# Patient Record
Sex: MALE | Race: WHITE | Employment: OTHER | URBAN - METROPOLITAN AREA
[De-identification: names, ages, dates, MRNs, and addresses within clinical notes are randomized per-mention and may not be internally consistent; named-entity substitution may affect disease eponyms.]

---

## 2017-10-05 ENCOUNTER — HOSPITAL ENCOUNTER (OUTPATIENT)
Dept: GENERAL RADIOLOGY | Age: 72
Discharge: HOME | End: 2017-10-05
Attending: OTOLARYNGOLOGY
Payer: MEDICARE

## 2017-10-05 DIAGNOSIS — R49.0 HOARSENESS: ICD-10-CM

## 2017-10-05 DIAGNOSIS — R13.10 DYSPHAGIA: ICD-10-CM

## 2017-10-05 DIAGNOSIS — R13.14 PHARYNGOESOPHAGEAL DYSPHAGIA: ICD-10-CM

## 2017-10-05 DIAGNOSIS — J37.0 LARYNGITIS, CHRONIC: ICD-10-CM

## 2017-10-05 PROCEDURE — 74230 X-RAY XM SWLNG FUNCJ C+: CPT

## 2017-10-05 PROCEDURE — G8997 SWALLOW GOAL STATUS: HCPCS

## 2017-10-05 PROCEDURE — G8996 SWALLOW CURRENT STATUS: HCPCS

## 2017-10-05 PROCEDURE — G8998 SWALLOW D/C STATUS: HCPCS

## 2017-10-05 PROCEDURE — 92611 MOTION FLUOROSCOPY/SWALLOW: CPT

## 2018-07-31 ENCOUNTER — HOSPITAL ENCOUNTER (OUTPATIENT)
Dept: PREADMISSION TESTING | Age: 73
Discharge: HOME | End: 2018-07-31
Payer: MEDICARE

## 2018-07-31 VITALS
WEIGHT: 274 LBS | SYSTOLIC BLOOD PRESSURE: 117 MMHG | BODY MASS INDEX: 35.16 KG/M2 | HEART RATE: 67 BPM | HEIGHT: 74 IN | DIASTOLIC BLOOD PRESSURE: 77 MMHG | TEMPERATURE: 97.7 F

## 2018-07-31 LAB
ABO + RH BLD: NORMAL
ANION GAP SERPL CALC-SCNC: 8 MMOL/L (ref 5–15)
APPEARANCE UR: ABNORMAL
BACTERIA URNS QL MICRO: NEGATIVE /HPF
BILIRUB UR QL: NEGATIVE
BLOOD GROUP ANTIBODIES SERPL: NORMAL
BUN SERPL-MCNC: 19 MG/DL (ref 6–20)
BUN/CREAT SERPL: 20 (ref 12–20)
CALCIUM SERPL-MCNC: 8.7 MG/DL (ref 8.5–10.1)
CHLORIDE SERPL-SCNC: 109 MMOL/L (ref 97–108)
CO2 SERPL-SCNC: 23 MMOL/L (ref 21–32)
COLOR UR: ABNORMAL
CREAT SERPL-MCNC: 0.93 MG/DL (ref 0.7–1.3)
EPITH CASTS URNS QL MICRO: ABNORMAL /LPF
ERYTHROCYTE [DISTWIDTH] IN BLOOD BY AUTOMATED COUNT: 13.5 % (ref 11.5–14.5)
EST. AVERAGE GLUCOSE BLD GHB EST-MCNC: 117 MG/DL
GLUCOSE SERPL-MCNC: 108 MG/DL (ref 65–100)
GLUCOSE UR STRIP.AUTO-MCNC: NEGATIVE MG/DL
HBA1C MFR BLD: 5.7 % (ref 4.2–6.3)
HCT VFR BLD AUTO: 47.1 % (ref 36.6–50.3)
HGB BLD-MCNC: 15.2 G/DL (ref 12.1–17)
HGB UR QL STRIP: NEGATIVE
HYALINE CASTS URNS QL MICRO: ABNORMAL /LPF (ref 0–5)
INR PPP: 1 (ref 0.9–1.1)
KETONES UR QL STRIP.AUTO: NEGATIVE MG/DL
LEUKOCYTE ESTERASE UR QL STRIP.AUTO: NEGATIVE
MCH RBC QN AUTO: 29.7 PG (ref 26–34)
MCHC RBC AUTO-ENTMCNC: 32.3 G/DL (ref 30–36.5)
MCV RBC AUTO: 92.2 FL (ref 80–99)
NITRITE UR QL STRIP.AUTO: NEGATIVE
NRBC # BLD: 0 K/UL (ref 0–0.01)
NRBC BLD-RTO: 0 PER 100 WBC
PH UR STRIP: 5.5 [PH] (ref 5–8)
PLATELET # BLD AUTO: 156 K/UL (ref 150–400)
PMV BLD AUTO: 12.2 FL (ref 8.9–12.9)
POTASSIUM SERPL-SCNC: 4.3 MMOL/L (ref 3.5–5.1)
PROT UR STRIP-MCNC: NEGATIVE MG/DL
PROTHROMBIN TIME: 10.4 SEC (ref 9–11.1)
RBC # BLD AUTO: 5.11 M/UL (ref 4.1–5.7)
RBC #/AREA URNS HPF: ABNORMAL /HPF (ref 0–5)
SODIUM SERPL-SCNC: 140 MMOL/L (ref 136–145)
SP GR UR REFRACTOMETRY: 1.02 (ref 1–1.03)
SPECIMEN EXP DATE BLD: NORMAL
UA: UC IF INDICATED,UAUC: ABNORMAL
UROBILINOGEN UR QL STRIP.AUTO: 1 EU/DL (ref 0.2–1)
WBC # BLD AUTO: 7.7 K/UL (ref 4.1–11.1)
WBC URNS QL MICRO: ABNORMAL /HPF (ref 0–4)

## 2018-07-31 PROCEDURE — 36415 COLL VENOUS BLD VENIPUNCTURE: CPT | Performed by: ORTHOPAEDIC SURGERY

## 2018-07-31 PROCEDURE — 81001 URINALYSIS AUTO W/SCOPE: CPT | Performed by: ORTHOPAEDIC SURGERY

## 2018-07-31 PROCEDURE — 83036 HEMOGLOBIN GLYCOSYLATED A1C: CPT | Performed by: ORTHOPAEDIC SURGERY

## 2018-07-31 PROCEDURE — 85027 COMPLETE CBC AUTOMATED: CPT | Performed by: ORTHOPAEDIC SURGERY

## 2018-07-31 PROCEDURE — 85610 PROTHROMBIN TIME: CPT | Performed by: ORTHOPAEDIC SURGERY

## 2018-07-31 PROCEDURE — 80048 BASIC METABOLIC PNL TOTAL CA: CPT | Performed by: ORTHOPAEDIC SURGERY

## 2018-07-31 PROCEDURE — 93005 ELECTROCARDIOGRAM TRACING: CPT

## 2018-07-31 PROCEDURE — 86900 BLOOD TYPING SEROLOGIC ABO: CPT | Performed by: ORTHOPAEDIC SURGERY

## 2018-07-31 RX ORDER — METOPROLOL SUCCINATE 25 MG/1
25 TABLET, EXTENDED RELEASE ORAL DAILY
COMMUNITY
End: 2021-09-17

## 2018-07-31 RX ORDER — LANOLIN ALCOHOL/MO/W.PET/CERES
400 CREAM (GRAM) TOPICAL DAILY
COMMUNITY
End: 2021-05-14

## 2018-07-31 RX ORDER — DUTASTERIDE 0.5 MG/1
0.5 CAPSULE, LIQUID FILLED ORAL DAILY
COMMUNITY

## 2018-07-31 RX ORDER — LEVOCETIRIZINE DIHYDROCHLORIDE 5 MG/1
5 TABLET, FILM COATED ORAL
COMMUNITY

## 2018-07-31 RX ORDER — ACETAMINOPHEN 160 MG/5ML
200 SUSPENSION, ORAL (FINAL DOSE FORM) ORAL DAILY
COMMUNITY
End: 2022-04-22

## 2018-07-31 RX ORDER — MONTELUKAST SODIUM 10 MG/1
10 TABLET ORAL
COMMUNITY
End: 2021-08-22

## 2018-07-31 RX ORDER — PHENOL/SODIUM PHENOLATE
20 AEROSOL, SPRAY (ML) MUCOUS MEMBRANE 2 TIMES DAILY
COMMUNITY
End: 2021-12-13 | Stop reason: ALTCHOICE

## 2018-07-31 RX ORDER — NITROFURANTOIN (MACROCRYSTALS) 100 MG/1
CAPSULE ORAL DAILY
COMMUNITY
End: 2018-08-16

## 2018-07-31 RX ORDER — IPRATROPIUM BROMIDE AND ALBUTEROL SULFATE 2.5; .5 MG/3ML; MG/3ML
3 SOLUTION RESPIRATORY (INHALATION)
COMMUNITY

## 2018-07-31 RX ORDER — GUAIFENESIN 100 MG/5ML
81 LIQUID (ML) ORAL DAILY
COMMUNITY
End: 2018-08-11

## 2018-07-31 RX ORDER — LANOLIN ALCOHOL/MO/W.PET/CERES
1000 CREAM (GRAM) TOPICAL
COMMUNITY
End: 2021-05-14 | Stop reason: ALTCHOICE

## 2018-08-01 LAB
ATRIAL RATE: 53 BPM
BACTERIA SPEC CULT: NORMAL
BACTERIA SPEC CULT: NORMAL
CALCULATED P AXIS, ECG09: 28 DEGREES
CALCULATED R AXIS, ECG10: -7 DEGREES
CALCULATED T AXIS, ECG11: 20 DEGREES
DIAGNOSIS, 93000: NORMAL
P-R INTERVAL, ECG05: 174 MS
Q-T INTERVAL, ECG07: 426 MS
QRS DURATION, ECG06: 102 MS
QTC CALCULATION (BEZET), ECG08: 399 MS
SERVICE CMNT-IMP: NORMAL
VENTRICULAR RATE, ECG03: 53 BPM

## 2018-08-03 PROBLEM — M17.11 PRIMARY OSTEOARTHRITIS OF RIGHT KNEE: Status: ACTIVE | Noted: 2018-08-03

## 2018-08-03 NOTE — H&P
Chief Complaint: Follow-up of the Right Knee 
  
Concepcion Carrion comes in the the clinic today for evaluation of his right knee. Patient complains of bilateral knee pain but he is mainly concerned with the right knee. Patient feels that he failed conservative treatment including steroid injections. He is unable to walk short distances without pain. He is unable to do his daily activities now due to the severity of his symptoms. No recent injuries.  
  
Review of Systems 7/16/2018 
  
Constitutional: Unexplained: Negative Genitourinary: Frequent Urination: Positive HEENT: Vision Loss: Negative Neurological: Memory Loss: Negative Integumentary: Rash: Negative Immunological: Seasonal Allergies: Positive Musculoskeletal: Joint Pain: Positive 
  
Medical History Past Medical History:  
Diagnosis Date  Asthma    
 Dementia    
 Sleep apnea    
  
  
  
Surgical History Past Surgical History:  
Procedure Laterality Date  ANGIOPLASTY      
 NO RELEVANT ORTHOPAEDIC SURGERIES      
  
  
  
Objective:  
  
   
Vitals:  
  07/16/18 1057 BP: 120/80  
  
  
Constitutional:  No acute distress. Well nourished. Well developed. Eyes:  Sclera are nonicteric. Respiratory:  No labored breathing. Cardiovascular:  No marked edema. Skin:  No marked skin ulcers. Neurological:  No marked sensory loss noted. Psychiatric: Alert and oriented x3. Musculoskeletal  
  
On exam of the right knee reveals a lack of 8 degrees to full extension with flexion to 111. Diffuse tenderness and crepitus to ROM. Synovial hypertrophy. No knee effusion. Slight varus deformity. The cruciate and collateral ligaments are stable. No sign of infection. No ecchymosis or erythema. No cellulitis or rash. No calf pain, no evidence of DVT. I detect no obvious motor or sensory deficits in the lower extremities. The extensor mechanism is intact. The neurovascular status is intact. 
  
Imaging/Studies: No imaging obtained   
I reviewed and interpreted previous X-rays of the right knee taken on 04/08/18.  
  
They revealed marked degenerative arthritis with complete loss of joint space medially. Significant lateral and patellofemoral changes.  
  
Assessment:  
There is no problem list on file for this patient. 
  
  
1. Primary osteoarthritis of right knee   
  
  
  
Plan: I reviewed my findings with the patient and explained he has advanced degenerative arthritis in the right knee. We discussed treatment options. The  Patient failed conservative treatment and cortisone injection. I explained that the only treatment modality left that would provide him significant relief of his symptoms was right total knee replacement. The patient was in agreement with the proposed plan.  
  
I explained the hospitalization, post-op and rehabilitation for the procedure. We discussed all complications associated with the surgery, including the chance of infection, DVT, and pulmonary embolus. I explained the use of antibiotics and Xarelto following surgery to prevent infection and DVT and we discussed the course of post-op physical therapy for rehabilitation. PROCEDURE: Right total knee replacement. Date of Surgery Update: Alayna Sanchez was seen and examined. Past Medical History:  
Diagnosis Date  Arthritis  Asthma   
 R/T ENVIRONMENTAL ALLERGIES; INHALER USE DAILY  CAD (coronary artery disease) 2007 MI  
 Edema extremities LEGS; WEARS COMPRESSION STOCKINGS  
 GERD (gastroesophageal reflux disease)  History of BPH   
 History of kidney stones  Hx: UTI (urinary tract infection)  Hypertension  Sleep apnea CPAP Prior to Admission Medications Prescriptions Last Dose Informant Patient Reported? Taking? Omeprazole delayed release (PRILOSEC D/R) 20 mg tablet 8/8/2018 at Unknown time  Yes Yes Sig: Take 20 mg by mouth two (2) times a day.   
albuterol-ipratropium (DUO-NEB) 2.5 mg-0.5 mg/3 ml nebu Not Taking at Unknown time  Yes No  
Sig: 3 mL by Nebulization route every six (6) hours as needed. aspirin 81 mg chewable tablet 8/7/2018 at Unknown time  Yes Yes Sig: Take 81 mg by mouth daily. calcium-cholecalciferol, d3, (CALCIUM 600 + D) 600-125 mg-unit tab 8/7/2018 at Unknown time  Yes Yes Sig: Take  by mouth daily. coenzyme Q-10 (CO Q-10) 200 mg capsule 8/1/2018 at Unknown time  Yes Yes Sig: Take  by mouth daily. cranberry fruit extract (CRANBERRY CONCENTRATE PO) 8/1/2018 at Unknown time  Yes Yes Sig: Take 360 mg by mouth nightly. cyanocobalamin (VITAMIN B-12) 1,000 mcg tablet 8/1/2018 at Unknown time  Yes Yes Sig: Take 1,000 mcg by mouth every seven (7) days. dutasteride (AVODART) 0.5 mg capsule 8/7/2018 at Unknown time  Yes Yes Sig: Take 0.5 mg by mouth daily. levocetirizine (XYZAL) 5 mg tablet 8/7/2018 at Unknown time  Yes Yes Sig: Take  by mouth nightly.  
magnesium oxide (MAG-OX) 400 mg tablet 8/7/2018 at Unknown time  Yes Yes Sig: Take 400 mg by mouth daily. mecobal/levomefolat Ca/B6 phos (FOLTANX PO) 8/1/2018 at Unknown time  Yes Yes Sig: Take  by mouth daily. metoprolol succinate (TOPROL-XL) 25 mg XL tablet 8/8/2018 at 0600  Yes Yes Sig: Take  by mouth daily. mometasone-formoterol (DULERA) 100-5 mcg/actuation HFA inhaler 8/8/2018 at Unknown time  Yes Yes Sig: Take 2 Puffs by inhalation two (2) times a day. montelukast (SINGULAIR) 10 mg tablet 8/7/2018 at Unknown time  Yes Yes Sig: Take 10 mg by mouth nightly. nitrofurantoin (MACRODANTIN) 100 mg capsule 8/7/2018 at Unknown time  Yes Yes Sig: Take  by mouth daily. Facility-Administered Medications: None Allergy to:Sulfa (sulfonamide antibiotics) Physical Examination: General appearance - alert, well appearing, and in no distress History and physical has been reviewed. The patient has been examined.  There have been no significant clinical changes since the completion of the originally dated History and Physical. 
 
Signed By: Audrey Robles PA-C  August 8, 2018 10:14 AM

## 2018-08-08 ENCOUNTER — HOSPITAL ENCOUNTER (INPATIENT)
Age: 73
LOS: 3 days | Discharge: HOME HEALTH | DRG: 470 | End: 2018-08-11
Attending: ORTHOPAEDIC SURGERY | Admitting: ORTHOPAEDIC SURGERY
Payer: MEDICARE

## 2018-08-08 ENCOUNTER — ANESTHESIA (OUTPATIENT)
Dept: SURGERY | Age: 73
DRG: 470 | End: 2018-08-08
Payer: MEDICARE

## 2018-08-08 ENCOUNTER — ANESTHESIA EVENT (OUTPATIENT)
Dept: SURGERY | Age: 73
DRG: 470 | End: 2018-08-08
Payer: MEDICARE

## 2018-08-08 DIAGNOSIS — M17.11 PRIMARY OSTEOARTHRITIS OF RIGHT KNEE: Primary | ICD-10-CM

## 2018-08-08 LAB
GLUCOSE BLD STRIP.AUTO-MCNC: 95 MG/DL (ref 65–100)
SERVICE CMNT-IMP: NORMAL

## 2018-08-08 PROCEDURE — 77030007866 HC KT SPN ANES BBMI -B: Performed by: NURSE ANESTHETIST, CERTIFIED REGISTERED

## 2018-08-08 PROCEDURE — 74011250636 HC RX REV CODE- 250/636: Performed by: PHYSICIAN ASSISTANT

## 2018-08-08 PROCEDURE — 74011250636 HC RX REV CODE- 250/636

## 2018-08-08 PROCEDURE — 0SRC0J9 REPLACEMENT OF RIGHT KNEE JOINT WITH SYNTHETIC SUBSTITUTE, CEMENTED, OPEN APPROACH: ICD-10-PCS | Performed by: ORTHOPAEDIC SURGERY

## 2018-08-08 PROCEDURE — 77030003601 HC NDL NRV BLK BBMI -A

## 2018-08-08 PROCEDURE — 77030016547 HC BLD SAW SAG1 STRY -B: Performed by: ORTHOPAEDIC SURGERY

## 2018-08-08 PROCEDURE — 74011250636 HC RX REV CODE- 250/636: Performed by: ANESTHESIOLOGY

## 2018-08-08 PROCEDURE — 77030014077 HC TOWER MX CEM J&J -C: Performed by: ORTHOPAEDIC SURGERY

## 2018-08-08 PROCEDURE — 76060000036 HC ANESTHESIA 2.5 TO 3 HR: Performed by: ORTHOPAEDIC SURGERY

## 2018-08-08 PROCEDURE — 94640 AIRWAY INHALATION TREATMENT: CPT

## 2018-08-08 PROCEDURE — 77030013079 HC BLNKT BAIR HGGR 3M -A: Performed by: NURSE ANESTHETIST, CERTIFIED REGISTERED

## 2018-08-08 PROCEDURE — 64450 NJX AA&/STRD OTHER PN/BRANCH: CPT

## 2018-08-08 PROCEDURE — C1776 JOINT DEVICE (IMPLANTABLE): HCPCS | Performed by: ORTHOPAEDIC SURGERY

## 2018-08-08 PROCEDURE — 77030029684 HC NEB SM VOL KT MONA -A

## 2018-08-08 PROCEDURE — 77030012935 HC DRSG AQUACEL BMS -B: Performed by: ORTHOPAEDIC SURGERY

## 2018-08-08 PROCEDURE — 74011000250 HC RX REV CODE- 250: Performed by: PHYSICIAN ASSISTANT

## 2018-08-08 PROCEDURE — 74011000258 HC RX REV CODE- 258

## 2018-08-08 PROCEDURE — 77030018846 HC SOL IRR STRL H20 ICUM -A: Performed by: ORTHOPAEDIC SURGERY

## 2018-08-08 PROCEDURE — 77030008467 HC STPLR SKN COVD -B: Performed by: ORTHOPAEDIC SURGERY

## 2018-08-08 PROCEDURE — 74011000250 HC RX REV CODE- 250

## 2018-08-08 PROCEDURE — 77030031139 HC SUT VCRL2 J&J -A: Performed by: ORTHOPAEDIC SURGERY

## 2018-08-08 PROCEDURE — 77030012894

## 2018-08-08 PROCEDURE — 74011250637 HC RX REV CODE- 250/637: Performed by: PHYSICIAN ASSISTANT

## 2018-08-08 PROCEDURE — 77030039497 HC CST PAD STERILE CHCS -A: Performed by: ORTHOPAEDIC SURGERY

## 2018-08-08 PROCEDURE — 76210000006 HC OR PH I REC 0.5 TO 1 HR: Performed by: ORTHOPAEDIC SURGERY

## 2018-08-08 PROCEDURE — 94664 DEMO&/EVAL PT USE INHALER: CPT

## 2018-08-08 PROCEDURE — C1713 ANCHOR/SCREW BN/BN,TIS/BN: HCPCS | Performed by: ORTHOPAEDIC SURGERY

## 2018-08-08 PROCEDURE — C9290 INJ, BUPIVACAINE LIPOSOME: HCPCS | Performed by: PHYSICIAN ASSISTANT

## 2018-08-08 PROCEDURE — 74011000258 HC RX REV CODE- 258: Performed by: PHYSICIAN ASSISTANT

## 2018-08-08 PROCEDURE — 77030020788: Performed by: ORTHOPAEDIC SURGERY

## 2018-08-08 PROCEDURE — 77030035236 HC SUT PDS STRATFX BARB J&J -B: Performed by: ORTHOPAEDIC SURGERY

## 2018-08-08 PROCEDURE — 77030032490 HC SLV COMPR SCD KNE COVD -B: Performed by: ORTHOPAEDIC SURGERY

## 2018-08-08 PROCEDURE — 3E0T3BZ INTRODUCTION OF ANESTHETIC AGENT INTO PERIPHERAL NERVES AND PLEXI, PERCUTANEOUS APPROACH: ICD-10-PCS | Performed by: ANESTHESIOLOGY

## 2018-08-08 PROCEDURE — 77030038020 HC MANFLD NEPTUNE STRY -B: Performed by: ORTHOPAEDIC SURGERY

## 2018-08-08 PROCEDURE — 76010000171 HC OR TIME 2 TO 2.5 HR INTENSV-TIER 1: Performed by: ORTHOPAEDIC SURGERY

## 2018-08-08 PROCEDURE — 77030011640 HC PAD GRND REM COVD -A: Performed by: ORTHOPAEDIC SURGERY

## 2018-08-08 PROCEDURE — 65270000029 HC RM PRIVATE

## 2018-08-08 PROCEDURE — 77030034850: Performed by: ORTHOPAEDIC SURGERY

## 2018-08-08 PROCEDURE — 82962 GLUCOSE BLOOD TEST: CPT

## 2018-08-08 PROCEDURE — 77030018836 HC SOL IRR NACL ICUM -A: Performed by: ORTHOPAEDIC SURGERY

## 2018-08-08 PROCEDURE — 77030020782 HC GWN BAIR PAWS FLX 3M -B

## 2018-08-08 PROCEDURE — 77010033678 HC OXYGEN DAILY

## 2018-08-08 DEVICE — INSERT TIB RP FEM KNEE CEM: Type: IMPLANTABLE DEVICE | Status: FUNCTIONAL

## 2018-08-08 DEVICE — ATTUNE KNEE SYSTEM REVISION FIXED BEARING TIBIAL BASE CEMENTED SIZE 8
Type: IMPLANTABLE DEVICE | Site: KNEE | Status: FUNCTIONAL
Brand: ATTUNE

## 2018-08-08 DEVICE — ATTUNE KNEE SYSTEM TIBIAL INSERT FIXED BEARING POSTERIOR STABILIZED 8 8MM AOX
Type: IMPLANTABLE DEVICE | Site: KNEE | Status: FUNCTIONAL
Brand: ATTUNE

## 2018-08-08 DEVICE — ATTUNE PATELLA MEDIALIZED DOME 41MM CEMENTED AOX
Type: IMPLANTABLE DEVICE | Site: KNEE | Status: FUNCTIONAL
Brand: ATTUNE

## 2018-08-08 DEVICE — SMARTSET HV HIGH VISCOSITY BONE CEMENT 40G
Type: IMPLANTABLE DEVICE | Site: KNEE | Status: FUNCTIONAL
Brand: SMARTSET

## 2018-08-08 DEVICE — ATTUNE KNEE SYSTEM FEMORAL POSTERIOR STABILIZED SIZE 8 RIGHT CEMENTED
Type: IMPLANTABLE DEVICE | Site: KNEE | Status: FUNCTIONAL
Brand: ATTUNE

## 2018-08-08 RX ORDER — MIDAZOLAM HYDROCHLORIDE 1 MG/ML
1 INJECTION, SOLUTION INTRAMUSCULAR; INTRAVENOUS AS NEEDED
Status: DISCONTINUED | OUTPATIENT
Start: 2018-08-08 | End: 2018-08-08 | Stop reason: HOSPADM

## 2018-08-08 RX ORDER — FENTANYL CITRATE 50 UG/ML
25 INJECTION, SOLUTION INTRAMUSCULAR; INTRAVENOUS
Status: DISCONTINUED | OUTPATIENT
Start: 2018-08-08 | End: 2018-08-11 | Stop reason: HOSPADM

## 2018-08-08 RX ORDER — FENTANYL CITRATE 50 UG/ML
INJECTION, SOLUTION INTRAMUSCULAR; INTRAVENOUS AS NEEDED
Status: DISCONTINUED | OUTPATIENT
Start: 2018-08-08 | End: 2018-08-08 | Stop reason: HOSPADM

## 2018-08-08 RX ORDER — SODIUM CHLORIDE 0.9 % (FLUSH) 0.9 %
5-10 SYRINGE (ML) INJECTION EVERY 8 HOURS
Status: DISCONTINUED | OUTPATIENT
Start: 2018-08-08 | End: 2018-08-08 | Stop reason: HOSPADM

## 2018-08-08 RX ORDER — SODIUM CHLORIDE 0.9 % (FLUSH) 0.9 %
5-10 SYRINGE (ML) INJECTION AS NEEDED
Status: DISCONTINUED | OUTPATIENT
Start: 2018-08-08 | End: 2018-08-08 | Stop reason: HOSPADM

## 2018-08-08 RX ORDER — CETIRIZINE HCL 10 MG
5 TABLET ORAL EVERY EVENING
Status: DISCONTINUED | OUTPATIENT
Start: 2018-08-08 | End: 2018-08-11 | Stop reason: HOSPADM

## 2018-08-08 RX ORDER — DEXAMETHASONE SODIUM PHOSPHATE 4 MG/ML
INJECTION, SOLUTION INTRA-ARTICULAR; INTRALESIONAL; INTRAMUSCULAR; INTRAVENOUS; SOFT TISSUE AS NEEDED
Status: DISCONTINUED | OUTPATIENT
Start: 2018-08-08 | End: 2018-08-08 | Stop reason: HOSPADM

## 2018-08-08 RX ORDER — METOPROLOL SUCCINATE 25 MG/1
25 TABLET, EXTENDED RELEASE ORAL DAILY
Status: DISCONTINUED | OUTPATIENT
Start: 2018-08-09 | End: 2018-08-11 | Stop reason: HOSPADM

## 2018-08-08 RX ORDER — SODIUM CHLORIDE 9 MG/ML
125 INJECTION, SOLUTION INTRAVENOUS CONTINUOUS
Status: DISPENSED | OUTPATIENT
Start: 2018-08-08 | End: 2018-08-09

## 2018-08-08 RX ORDER — NALOXONE HYDROCHLORIDE 0.4 MG/ML
0.4 INJECTION, SOLUTION INTRAMUSCULAR; INTRAVENOUS; SUBCUTANEOUS AS NEEDED
Status: DISCONTINUED | OUTPATIENT
Start: 2018-08-08 | End: 2018-08-11 | Stop reason: HOSPADM

## 2018-08-08 RX ORDER — FENTANYL CITRATE 50 UG/ML
25 INJECTION, SOLUTION INTRAMUSCULAR; INTRAVENOUS
Status: DISCONTINUED | OUTPATIENT
Start: 2018-08-08 | End: 2018-08-08 | Stop reason: HOSPADM

## 2018-08-08 RX ORDER — HYDROXYZINE HYDROCHLORIDE 10 MG/1
10 TABLET, FILM COATED ORAL
Status: DISCONTINUED | OUTPATIENT
Start: 2018-08-08 | End: 2018-08-11 | Stop reason: HOSPADM

## 2018-08-08 RX ORDER — BUDESONIDE 0.5 MG/2ML
500 INHALANT ORAL
Status: DISCONTINUED | OUTPATIENT
Start: 2018-08-08 | End: 2018-08-11 | Stop reason: HOSPADM

## 2018-08-08 RX ORDER — SODIUM CHLORIDE 9 MG/ML
1000 INJECTION, SOLUTION INTRAVENOUS CONTINUOUS
Status: DISCONTINUED | OUTPATIENT
Start: 2018-08-08 | End: 2018-08-08 | Stop reason: HOSPADM

## 2018-08-08 RX ORDER — OXYCODONE AND ACETAMINOPHEN 5; 325 MG/1; MG/1
1 TABLET ORAL AS NEEDED
Status: DISCONTINUED | OUTPATIENT
Start: 2018-08-08 | End: 2018-08-08 | Stop reason: HOSPADM

## 2018-08-08 RX ORDER — FACIAL-BODY WIPES
10 EACH TOPICAL DAILY PRN
Status: DISCONTINUED | OUTPATIENT
Start: 2018-08-10 | End: 2018-08-11 | Stop reason: HOSPADM

## 2018-08-08 RX ORDER — OXYCODONE HYDROCHLORIDE 5 MG/1
10 TABLET ORAL
Status: DISCONTINUED | OUTPATIENT
Start: 2018-08-08 | End: 2018-08-11 | Stop reason: HOSPADM

## 2018-08-08 RX ORDER — SODIUM CHLORIDE 0.9 % (FLUSH) 0.9 %
5-10 SYRINGE (ML) INJECTION EVERY 8 HOURS
Status: DISCONTINUED | OUTPATIENT
Start: 2018-08-09 | End: 2018-08-11 | Stop reason: HOSPADM

## 2018-08-08 RX ORDER — FAMOTIDINE 20 MG/1
20 TABLET, FILM COATED ORAL
Status: DISCONTINUED | OUTPATIENT
Start: 2018-08-08 | End: 2018-08-11 | Stop reason: HOSPADM

## 2018-08-08 RX ORDER — BUPIVACAINE HYDROCHLORIDE 5 MG/ML
INJECTION, SOLUTION EPIDURAL; INTRACAUDAL AS NEEDED
Status: DISCONTINUED | OUTPATIENT
Start: 2018-08-08 | End: 2018-08-08 | Stop reason: HOSPADM

## 2018-08-08 RX ORDER — LIDOCAINE HYDROCHLORIDE 10 MG/ML
0.1 INJECTION, SOLUTION EPIDURAL; INFILTRATION; INTRACAUDAL; PERINEURAL AS NEEDED
Status: DISCONTINUED | OUTPATIENT
Start: 2018-08-08 | End: 2018-08-08 | Stop reason: HOSPADM

## 2018-08-08 RX ORDER — MIDAZOLAM HYDROCHLORIDE 1 MG/ML
0.5 INJECTION, SOLUTION INTRAMUSCULAR; INTRAVENOUS
Status: DISCONTINUED | OUTPATIENT
Start: 2018-08-08 | End: 2018-08-08 | Stop reason: HOSPADM

## 2018-08-08 RX ORDER — OXYCODONE HYDROCHLORIDE 5 MG/1
5 TABLET ORAL
Status: DISCONTINUED | OUTPATIENT
Start: 2018-08-08 | End: 2018-08-11 | Stop reason: HOSPADM

## 2018-08-08 RX ORDER — POLYETHYLENE GLYCOL 3350 17 G/17G
17 POWDER, FOR SOLUTION ORAL DAILY
Status: DISCONTINUED | OUTPATIENT
Start: 2018-08-09 | End: 2018-08-11 | Stop reason: HOSPADM

## 2018-08-08 RX ORDER — MIDAZOLAM HYDROCHLORIDE 1 MG/ML
INJECTION, SOLUTION INTRAMUSCULAR; INTRAVENOUS AS NEEDED
Status: DISCONTINUED | OUTPATIENT
Start: 2018-08-08 | End: 2018-08-08 | Stop reason: HOSPADM

## 2018-08-08 RX ORDER — ARFORMOTEROL TARTRATE 15 UG/2ML
15 SOLUTION RESPIRATORY (INHALATION)
Status: DISCONTINUED | OUTPATIENT
Start: 2018-08-08 | End: 2018-08-11 | Stop reason: HOSPADM

## 2018-08-08 RX ORDER — AMOXICILLIN 250 MG
1 CAPSULE ORAL 2 TIMES DAILY
Status: DISCONTINUED | OUTPATIENT
Start: 2018-08-08 | End: 2018-08-11 | Stop reason: HOSPADM

## 2018-08-08 RX ORDER — MORPHINE SULFATE 1 MG/ML
2 INJECTION, SOLUTION EPIDURAL; INTRATHECAL; INTRAVENOUS
Status: DISCONTINUED | OUTPATIENT
Start: 2018-08-08 | End: 2018-08-08 | Stop reason: HOSPADM

## 2018-08-08 RX ORDER — MONTELUKAST SODIUM 10 MG/1
10 TABLET ORAL
Status: DISCONTINUED | OUTPATIENT
Start: 2018-08-08 | End: 2018-08-11 | Stop reason: HOSPADM

## 2018-08-08 RX ORDER — PANTOPRAZOLE SODIUM 40 MG/1
40 TABLET, DELAYED RELEASE ORAL 2 TIMES DAILY
Status: DISCONTINUED | OUTPATIENT
Start: 2018-08-09 | End: 2018-08-11 | Stop reason: HOSPADM

## 2018-08-08 RX ORDER — PROPOFOL 10 MG/ML
INJECTION, EMULSION INTRAVENOUS AS NEEDED
Status: DISCONTINUED | OUTPATIENT
Start: 2018-08-08 | End: 2018-08-08 | Stop reason: HOSPADM

## 2018-08-08 RX ORDER — IPRATROPIUM BROMIDE AND ALBUTEROL SULFATE 2.5; .5 MG/3ML; MG/3ML
3 SOLUTION RESPIRATORY (INHALATION)
Status: DISCONTINUED | OUTPATIENT
Start: 2018-08-08 | End: 2018-08-11 | Stop reason: HOSPADM

## 2018-08-08 RX ORDER — SODIUM CHLORIDE, SODIUM LACTATE, POTASSIUM CHLORIDE, CALCIUM CHLORIDE 600; 310; 30; 20 MG/100ML; MG/100ML; MG/100ML; MG/100ML
125 INJECTION, SOLUTION INTRAVENOUS CONTINUOUS
Status: DISCONTINUED | OUTPATIENT
Start: 2018-08-08 | End: 2018-08-08 | Stop reason: HOSPADM

## 2018-08-08 RX ORDER — DIPHENHYDRAMINE HYDROCHLORIDE 50 MG/ML
12.5 INJECTION, SOLUTION INTRAMUSCULAR; INTRAVENOUS AS NEEDED
Status: DISCONTINUED | OUTPATIENT
Start: 2018-08-08 | End: 2018-08-08 | Stop reason: HOSPADM

## 2018-08-08 RX ORDER — ONDANSETRON 2 MG/ML
4 INJECTION INTRAMUSCULAR; INTRAVENOUS AS NEEDED
Status: DISCONTINUED | OUTPATIENT
Start: 2018-08-08 | End: 2018-08-08 | Stop reason: HOSPADM

## 2018-08-08 RX ORDER — SODIUM CHLORIDE 0.9 % (FLUSH) 0.9 %
5-10 SYRINGE (ML) INJECTION AS NEEDED
Status: DISCONTINUED | OUTPATIENT
Start: 2018-08-08 | End: 2018-08-11 | Stop reason: HOSPADM

## 2018-08-08 RX ORDER — SODIUM CHLORIDE 9 MG/ML
50 INJECTION, SOLUTION INTRAVENOUS CONTINUOUS
Status: DISCONTINUED | OUTPATIENT
Start: 2018-08-08 | End: 2018-08-08 | Stop reason: HOSPADM

## 2018-08-08 RX ORDER — PROPOFOL 10 MG/ML
INJECTION, EMULSION INTRAVENOUS
Status: DISCONTINUED | OUTPATIENT
Start: 2018-08-08 | End: 2018-08-08 | Stop reason: HOSPADM

## 2018-08-08 RX ORDER — DUTASTERIDE 0.5 MG/1
0.5 CAPSULE, LIQUID FILLED ORAL DAILY
Status: DISCONTINUED | OUTPATIENT
Start: 2018-08-09 | End: 2018-08-11 | Stop reason: HOSPADM

## 2018-08-08 RX ORDER — ACETAMINOPHEN 325 MG/1
650 TABLET ORAL EVERY 6 HOURS
Status: DISCONTINUED | OUTPATIENT
Start: 2018-08-08 | End: 2018-08-11 | Stop reason: HOSPADM

## 2018-08-08 RX ORDER — ONDANSETRON 2 MG/ML
INJECTION INTRAMUSCULAR; INTRAVENOUS AS NEEDED
Status: DISCONTINUED | OUTPATIENT
Start: 2018-08-08 | End: 2018-08-08 | Stop reason: HOSPADM

## 2018-08-08 RX ORDER — FENTANYL CITRATE 50 UG/ML
50 INJECTION, SOLUTION INTRAMUSCULAR; INTRAVENOUS AS NEEDED
Status: DISCONTINUED | OUTPATIENT
Start: 2018-08-08 | End: 2018-08-08 | Stop reason: HOSPADM

## 2018-08-08 RX ORDER — ONDANSETRON 2 MG/ML
4 INJECTION INTRAMUSCULAR; INTRAVENOUS
Status: ACTIVE | OUTPATIENT
Start: 2018-08-08 | End: 2018-08-09

## 2018-08-08 RX ADMIN — CEFAZOLIN 3 G: 1 INJECTION, POWDER, FOR SOLUTION INTRAMUSCULAR; INTRAVENOUS; PARENTERAL at 19:34

## 2018-08-08 RX ADMIN — ONDANSETRON 4 MG: 2 INJECTION INTRAMUSCULAR; INTRAVENOUS at 13:35

## 2018-08-08 RX ADMIN — CETIRIZINE HYDROCHLORIDE 5 MG: 10 TABLET, FILM COATED ORAL at 17:53

## 2018-08-08 RX ADMIN — RIVAROXABAN 10 MG: 10 TABLET, FILM COATED ORAL at 21:45

## 2018-08-08 RX ADMIN — FENTANYL CITRATE 50 MCG: 50 INJECTION, SOLUTION INTRAMUSCULAR; INTRAVENOUS at 13:24

## 2018-08-08 RX ADMIN — DOCUSATE SODIUM AND SENNOSIDES 1 TABLET: 8.6; 5 TABLET, FILM COATED ORAL at 17:53

## 2018-08-08 RX ADMIN — MONTELUKAST SODIUM 10 MG: 10 TABLET, FILM COATED ORAL at 21:45

## 2018-08-08 RX ADMIN — SODIUM CHLORIDE, SODIUM LACTATE, POTASSIUM CHLORIDE, AND CALCIUM CHLORIDE 125 ML/HR: 600; 310; 30; 20 INJECTION, SOLUTION INTRAVENOUS at 10:30

## 2018-08-08 RX ADMIN — FENTANYL CITRATE 50 MCG: 50 INJECTION, SOLUTION INTRAMUSCULAR; INTRAVENOUS at 10:50

## 2018-08-08 RX ADMIN — BUDESONIDE 500 MCG: 0.5 INHALANT RESPIRATORY (INHALATION) at 22:18

## 2018-08-08 RX ADMIN — PROPOFOL 30 MG: 10 INJECTION, EMULSION INTRAVENOUS at 11:54

## 2018-08-08 RX ADMIN — FENTANYL CITRATE 50 MCG: 50 INJECTION, SOLUTION INTRAMUSCULAR; INTRAVENOUS at 13:32

## 2018-08-08 RX ADMIN — ACETAMINOPHEN 650 MG: 325 TABLET, FILM COATED ORAL at 22:52

## 2018-08-08 RX ADMIN — ACETAMINOPHEN 650 MG: 325 TABLET, FILM COATED ORAL at 17:53

## 2018-08-08 RX ADMIN — SODIUM CHLORIDE, SODIUM LACTATE, POTASSIUM CHLORIDE, AND CALCIUM CHLORIDE: 600; 310; 30; 20 INJECTION, SOLUTION INTRAVENOUS at 12:27

## 2018-08-08 RX ADMIN — ARFORMOTEROL TARTRATE 15 MCG: 15 SOLUTION RESPIRATORY (INHALATION) at 22:18

## 2018-08-08 RX ADMIN — SODIUM CHLORIDE 125 ML/HR: 900 INJECTION, SOLUTION INTRAVENOUS at 22:50

## 2018-08-08 RX ADMIN — MIDAZOLAM HYDROCHLORIDE 2 MG: 1 INJECTION, SOLUTION INTRAMUSCULAR; INTRAVENOUS at 11:46

## 2018-08-08 RX ADMIN — BUPIVACAINE HYDROCHLORIDE 12 MG: 5 INJECTION, SOLUTION EPIDURAL; INTRACAUDAL at 11:57

## 2018-08-08 RX ADMIN — SODIUM CHLORIDE 125 ML/HR: 900 INJECTION, SOLUTION INTRAVENOUS at 15:38

## 2018-08-08 RX ADMIN — MIDAZOLAM HYDROCHLORIDE 2 MG: 1 INJECTION, SOLUTION INTRAMUSCULAR; INTRAVENOUS at 10:50

## 2018-08-08 RX ADMIN — CEFAZOLIN 3 G: 1 INJECTION, POWDER, FOR SOLUTION INTRAMUSCULAR; INTRAVENOUS; PARENTERAL at 12:10

## 2018-08-08 RX ADMIN — DEXAMETHASONE SODIUM PHOSPHATE 8 MG: 4 INJECTION, SOLUTION INTRA-ARTICULAR; INTRALESIONAL; INTRAMUSCULAR; INTRAVENOUS; SOFT TISSUE at 12:05

## 2018-08-08 RX ADMIN — PROPOFOL 75 MCG/KG/MIN: 10 INJECTION, EMULSION INTRAVENOUS at 12:02

## 2018-08-08 NOTE — BRIEF OP NOTE
BRIEF OPERATIVE NOTE    Date of Procedure: 8/8/2018   Preoperative Diagnosis: DEGENERATIVE JOINT DISEASE RIGHT KNEE  Postoperative Diagnosis: DEGENERATIVE JOINT DISEASE RIGHT KNEE    Procedure(s):  RIGHT TOTAL KNEE REPLACEMENT  Surgeon(s) and Role:     * Rex Cr MD - Primary         Surgical Assistant: Noman Samano PA-C    Surgical Staff:  Circ-1: Dung Hooker  Circ-Relief: Liliam Olea RN  Physician Assistant: Nguyen Luna PA-C  Scrub Tech-1: Nerissa Mayo  Scrub RN-1: Radha Forrest RN  Surg Asst-1: Emily Walsh  Surg Asst-2: Schaller [place]  Event Time In   Incision Start 1230   Incision Close 1409     Anesthesia: Spinal   Estimated Blood Loss: 200 mL  Specimens: * No specimens in log *   Findings: DJD Right knee   Complications: None. Implants:   Implant Name Type Inv.  Item Serial No.  Lot No. LRB No. Used Action   CEMENT BNE SMARTSET HV 40GM -- ORDER IN SETS OF 20 - SNA  CEMENT BNE SMARTSET HV 40GM -- ORDER IN SETS OF 20 [facility] [id number] Right 2 Implanted   PAT VANIA DOME MEDIAL 41MM -- ATTUNE - SNA  PAT VANIA DOME MEDIAL 41MM -- ATTUNE NA [facility] [id number] Right 1 Implanted   REVISION CEMENTED STEM 14 MM X 50 MM   1512- [employer] [id number] Right 1 Implanted   REVISION TIBIAL BASE SIZE 8    NA [employer] [id number] Right 1 Implanted   FEM PS SZ 8 RT VANIA -- ATTUNE - SNA  FEM PS SZ 8 RT VANIA -- ATTUNE NA [facility] [id number] Right 1 Implanted   INSERT TIB FB PS SZ 8 8MM -- ATTUNE - SNA   INSERT TIB FB PS SZ 8 8MM -- ATTUNE NA [facility] [id number] Right 1 Implanted

## 2018-08-08 NOTE — PERIOP NOTES
Right adductor canal block performed by Dr. Noa Obrien. Oxygen and monitors in place per protocol. Patient tolerated procedure well.

## 2018-08-08 NOTE — ANESTHESIA PROCEDURE NOTES
Peripheral Block    End time: 8/8/2018 10:50 AM  Performed by: ELVIS Meng  Authorized by: ELVIS Meng       Pre-procedure: Indications: at surgeon's request and post-op pain management    Preanesthetic Checklist: patient identified, risks and benefits discussed, site marked, timeout performed, anesthesia consent given and patient being monitored      Block Type:   Block Type:   Adductor canal  Laterality:  Right  Monitoring:  Standard ASA monitoring, continuous pulse ox, frequent vital sign checks, heart rate, responsive to questions and oxygen  Injection Technique:  Single shot  Procedures: ultrasound guided    Patient Position: supine  Prep: chlorhexidine    Location:  Mid thigh  Needle Type:  Stimuplex  Needle Gauge:  22 G  Needle Localization:  Ultrasound guidance  Medication Injected:  0.5%  ropivacaine  Volume (mL):  25    Assessment:  Number of attempts:  1  Injection Assessment:  Incremental injection every 5 mL, local visualized surrounding nerve on ultrasound, negative aspiration for blood, no paresthesia and no intravascular symptoms  Patient tolerance:  Patient tolerated the procedure well with no immediate complications

## 2018-08-08 NOTE — IP AVS SNAPSHOT
[address] [facility] [address] [place] [address] 
[phone] Patient: Rae Montes De Oca MRN: [id number] [id number] A check dimple indicates which time of day the medication should be taken. My Medications START taking these medications Instructions Each Dose to Equal  
 Morning Noon Evening Bedtime  
 oxyCODONE IR 5 mg immediate release tablet Commonly known as:  Claire Dean Your last dose was: Your next dose is: Take 1 Tab by mouth every four (4) hours as needed for Pain. Max Daily Amount: 30 mg.  
 5 mg  
    
   
   
   
  
 rivaroxaban 10 mg tablet Commonly known as:  Humza Gavin Your last dose was: Your next dose is: Take 1 Tab by mouth daily (with lunch) for 12 days. Indications: KNEE REPLACEMENT DEEP VEIN THROMBOSIS PREVENTION  
 10 mg CONTINUE taking these medications Instructions Each Dose to Equal  
 Morning Noon Evening Bedtime  
 albuterol-ipratropium 2.5 mg-0.5 mg/3 ml Nebu Commonly known as:  Georgette Medicus Your last dose was: Your next dose is:    
   
   
 3 mL by Nebulization route every six (6) hours as needed. 3 mL CALCIUM 600 + D 600-125 mg-unit Tab Generic drug:  calcium-cholecalciferol (d3) Your last dose was: Your next dose is: Take  by mouth daily. CO Q-10 200 mg capsule Generic drug:  coenzyme Q-10 Your last dose was: Your next dose is: Take  by mouth daily. CRANBERRY CONCENTRATE PO Your last dose was: Your next dose is: Take 360 mg by mouth nightly. 360 mg  
    
   
   
   
  
 DULERA 100-5 mcg/actuation HFA inhaler Generic drug:  mometasone-formoterol Your last dose was: Your next dose is: Take 2 Puffs by inhalation two (2) times a day. 2 Puff dutasteride 0.5 mg capsule Commonly known as:  AVODART Your last dose was: Your next dose is: Take 0.5 mg by mouth daily. 0.5 mg  
    
   
   
   
  
 FOLTANX PO Your last dose was: Your next dose is: Take  by mouth daily. levocetirizine 5 mg tablet Commonly known as:  Lupe Brady Your last dose was: Your next dose is: Take  by mouth nightly.  
     
   
   
   
  
 magnesium oxide 400 mg tablet Commonly known as:  MAG-OX Your last dose was: Your next dose is: Take 400 mg by mouth daily. 400 mg  
    
   
   
   
  
 metoprolol succinate 25 mg XL tablet Commonly known as:  TOPROL-XL Your last dose was: Your next dose is: Take  by mouth daily. montelukast 10 mg tablet Commonly known as:  SINGULAIR Your last dose was: Your next dose is: Take 10 mg by mouth nightly. 10 mg  
    
   
   
   
  
 nitrofurantoin 100 mg capsule Commonly known as:  MACRODANTIN Your last dose was: Your next dose is: Take  by mouth daily. Omeprazole delayed release 20 mg tablet Commonly known as:  PRILOSEC D/R Your last dose was: Your next dose is: Take 20 mg by mouth two (2) times a day. 20 mg  
    
   
   
   
  
 VITAMIN B-12 1,000 mcg tablet Generic drug:  cyanocobalamin Your last dose was: Your next dose is: Take 1,000 mcg by mouth every seven (7) days. 1000 mcg STOP taking these medications   
 aspirin 81 mg chewable tablet Where to Get Your Medications Information on where to get these meds will be given to you by the nurse or doctor. ! Ask your nurse or doctor about these medications oxyCODONE IR 5 mg immediate release tablet  
 rivaroxaban 10 mg tablet

## 2018-08-08 NOTE — ANESTHESIA POSTPROCEDURE EVALUATION
Post-Anesthesia Evaluation and Assessment    Patient: Andry Travis MRN: [id number]  SSN: [id number]    YOB: 1945  Age: 68 y.o. Sex: male       Cardiovascular Function/Vital Signs  Visit Vitals    /81    Pulse 74    Temp 36.4 °C (97.5 °F)    Resp 25    Ht 6' 1.5\" (1.867 m)    Wt 124.3 kg (274 lb)    SpO2 97%    BMI 35.66 kg/m2       Patient is status post spinal anesthesia for Procedure(s):  RIGHT TOTAL KNEE REPLACEMENT. Nausea/Vomiting: None    Postoperative hydration reviewed and adequate. Pain:  Pain Scale 1: Numeric (0 - 10) (08/08/18 1424)  Pain Intensity 1: 0 (08/08/18 1424)   Managed    Neurological Status:   Neuro (WDL): Exceptions to WDL (08/08/18 1424)  Neuro  Neurologic State: Alert;Drowsy (08/08/18 1424)  Orientation Level: Oriented X4 (08/08/18 1424)  Cognition: Follows commands (08/08/18 1424)  Speech: Clear (08/08/18 1424)  LUE Motor Response: Purposeful (08/08/18 1424)  LLE Motor Response: Purposeful;Weak;Numbness (08/08/18 1424)  RUE Motor Response: Purposeful (08/08/18 1424)  RLE Motor Response: Purposeful;Weak;Numbness (08/08/18 1424)   At baseline    Mental Status and Level of Consciousness: Arousable    Pulmonary Status:   O2 Device: Nasal cannula (08/08/18 1424)   Adequate oxygenation and airway patent    Complications related to anesthesia: None    Post-anesthesia assessment completed.  No concerns    Signed By: Win Tomlinson MD     August 8, 2018

## 2018-08-08 NOTE — PROGRESS NOTES
Physical Therapy  8/8/2018     1601:  Patient arrived to floor, Room 571. Assisted RN and transport with transfer from stretcher to bed. Patient only able to extend great toe, bilaterally. Patient unable to discriminate light touch or deep pressure sensations in bilateral LEs at this time. Not safe to attempt PT evaluation at this time. Will continue to follow. 1630:   Patient able to DF/PF 1/3 of full range, bilaterally, only. Patient still not appropriate for PT evaluation at this time. Will continue to follow. 1703:   Patient able to DF/PF 2/3 of full range bilaterally. Patient with continued impaired sensation in surgical LE - minimal quad activation. Note only trace gluteal activation during attempted gluteal set. Will follow up tomorrow. Nursing to mobilize this evening as appropriate (Thank you for your assistance!)    Thank you.   Celestino Sloan, PT, DPT

## 2018-08-08 NOTE — IP AVS SNAPSHOT
110 [facility] [place] [address] [place] [address] 
[phone] Patient: Lanre Martinez MRN: [id number] [id number] About your hospitalization You were admitted on:  August 8, 2018 You last received care in the:  [address] [place] [address] You were discharged on:  August 11, 2018 Why you were hospitalized Your primary diagnosis was:  Primary Osteoarthritis Of Right Knee Follow-up Information Follow up With Details Comments Contact Info Geo Townsend 6885   [phone] Marixa Vasquez MD   [address] [address] 
[phone] Discharge Orders None A check dimple indicates which time of day the medication should be taken. My Medications START taking these medications Instructions Each Dose to Equal  
 Morning Noon Evening Bedtime  
 oxyCODONE IR 5 mg immediate release tablet Commonly known as:  Charley Mccormick Your last dose was: Your next dose is: Take 1 Tab by mouth every four (4) hours as needed for Pain. Max Daily Amount: 30 mg.  
 5 mg  
    
   
   
   
  
 rivaroxaban 10 mg tablet Commonly known as:  Quoc Esteves Your last dose was: Your next dose is: Take 1 Tab by mouth daily (with lunch) for 12 days. Indications: KNEE REPLACEMENT DEEP VEIN THROMBOSIS PREVENTION  
 10 mg CONTINUE taking these medications Instructions Each Dose to Equal  
 Morning Noon Evening Bedtime  
 albuterol-ipratropium 2.5 mg-0.5 mg/3 ml Nebu Commonly known as:  Lianna Villalobos Your last dose was: Your next dose is:    
   
   
 3 mL by Nebulization route every six (6) hours as needed. 3 mL CALCIUM 600 + D 600-125 mg-unit Tab Generic drug:  calcium-cholecalciferol (d3) Your last dose was: Your next dose is: Take  by mouth daily. CO Q-10 200 mg capsule Generic drug:  coenzyme Q-10 Your last dose was: Your next dose is: Take  by mouth daily. CRANBERRY CONCENTRATE PO Your last dose was: Your next dose is: Take 360 mg by mouth nightly. 360 mg  
    
   
   
   
  
 DULERA 100-5 mcg/actuation HFA inhaler Generic drug:  mometasone-formoterol Your last dose was: Your next dose is: Take 2 Puffs by inhalation two (2) times a day. 2 Puff  
    
   
   
   
  
 dutasteride 0.5 mg capsule Commonly known as:  AVODART Your last dose was: Your next dose is: Take 0.5 mg by mouth daily. 0.5 mg  
    
   
   
   
  
 FOLTANX PO Your last dose was: Your next dose is: Take  by mouth daily. levocetirizine 5 mg tablet Commonly known as:  Ping Solian Your last dose was: Your next dose is: Take  by mouth nightly.  
     
   
   
   
  
 magnesium oxide 400 mg tablet Commonly known as:  MAG-OX Your last dose was: Your next dose is: Take 400 mg by mouth daily. 400 mg  
    
   
   
   
  
 metoprolol succinate 25 mg XL tablet Commonly known as:  TOPROL-XL Your last dose was: Your next dose is: Take  by mouth daily. montelukast 10 mg tablet Commonly known as:  SINGULAIR Your last dose was: Your next dose is: Take 10 mg by mouth nightly. 10 mg  
    
   
   
   
  
 nitrofurantoin 100 mg capsule Commonly known as:  MACRODANTIN Your last dose was: Your next dose is: Take  by mouth daily. Omeprazole delayed release 20 mg tablet Commonly known as:  PRILOSEC D/R Your last dose was: Your next dose is: Take 20 mg by mouth two (2) times a day.   
 20 mg  
    
 VITAMIN B-12 1,000 mcg tablet Generic drug:  cyanocobalamin Your last dose was: Your next dose is: Take 1,000 mcg by mouth every seven (7) days. 1000 mcg STOP taking these medications   
 aspirin 81 mg chewable tablet Where to Get Your Medications Information on where to get these meds will be given to you by the nurse or doctor. ! Ask your nurse or doctor about these medications  
  oxyCODONE IR 5 mg immediate release tablet  
 rivaroxaban 10 mg tablet Opioid Education Prescription Opioids: What You Need to Know: 
 
Prescription opioids can be used to help relieve moderate-to-severe pain and are often prescribed following a surgery or injury, or for certain health conditions. These medications can be an important part of treatment but also come with serious risks. Opioids are strong pain medicines. Examples include hydrocodone, oxycodone, fentanyl, and morphine. Heroin is an example of an illegal opioid. It is important to work with your health care provider to make sure you are getting the safest, most effective care. WHAT ARE THE RISKS AND SIDE EFFECTS OF OPIOID USE? Prescription opioids carry serious risks of addiction and overdose, especially with prolonged use. An opioid overdose, often marked by slow breathing, can cause sudden death. The use of prescription opioids can have a number of side effects as well, even when taken as directed. · Tolerance-meaning you might need to take more of a medication for the same pain relief · Physical dependence-meaning you have symptoms of withdrawal when the medication is stopped. Withdrawal symptoms can include nausea, sweating, chills, diarrhea, stomach cramps, and muscle aches. Withdrawal can last up to several weeks, depending on which drug you took and how long you took it. · Increased sensitivity to pain · Constipation · Nausea, vomiting, and dry mouth · Sleepiness and dizziness · Confusion · Depression · Low levels of testosterone that can result in lower sex drive, energy, and strength · Itching and sweating RISKS ARE GREATER WITH:      
· History of drug misuse, substance use disorder, or overdose · Mental health conditions (such as depression or anxiety) · Sleep apnea · Older age (72 years or older) · Pregnancy Avoid alcohol while taking prescription opioids. Also, unless specifically advised by your health care provider, medications to avoid include: · Benzodiazepines (such as Xanax or Valium) · Muscle relaxants (such as Soma or Flexeril) · Hypnotics (such as Ambien or Lunesta) · Other prescription opioids KNOW YOUR OPTIONS Talk to your health care provider about ways to manage your pain that don't involve prescription opioids. Some of these options may actually work better and have fewer risks and side effects. Options may include: 
· Pain relievers such as acetaminophen, ibuprofen, and naproxen · Some medications that are also used for depression or seizures · Physical therapy and exercise · Counseling to help patients learn how to cope better with triggers of pain and stress. · Application of heat or cold compress · Massage therapy · Relaxation techniques Be Informed Make sure you know the name of your medication, how much and how often to take it, and its potential risks & side effects. IF YOU ARE PRESCRIBED OPIOIDS FOR PAIN: 
· Never take opioids in greater amounts or more often than prescribed. Remember the goal is not to be pain-free but to manage your pain at a tolerable level. · Follow up with your primary care provider to: · Work together to create a plan on how to manage your pain. · Talk about ways to help manage your pain that don't involve prescription opioids. · Talk about any and all concerns and side effects. · Help prevent misuse and abuse. · Never sell or share prescription opioids · Help prevent misuse and abuse. · Store prescription opioids in a secure place and out of reach of others (this may include visitors, children, friends, and family). · Safely dispose of unused/unwanted prescription opioids: Find your community drug take-back program or your pharmacy mail-back program, or flush them down the toilet, following guidance from the Food and Drug Administration (www.fda.gov/Drugs/ResourcesForYou). · Visit www.cdc.gov/drugoverdose to learn about the risks of opioid abuse and overdose. · If you believe you may be struggling with addiction, tell your health care provider and ask for guidance or call [employer] at 3-312-800-QHNT. Discharge Instructions Patient meets criteria for BUNDLED PAYMENT  
for Care Improvement Initiative Criteria Contact Information for Orthopedic Nurse Navigator:     
MADELEINE Singletary, RN-BC 
Z:[phone] D:[phone] Q:[phone] DC Orders All Total Knees Case Management for DC planning to Home HC . - PT 3 times a week for 2 weeks; WBAT. - Xarelto therapy once daily for 12 days post-operatively. - Remove staples at 2 weeks post-op; 8/22/18 . - Follow up in Office on Monday [facility] location). After Hospital Care Plan:  Discharge Instructions Knee Replacement-Dr. Lien Quevedo Patient Name: Yesi Kruse Date of procedure: 8/8/2018 Procedure: Procedure(s): RIGHT TOTAL KNEE REPLACEMENT Surgeon: Chalino Ayon) and Role: 
   * Jon Becker MD - Primary PCP: Ryder Disla MD 
Date of discharge: No discharge date for patient encounter. Follow up appointments ? [facility] Call [phone] to make an appointment.  
 
? If home health has been arranged for you the agency will contact you to arrange dates/times for visits. Please call them if you do not hear from them within 24 hours after you are discharged When to call your Orthopaedic Surgeon: Call [phone]. If you call after 5pm or on a weekend, the on call physician will be contacted ? Unrelieved pain ? Signs of infection-if your incision is red; continues to have drainage; drainage has a foul odor or if you have a persistent fever over 101 degrees ? Signs of a blood clot in your leg-calf pain, tenderness, redness, swelling of lower leg When to call your Primary Care Physician: 
? Concerns about medical conditions such as diabetes, high blood pressure, asthma, congestive heart failure ? Call if blood sugars are elevated, persistent headache or dizziness, coughing or congestion, constipation or diarrhea, burning with urination, abnormal heart rate When to call 286wnd go to the nearest emergency room ? Acute onset of chest pain, shortness of breath, difficulty breathing Activity ? Weight bearing as tolerated with walker or crutches. Refer to pages 23-31 of your handbook for instructions and pictures ? Complete your Home Exercise Program daily as instructed by your therapist.  Refer to pages 33-41 of your handbook for instructions and pictures ? Get up every one hour and walk (except at night when sleeping) ? Do not drive or operate heavy machinery Incision Care ? The Aquacel (brown, waterproof) surgical dressing is to remain on your knee for 7 days. On the 7th day have someone gently peel the dressing off by carefully lifting the edge and stretching it slightly to break the adhesive seal 
? You will have staples in your knee incision. They will be removed by the home health agency staff ? If your Aquacel dressing comes loose/off before the 7th day, you may replace it with a dry sterile gauze dressing; change it daily. Once your incision is not draining, you may leave it open to air ? You may take a shower with the Aquacel dressing in place. Once the Aquacel is removed, you may shower and get your incision wet but do not submerge your incision under water in a bath tub, hot tub or swimming pool for 6 weeks after surgery. Preventing blood clots ? Take Xarelto once daily as prescribed by Dr. Lien Quevedo for 12 days following surgery ? Wear elastic stockings (TEDS) for 4 weeks. You should remove them for approximately 1 hour daily for showering/sponge bathing Pain management ? Take pain medication as prescribed; decrease the amount you use as your pain lessens ? Avoid alcoholic beverages while taking pain medication ? Please be aware that many medications contain Tylenol. We do not want you to over medicate so please read the information below as a guide. Do not take more than 4 Grams of Tylenol in a 24 hour period. (There are 1000 milligrams in one Gram) 
o 325 mg of Tylenol per tablet (do not take more than 9 tablets in 24 hours) 
o 500 mg of Tylenol per tablet (do not take more than 8 tablets in 24 hours) ? Elevate your leg (do not bend/flex knee) and place ice bags on your knee for 15-20 minutes after exercising Diet ? Resume usual diet; drink plenty of fluids; eat foods high in fiber ? You may want to take a stool softener (such as Senokot-S or Colace) to prevent constipation while you are taking pain medication. If constipation occurs, take a laxative (such as Dulcolax tablets, Milk of Magnesia, or a suppository) Home Health Care Protocol (to be followed by [facility] Philippe Dean carolynn) Nursing ? Remove staples per physicians order ? Complete head to toe assessment, vital signs ? Medication reconciliation ? Review pain management ? Manage chronic medical conditions Physical Therapy-per physicians orders Weight bearing status: 
  
  
  
 
Mobility Status: 
  
  
  
  
 
Gait: 
  
  
  
  
 
ADL status overall composite: 
  
  
  
  
  
 
Physical Therapy ? Assessment and evaluation-bed mobility; functional transfers (bed, chair, bathroom, stairs); ambulation with equipment, car transfers, shower transfers, safety and ability to get out of house in the event of an emergency ? Review weight bearing as tolerated, wean from walker or crutches as tolerated ? Discuss pain management ? Review how to do ADLs. Refer to page 42 of patient handbook Home Exercise Program-refer to pages 33-41 of patient handbook for exercises Introducing [facility]! [employer] introduces [employer] patient portal. Now you can access parts of your medical record, email your doctor's office, and request medication refills online. 1. In your internet browser, go to [email or web address] 2. Click on the First Time User? Click Here link in the Sign In box. You will see the New Member Sign Up page. 3. Enter your [employer] Access Code exactly as it appears below. You will not need to use this code after youve completed the sign-up process. If you do not sign up before the expiration date, you must request a new code. · [employer] Access Code: [email or web address] Expires: 10/29/2018 11:42 AM 
 
4. Enter the last four digits of your Social Security Number (xxxx) and Date of Birth (mm/dd/yyyy) as indicated and click Submit. You will be taken to the next sign-up page. 5. Create a [employer] ID. This will be your [employer] login ID and cannot be changed, so think of one that is secure and easy to remember. 6. Create a [employer] password. You can change your password at any time. 7. Enter your Password Reset Question and Answer. This can be used at a later time if you forget your password. 8. Enter your e-mail address. You will receive e-mail notification when new information is available in [address] 9. Click Sign Up. You can now view and download portions of your medical record. 10. Click the Download Summary menu link to download a portable copy of your medical information. If you have questions, please visit the Frequently Asked Questions section of the [employer] website. Remember, AVOB is NOT to be used for urgent needs. For medical emergencies, dial 911. Now available from your iPhone and Android! Introducing Juan M Nash As a [facility] patient, I wanted to make you aware of our electronic visit tool called Juan M Nash. [employer] 24/7 allows you to connect within minutes with a medical provider 24 hours a day, seven days a week via a mobile device or tablet or logging into a secure website from your computer. You can access Juan M Nash from anywhere in the [place]. A virtual visit might be right for you when you have a simple condition and feel like you just dont want to get out of bed, or cant get away from work for an appointment, when your regular [facility] provider is not available (evenings, weekends or holidays), or when youre out of town and need minor care. Electronic visits cost only $49 and if the [facility] 24/7 provider determines a prescription is needed to treat your condition, one can be electronically transmitted to a nearby pharmacy*. Please take a moment to enroll today if you have not already done so. The enrollment process is free and takes just a few minutes. To enroll, please download the [employer] 24/7 carmen to your tablet or phone, or visit [email or web address] to enroll on your computer. And, as an [address] [place] [address] patient with a [employer] account, the results of your visits will be scanned into your electronic medical record and your primary care provider will be able to view the scanned results. We urge you to continue to see your regular [facility] provider for your ongoing medical care.   And while your primary care provider may not be the one available when you seek a Juan M Nash virtual visit, the peace of mind you get from getting a real diagnosis real time can be priceless. For more information on Juan M Web[employer], view our Frequently Asked Questions (FAQs) at [email or web address]. Sincerely, 
 
Obi Smith MD 
Chief Medical Officer Isaiah Calderon *:  certain medications cannot be prescribed via Juan M Nash Providers Seen During Your Hospitalization Provider Specialty Primary office phone Emmanuel Will MD Orthopedic Surgery [phone] Your Primary Care Physician (PCP) Primary Care Physician Office Phone Office Fax Chey Loaiza [phone] You are allergic to the following Allergen Reactions Sulfa (Sulfonamide Antibiotics) Rash Recent Documentation Height Weight BMI Smoking Status 1.867 m 124 kg 35.58 kg/m2 Former Smoker Emergency Contacts Name Discharge Info Relation Home Work Mobile Bess Murray DISCHARGE CAREGIVER [3] Spouse [3] [phone] Patient Belongings The following personal items are in your possession at time of discharge: 
     Visual Aid: None   Hearing Aids/Status: Does not own         Clothing: Other (comment) (clothing bag to pacu)    Other Valuables: Eyeglasses (glasses to pacu) Please provide this summary of care documentation to your next provider. Signatures-by signing, you are acknowledging that this After Visit Summary has been reviewed with you and you have received a copy. Patient Signature:  ____________________________________________________________ Date:  ____________________________________________________________  
  
Eder Andrew Provider Signature:  ____________________________________________________________ Date:  ____________________________________________________________

## 2018-08-08 NOTE — ANESTHESIA PREPROCEDURE EVALUATION
Anesthetic History   No history of anesthetic complications            Review of Systems / Medical History  Patient summary reviewed, nursing notes reviewed and pertinent labs reviewed    Pulmonary  Within defined limits      Sleep apnea    Asthma        Neuro/Psych   Within defined limits           Cardiovascular  Within defined limits  Hypertension          CAD         GI/Hepatic/Renal  Within defined limits   GERD           Endo/Other  Within defined limits      Arthritis     Other Findings              Physical Exam    Airway  Mallampati: II  TM Distance: > 6 cm  Neck ROM: normal range of motion   Mouth opening: Normal     Cardiovascular  Regular rate and rhythm,  S1 and S2 normal,  no murmur, click, rub, or gallop             Dental  No notable dental hx       Pulmonary  Breath sounds clear to auscultation               Abdominal  GI exam deferred       Other Findings            Anesthetic Plan    ASA: 3  Anesthesia type: spinal      Post-op pain plan if not by surgeon: peripheral nerve block single    Induction: Intravenous  Anesthetic plan and risks discussed with: Patient

## 2018-08-08 NOTE — ANESTHESIA PROCEDURE NOTES
Spinal Block    Start time: 8/8/2018 11:51 AM  End time: 8/8/2018 12:00 PM  Performed by: Walter Fuentes  Authorized by: Walter Fuentes     Pre-procedure:   Indications: primary anesthetic  Preanesthetic Checklist: patient identified, risks and benefits discussed, anesthesia consent, site marked, patient being monitored and timeout performed    Timeout Time: 11:50          Spinal Block:   Patient Position:  Seated  Prep Region:  Lumbar  Prep: Betadine      Location:  L3-4  Technique:  Single shot  Local:  Lidocaine 1%  Local Dose (mL):  2    Needle:   Needle Type:  Pencan  Needle Gauge:  25 G        Events: CSF confirmed, no blood with aspiration and no paresthesia        Assessment:  Insertion:  Uncomplicated  Patient tolerance:  Patient tolerated the procedure well with no immediate complications

## 2018-08-08 NOTE — ROUTINE PROCESS
Patient: Marcelle Nieto MRN: [id number]  SSN: [id number]   YOB: 1945  Age: 68 y.o. Sex: male     Patient is status post Procedure(s):  RIGHT TOTAL KNEE REPLACEMENT. Surgeon(s) and Role:     * Germain Hector MD - Primary    Local/Dose/Irrigation: Right knee injection: 0.5% Marcaine 30 ml, Exparel 266 mg in 20 ml injectable saline                 Peripheral IV 08/08/18 Left Arm (Active)                           Dressing/Packing:  Wound Knee Right-DRESSING TYPE: Aquacel; Cast padding;Elastic bandage;Staples (08/08/18 1100)  Splint/Cast:  ]    Other:

## 2018-08-08 NOTE — PERIOP NOTES
TRANSFER - OUT REPORT:    Verbal report given to [facility]) on Rae Montes De Oca  being transferred to 57(unit) for routine post - op       Report consisted of patients Situation, Background, Assessment and   Recommendations(SBAR). Time Pre op antibiotic given:1210  Anesthesia Stop time: 5631   Argueta Present on Transfer to floor:yes  Order for Argueta on Chart:yes  Discharge Prescriptions with Chart:no    Information from the following report(s) SBAR, OR Summary, Procedure Summary, Intake/Output, MAR, Recent Results, Med Rec Status and Cardiac Rhythm NSR was reviewed with the receiving nurse. Opportunity for questions and clarification was provided. Is the patient on 02? NO       L/Min ra       Other     Is the patient on a monitor? NO    Is the nurse transporting with the patient? NO    Surgical Waiting Area notified of patient's transfer from PACU? YES      The following personal items collected during your admission accompanied patient upon transfer:   Dental Appliance:    Vision:    Hearing Aid:    Jewelry:    Clothing: Clothing: Other (comment) (clothing bag to pacu)  Other Valuables:  Other Valuables: Eyeglasses (glasses to pacu)  Valuables sent to safe:

## 2018-08-09 LAB
ANION GAP SERPL CALC-SCNC: 10 MMOL/L (ref 5–15)
BUN SERPL-MCNC: 12 MG/DL (ref 6–20)
BUN/CREAT SERPL: 15 (ref 12–20)
CALCIUM SERPL-MCNC: 8.1 MG/DL (ref 8.5–10.1)
CHLORIDE SERPL-SCNC: 110 MMOL/L (ref 97–108)
CO2 SERPL-SCNC: 19 MMOL/L (ref 21–32)
CREAT SERPL-MCNC: 0.8 MG/DL (ref 0.7–1.3)
GLUCOSE SERPL-MCNC: 131 MG/DL (ref 65–100)
HGB BLD-MCNC: 13.5 G/DL (ref 12.1–17)
INR PPP: 1.2 (ref 0.9–1.1)
POTASSIUM SERPL-SCNC: 4.1 MMOL/L (ref 3.5–5.1)
PROTHROMBIN TIME: 11.9 SEC (ref 9–11.1)
SODIUM SERPL-SCNC: 139 MMOL/L (ref 136–145)

## 2018-08-09 PROCEDURE — 97530 THERAPEUTIC ACTIVITIES: CPT

## 2018-08-09 PROCEDURE — 85018 HEMOGLOBIN: CPT | Performed by: PHYSICIAN ASSISTANT

## 2018-08-09 PROCEDURE — 74011000250 HC RX REV CODE- 250: Performed by: PHYSICIAN ASSISTANT

## 2018-08-09 PROCEDURE — 97161 PT EVAL LOW COMPLEX 20 MIN: CPT

## 2018-08-09 PROCEDURE — 94760 N-INVAS EAR/PLS OXIMETRY 1: CPT

## 2018-08-09 PROCEDURE — 80048 BASIC METABOLIC PNL TOTAL CA: CPT | Performed by: PHYSICIAN ASSISTANT

## 2018-08-09 PROCEDURE — 74011250637 HC RX REV CODE- 250/637: Performed by: PHYSICIAN ASSISTANT

## 2018-08-09 PROCEDURE — 85610 PROTHROMBIN TIME: CPT | Performed by: PHYSICIAN ASSISTANT

## 2018-08-09 PROCEDURE — 65270000029 HC RM PRIVATE

## 2018-08-09 PROCEDURE — 94762 N-INVAS EAR/PLS OXIMTRY CONT: CPT

## 2018-08-09 PROCEDURE — 74011250636 HC RX REV CODE- 250/636: Performed by: PHYSICIAN ASSISTANT

## 2018-08-09 PROCEDURE — 94640 AIRWAY INHALATION TREATMENT: CPT

## 2018-08-09 PROCEDURE — 36415 COLL VENOUS BLD VENIPUNCTURE: CPT | Performed by: PHYSICIAN ASSISTANT

## 2018-08-09 PROCEDURE — 77010033678 HC OXYGEN DAILY

## 2018-08-09 PROCEDURE — 97116 GAIT TRAINING THERAPY: CPT

## 2018-08-09 RX ORDER — ONDANSETRON 4 MG/1
4 TABLET, ORALLY DISINTEGRATING ORAL
Status: DISCONTINUED | OUTPATIENT
Start: 2018-08-09 | End: 2018-08-11 | Stop reason: HOSPADM

## 2018-08-09 RX ORDER — ZOLPIDEM TARTRATE 5 MG/1
5 TABLET ORAL
Status: DISCONTINUED | OUTPATIENT
Start: 2018-08-09 | End: 2018-08-11 | Stop reason: HOSPADM

## 2018-08-09 RX ORDER — OXYCODONE HYDROCHLORIDE 5 MG/1
5 TABLET ORAL
Qty: 60 TAB | Refills: 0 | Status: SHIPPED | OUTPATIENT
Start: 2018-08-09 | End: 2018-08-16

## 2018-08-09 RX ADMIN — ACETAMINOPHEN 650 MG: 325 TABLET, FILM COATED ORAL at 02:00

## 2018-08-09 RX ADMIN — OXYCODONE HYDROCHLORIDE 5 MG: 5 TABLET ORAL at 04:52

## 2018-08-09 RX ADMIN — MONTELUKAST SODIUM 10 MG: 10 TABLET, FILM COATED ORAL at 20:40

## 2018-08-09 RX ADMIN — OXYCODONE HYDROCHLORIDE 5 MG: 5 TABLET ORAL at 08:05

## 2018-08-09 RX ADMIN — SODIUM CHLORIDE 125 ML/HR: 900 INJECTION, SOLUTION INTRAVENOUS at 08:07

## 2018-08-09 RX ADMIN — DUTASTERIDE 0.5 MG: 0.5 CAPSULE, LIQUID FILLED ORAL at 09:00

## 2018-08-09 RX ADMIN — METOPROLOL SUCCINATE 25 MG: 25 TABLET, EXTENDED RELEASE ORAL at 10:06

## 2018-08-09 RX ADMIN — Medication 10 ML: at 22:39

## 2018-08-09 RX ADMIN — ACETAMINOPHEN 650 MG: 325 TABLET, FILM COATED ORAL at 17:55

## 2018-08-09 RX ADMIN — CETIRIZINE HYDROCHLORIDE 5 MG: 10 TABLET, FILM COATED ORAL at 17:56

## 2018-08-09 RX ADMIN — SODIUM CHLORIDE 125 ML/HR: 900 INJECTION, SOLUTION INTRAVENOUS at 01:26

## 2018-08-09 RX ADMIN — BUDESONIDE 500 MCG: 0.5 INHALANT RESPIRATORY (INHALATION) at 07:43

## 2018-08-09 RX ADMIN — DOCUSATE SODIUM AND SENNOSIDES 1 TABLET: 8.6; 5 TABLET, FILM COATED ORAL at 17:56

## 2018-08-09 RX ADMIN — PANTOPRAZOLE SODIUM 40 MG: 40 TABLET, DELAYED RELEASE ORAL at 17:56

## 2018-08-09 RX ADMIN — DOCUSATE SODIUM AND SENNOSIDES 1 TABLET: 8.6; 5 TABLET, FILM COATED ORAL at 09:00

## 2018-08-09 RX ADMIN — OXYCODONE HYDROCHLORIDE 5 MG: 5 TABLET ORAL at 12:13

## 2018-08-09 RX ADMIN — PANTOPRAZOLE SODIUM 40 MG: 40 TABLET, DELAYED RELEASE ORAL at 10:05

## 2018-08-09 RX ADMIN — ACETAMINOPHEN 650 MG: 325 TABLET, FILM COATED ORAL at 04:52

## 2018-08-09 RX ADMIN — FENTANYL CITRATE 25 MCG: 50 INJECTION, SOLUTION INTRAMUSCULAR; INTRAVENOUS at 01:39

## 2018-08-09 RX ADMIN — ARFORMOTEROL TARTRATE 15 MCG: 15 SOLUTION RESPIRATORY (INHALATION) at 07:43

## 2018-08-09 RX ADMIN — POLYETHYLENE GLYCOL 3350 17 G: 17 POWDER, FOR SOLUTION ORAL at 10:06

## 2018-08-09 RX ADMIN — Medication 5 ML: at 06:00

## 2018-08-09 RX ADMIN — OXYCODONE HYDROCHLORIDE 5 MG: 5 TABLET ORAL at 20:40

## 2018-08-09 RX ADMIN — CEFAZOLIN 3 G: 1 INJECTION, POWDER, FOR SOLUTION INTRAMUSCULAR; INTRAVENOUS; PARENTERAL at 04:46

## 2018-08-09 RX ADMIN — ZOLPIDEM TARTRATE 5 MG: 5 TABLET ORAL at 22:39

## 2018-08-09 RX ADMIN — RIVAROXABAN 10 MG: 10 TABLET, FILM COATED ORAL at 17:56

## 2018-08-09 RX ADMIN — ACETAMINOPHEN 650 MG: 325 TABLET, FILM COATED ORAL at 11:29

## 2018-08-09 NOTE — PROGRESS NOTES
Problem: Mobility Impaired (Adult and Pediatric)  Goal: *Acute Goals and Plan of Care (Insert Text)  Physical Therapy Goals  Initiated 8/9/2018    1. Patient will move from supine to sit and sit to supine , scoot up and down and roll side to side in bed with modified independence within 4 days. 2. Patient will perform sit to stand with supervision/set-up within 4 days. 3. Patient will ambulate with supervision/set-up for 150 feet with the least restrictive device within 4 days. 4. Patient will perform home exercise program per protocol with independence within 4 days. 5. Patient will demonstrate AROM 0-90 degrees in operative joint within 4 days. physical Therapy knee EVALUATION  Patient: Simran Tapia (68 y.o. male)  Date: 8/9/2018  Primary Diagnosis: DEGENERATIVE JOINT DISEASE RIGHT KNEE  Primary osteoarthritis of right knee  Procedure(s) (LRB):  RIGHT TOTAL KNEE REPLACEMENT (Right) 1 Day Post-Op   Precautions:   WBAT    ASSESSMENT :    Based on the objective data described below, the patient presents with functional mobility deficits relative to baseline due to R knee pain, ROM restriction, weakness, gait and balance dysfunction with low activity tolerance s/p admission for R TKA, POD #1. Patient cleared for OOB activity by RN. Reviewed and performed bed therex in supine with patient able to perform SLR, quad sets, hip abd/add, and heel slides;  poor activation of quads. Overall patient moving well, only required CGA for bed mobility to EOB, MIN A for transfer to standing and CGA with gait x 80' with RW. Good weight acceptance on RLE with minimal antalgic limp. Needed cues initially for sequencing and not advancing RW too far anteriorly but otherwise demos good safety with device. Returned to bedside chair where encouraged to sit up for 30 min with instructions on how to periodically stretch knee to change positions.   Patient to get back to bed with RN assist after this due to pre-existing lymphedema and need to elevate LEs. Patient lives with significant other in a trailer at ground level with ramp to enter, was very active and indep with full-time job PTA. Anticipate patient will progress quickly with continued acute therapy and be appropriate for d/c home with assistance and benefit from HHPT to further progress mobility goals. Patient will benefit from skilled intervention to address the above impairments. Patients rehabilitation potential is considered to be Excellent  Factors which may influence rehabilitation potential include:   []         None noted  []         Mental ability/status  []         Medical condition  []         Home/family situation and support systems  []         Safety awareness  []         Pain tolerance/management  [x]         Other: lymphedema     PLAN :  Recommendations and Planned Interventions:  [x]           Bed Mobility Training             [x]    Neuromuscular Re-Education  [x]           Transfer Training                   []    Orthotic/Prosthetic Training  [x]           Gait Training                         []    Modalities  [x]           Therapeutic Exercises           []    Edema Management/Control  [x]           Therapeutic Activities            [x]    Patient and Family Training/Education  []           Other (comment):    Frequency/Duration: Patient will be followed by physical therapy twice daily to address goals. Discharge Recommendations: Home Health  Further Equipment Recommendations for Discharge: rolling walker (ordered)     SUBJECTIVE:   Patient stated I've been hurting for years, I can handle this okay.     OBJECTIVE DATA SUMMARY:   HISTORY:    Past Medical History:   Diagnosis Date    Arthritis     Asthma     R/T ENVIRONMENTAL ALLERGIES; INHALER USE DAILY    CAD (coronary artery disease) 2007    MI    Edema extremities     LEGS; WEARS COMPRESSION STOCKINGS    GERD (gastroesophageal reflux disease)     History of BPH     History of kidney stones  Hx: UTI (urinary tract infection)     Hypertension     Sleep apnea     CPAP     Past Surgical History:   Procedure Laterality Date    BREAST SURGERY PROCEDURE UNLISTED Right     EXCISION OF BREAST LUMP (BENIGN)    HX HEART CATHETERIZATION  2007, 2016    STENTS X3  (INGRID--DR NUNEZ)    HX LITHOTRIPSY      2-3X    HX ORTHOPAEDIC Right 1964    FEMUR ORIF  (DR PRESCOTT)    HX TONSILLECTOMY      HX UROLOGICAL      CYSTO    VASCULAR SURGERY PROCEDURE UNLIST Right     LEG VEIN BYPASS (INGRID)     Prior Level of Function/Home Situation: indep PTA, no AD, 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  Personal factors and/or comorbidities impacting plan of care:     Home Situation  Home Environment: Trailer/mobile home  # Steps to Enter: 0  One/Two Story Residence: One story  Living Alone: No  Support Systems: Spouse/Significant Other/Partner  Patient Expects to be Discharged to[de-identified] Trailer/mobile home  Current DME Used/Available at Home: CPAP, Nebulizer    EXAMINATION/PRESENTATION/DECISION MAKING:   Critical Behavior:  Neurologic State: Alert  Orientation Level: Oriented X4  Cognition: Appropriate decision making     Hearing: Auditory  Auditory Impairment: None  Hearing Aids/Status: Does not own  Skin:  See nursing notes  Range Of Motion:  AROM: Within functional limits (Except R knee -10 to 70 deg)                       Strength:    Strength: Within functional limits (Weak quads, very weak SLR)                    Tone & Sensation:   Tone: Normal              Sensation: Intact               Coordination:  Coordination: Within functional limits  Vision:      Functional Mobility:  Bed Mobility:     Supine to Sit: Contact guard assistance     Scooting: Stand-by assistance  Transfers:  Sit to Stand: Minimum assistance;Assist x1  Stand to Sit: Contact guard assistance;Assist x1                       Balance:   Sitting: Intact; Without support  Standing: Intact; With support  Ambulation/Gait Training:  Distance (ft): 80 Feet (ft)  Assistive Device: Gait belt;Walker, rolling  Ambulation - Level of Assistance: Contact guard assistance        Gait Abnormalities: Antalgic;Decreased step clearance; Step to gait  Right Side Weight Bearing: As tolerated  Left Side Weight Bearing: Full  Base of Support: Shift to left  Stance: Right decreased  Speed/Sandra: Pace decreased (<100 feet/min)  Step Length: Right lengthened;Left shortened                     Stairs: Therapeutic Exercises:   Instructed in QS, heel slides, hip abd/add, SLR    Functional Measure:  Tinetti test:    Sitting Balance: 1  Arises: 0  Attempts to Rise: 2  Immediate Standing Balance: 1  Standing Balance: 1  Nudged: 0  Eyes Closed: 0  Turn 360 Degrees - Continuous/Discontinuous: 0  Turn 360 Degrees - Steady/Unsteady: 0  Sitting Down: 1  Balance Score: 6  Indication of Gait: 0  R Step Length/Height: 0  L Step Length/Height: 0  R Foot Clearance: 1  L Foot Clearance: 1  Step Symmetry: 0  Step Continuity: 0  Path: 1  Trunk: 1  Walking Time: 0  Gait Score: 4  Total Score: 10       Tinetti Test and G-code impairment scale:  Percentage of Impairment CH    0%   CI    1-19% CJ    20-39% CK    40-59% CL    60-79% CM    80-99% CN     100%   Tinetti  Score 0-28 28 23-27 17-22 12-16 6-11 1-5 0       Tinetti Tool Score Risk of Falls  <19 = High Fall Risk  19-24 = Moderate Fall Risk  25-28 = Low Fall Risk  Tinetti ME. Performance-Oriented Assessment of Mobility Problems in Elderly Patients. Gonzalez 66; [id number]. (Scoring Description: PT Bulletin Feb. 10, 1993)    Older adults: Ana Collins et al, 2009; n = 1000 [facility] elderly evaluated with ABC, MARTIN, ADL, and IADL)  · Mean MARTIN score for males aged 69-68 years = 26.21(3.40)  · Mean MARTIN score for females age 69-68 years = 25.16(4.30)  · Mean MARTIN score for males over 80 years = 23.29(6.02)  · Mean MARTIN score for females over 80 years = 17.20(8.32)         G codes:   In compliance with CMSs Claims Based Outcome Reporting, the following G-code set was chosen for this patient based on their primary functional limitation being treated: The outcome measure chosen to determine the severity of the functional limitation was the Tinetti with a score of 10/28 which was correlated with the impairment scale. ? Mobility - Walking and Moving Around:     - CURRENT STATUS: CL - 60%-79% impaired, limited or restricted    - GOAL STATUS: CJ - 20%-39% impaired, limited or restricted    - D/C STATUS:  ---------------To be determined---------------        Physical Therapy Evaluation Charge Determination   History Examination Presentation Decision-Making   HIGH Complexity :3+ comorbidities / personal factors will impact the outcome/ POC  LOW Complexity : 1-2 Standardized tests and measures addressing body structure, function, activity limitation and / or participation in recreation  MEDIUM Complexity : Evolving with changing characteristics  Other outcome measures Tinetti 10/28  LOW       Based on the above components, the patient evaluation is determined to be of the following complexity level: LOW     Pain:  Pain Scale 1: Numeric (0 - 10)  Pain Intensity 1: 6  Pain Location 1: Knee  Pain Orientation 1: Right  Pain Description 1: Aching  Pain Intervention(s) 1: Medication (see MAR); Cold pack  Activity Tolerance:   Fair - [de-identified]' with RW with CGA    Please refer to the flowsheet for vital signs taken during this treatment. After treatment:   [x]         Patient left in no apparent distress sitting up in chair  []         Patient left in no apparent distress in bed  [x]         Call bell left within reach  [x]         Nursing notified  []         Caregiver present  []         Bed alarm activated    COMMUNICATION/EDUCATION:   The patients plan of care was discussed with: Registered Nurse. [x]         Fall prevention education was provided and the patient/caregiver indicated understanding.   []         Patient/family have participated as able in goal setting and plan of care. [x]         Patient/family agree to work toward stated goals and plan of care. []         Patient understands intent and goals of therapy, but is neutral about his/her participation. []         Patient is unable to participate in goal setting and plan of care.     Thank you for this referral.  Marciano Fox, PT   Time Calculation: 28 mins

## 2018-08-09 NOTE — PROGRESS NOTES
TOTAL KNEE PROGRESS NOTE      Subjective:     Post-Operative Day: 1 Status Post right Total Knee Arthroplasty  Systemic or Specific Complaints:No Complaints    Objective:     Patient Vitals for the past 24 hrs:   BP Temp Pulse Resp SpO2   08/09/18 0846 113/77 97.5 °F (36.4 °C) 95 16 96 %   08/09/18 0747 - - - - 100 %   08/09/18 0444 154/77 97.8 °F (36.6 °C) 84 16 94 %   08/08/18 2242 154/88 97.6 °F (36.4 °C) 88 18 96 %   08/08/18 2233 144/83 - - - -   08/08/18 2219 - - - - 95 %   08/08/18 2216 136/79 - 73 - -   08/08/18 1933 136/84 97.6 °F (36.4 °C) 69 16 95 %   08/08/18 1802 145/84 97.4 °F (36.3 °C) 76 18 96 %   08/08/18 1707 124/79 97.4 °F (36.3 °C) 81 18 96 %   08/08/18 1602 136/82 96.5 °F (35.8 °C) 60 18 97 %   08/08/18 1531 - - (!) 59 19 96 %   08/08/18 1530 (!) 145/97 - (!) 52 19 96 %   08/08/18 1515 (!) 137/95 - (!) 55 22 97 %   08/08/18 1510 - 97.5 °F (36.4 °C) - - -   08/08/18 1500 129/75 - 61 19 95 %   08/08/18 1445 134/81 - 74 25 97 %   08/08/18 1430 103/80 - 77 15 93 %   08/08/18 1425 (!) 116/94 - 80 22 94 %   08/08/18 1424 - 97.5 °F (36.4 °C) - - -   08/08/18 1420 126/73 - 69 17 96 %   08/08/18 1418 - - 76 21 92 %   08/08/18 1417 129/82 97.5 °F (36.4 °C) 76 21 93 %       General: alert, cooperative, no distress, appears stated age   Wound: Wound clean and dry no evidence of infection. , No Erythema, No Edema, No Drainage and Wound Intact   Motion: Extension: Full Extension   DVT Exam: No evidence of DVT seen on physical exam.  Negative Kathi's sign. No cords or calf tenderness. No significant calf/ankle edema.      Data Review:    Recent Results (from the past 24 hour(s))   METABOLIC PANEL, BASIC    Collection Time: 08/09/18  4:54 AM   Result Value Ref Range    Sodium 139 136 - 145 mmol/L    Potassium 4.1 3.5 - 5.1 mmol/L    Chloride 110 (H) 97 - 108 mmol/L    CO2 19 (L) 21 - 32 mmol/L    Anion gap 10 5 - 15 mmol/L    Glucose 131 (H) 65 - 100 mg/dL    BUN 12 6 - 20 MG/DL    Creatinine 0.80 0.70 - 1.30 MG/DL    BUN/Creatinine ratio 15 12 - 20      GFR est AA >60 >60 ml/min/1.73m2    GFR est non-AA >60 >60 ml/min/1.73m2    Calcium 8.1 (L) 8.5 - 10.1 MG/DL   HEMOGLOBIN    Collection Time: 08/09/18  4:54 AM   Result Value Ref Range    HGB 13.5 12.1 - 17.0 g/dL   PROTHROMBIN TIME + INR    Collection Time: 08/09/18  4:54 AM   Result Value Ref Range    INR 1.2 (H) 0.9 - 1.1      Prothrombin time 11.9 (H) 9.0 - 11.1 sec         Assessment:     Status Post right Total Knee Arthroplasty. Doing well postoperatively. . Bandage aquacell, covered by clothes. Labs stable. PT progressing well. Pain control WNL. Plan:     Continues current post-op course  Continue PT per protocol  Plan to discharge to [facility]. tomorrow.

## 2018-08-09 NOTE — PROGRESS NOTES
Bedside shift change report given to [address] (oncoming nurse) by Romero Dudley (offgoing nurse). Report included the following information SBAR.

## 2018-08-09 NOTE — PROGRESS NOTES
Problem: Discharge Planning  Goal: *Discharge to safe environment  Outcome: 65 [facility]

## 2018-08-09 NOTE — PROGRESS NOTES
Problem: Mobility Impaired (Adult and Pediatric)  Goal: *Acute Goals and Plan of Care (Insert Text)  Physical Therapy Goals  Initiated 8/9/2018    1. Patient will move from supine to sit and sit to supine , scoot up and down and roll side to side in bed with modified independence within 4 days. 2. Patient will perform sit to stand with supervision/set-up within 4 days. 3. Patient will ambulate with supervision/set-up for 150 feet with the least restrictive device within 4 days. 4. Patient will perform home exercise program per protocol with independence within 4 days. 5. Patient will demonstrate AROM 0-90 degrees in operative joint within 4 days. physical Therapy TREATMENT  Patient: Misty Elias (68 y.o. male)  Date: 8/9/2018  Diagnosis: DEGENERATIVE JOINT DISEASE RIGHT KNEE  Primary osteoarthritis of right knee Primary osteoarthritis of right knee  Procedure(s) (LRB):  RIGHT TOTAL KNEE REPLACEMENT (Right) 1 Day Post-Op  Precautions: WBAT  Chart, physical therapy assessment, plan of care and goals were reviewed. ASSESSMENT:  Patient progressing steadily with therapy goals, more sore this afternoon but eager to work with therapy and push ambulation tolerance. MIN A still to come to standing, increased swelling/lymphedema noted in BLEs. Gait training x 100' total distance with one standing rest break x 30 sec 2/2 fatigue, VSS. Occ cues needed to advance RW less as tended to increased distance as time elapsed and with distarctions. Otherwise good safety awareness exhibited and good command following. Instructed in use of LLE to assist RLE back to bed but still required CGA to MIN A for this transfer, likely 2/2 weight of LEs as well. Patient may be appropriate for d/c tomorrow if medically ready pending continued improvement in ambulation tolerance and improved transfer ability.   Patient will have assistance at home but not round the clock so will need to be able to get up more easily and without assistance safely prior to d/c.  Very motivated to work with therapy and improve, complete therex. HHPT. Progression toward goals:  [x]      Improving appropriately and progressing toward goals  []      Improving slowly and progressing toward goals  []      Not making progress toward goals and plan of care will be adjusted     PLAN:  Patient continues to benefit from skilled intervention to address the above impairments. Continue treatment per established plan of care. Discharge Recommendations:  Home Health  Further Equipment Recommendations for Discharge:  rolling walker (ordered)     SUBJECTIVE:   I kind of feel like I may have a chill/fever    OBJECTIVE DATA SUMMARY:   Range of Motion:  AROM: Within functional limits (Except R knee -10 to 70 deg)                       Functional Mobility Training:  Bed Mobility:     Supine to Sit: Contact guard assistance     Scooting: Stand-by assistance        Transfers:  Sit to Stand: Minimum assistance;Assist x1  Stand to Sit: Contact guard assistance;Assist x1                             Ambulation/Gait Training:  Distance (ft): 100 Feet (ft)  Assistive Device: Gait belt;Walker, rolling  Ambulation - Level of Assistance: Contact guard assistance        Gait Abnormalities: Antalgic;Decreased step clearance; Step to gait  Right Side Weight Bearing: As tolerated  Left Side Weight Bearing: Full  Base of Support: Shift to left  Stance: Right decreased  Speed/Sandra: Pace decreased (<100 feet/min)  Step Length: Right lengthened;Left shortened                    Stairs: Therapeutic Exercises:   Exercises performed per protocol. See morning treatment note for description. Pain:  Pain Scale 1: Numeric (0 - 10)  Pain Intensity 1: 6  Pain Location 1: Knee  Pain Orientation 1: Right  Pain Description 1: Aching  Pain Intervention(s) 1: Medication (see MAR); Cold pack  Activity Tolerance:   Improved - fair- 100' with RW and CGA    Please refer to the flowsheet for vital signs taken during this treatment.   After treatment:   [] Patient left in no apparent distress sitting up in chair  [x] Patient left in no apparent distress in bed  [x] Call bell left within reach  [x] Nursing notified  [] Caregiver present  [] Bed alarm activated    COMMUNICATION/COLLABORATION:   The patients plan of care was discussed with: Registered Nurse    Marciano Fox, PT   Time Calculation: 35 mins

## 2018-08-09 NOTE — PROGRESS NOTES
Care Management Interventions  PCP Verified by CM: Yes (Dr. Tom Rivas)  Palliative Care Criteria Met (RRAT>21 & CHF Dx)?: No  Mode of Transport at Discharge: Self  Transition of Care Consult (CM Consult): [address]: No  Reason Outside [place]: Out of service area (Referral to Home Recovery)  [address]: No  Discharge Durable Medical Equipment: No  Health Maintenance Reviewed: Yes  Physical Therapy Consult: Yes  Occupational Therapy Consult: Yes  Speech Therapy Consult: No  Current Support Network: Lives with Spouse  Confirm Follow Up Transport: Family  Plan discussed with Pt/Family/Caregiver: Yes  Freedom of Choice Offered: Yes   Resource Information Provided?: No  Discharge Location  Discharge Placement: Home with home health    Reason for Admission:   Right Total Knee Replacement/Medicare Bundle                   RRAT Score:    10                 Plan for utilizing home health:      Yes, referral sent to Home [facility]                    Likelihood of Readmission:  low                         Transition of Care Plan:       [address] [facility] accepted the case. [email or web address]    Home care will not start for this patient until Monday August 13th.  PENNYT

## 2018-08-09 NOTE — PROGRESS NOTES
08/08/18 4785   Activity and Safety   Activity Level Up with Assistance     Ambulated to the hallway and back without distress

## 2018-08-10 LAB — HGB BLD-MCNC: 12.9 G/DL (ref 12.1–17)

## 2018-08-10 PROCEDURE — 65270000029 HC RM PRIVATE

## 2018-08-10 PROCEDURE — 97110 THERAPEUTIC EXERCISES: CPT

## 2018-08-10 PROCEDURE — 74011000250 HC RX REV CODE- 250: Performed by: PHYSICIAN ASSISTANT

## 2018-08-10 PROCEDURE — 85018 HEMOGLOBIN: CPT | Performed by: PHYSICIAN ASSISTANT

## 2018-08-10 PROCEDURE — 97116 GAIT TRAINING THERAPY: CPT

## 2018-08-10 PROCEDURE — 97530 THERAPEUTIC ACTIVITIES: CPT

## 2018-08-10 PROCEDURE — 74011250637 HC RX REV CODE- 250/637: Performed by: PHYSICIAN ASSISTANT

## 2018-08-10 PROCEDURE — 94640 AIRWAY INHALATION TREATMENT: CPT

## 2018-08-10 PROCEDURE — 36415 COLL VENOUS BLD VENIPUNCTURE: CPT | Performed by: PHYSICIAN ASSISTANT

## 2018-08-10 PROCEDURE — 94664 DEMO&/EVAL PT USE INHALER: CPT

## 2018-08-10 RX ADMIN — OXYCODONE HYDROCHLORIDE 5 MG: 5 TABLET ORAL at 11:25

## 2018-08-10 RX ADMIN — PANTOPRAZOLE SODIUM 40 MG: 40 TABLET, DELAYED RELEASE ORAL at 08:42

## 2018-08-10 RX ADMIN — RIVAROXABAN 10 MG: 10 TABLET, FILM COATED ORAL at 17:14

## 2018-08-10 RX ADMIN — POLYETHYLENE GLYCOL 3350 17 G: 17 POWDER, FOR SOLUTION ORAL at 08:44

## 2018-08-10 RX ADMIN — BUDESONIDE 500 MCG: 0.5 INHALANT RESPIRATORY (INHALATION) at 09:00

## 2018-08-10 RX ADMIN — OXYCODONE HYDROCHLORIDE 5 MG: 5 TABLET ORAL at 14:36

## 2018-08-10 RX ADMIN — DOCUSATE SODIUM AND SENNOSIDES 1 TABLET: 8.6; 5 TABLET, FILM COATED ORAL at 08:42

## 2018-08-10 RX ADMIN — ACETAMINOPHEN 650 MG: 325 TABLET, FILM COATED ORAL at 17:14

## 2018-08-10 RX ADMIN — BUDESONIDE 500 MCG: 0.5 INHALANT RESPIRATORY (INHALATION) at 22:24

## 2018-08-10 RX ADMIN — DOCUSATE SODIUM AND SENNOSIDES 1 TABLET: 8.6; 5 TABLET, FILM COATED ORAL at 17:14

## 2018-08-10 RX ADMIN — ACETAMINOPHEN 650 MG: 325 TABLET, FILM COATED ORAL at 05:12

## 2018-08-10 RX ADMIN — Medication 10 ML: at 14:37

## 2018-08-10 RX ADMIN — CETIRIZINE HYDROCHLORIDE 5 MG: 10 TABLET, FILM COATED ORAL at 17:14

## 2018-08-10 RX ADMIN — Medication 10 ML: at 05:13

## 2018-08-10 RX ADMIN — PANTOPRAZOLE SODIUM 40 MG: 40 TABLET, DELAYED RELEASE ORAL at 17:13

## 2018-08-10 RX ADMIN — ARFORMOTEROL TARTRATE 15 MCG: 15 SOLUTION RESPIRATORY (INHALATION) at 22:24

## 2018-08-10 RX ADMIN — MONTELUKAST SODIUM 10 MG: 10 TABLET, FILM COATED ORAL at 21:22

## 2018-08-10 RX ADMIN — METOPROLOL SUCCINATE 25 MG: 25 TABLET, EXTENDED RELEASE ORAL at 08:42

## 2018-08-10 RX ADMIN — ARFORMOTEROL TARTRATE 15 MCG: 15 SOLUTION RESPIRATORY (INHALATION) at 09:00

## 2018-08-10 RX ADMIN — OXYCODONE HYDROCHLORIDE 5 MG: 5 TABLET ORAL at 05:12

## 2018-08-10 RX ADMIN — Medication 10 ML: at 21:22

## 2018-08-10 RX ADMIN — ACETAMINOPHEN 650 MG: 325 TABLET, FILM COATED ORAL at 11:25

## 2018-08-10 RX ADMIN — DUTASTERIDE 0.5 MG: 0.5 CAPSULE, LIQUID FILLED ORAL at 08:42

## 2018-08-10 NOTE — PROGRESS NOTES
Problem: Mobility Impaired (Adult and Pediatric)  Goal: *Acute Goals and Plan of Care (Insert Text)  Physical Therapy Goals  Initiated 8/9/2018    1. Patient will move from supine to sit and sit to supine , scoot up and down and roll side to side in bed with modified independence within 4 days. 2. Patient will perform sit to stand with supervision/set-up within 4 days. 3. Patient will ambulate with supervision/set-up for 150 feet with the least restrictive device within 4 days. 4. Patient will perform home exercise program per protocol with independence within 4 days. 5. Patient will demonstrate AROM 0-90 degrees in operative joint within 4 days. physical Therapy TREATMENT  Patient: Camila Brady (68 y.o. male)  Date: 8/10/2018  Diagnosis: DEGENERATIVE JOINT DISEASE RIGHT KNEE  Primary osteoarthritis of right knee Primary osteoarthritis of right knee  Procedure(s) (LRB):  RIGHT TOTAL KNEE REPLACEMENT (Right) 2 Days Post-Op  Precautions: WBAT  Chart, physical therapy assessment, plan of care and goals were reviewed. ASSESSMENT:  Pt received fowlers in bed and agreeable to therapy. Pt performed supine therex, as per protocol, demonstrating limited flexion/extension ROM and requiring min-mod A for assisted heelslides. Performed bed mobility with min-mod A, requiring therapist assist for RLE and at trunk. Pt performed sit to stand, with RW and min A, VC for hand placement to ensure safety. Pt requested assistance into bathroom and ambulated approximately 120', with RW and CGA, requiring occasional standing rest breaks due to SOB, VC for LE sequencing with good carryover. Upon return to room, pt required assistance for self care and doffing/donning of briefs. Pt returned to seated in chair, and positioned to comfort, all needs within reach. Pt may benefit from afternoon session to review bed mobility strategies in preparation for discharge to home with HHPT services.  In addition to home health, pt advises that he does have a caretaker to provide assistance with ADLs during the day. Progression toward goals:  []      Improving appropriately and progressing toward goals  [x]      Improving slowly and progressing toward goals  []      Not making progress toward goals and plan of care will be adjusted     PLAN:  Patient continues to benefit from skilled intervention to address the above impairments. Continue treatment per established plan of care. Discharge Recommendations:  Home Health  Further Equipment Recommendations for Discharge:  Pt owns RW     SUBJECTIVE:   Patient stated Everybody is telling me something different. Nobody is on the same page. But I'll do whatever you need me to do.     OBJECTIVE DATA SUMMARY:   Critical Behavior:  Neurologic State: Alert  Orientation Level: Oriented X4  Cognition: Appropriate decision making     Range of Motion:              RLE PROM  R Knee Flexion: 40  R Knee Extension: -15           Functional Mobility Training:  Bed Mobility:     Supine to Sit: min-mod A for RLE and trunk  Sit to Supine:          Transfers:  Sit to Stand: Min A  Stand to Sit: Contact guard assistance                             Balance:  Sitting: Intact  Standing: Impaired  Standing - Static: Good  Standing - Dynamic : Good  Ambulation/Gait Training:  Distance (ft): 120 Feet (ft)  Assistive Device: Gait belt;Walker, rolling  Ambulation - Level of Assistance: Contact guard assistance        Gait Abnormalities: Antalgic;Decreased step clearance  Right Side Weight Bearing: As tolerated     Base of Support: Widened  Stance: Right decreased  Speed/Sandra: Pace decreased (<100 feet/min)  Step Length: Left shortened                          Therapeutic Exercises:     EXERCISE   Sets   Reps   Active Active Assist   Passive Self ROM   Comments   Ankle Pumps  10 [x]                                        []                                        []                                        [] Quad Sets  10 [x]                                        []                                        []                                        []                                           Hamstring Sets   []                                        []                                        []                                        []                                           Short Arc Quads  10 []                                        [x]                                        []                                        []                                           Knee Extension Stretch  1 min   []                                          [x]                                          []                                          []                                           Heel Slides   []                                        [x]                                        []                                        []                                           Long Arc Quads   []                                        []                                        []                                        []                                           Knee Flexion Stretch   []                                        []                                        [x]                                        []                                           Straight Leg Raises   []                                        []                                        []                                        []                                             Pain:                    Activity Tolerance:   Limited by pain and activity endurance  Please refer to the flowsheet for vital signs taken during this treatment.   After treatment:   [x] Patient left in no apparent distress sitting up in chair  [] Patient left in no apparent distress in bed  [x] Call bell left within reach  [x] Nursing notified  [] Caregiver present  [] Bed alarm activated    COMMUNICATION/COLLABORATION:   The patients plan of care was discussed with: Registered Nurse    MIKE Kaye  Start: 09:09  End: 10:00    Regarding student involvement in patient care:  A student participated in this treatment session. Per CMS Medicare statements and APTA guidelines I certify that the following was true:  1. I was present and directly observed the entire session. 2. I made all skilled judgments and clinical decisions regarding care. 3. I am the practitioner responsible for assessment, treatment, and documentation.

## 2018-08-10 NOTE — OP NOTES
[facility] REPORT    Comfort Tam  MR#: [id number]  : 1945  ACCOUNT #: [de-identified]   DATE OF SERVICE: 2018    PREOPERATIVE DIAGNOSIS:  Advanced degenerative arthritis, right knee. POSTOPERATIVE DIAGNOSIS:  Advanced degenerative arthritis, right knee. PROCEDURE PERFORMED:  Right total knee replacement. ANESTHESIA:  Spinal.    ESTIMATED BLOOD LOSS:  200 mL. DRAIN:  None. COMPLICATIONS:  None. SPECIMENS REMOVED:  Tibial and femoral bone fragments. ASSISTANT:  Arnol Reilly PA-C    SURGEON:  Brayan Salcido MD    IMPLANTS:  Right total knee components as listed in the operative report. INDICATIONS:  The patient has advanced degenerative arthritis of his right knee. He has failed conservative treatment. He now presents for the above procedure. PROCEDURE:  On the day of operation, the patient was taken to the operating room, spinal anesthesia administered. He was placed in supine position. Right lower extremity was prepped and draped in the usual fashion. After exsanguination with an Esmarch, tourniquet inflated to 312 mmHg. A midline longitudinal incision was made over the knee. It was carried through subcutaneous tissue. A medial parapatellar capsular incision made. Advanced degenerative changes were noted throughout the knee. The knee was debrided of osteophytes and soft tissue. A drill was used to gain access to the femoral canal.  Distal femoral cutting guide assembled with a 5 degree valgus cut. The distal femoral cut made with an oscillating saw. The femur was sized and felt to be #8 in the Attune system. Anterior and posterior cuts were made, chamfer cuts were made. Box cut for the posterior stabilized prosthesis made. The external tibial alignment guide assembled, tibial plateau cut was made with an oscillating saw.   The tibial bone was felt to be osteoporotic and it was felt that a supplemental stem would be appropriate. Flexion and extension gaps were evaluated. The tibia was then prepared with a #8 to utilize an extended stem as well. Oscillating saw was used to prepare the patella and sized for 41 mm. Trial components were put into place, 8 mm trial insert provided the best fit, range of motion, with proper alignment and proper tracking of the patella. The trial components were then removed. The knee was thoroughly irrigated with pulse irrigation as well as antibiotic solution. The components were then cemented into place including the tibial stem. The 8 mm trial insert put into place. Soft tissues were infiltrated with Exparel local anesthetic. After these cement matured, the 8 mm insert was put into place. Tourniquet released, coagulation achieved with electrocautery. The capsule repaired with #1 Vicryl supplemented with #2 PDS, 2-0 Vicryl was used to close subcutaneous tissue and staples used to close the skin. Sterile dressings were applied and the patient was taken to recovery room in satisfactory condition. The assistant, Jimena Acosta PA-C, assisted me with positioning, retraction, implant installation and incision closure. MD DEBORAH Lyoola / BETTIE  D: 08/09/2018 12:44     T: 08/09/2018 20:05  JOB #: [id number]  CC: Lobito Ching MD  CC: Eli Marion MD

## 2018-08-10 NOTE — PROGRESS NOTES
Occupational Therapy Note:     Attempted to see pt for evaluation and training. Pt received supine in bed and noted with numerous demands. Pt assisted to meet all demands as therapist entered (call bell out of reach, phone out of reach, call bell not working, charging cell phone, fixing pillows, and repositioning). Pt educated for the role of OT and provided training for progress OOB to complete dressing and bathing activities. Pt reporting a rough night last night due to difficulty accessing to use of urinal.  Pt soiled bed and linens numerous times. Encouraged pt to sit EOB for toileting and use of urinal to see if this will help soiling himself. Pt asking not to dress today and to try to do this tomorrow. Will place patient on weekend list to follow up tomorrow to progress ADL education and training.       Jacquelyn Middleton, OT

## 2018-08-10 NOTE — PROGRESS NOTES
TOTAL KNEE PROGRESS NOTE      Subjective:     Post-Operative Day: 2 Status Post right Total Knee Arthroplasty  Systemic or Specific Complaints:No Complaints    Objective:     Patient Vitals for the past 24 hrs:   BP Temp Pulse Resp SpO2 Weight   08/10/18 0503 135/89 98.1 °F (36.7 °C) 76 16 96 % -   08/09/18 2308 - - - - - 124 kg (273 lb 5.9 oz)   08/09/18 2100 - - 92 16 96 % -   08/09/18 2038 149/89 98.2 °F (36.8 °C) 88 16 96 % -   08/09/18 1538 134/78 97.7 °F (36.5 °C) 97 16 97 % -   08/09/18 0846 113/77 97.5 °F (36.4 °C) 95 16 96 % -       General: alert, cooperative, no distress, appears stated age   Wound: Wound clean and dry no evidence of infection. , No Erythema, No Edema, No Drainage and Wound Intact   Motion: Extension: Full Extension   DVT Exam: No evidence of DVT seen on physical exam.  Negative Kathi's sign. No cords or calf tenderness. No significant calf/ankle edema. Data Review:    Recent Results (from the past 24 hour(s))   HEMOGLOBIN    Collection Time: 08/10/18  5:09 AM   Result Value Ref Range    HGB 12.9 12.1 - 17.0 g/dL         Assessment:     Status Post right Total Knee Arthroplasty. Doing well postoperatively. . Bandage aquacell, clean/dry. Labs stable. PT progressing well. Pain control WNL. Lower extremity edema, no change. Plan:     Continues current post-op course  Continue PT per protocol  Plan to discharge to Home [facility]. today, followup with Dr. Noris Spicer in [place] on Monday.

## 2018-08-10 NOTE — DISCHARGE INSTRUCTIONS
Patient meets criteria for   BUNDLED PAYMENT   for Care Improvement Initiative Criteria    Contact Information for Orthopedic Nurse Navigator:      MADELEINE Lai, RN-BC  V:[phone]  V:[phone]  H:[phone]    DC Orders All Total Knees    Case Management for DC planning to Home HC .   - PT 3 times a week for 2 weeks; WBAT. - Xarelto therapy once daily for 12 days post-operatively. - Remove staples at 2 weeks post-op; 8/22/18 .   - Follow up in Office on Monday [facility] location). After Hospital Care Plan:  Discharge Instructions Knee Replacement-Dr. Carolina Cote    Patient Name: Johanne Kline  Date of procedure: 8/8/2018   Procedure: Procedure(s):  RIGHT TOTAL KNEE REPLACEMENT  Surgeon: Kellie Chaudhary) and Role:     * Dilma Bernard MD - Primary    PCP: Kirsten Pozo MD  Date of discharge: No discharge date for patient encounter. Follow up appointments   Follow up with Dr. Carolina Cote on Monday [facility] location). Call [phone] to make an appointment.  If home health has been arranged for you the agency will contact you to arrange dates/times for visits. Please call them if you do not hear from them within 24 hours after you are discharged    When to call your Orthopaedic Surgeon: Call [phone].  If you call after 5pm or on a weekend, the on call physician will be contacted   Unrelieved pain   Signs of infection-if your incision is red; continues to have drainage; drainage has a foul odor or if you have a persistent fever over 101 degrees   Signs of a blood clot in your leg-calf pain, tenderness, redness, swelling of lower leg    When to call your Primary Care Physician:   Concerns about medical conditions such as diabetes, high blood pressure, asthma, congestive heart failure   Call if blood sugars are elevated, persistent headache or dizziness, coughing or congestion, constipation or diarrhea, burning with urination, abnormal heart rate    When to call [id number] go to the nearest emergency room   Acute onset of chest pain, shortness of breath, difficulty breathing      Activity   Weight bearing as tolerated with walker or crutches. Refer to pages 23-31 of your handbook for instructions and pictures   Complete your Home Exercise Program daily as instructed by your therapist.  Refer to pages 33-41 of your handbook for instructions and pictures   Get up every one hour and walk (except at night when sleeping)   Do not drive or operate heavy machinery    Incision Care   The Aquacel (brown, waterproof) surgical dressing is to remain on your knee for 7 days. On the 7th day have someone gently peel the dressing off by carefully lifting the edge and stretching it slightly to break the adhesive seal   You will have staples in your knee incision. They will be removed by the home health agency staff   If your Aquacel dressing comes loose/off before the 7th day, you may replace it with a dry sterile gauze dressing; change it daily. Once your incision is not draining, you may leave it open to air   You may take a shower with the Aquacel dressing in place. Once the Aquacel is removed, you may shower and get your incision wet but do not submerge your incision under water in a bath tub, hot tub or swimming pool for 6 weeks after surgery. Preventing blood clots    Take Xarelto once daily as prescribed by Dr. Portillo Sensor for 12 days following surgery   Wear elastic stockings (TEDS) for 4 weeks. You should remove them for approximately 1 hour daily for showering/sponge bathing    Pain management   Take pain medication as prescribed; decrease the amount you use as your pain lessens   Avoid alcoholic beverages while taking pain medication   Please be aware that many medications contain Tylenol. We do not want you to over medicate so please read the information below as a guide. Do not take more than 4 Grams of Tylenol in a 24 hour period.   (There are 1000 milligrams in one Gram)  o 325 mg of Tylenol per tablet (do not take more than 9 tablets in 24 hours)  o 500 mg of Tylenol per tablet (do not take more than 8 tablets in 24 hours)     Elevate your leg (do not bend/flex knee) and place ice bags on your knee for 15-20 minutes after exercising    Diet   Resume usual diet; drink plenty of fluids; eat foods high in fiber   You may want to take a stool softener (such as Senokot-S or Colace) to prevent constipation while you are taking pain medication. If constipation occurs, take a laxative (such as Dulcolax tablets, Milk of Magnesia, or a suppository)    [facility] (to be followed by [address] [place] [address])  Nursing   Remove staples per physicians order   Complete head to toe assessment, vital signs   Medication reconciliation   Review pain management   Manage chronic medical conditions    Physical Therapy-per physicians orders     Weight bearing status:             Mobility Status:                Gait:                ADL status overall composite:                   Physical Therapy   Assessment and evaluation-bed mobility; functional transfers (bed, chair, bathroom, stairs); ambulation with equipment, car transfers, shower transfers, safety and ability to get out of house in the event of an emergency   Review weight bearing as tolerated, wean from walker or crutches as tolerated   Discuss pain management   Review how to do ADLs.  Refer to page 42 of patient handbook    Home Exercise Program-refer to pages 33-41 of patient handbook for exercises

## 2018-08-10 NOTE — PROGRESS NOTES
Home care will start on Tuesday August 14th. The patient is scheduled to see the doctor on Monday in [place].  MAYITO

## 2018-08-10 NOTE — PROGRESS NOTES
Bedside shift change report given to Jian Greenfield (oncoming nurse) by Jannet Gates (offgoing nurse). Report included the following information SBAR.

## 2018-08-10 NOTE — PROGRESS NOTES
Problem: Mobility Impaired (Adult and Pediatric)  Goal: *Acute Goals and Plan of Care (Insert Text)  Physical Therapy Goals  Initiated 8/9/2018    1. Patient will move from supine to sit and sit to supine , scoot up and down and roll side to side in bed with modified independence within 4 days. 2. Patient will perform sit to stand with supervision/set-up within 4 days. 3. Patient will ambulate with supervision/set-up for 150 feet with the least restrictive device within 4 days. 4. Patient will perform home exercise program per protocol with independence within 4 days. 5. Patient will demonstrate AROM 0-90 degrees in operative joint within 4 days. physical Therapy TREATMENT  Patient: Srinath Lutz (68 y.o. male)  Date: 8/10/2018  Diagnosis: DEGENERATIVE JOINT DISEASE RIGHT KNEE  Primary osteoarthritis of right knee Primary osteoarthritis of right knee  Procedure(s) (LRB):  RIGHT TOTAL KNEE REPLACEMENT (Right) 2 Days Post-Op  Precautions: WBAT  Chart, physical therapy assessment, plan of care and goals were reviewed. ASSESSMENT:  Per discussion with Dr Crystal Napier and patient, pt will stay the night and have another PT session in the AM. Pt did perform bed mobility easier with the use of the leg  but required increase time for task. Pt also required several attempts to complete stand. Pt was able to maintain gait distance with rolling walker and progressed to SBA. Pt could benefit from bed mobility review. Pt has a ramp to enter home. Pt will have a friend to assist at discharge for AM. Pt is working on getting more assistance at discharge from daughter. Pt will have HHPT  Progression toward goals:  [x]      Improving appropriately and progressing toward goals  []      Improving slowly and progressing toward goals  []      Not making progress toward goals and plan of care will be adjusted     PLAN:  Patient continues to benefit from skilled intervention to address the above impairments.   Continue treatment per established plan of care. Discharge Recommendations:  Home Health  Further Equipment Recommendations for Discharge:  Received rolling walker     SUBJECTIVE:   Patient stated That was better.     OBJECTIVE DATA SUMMARY:   Critical Behavior:  Neurologic State: Alert  Orientation Level: Oriented X4  Cognition: Appropriate decision making     Range of Motion:         measured in AM     RLE PROM  R Knee Flexion: 40  R Knee Extension: -15           Functional Mobility Training:  Bed Mobility:     Supine to Sit: Stand-by assistance;Setup (leg )  Sit to Supine: Stand-by assistance with leg      Transfers:  Sit to Stand: Stand-by assistance; Additional time  Stand to Sit: Contact guard assistance                             Balance:  Sitting: Intact  Standing: Impaired  Standing - Static: Good  Standing - Dynamic : Good  Ambulation/Gait Training:  Distance (ft): 120 Feet (ft)  Assistive Device: Gait belt;Walker, rolling  Ambulation - Level of Assistance: Contact guard assistance        Gait Abnormalities: Antalgic;Decreased step clearance  Right Side Weight Bearing: As tolerated     Base of Support: Widened  Stance: Right decreased  Speed/Sandra: Pace decreased (<100 feet/min)  Step Length: Left shortened                   Stairs:            Therapeutic Exercises:     EXERCISE   Sets   Reps   Active Active Assist   Passive Self ROM   Comments   Ankle Pumps  10 [x]                                        []                                        []                                        []                                           Quad Sets   []                                        []                                        []                                        []                                           Hamstring Sets   []                                        []                                        []                                        []                                           Short Arc Quads 10 [x]                                        []                                        []                                        []                                           Knee Extension Stretch     []                                          []                                          []                                          []                                           Heel Slides  10 []                                        [x]                                        []                                        []                                           Long Arc Quads   []                                        []                                        []                                        []                                           Knee Flexion Stretch   []                                        []                                        []                                        []                                           Straight Leg Raises   []                                        []                                        []                                        []                                             Pain:         pt had no c/o knee pain during to session            Activity Tolerance:   Limited   Please refer to the flowsheet for vital signs taken during this treatment.   After treatment:   [] Patient left in no apparent distress sitting up in chair  [x] Patient left in no apparent distress in bed  [x] Call bell left within reach  [x] Nursing notified  [] Caregiver present  [] Bed alarm activated    COMMUNICATION/COLLABORATION:   The patients plan of care was discussed with: Registered Nurse and Physician    Yuli Gore PTA   Time Calculation: 33 mins

## 2018-08-10 NOTE — PROGRESS NOTES
Bedside and Verbal shift change report given to Jake Olivares (oncoming nurse) by Hemalatha Vieira (offgoing nurse). Report included the following information SBAR.

## 2018-08-10 NOTE — PROGRESS NOTES
Bedside and Verbal shift change report given to Jaiden Hamm (oncoming nurse) by Hal Joyce RN (offgoing nurse). Report given with Moe REYNOSO and MAR.

## 2018-08-11 VITALS
RESPIRATION RATE: 17 BRPM | HEART RATE: 86 BPM | WEIGHT: 273.37 LBS | DIASTOLIC BLOOD PRESSURE: 77 MMHG | HEIGHT: 74 IN | OXYGEN SATURATION: 94 % | BODY MASS INDEX: 35.08 KG/M2 | SYSTOLIC BLOOD PRESSURE: 125 MMHG | TEMPERATURE: 98 F

## 2018-08-11 LAB — HGB BLD-MCNC: 12 G/DL (ref 12.1–17)

## 2018-08-11 PROCEDURE — 36415 COLL VENOUS BLD VENIPUNCTURE: CPT | Performed by: PHYSICIAN ASSISTANT

## 2018-08-11 PROCEDURE — 94760 N-INVAS EAR/PLS OXIMETRY 1: CPT

## 2018-08-11 PROCEDURE — G8988 SELF CARE GOAL STATUS: HCPCS

## 2018-08-11 PROCEDURE — 74011000250 HC RX REV CODE- 250: Performed by: PHYSICIAN ASSISTANT

## 2018-08-11 PROCEDURE — G8987 SELF CARE CURRENT STATUS: HCPCS

## 2018-08-11 PROCEDURE — 85018 HEMOGLOBIN: CPT | Performed by: PHYSICIAN ASSISTANT

## 2018-08-11 PROCEDURE — 94640 AIRWAY INHALATION TREATMENT: CPT

## 2018-08-11 PROCEDURE — 74011250637 HC RX REV CODE- 250/637: Performed by: PHYSICIAN ASSISTANT

## 2018-08-11 PROCEDURE — 97165 OT EVAL LOW COMPLEX 30 MIN: CPT

## 2018-08-11 PROCEDURE — 97116 GAIT TRAINING THERAPY: CPT

## 2018-08-11 PROCEDURE — 97535 SELF CARE MNGMENT TRAINING: CPT

## 2018-08-11 RX ADMIN — ACETAMINOPHEN 650 MG: 325 TABLET, FILM COATED ORAL at 03:34

## 2018-08-11 RX ADMIN — OXYCODONE HYDROCHLORIDE 5 MG: 5 TABLET ORAL at 03:34

## 2018-08-11 RX ADMIN — DUTASTERIDE 0.5 MG: 0.5 CAPSULE, LIQUID FILLED ORAL at 09:14

## 2018-08-11 RX ADMIN — ARFORMOTEROL TARTRATE 15 MCG: 15 SOLUTION RESPIRATORY (INHALATION) at 08:25

## 2018-08-11 RX ADMIN — POLYETHYLENE GLYCOL 3350 17 G: 17 POWDER, FOR SOLUTION ORAL at 09:14

## 2018-08-11 RX ADMIN — ACETAMINOPHEN 650 MG: 325 TABLET, FILM COATED ORAL at 09:14

## 2018-08-11 RX ADMIN — BUDESONIDE 500 MCG: 0.5 INHALANT RESPIRATORY (INHALATION) at 08:25

## 2018-08-11 RX ADMIN — METOPROLOL SUCCINATE 25 MG: 25 TABLET, EXTENDED RELEASE ORAL at 09:14

## 2018-08-11 RX ADMIN — OXYCODONE HYDROCHLORIDE 5 MG: 5 TABLET ORAL at 10:34

## 2018-08-11 RX ADMIN — DOCUSATE SODIUM AND SENNOSIDES 1 TABLET: 8.6; 5 TABLET, FILM COATED ORAL at 09:14

## 2018-08-11 RX ADMIN — PANTOPRAZOLE SODIUM 40 MG: 40 TABLET, DELAYED RELEASE ORAL at 09:14

## 2018-08-11 NOTE — PROGRESS NOTES
Problem: Self Care Deficits Care Plan (Adult)  Goal: *Acute Goals and Plan of Care (Insert Text)  Occupational Therapy Goals  Initiated 8/11/2018  1. Patient will perform lower body dressing with modified independence within 7 day(s). 2.  Patient will perform bathing with supervision/set-up within 7 day(s). 3.  Patient will perform toilet transfers with modified independence within 7 day(s). 4.  Patient will utilize energy conservation techniques during functional activities with verbal cues within 7 day(s). Occupational Therapy EVALUATION  Patient: Misty Elias (68 y.o. male)  Date: 8/11/2018  Primary Diagnosis: DEGENERATIVE JOINT DISEASE RIGHT KNEE  Primary osteoarthritis of right knee  Procedure(s) (LRB):  RIGHT TOTAL KNEE REPLACEMENT (Right) 3 Days Post-Op   Precautions:   WBAT    ASSESSMENT :  Based on the objective data described below, the patient presents with overall decreased strength, endurance, and ROM compared to baseline following R TRK. Patient received supine in bed agreeable to therapy today, required mod A with leg assist and reliance on bed rails for bed mobilitiy to sit EOB. Once EOB, required min A with RW for support for sit to stand transfer for functional walk to bathroom. Required min A for toilet transfer (elevated commode and grab bars for support), completed toileting with supervision and lower body dressing to don underpants with min A to for functional reach and increased time. Patient completed grooming at sink with RW with mod I. Patient returned to chair with supervision and RW. At this time, patient well below functional baseline and requiring assist for most ADL tasks. He states his daughter will be nearby if he needs something and that he will have caregivers coming over for assist. Patient will benefit from [facility] for continued work toward independence with ADLs.  Patient educated on where to purchase [facility] [employer] and possibly use of reacher/dressing stick and sock aide pending progression. Recommend with nursing patient to complete as able in order to maintain strength, endurance and independence: ADLs with supervision/setup, OOB to chair 3x/day and mobilizing to the bathroom for toileting with 1 assist. Thank you for your assistance. Patient will benefit from skilled intervention to address the above impairments. Patients rehabilitation potential is considered to be Good  Factors which may influence rehabilitation potential include:   [x]             None noted  []             Mental ability/status  []             Medical condition  []             Home/family situation and support systems  []             Safety awareness  []             Pain tolerance/management  []             Other:      PLAN :  Recommendations and Planned Interventions:  [x]               Self Care Training                  [x]        Therapeutic Activities  []               Functional Mobility Training    []        Cognitive Retraining  []               Therapeutic Exercises           [x]        Endurance Activities  []               Balance Training                   []        Neuromuscular Re-Education  []               Visual/Perceptual Training     [x]   Home Safety Training  [x]               Patient Education                 []        Family Training/Education  []               Other (comment):    Frequency/Duration: Patient will be followed by occupational therapy 5 times a week to address goals. Discharge Recommendations: Home Health  Further Equipment Recommendations for Discharge: hip kit, BSC (instructed on where to buy)     SUBJECTIVE:   Patient stated I didn't think about these things, I was focused on the surgery.     OBJECTIVE DATA SUMMARY:   HISTORY:   Past Medical History:   Diagnosis Date    Arthritis     Asthma     R/T ENVIRONMENTAL ALLERGIES; INHALER USE DAILY    CAD (coronary artery disease) 2007    MI    Edema extremities     LEGS; WEARS COMPRESSION STOCKINGS    GERD (gastroesophageal reflux disease)     History of BPH     History of kidney stones     Hx: UTI (urinary tract infection)     Hypertension     Sleep apnea     CPAP     Past Surgical History:   Procedure Laterality Date    BREAST SURGERY PROCEDURE UNLISTED Right     EXCISION OF BREAST LUMP (BENIGN)    HX HEART CATHETERIZATION  2007, 2016    STENTS X3  (INGRID--DR NUNEZ)    HX LITHOTRIPSY      2-3X    HX ORTHOPAEDIC Right 1964    FEMUR ORIF  (DR PRESCOTT)    HX TONSILLECTOMY      HX UROLOGICAL      CYSTO    VASCULAR SURGERY PROCEDURE UNLIST Right     LEG VEIN BYPASS (INGRID)       Prior Level of Function/Environment/Context: Patient lives alone in trailer, no steps, tub/shower combo and was independent with ADLs PTA, daughter lives nearby and patient states that there will be caregivers coming in to assist him   Occupations in which the patient is/was successful, what are the barriers preventing that success:   Performance Patterns (routines, roles, habits, and rituals):   Personal Interests and/or values:   Expanded or extensive additional review of patient history:     Home Situation  Home Environment: Trailer/mobile home  # Steps to Enter: 0  Wheelchair Ramp: Yes  One/Two Story Residence: One story  Living Alone: Yes  Support Systems: Family member(s)  Patient Expects to be Discharged to[de-identified] Trailer/mobile home  Current DME Used/Available at Home:  (none)  Tub or Shower Type: Tub/Shower combination        EXAMINATION OF PERFORMANCE DEFICITS:  Cognitive/Behavioral Status:  Neurologic State: Alert  Orientation Level: Oriented X4  Cognition: Appropriate decision making             Skin: intact    Edema: none noted     Hearing:   Auditory  Auditory Impairment: None  Hearing Aids/Status: Does not own    Vision/Perceptual:                                     Range of Motion:    AROM: Generally decreased, functional                         Strength:    Strength: Generally decreased, functional Coordination:  Coordination: Within functional limits  Fine Motor Skills-Upper: Left Intact; Right Intact    Gross Motor Skills-Upper: Left Intact; Right Intact    Tone & Sensation:    Tone: Normal                         Balance:  Sitting: Intact; Without support  Standing: Impaired  Standing - Static: Good  Standing - Dynamic : Fair    Functional Mobility and Transfers for ADLs:  Bed Mobility:  Supine to Sit: Moderate assistance (use of gait belt for assist with LE, bed rail)    Transfers:  Sit to Stand: Minimum assistance (RW)  Stand to Sit: Supervision  Toilet Transfer : Minimum assistance (grab bars, RW)  Tub Transfer: Maximum assistance (infer)    ADL Assessment:       Oral Facial Hygiene/Grooming: Modified Independent (standing at sink)              Lower Body Dressing: Minimum assistance (don underpants )    Toileting: Supervision                ADL Intervention and task modifications:       Grooming  Grooming Assistance: Modified independent (at sink, RW)                   Lower Body Dressing Assistance  Dressing Assistance: Minimum assistance    Toileting  Toileting Assistance: Supervision/set up         Therapeutic Exercise:     Functional Measure:  Barthel Index:    Bathin  Bladder: 10  Bowels: 10  Groomin  Dressin  Feeding: 10  Mobility: 0  Stairs: 0  Toilet Use: 10  Transfer (Bed to Chair and Back): 10  Total: 60       Barthel and G-code impairment scale:  Percentage of impairment CH  0% CI  1-19% CJ  20-39% CK  40-59% CL  60-79% CM  80-99% CN  100%   Barthel Score 0-100 100 99-80 79-60 59-40 20-39 1-19   0   Barthel Score 0-20 20 17-19 13-16 9-12 5-8 1-4 0      The Barthel ADL Index: Guidelines  1. The index should be used as a record of what a patient does, not as a record of what a patient could do. 2. The main aim is to establish degree of independence from any help, physical or verbal, however minor and for whatever reason.   3. The need for supervision renders the patient not independent. 4. A patient's performance should be established using the best available evidence. Asking the patient, friends/relatives and nurses are the usual sources, but direct observation and common sense are also important. However direct testing is not needed. 5. Usually the patient's performance over the preceding 24-48 hours is important, but occasionally longer periods will be relevant. 6. Middle categories imply that the patient supplies over 50 per cent of the effort. 7. Use of aids to be independent is allowed. Johan Jason., Barthel, D.W. (8628). Functional evaluation: the Barthel Index. [facility] (14)2. ALISON Grimaldo, Cari Fitch., Suraj Callaway., Franco, [address] [place] [address] Ave (1999). Measuring the change indisability after inpatient rehabilitation; comparison of the responsiveness of the Barthel Index and Functional [place] Measure. Journal of Neurology, Neurosurgery, and Psychiatry, 66(4), 519-394. Star Zuniga, N.J.A, KAREEM Kelly, & Chandler Carrillo MDannaA. (2004.) Assessment of post-stroke quality of life in cost-effectiveness studies: The usefulness of the Barthel Index and the EuroQoL-5D. Quality of Life Research, 13, 097-00         G codes: In compliance with CMSs Claims Based Outcome Reporting, the following G-code set was chosen for this patient based on their primary functional limitation being treated: The outcome measure chosen to determine the severity of the functional limitation was the Barthel Index with a score of 60/100 which was correlated with the impairment scale. ?  Self Care:     - CURRENT STATUS: CK - 40%-59% impaired, limited or restricted    - GOAL STATUS: CJ - 20%-39% impaired, limited or restricted    - D/C STATUS:  ---------------To be determined---------------     Occupational Therapy Evaluation Charge Determination   History Examination Decision-Making   LOW Complexity : Brief history review  LOW Complexity : 1-3 performance deficits relating to physical, cognitive , or psychosocial skils that result in activity limitations and / or participation restrictions  LOW Complexity : No comorbidities that affect functional and no verbal or physical assistance needed to complete eval tasks       Based on the above components, the patient evaluation is determined to be of the following complexity level: LOW   Pain:  Pain Scale 1: Numeric (0 - 10)  Pain Intensity 1: 3  Pain Location 1: Knee  Pain Orientation 1: Anterior  Pain Description 1: Aching  Pain Intervention(s) 1: Medication (see MAR)  Activity Tolerance:   VSS  Please refer to the flowsheet for vital signs taken during this treatment. After treatment:   [x] Patient left in no apparent distress sitting up in chair  [] Patient left in no apparent distress in bed  [x] Call bell left within reach  [] Nursing notified  [] Caregiver present  [] Bed alarm activated    COMMUNICATION/EDUCATION:   The patients plan of care was discussed with: Physical Therapist.  [x] Home safety education was provided and the patient/caregiver indicated understanding. [] Patient/family have participated as able in goal setting and plan of care. [] Patient/family agree to work toward stated goals and plan of care. [] Patient understands intent and goals of therapy, but is neutral about his/her participation. [] Patient is unable to participate in goal setting and plan of care. This patients plan of care is appropriate for delegation to [facility].     Thank you for this referral.  Edouard Goodson  Time Calculation: 30 mins

## 2018-08-11 NOTE — PROGRESS NOTES
Problem: Mobility Impaired (Adult and Pediatric)  Goal: *Acute Goals and Plan of Care (Insert Text)  Physical Therapy Goals  Initiated 8/9/2018    1. Patient will move from supine to sit and sit to supine , scoot up and down and roll side to side in bed with modified independence within 4 days. 2. Patient will perform sit to stand with supervision/set-up within 4 days. 3. Patient will ambulate with supervision/set-up for 150 feet with the least restrictive device within 4 days. 4. Patient will perform home exercise program per protocol with independence within 4 days. 5. Patient will demonstrate AROM 0-90 degrees in operative joint within 4 days. physical Therapy TREATMENT  Patient: Kiley Amezquita (68 y.o. male)  Date: 8/11/2018  Diagnosis: DEGENERATIVE JOINT DISEASE RIGHT KNEE  Primary osteoarthritis of right knee Primary osteoarthritis of right knee  Procedure(s) (LRB):  RIGHT TOTAL KNEE REPLACEMENT (Right) 3 Days Post-Op  Precautions: WBAT  Chart, physical therapy assessment, plan of care and goals were reviewed. ASSESSMENT:  Patient continues to be limited by pain. Received up in chair. Overall CGA/min A for sit to stand, cues for scooting hips forward for ease of transfer. Patient able to ambulate 120 feet with RW and supervision. Ambulates with step to pattern but able to increase step length on L with cues. Patient with heavy reliance on RW for support during gait. Patient's HR 120bpm and O2 sats 96% on RA with activity, RN aware. Patient returned to bed at end of session. Using gait belt as leg , able to return to supine with min A. Patient plans to have caregivers in the home to assist with mobility. He is cleared for discharge from PT standpoint, RN aware. Recommending HHPT.   Progression toward goals:  [x]      Improving appropriately and progressing toward goals  []      Improving slowly and progressing toward goals  []      Not making progress toward goals and plan of care will be adjusted     PLAN:  Patient continues to benefit from skilled intervention to address the above impairments. Continue treatment per established plan of care. Discharge Recommendations:  Home Health  Further Equipment Recommendations for Discharge: Owns RW     SUBJECTIVE:   Patient stated I don't think I have any more questions for you.     OBJECTIVE DATA SUMMARY:   Critical Behavior:  Neurologic State: Alert  Orientation Level: Oriented X4  Cognition: Appropriate decision making     Range of Motion:  AROM: Generally decreased, functional                       Functional Mobility Training:  Bed Mobility:     Supine to Sit: Moderate assistance (use of gait belt for assist with LE, bed rail)  Sit to Supine: Minimum assistance (with leg )           Transfers:  Sit to Stand: Minimum assistance;Contact guard assistance  Stand to Sit: Supervision  Stand Pivot Transfers: Supervision                          Balance:  Sitting: Intact; Without support  Standing: Intact; With support  Standing - Static: Good  Standing - Dynamic : Fair  Ambulation/Gait Training:  Distance (ft): 120 Feet (ft)  Assistive Device: Gait belt;Walker, rolling  Ambulation - Level of Assistance: Supervision        Gait Abnormalities: Antalgic;Decreased step clearance; Step to gait  Right Side Weight Bearing: As tolerated     Base of Support: Widened;Shift to left  Stance: Right decreased  Speed/Sandra: Pace decreased (<100 feet/min)  Step Length: Right shortened;Left shortened                    Stairs:               Pain:  Pain Scale 1: Numeric (0 - 10)  Pain Intensity 1: 3  Pain Location 1: Knee  Pain Orientation 1: Anterior  Pain Description 1: Aching  Pain Intervention(s) 1: Medication (see MAR)  Activity Tolerance:   Hr 120bpm with activity  Please refer to the flowsheet for vital signs taken during this treatment.   After treatment:   [] Patient left in no apparent distress sitting up in chair  [x] Patient left in no apparent distress in bed  [x] Call bell left within reach  [x] Nursing notified  [] Caregiver present  [] Bed alarm activated    COMMUNICATION/COLLABORATION:   The patients plan of care was discussed with: Registered Nurse    Nick Amaya PT   Time Calculation: 20 mins

## 2018-08-11 NOTE — PROGRESS NOTES
Bedside shift change report given to [facility] Zain Prado and Kal Sanchez RN (oncoming nurse) by Abiola Alvarado (offgoing nurse). Report included the following information SBAR, Kardex, Intake/Output, MAR and Recent Results.

## 2018-08-11 NOTE — PROGRESS NOTES
I have reviewed discharge instructions with the patient. The patient verbalized understanding. Patient was discharged with all of his belongings and taken to patient discharge with a volunteer.

## 2018-08-13 ENCOUNTER — PATIENT OUTREACH (OUTPATIENT)
Dept: OTHER | Age: 73
End: 2018-08-13

## 2018-08-13 NOTE — NURSE NAVIGATOR
[facility]  SBAR Bundled Payment Handoff     FROM:                                TO: Home Recovery                                                      ([facility] or Facility name)  [address]  [address] [id number]  Dept: [address]: [phone]                                      Room#:  [phone]                                                       Nurse Navigator:  Diana Laureano RN           SITUATION      ASAScore: ASA 3 - Patient with moderate systemic disease with functional limitations    Admitted:  8/8/2018  Hospital Day: 4      Attending Provider:  No att. providers found     Consultations:  None    PCP:  Bobby Wakefield MD   [phone]     Admitting Dx:  DEGENERATIVE JOINT DISEASE RIGHT KNEE  Primary osteoarthritis of right knee       Principal Problem:    Primary osteoarthritis of right knee (8/3/2018)      5 Days Post-Op of   Procedure(s):  RIGHT TOTAL KNEE REPLACEMENT   BY: Sury Mosley MD             ON: 8/8/2018                  Code Status: Prior             Advance Directive? Yes Not W Pt (Send w/patient)     Isolation:  There are currently no Active Isolations       MDRO: No current active infections    BACKGROUND     Allergies:   Allergies   Allergen Reactions    Sulfa (Sulfonamide Antibiotics) Rash       Past Medical History:   Diagnosis Date    Arthritis     Asthma     R/T ENVIRONMENTAL ALLERGIES; INHALER USE DAILY    CAD (coronary artery disease) 2007    MI    Edema extremities     LEGS; WEARS COMPRESSION STOCKINGS    GERD (gastroesophageal reflux disease)     History of BPH     History of kidney stones     Hx: UTI (urinary tract infection)     Hypertension     Sleep apnea     CPAP       Past Surgical History:   Procedure Laterality Date    BREAST SURGERY PROCEDURE UNLISTED Right     EXCISION OF BREAST LUMP (BENIGN)    HX HEART CATHETERIZATION  2007, 2016    STENTS X3  (JEFF- MAYRA)    HX LITHOTRIPSY      2-3X    HX ORTHOPAEDIC Right 1964    FEMUR ORIF  (DR PRESCOTT)    HX TONSILLECTOMY      HX UROLOGICAL      CYSTO    VASCULAR SURGERY PROCEDURE UNLIST Right     LEG VEIN BYPASS (INGRID)       Prior to Admission Medications   Prescriptions Last Dose Informant Patient Reported? Taking? Omeprazole delayed release (PRILOSEC D/R) 20 mg tablet 8/8/2018 at Unknown time  Yes Yes   Sig: Take 20 mg by mouth two (2) times a day. albuterol-ipratropium (DUO-NEB) 2.5 mg-0.5 mg/3 ml nebu Not Taking at Unknown time  Yes No   Sig: 3 mL by Nebulization route every six (6) hours as needed. calcium-cholecalciferol, d3, (CALCIUM 600 + D) 600-125 mg-unit tab 8/7/2018 at Unknown time  Yes Yes   Sig: Take  by mouth daily. coenzyme Q-10 (CO Q-10) 200 mg capsule 8/1/2018 at Unknown time  Yes Yes   Sig: Take  by mouth daily. cranberry fruit extract (CRANBERRY CONCENTRATE PO) 8/1/2018 at Unknown time  Yes Yes   Sig: Take 360 mg by mouth nightly. cyanocobalamin (VITAMIN B-12) 1,000 mcg tablet 8/1/2018 at Unknown time  Yes Yes   Sig: Take 1,000 mcg by mouth every seven (7) days. dutasteride (AVODART) 0.5 mg capsule 8/7/2018 at Unknown time  Yes Yes   Sig: Take 0.5 mg by mouth daily. levocetirizine (XYZAL) 5 mg tablet 8/7/2018 at Unknown time  Yes Yes   Sig: Take  by mouth nightly.   magnesium oxide (MAG-OX) 400 mg tablet 8/7/2018 at Unknown time  Yes Yes   Sig: Take 400 mg by mouth daily. mecobal/levomefolat Ca/B6 phos (FOLTANX PO) 8/1/2018 at Unknown time  Yes Yes   Sig: Take  by mouth daily. metoprolol succinate (TOPROL-XL) 25 mg XL tablet 8/8/2018 at 0600  Yes Yes   Sig: Take  by mouth daily. mometasone-formoterol (DULERA) 100-5 mcg/actuation HFA inhaler 8/8/2018 at Unknown time  Yes Yes   Sig: Take 2 Puffs by inhalation two (2) times a day. montelukast (SINGULAIR) 10 mg tablet 8/7/2018 at Unknown time  Yes Yes   Sig: Take 10 mg by mouth nightly.    nitrofurantoin (MACRODANTIN) 100 mg capsule 8/7/2018 at Unknown time  Yes Yes   Sig: Take  by mouth daily. Facility-Administered Medications: None       Vaccinations: There is no immunization history on file for this patient. ASSESSMENT   Age: 68 y.o. Gender: male        Height: Height: 6' 1.5\" (186.7 cm)                    Weight:Weight: 124 kg (273 lb 5.9 oz)     No data found. Active Orders   There are no active orders of the following type(s): Diet. Orientation: Orientation Level: Oriented X4    Active Lines/Drains:  (Peg Tube / Argueta / CL or S/L?):no    Urinary Status: Voiding      Last BM: Last Bowel Movement Date: 08/07/18     Skin Integrity: Incision (comment) (surgery to RLE)   Wound Knee Right-DRESSING STATUS: Old drainage, Dry, Intact    Wound Knee Right-DRESSING TYPE: Aquacel    Mobility: Slightly limited   Weight Bearing Status: WBAT (Weight Bearing as Tolerated)      Gait Training  Assistive Device: Gait belt, Walker, rolling  Ambulation - Level of Assistance: Supervision  Distance (ft): 120 Feet (ft)     On Anticoagulation? YES  Xarelto        Pain Medications given:  Oxycodone 5 mg         Lab Results   Component Value Date/Time    Glucose 131 (H) 08/09/2018 04:54 AM    Hemoglobin A1c 5.7 07/31/2018 12:34 PM    INR 1.2 (H) 08/09/2018 04:54 AM    INR 1.0 07/31/2018 12:34 PM    HGB 12.0 (L) 08/11/2018 03:42 AM    HGB 12.9 08/10/2018 05:09 AM    HGB 13.5 08/09/2018 04:54 AM    HGB 15.2 07/31/2018 12:34 PM       Readmission Risks:  Score:         RECOMMENDATION     See After Visit Summary (AVS) for:  · Discharge instructions  · After [address]   · Medication Reconciliation          [facility] Orthopaedic Nurse Navigator  MADELEINE Joe, RN-BC       Office  [phone]  Cell      [phone]  Fax      [phone]  [email or web address]             . [place]

## 2018-08-13 NOTE — DISCHARGE SUMMARY
Discharge Summary    Physician: Leeanne Luo MD    Physician Assistant: Rafael Zhou PA-C    Admit Diagnosis:  DEGENERATIVE JOINT DISEASE RIGHT KNEE  Primary osteoarthritis of right knee    Final Diagnosis:   1. Advanced degenerative arthritis of right knee . Procedure: Right total knee replacement    Complications: None. History of Present Illness: The patient has a long-standing history of pain within the right knee . The patient has severe degenerative arthritis of the right knee and has exhausted conservative therapy at this time including prior intra-articular injections, activity modifications, OTC NSAIDS. The patient has been limited in their ability to perform ADLs, walk short distances or climb steps appropriately. The patient presents for the above-mentioned procedure. The patient has been cleared from a medical and cardiac standpoint. Past Medical History:  Recorded in the chart. Physical Examination: Also recorded in the chart. The patient is noted for ambulating with an antalgic gait favoring the right side. Examination of the right knee reveals a lack of 8 degrees to full extension with flexion to 111. Diffuse tenderness and crepitus to ROM. Synovial hypertrophy. No knee effusion. Slight varus deformity. The cruciate and collateral ligaments are stable. No sign of infection. No ecchymosis or erythema. No cellulitis or rash. No calf pain, no evidence of DVT. I detect no obvious motor or sensory deficits in the lower extremities. The extensor mechanism is intact. The neurovascular status is intact . X-rays reveal marked degenerative arthritis with complete loss of joint space medially. Significant lateral and patellofemoral changes. Course in the Hospital:  The patient was admitted to the hospital.  The Pt. Underwent a right total knee replacement . Postoperatively, the pt. did well. The pt. Remained stable from a medical standpoint.   The patient ambulated, WBAT weightbearing within the hospital with Physical Therapy. The patient continued with this therapy at [address] [place] [address] with PT. The patient began taking Xarelto post-operatively for DVT prophylaxis and will continue on this for 12 days. At the time of discharge, there was no evidence of any DVT noted. The patient had negative Homans signs bilateral lower extremities. At the time of discharge, the patient's right knee incision appeared with staples intact. No signs of infection or inflammation noted. The patient will follow up in our office in 2-1/2 weeks. DC med list:  Discharge Medication List as of 8/11/2018 10:41 AM      START taking these medications    Details   rivaroxaban (XARELTO) 10 mg tablet Take 1 Tab by mouth daily (with lunch) for 12 days. Indications: KNEE REPLACEMENT DEEP VEIN THROMBOSIS PREVENTION, Print, Disp-12 Tab, R-0      oxyCODONE IR (ROXICODONE) 5 mg immediate release tablet Take 1 Tab by mouth every four (4) hours as needed for Pain. Max Daily Amount: 30 mg., Print, Disp-60 Tab, R-0         CONTINUE these medications which have NOT CHANGED    Details   nitrofurantoin (MACRODANTIN) 100 mg capsule Take  by mouth daily. , Historical Med      mometasone-formoterol (DULERA) 100-5 mcg/actuation HFA inhaler Take 2 Puffs by inhalation two (2) times a day., Historical Med      dutasteride (AVODART) 0.5 mg capsule Take 0.5 mg by mouth daily. , Historical Med      mecobal/levomefolat Ca/B6 phos (FOLTANX PO) Take  by mouth daily. , Historical Med      albuterol-ipratropium (DUO-NEB) 2.5 mg-0.5 mg/3 ml nebu 3 mL by Nebulization route every six (6) hours as needed., Historical Med      levocetirizine (XYZAL) 5 mg tablet Take  by mouth nightly., Historical Med      metoprolol succinate (TOPROL-XL) 25 mg XL tablet Take  by mouth daily. , Historical Med      montelukast (SINGULAIR) 10 mg tablet Take 10 mg by mouth nightly., Historical Med      cyanocobalamin (VITAMIN B-12) 1,000 mcg tablet Take 1,000 mcg by mouth every seven (7) days. , Historical Med      calcium-cholecalciferol, d3, (CALCIUM 600 + D) 600-125 mg-unit tab Take  by mouth daily. , Historical Med      coenzyme Q-10 (CO Q-10) 200 mg capsule Take  by mouth daily. , Historical Med      Omeprazole delayed release (PRILOSEC D/R) 20 mg tablet Take 20 mg by mouth two (2) times a day., Historical Med      cranberry fruit extract (CRANBERRY CONCENTRATE PO) Take 360 mg by mouth nightly., Historical Med      magnesium oxide (MAG-OX) 400 mg tablet Take 400 mg by mouth daily. , Historical Med         STOP taking these medications       aspirin 81 mg chewable tablet Comments:   Reason for Stopping:               Patient Disposition: Home  Followup Care:  Discharge Condition: good  PT/OT per Home Health  Regular Diet  As directed    Follow-up Information     Follow up With Details Comments [facility]   [address] [address], [address] [place]                    Tashi Hess PA-C

## 2018-08-13 NOTE — PROGRESS NOTES
This note will not be viewable in [address] Post Discharge Follow-up contact after Joint Replacement    Patient discharged on 8/11/18  By  Esther Moreno   following  right knee Arthroplasty. Spoke with patient today, who reports they are \" doing okay, my daughter is coming over and the man that helps me has come by. I went to see Dr Reinaldo Moreno this morning and he said that everything looked okay. \"  Denies Fever, Shortness of Breath or Chest Pain. Home Health has not visited; but first visit is scheduled for 9 am tomorrow. Patient also reports:. Incision  clean, dry, intact  Calf is non-tender,   operative extremity has moderate swelling. Pain is well managed. Discussed use of ice & elevation. He states that he is using his walker and  is exercising independently. Taking Xarelto for anticoagulation, oxycodone for pain; he has only taken 2 pills since discharge. Patient   is not experiencing symptoms of constipation & urinating without difficulty. Discussed side effects of anticoagulants & pain medications (bleeding/bruising, constipation, lightheaded/dizziness)  First follow up appointment was completed this morning and next follow up  is scheduled. Discussed calling surgeon Dr Reinaldo Moreno  for drainage, bleeding, swelling in operative extremity, fever or pain. Discussed calling PCP Dr Jackson Howell with other medical issues.

## 2018-08-16 ENCOUNTER — HOSPITAL ENCOUNTER (INPATIENT)
Age: 73
LOS: 3 days | Discharge: HOME HEALTH | DRG: 698 | End: 2018-08-19
Attending: EMERGENCY MEDICINE | Admitting: INTERNAL MEDICINE
Payer: MEDICARE

## 2018-08-16 DIAGNOSIS — A41.9 SEPSIS SECONDARY TO UTI (HCC): Primary | ICD-10-CM

## 2018-08-16 DIAGNOSIS — N39.0 SEPSIS SECONDARY TO UTI (HCC): Primary | ICD-10-CM

## 2018-08-16 PROBLEM — D72.829 LEUKOCYTOSIS: Status: ACTIVE | Noted: 2018-08-16

## 2018-08-16 PROBLEM — R50.9 FEVER: Status: ACTIVE | Noted: 2018-08-16

## 2018-08-16 LAB
ALBUMIN SERPL-MCNC: 2.9 G/DL (ref 3.5–5)
ALBUMIN/GLOB SERPL: 0.7 {RATIO} (ref 1.1–2.2)
ALP SERPL-CCNC: 104 U/L (ref 45–117)
ALT SERPL-CCNC: 57 U/L (ref 12–78)
ANION GAP SERPL CALC-SCNC: 9 MMOL/L (ref 5–15)
APPEARANCE UR: ABNORMAL
AST SERPL-CCNC: 29 U/L (ref 15–37)
BACTERIA URNS QL MICRO: ABNORMAL /HPF
BASOPHILS # BLD: 0.1 K/UL (ref 0–0.1)
BASOPHILS NFR BLD: 1 % (ref 0–1)
BILIRUB SERPL-MCNC: 1.2 MG/DL (ref 0.2–1)
BILIRUB UR QL: NEGATIVE
BUN SERPL-MCNC: 14 MG/DL (ref 6–20)
BUN/CREAT SERPL: 13 (ref 12–20)
CALCIUM SERPL-MCNC: 8.7 MG/DL (ref 8.5–10.1)
CHLORIDE SERPL-SCNC: 105 MMOL/L (ref 97–108)
CO2 SERPL-SCNC: 22 MMOL/L (ref 21–32)
COLOR UR: ABNORMAL
CREAT SERPL-MCNC: 1.1 MG/DL (ref 0.7–1.3)
DIFFERENTIAL METHOD BLD: ABNORMAL
EOSINOPHIL # BLD: 0.1 K/UL (ref 0–0.4)
EOSINOPHIL NFR BLD: 1 % (ref 0–7)
EPITH CASTS URNS QL MICRO: ABNORMAL /LPF
ERYTHROCYTE [DISTWIDTH] IN BLOOD BY AUTOMATED COUNT: 14.2 % (ref 11.5–14.5)
GLOBULIN SER CALC-MCNC: 4 G/DL (ref 2–4)
GLUCOSE SERPL-MCNC: 125 MG/DL (ref 65–100)
GLUCOSE UR STRIP.AUTO-MCNC: NEGATIVE MG/DL
HCT VFR BLD AUTO: 39 % (ref 36.6–50.3)
HGB BLD-MCNC: 12.6 G/DL (ref 12.1–17)
HGB UR QL STRIP: ABNORMAL
IMM GRANULOCYTES # BLD: 0.1 K/UL (ref 0–0.04)
IMM GRANULOCYTES NFR BLD AUTO: 1 % (ref 0–0.5)
KETONES UR QL STRIP.AUTO: ABNORMAL MG/DL
LACTATE SERPL-SCNC: 1.5 MMOL/L (ref 0.4–2)
LEUKOCYTE ESTERASE UR QL STRIP.AUTO: ABNORMAL
LYMPHOCYTES # BLD: 1 K/UL (ref 0.8–3.5)
LYMPHOCYTES NFR BLD: 7 % (ref 12–49)
MCH RBC QN AUTO: 29 PG (ref 26–34)
MCHC RBC AUTO-ENTMCNC: 32.3 G/DL (ref 30–36.5)
MCV RBC AUTO: 89.9 FL (ref 80–99)
MONOCYTES # BLD: 0.9 K/UL (ref 0–1)
MONOCYTES NFR BLD: 6 % (ref 5–13)
NEUTS SEG # BLD: 12.2 K/UL (ref 1.8–8)
NEUTS SEG NFR BLD: 85 % (ref 32–75)
NITRITE UR QL STRIP.AUTO: NEGATIVE
NRBC # BLD: 0 K/UL (ref 0–0.01)
NRBC BLD-RTO: 0 PER 100 WBC
PH UR STRIP: 7 [PH] (ref 5–8)
PLATELET # BLD AUTO: 318 K/UL (ref 150–400)
PMV BLD AUTO: 9.6 FL (ref 8.9–12.9)
POTASSIUM SERPL-SCNC: 4.1 MMOL/L (ref 3.5–5.1)
PROT SERPL-MCNC: 6.9 G/DL (ref 6.4–8.2)
PROT UR STRIP-MCNC: 30 MG/DL
RBC # BLD AUTO: 4.34 M/UL (ref 4.1–5.7)
RBC #/AREA URNS HPF: ABNORMAL /HPF (ref 0–5)
SODIUM SERPL-SCNC: 136 MMOL/L (ref 136–145)
SP GR UR REFRACTOMETRY: 1.02 (ref 1–1.03)
UR CULT HOLD, URHOLD: NORMAL
UROBILINOGEN UR QL STRIP.AUTO: 1 EU/DL (ref 0.2–1)
WBC # BLD AUTO: 14.4 K/UL (ref 4.1–11.1)
WBC URNS QL MICRO: >100 /HPF (ref 0–4)

## 2018-08-16 PROCEDURE — 74011250636 HC RX REV CODE- 250/636: Performed by: PHYSICIAN ASSISTANT

## 2018-08-16 PROCEDURE — 85025 COMPLETE CBC W/AUTO DIFF WBC: CPT | Performed by: EMERGENCY MEDICINE

## 2018-08-16 PROCEDURE — 87040 BLOOD CULTURE FOR BACTERIA: CPT | Performed by: PHYSICIAN ASSISTANT

## 2018-08-16 PROCEDURE — 65660000000 HC RM CCU STEPDOWN

## 2018-08-16 PROCEDURE — A4216 STERILE WATER/SALINE, 10 ML: HCPCS | Performed by: PHYSICIAN ASSISTANT

## 2018-08-16 PROCEDURE — 74011250637 HC RX REV CODE- 250/637: Performed by: NURSE PRACTITIONER

## 2018-08-16 PROCEDURE — 65270000029 HC RM PRIVATE

## 2018-08-16 PROCEDURE — 93005 ELECTROCARDIOGRAM TRACING: CPT

## 2018-08-16 PROCEDURE — 81001 URINALYSIS AUTO W/SCOPE: CPT | Performed by: EMERGENCY MEDICINE

## 2018-08-16 PROCEDURE — 99285 EMERGENCY DEPT VISIT HI MDM: CPT

## 2018-08-16 PROCEDURE — 96360 HYDRATION IV INFUSION INIT: CPT

## 2018-08-16 PROCEDURE — 87186 SC STD MICRODIL/AGAR DIL: CPT | Performed by: PHYSICIAN ASSISTANT

## 2018-08-16 PROCEDURE — 74011250637 HC RX REV CODE- 250/637: Performed by: INTERNAL MEDICINE

## 2018-08-16 PROCEDURE — 87077 CULTURE AEROBIC IDENTIFY: CPT | Performed by: PHYSICIAN ASSISTANT

## 2018-08-16 PROCEDURE — 83605 ASSAY OF LACTIC ACID: CPT | Performed by: EMERGENCY MEDICINE

## 2018-08-16 PROCEDURE — 74011250636 HC RX REV CODE- 250/636: Performed by: EMERGENCY MEDICINE

## 2018-08-16 PROCEDURE — 87086 URINE CULTURE/COLONY COUNT: CPT | Performed by: PHYSICIAN ASSISTANT

## 2018-08-16 PROCEDURE — 80053 COMPREHEN METABOLIC PANEL: CPT | Performed by: EMERGENCY MEDICINE

## 2018-08-16 PROCEDURE — 36415 COLL VENOUS BLD VENIPUNCTURE: CPT | Performed by: PHYSICIAN ASSISTANT

## 2018-08-16 RX ORDER — ACETAMINOPHEN 500 MG
1000 TABLET ORAL
COMMUNITY

## 2018-08-16 RX ORDER — ACETAMINOPHEN 325 MG/1
650 TABLET ORAL
Status: COMPLETED | OUTPATIENT
Start: 2018-08-16 | End: 2018-08-16

## 2018-08-16 RX ORDER — IPRATROPIUM BROMIDE AND ALBUTEROL SULFATE 2.5; .5 MG/3ML; MG/3ML
3 SOLUTION RESPIRATORY (INHALATION)
Status: DISCONTINUED | OUTPATIENT
Start: 2018-08-16 | End: 2018-08-19 | Stop reason: HOSPADM

## 2018-08-16 RX ORDER — HYDROCODONE BITARTRATE AND ACETAMINOPHEN 5; 325 MG/1; MG/1
1 TABLET ORAL
Status: DISCONTINUED | OUTPATIENT
Start: 2018-08-16 | End: 2018-08-19 | Stop reason: HOSPADM

## 2018-08-16 RX ORDER — SODIUM CHLORIDE 0.9 % (FLUSH) 0.9 %
5-10 SYRINGE (ML) INJECTION AS NEEDED
Status: DISCONTINUED | OUTPATIENT
Start: 2018-08-16 | End: 2018-08-19 | Stop reason: HOSPADM

## 2018-08-16 RX ORDER — METOPROLOL SUCCINATE 25 MG/1
25 TABLET, EXTENDED RELEASE ORAL DAILY
Status: DISCONTINUED | OUTPATIENT
Start: 2018-08-17 | End: 2018-08-19 | Stop reason: HOSPADM

## 2018-08-16 RX ORDER — ONDANSETRON 2 MG/ML
4 INJECTION INTRAMUSCULAR; INTRAVENOUS
Status: DISCONTINUED | OUTPATIENT
Start: 2018-08-16 | End: 2018-08-19 | Stop reason: HOSPADM

## 2018-08-16 RX ORDER — BUDESONIDE 0.5 MG/2ML
500 INHALANT ORAL
Status: DISCONTINUED | OUTPATIENT
Start: 2018-08-16 | End: 2018-08-19 | Stop reason: HOSPADM

## 2018-08-16 RX ORDER — TAMSULOSIN HYDROCHLORIDE 0.4 MG/1
0.4 CAPSULE ORAL DAILY
COMMUNITY
End: 2021-08-09 | Stop reason: SDUPTHER

## 2018-08-16 RX ORDER — DUTASTERIDE 0.5 MG/1
0.5 CAPSULE, LIQUID FILLED ORAL DAILY
Status: DISCONTINUED | OUTPATIENT
Start: 2018-08-17 | End: 2018-08-19 | Stop reason: HOSPADM

## 2018-08-16 RX ORDER — DIPHENHYDRAMINE HCL 25 MG
25 CAPSULE ORAL
Status: DISCONTINUED | OUTPATIENT
Start: 2018-08-16 | End: 2018-08-19 | Stop reason: HOSPADM

## 2018-08-16 RX ORDER — FERROUS SULFATE, DRIED 160(50) MG
1 TABLET, EXTENDED RELEASE ORAL DAILY
COMMUNITY

## 2018-08-16 RX ORDER — ERGOCALCIFEROL 1.25 MG/1
50000 CAPSULE ORAL
COMMUNITY
End: 2021-08-04

## 2018-08-16 RX ORDER — PANTOPRAZOLE SODIUM 40 MG/1
40 TABLET, DELAYED RELEASE ORAL
Status: DISCONTINUED | OUTPATIENT
Start: 2018-08-16 | End: 2018-08-19 | Stop reason: HOSPADM

## 2018-08-16 RX ORDER — MONTELUKAST SODIUM 10 MG/1
10 TABLET ORAL
Status: DISCONTINUED | OUTPATIENT
Start: 2018-08-16 | End: 2018-08-19 | Stop reason: HOSPADM

## 2018-08-16 RX ORDER — ARFORMOTEROL TARTRATE 15 UG/2ML
15 SOLUTION RESPIRATORY (INHALATION)
Status: DISCONTINUED | OUTPATIENT
Start: 2018-08-16 | End: 2018-08-19 | Stop reason: HOSPADM

## 2018-08-16 RX ORDER — NALOXONE HYDROCHLORIDE 0.4 MG/ML
0.4 INJECTION, SOLUTION INTRAMUSCULAR; INTRAVENOUS; SUBCUTANEOUS AS NEEDED
Status: DISCONTINUED | OUTPATIENT
Start: 2018-08-16 | End: 2018-08-19 | Stop reason: HOSPADM

## 2018-08-16 RX ORDER — ACETAMINOPHEN 325 MG/1
650 TABLET ORAL
Status: DISCONTINUED | OUTPATIENT
Start: 2018-08-16 | End: 2018-08-19 | Stop reason: HOSPADM

## 2018-08-16 RX ADMIN — MONTELUKAST SODIUM 10 MG: 10 TABLET, COATED ORAL at 21:57

## 2018-08-16 RX ADMIN — SODIUM CHLORIDE 1000 ML: 900 INJECTION, SOLUTION INTRAVENOUS at 15:34

## 2018-08-16 RX ADMIN — PANTOPRAZOLE SODIUM 40 MG: 40 TABLET, DELAYED RELEASE ORAL at 21:57

## 2018-08-16 RX ADMIN — Medication 10 ML: at 21:57

## 2018-08-16 RX ADMIN — WATER 1 G: 1 INJECTION INTRAMUSCULAR; INTRAVENOUS; SUBCUTANEOUS at 18:21

## 2018-08-16 RX ADMIN — RIVAROXABAN 10 MG: 10 TABLET, FILM COATED ORAL at 21:57

## 2018-08-16 RX ADMIN — ACETAMINOPHEN 650 MG: 325 TABLET ORAL at 16:26

## 2018-08-16 RX ADMIN — ACETAMINOPHEN 650 MG: 325 TABLET ORAL at 21:57

## 2018-08-16 NOTE — Clinical Note
Status[de-identified] Inpatient [101] Type of Bed: Remote Telemetry [29] Inpatient Hospitalization Certified Necessary for the Following Reasons: 3. Patient receiving treatment that can only be provided in an inpatient setting (further clarification in H&P documentation) Admitting Diagnosis: UTI (urinary tract infection) [321002] Admitting Physician: [address] [place], [place] [address]Dayday Attending Physician: [address] [place] [address] Danna Boudreaux Estimated Length of Stay: 2 Midnights Discharge Plan[de-identified] Home with Office Follow-up

## 2018-08-16 NOTE — IP AVS SNAPSHOT
Emerita Almeida 
 
 
 [address] [place] [address] 
[phone] Patient: Meme Hernández MRN: [id number] [facility]:[birth year] About your hospitalization You were admitted on:  August 16, 2018 You last received care in the:  [facility] 4M POST SURG ORT 2 You were discharged on:  August 19, 2018 Why you were hospitalized Your primary diagnosis was:  Uti (Urinary Tract Infection) Your diagnoses also included:  Leukocytosis, Fever, Sepsis (Hcc), Primary Osteoarthritis Of Right Knee, Lymphedema, Hypertension, Hx: Uti (Urinary Tract Infection), History Of Kidney Stones, History Of Bph, Gerd (Gastroesophageal Reflux Disease), Cad (Coronary Artery Disease), Asthma, Arthritis, Ruy On Cpap Follow-up Information Follow up With Details Comments Contact Info David Carmona [id number] [phone] Bobby Wakefield MD Schedule an appointment as soon as possible for a visit for a regular follow up and review [address] [address] 
[phone] Discharge Orders None A check dimple indicates which time of day the medication should be taken. My Medications START taking these medications Instructions Each Dose to Equal  
 Morning Noon Evening Bedtime  
 cephALEXin 500 mg capsule Commonly known as:  Cait Other Your last dose was: Your next dose is: Take 1 Cap by mouth two (2) times a day for 7 days. 500 mg CONTINUE taking these medications Instructions Each Dose to Equal  
 Morning Noon Evening Bedtime  
 albuterol-ipratropium 2.5 mg-0.5 mg/3 ml Nebu Commonly known as:  Darlene Enter Your last dose was: Your next dose is:    
   
   
 3 mL by Nebulization route every six (6) hours as needed. 3 mL  
    
   
   
   
  
 calcium-vitamin D 500 mg(1,250mg) -200 unit per tablet Commonly known as:  OYSTER SHELL Your last dose was: Your next dose is: Take 1 Tab by mouth daily. 1 Tab  
    
   
   
   
  
 CO Q-10 200 mg capsule Generic drug:  coenzyme Q-10 Your last dose was: Your next dose is: Take 200 mg by mouth daily. 200 mg CRANBERRY CONCENTRATE PO Your last dose was: Your next dose is: Take 360 mg by mouth nightly. 360 mg  
    
   
   
   
  
 DULERA 100-5 mcg/actuation HFA inhaler Generic drug:  mometasone-formoterol Your last dose was: Your next dose is: Take 1 Puff by inhalation two (2) times a day. 1 Puff  
    
   
   
   
  
 dutasteride 0.5 mg capsule Commonly known as:  AVODART Your last dose was: Your next dose is: Take 0.5 mg by mouth daily. 0.5 mg  
    
   
   
   
  
 FLOMAX 0.4 mg capsule Generic drug:  tamsulosin Your last dose was: Your next dose is: Take 0.4 mg by mouth daily. 0.4 mg  
    
   
   
   
  
 FOLTANX PO Your last dose was: Your next dose is: Take  by mouth daily. levocetirizine 5 mg tablet Commonly known as:  Graylon Jeremy Your last dose was: Your next dose is: Take 5 mg by mouth nightly. 5 mg  
    
   
   
   
  
 magnesium oxide 400 mg tablet Commonly known as:  MAG-OX Your last dose was: Your next dose is: Take 400 mg by mouth daily. 400 mg  
    
   
   
   
  
 metoprolol succinate 25 mg XL tablet Commonly known as:  TOPROL-XL Your last dose was: Your next dose is: Take 25 mg by mouth daily. 25 mg  
    
   
   
   
  
 montelukast 10 mg tablet Commonly known as:  SINGULAIR Your last dose was: Your next dose is: Take 10 mg by mouth nightly.   
 10 mg  
    
   
   
   
  
 Omeprazole delayed release 20 mg tablet Commonly known as:  PRILOSEC D/R Your last dose was: Your next dose is: Take 20 mg by mouth two (2) times a day. 20 mg  
    
   
   
   
  
 rivaroxaban 10 mg tablet Commonly known as:  Sue Smaller Your last dose was: Your next dose is: Take 1 Tab by mouth daily (with lunch) for 12 days. Indications: KNEE REPLACEMENT DEEP VEIN THROMBOSIS PREVENTION  
 10 mg  
    
   
   
   
  
 TYLENOL EXTRA STRENGTH 500 mg tablet Generic drug:  acetaminophen Your last dose was: Your next dose is: Take 1,000 mg by mouth every six (6) hours as needed for Pain. 1000 mg  
    
   
   
   
  
 VITAMIN B-12 1,000 mcg tablet Generic drug:  cyanocobalamin Your last dose was: Your next dose is: Take 1,000 mcg by mouth every seven (7) days. 1000 mcg VITAMIN D2 50,000 unit capsule Generic drug:  ergocalciferol Your last dose was: Your next dose is: Take 50,000 Units by mouth Every Thursday. [address] Units Where to Get Your Medications Information on where to get these meds will be given to you by the nurse or doctor. ! Ask your nurse or doctor about these medications  
  cephALEXin 500 mg capsule Discharge Instructions HOSPITALIST DISCHARGE INSTRUCTIONS 
 
NAME:             Andry Travis :  1945 MRN:  [id number] Date:     2018 ADMIT DATE: 2018 DISCHARGE DATE: 2018 PRINCIPAL ADMITTING DIAGNOSIS: 
UTI (urinary tract infection) DISCHARGE DIAGNOSES: 
Principal Problem: 
  UTI (urinary tract infection) (2018) Primary osteoarthritis of right knee (8/3/2018) Asthma: Overview: R/T ENVIRONMENTAL ALLERGIES; INHALER USE DAILY 
  CAD (coronary artery disease) (2007): Overview: MI 
  GERD (gastroesophageal reflux disease) History of BPH History of kidney stones Hx: UTI (urinary tract infection) Hypertension Lymphedema SANTA on CPAP MEDICATIONS: 
 
· It is important that medications are taken exactly as they are prescribed on the discharge medication instructions and keep them your  in the bottles provided by the pharmacist.  
· Keep a list of the medication names, dosages, and times to be taken at all times. · Do not take other medications without consulting your doctor. Recommended diet:  Cardiac Diet Recommended activity: Activity as tolerated Post discharge care: 
 
Notify follow up health care provider or return to the emergency department if you cannot get hold of your doctor if you feel worse or experience symptoms similar to those that brought you to hospital 
 
Follow-up Information Follow up With Details Comments Contact Info Kassandra [facility] [address] [phone] Information obtained by : 
I understand that if any problems occur once I am at home I am to contact my physician and I understand and acknowledge receipt of the instructions indicated above. Physician's or R.N.'s Signature                                                                  Date/Time Patient or Representative Signature                                                          Date/Time [employer] Announcement We are excited to announce that we are making your provider's discharge notes available to you in [employer].   You will see these notes when they are completed and signed by the physician that discharged you from your recent hospital stay. If you have any questions or concerns about any information you see in [facility], please call the Health Information Department where you were seen or reach out to your Primary Care Provider for more information about your plan of care. Introducing [facility]! [employer] introduces [facility] patient portal. Now you can access parts of your medical record, email your doctor's office, and request medication refills online. 1. In your internet browser, go to [email or web address] 2. Click on the First Time User? Click Here link in the Sign In box. You will see the New Member Sign Up page. 3. Enter your [facility] Access Code exactly as it appears below. You will not need to use this code after youve completed the sign-up process. If you do not sign up before the expiration date, you must request a new code. · [facility] Access Code: [email or web address] Expires: 10/29/2018 11:42 AM 
 
4. Enter the last four digits of your Social Security Number (xxxx) and Date of Birth (mm/dd/yyyy) as indicated and click Submit. You will be taken to the next sign-up page. 5. Create a [facility] ID. This will be your [facility] login ID and cannot be changed, so think of one that is secure and easy to remember. 6. Create a [facility] password. You can change your password at any time. 7. Enter your Password Reset Question and Answer. This can be used at a later time if you forget your password. 8. Enter your e-mail address. You will receive e-mail notification when new information is available in [address] 9. Click Sign Up. You can now view and download portions of your medical record. 10. Click the Download Summary menu link to download a portable copy of your medical information. If you have questions, please visit the Frequently Asked Questions section of the [facility] website. Remember, [facility] is NOT to be used for urgent needs. For medical emergencies, dial 911. Now available from your iPhone and Android! Introducing [facility] As a [employer] patient, I wanted to make you aware of our electronic visit tool called [facility]. [employer] 24/7 allows you to connect within minutes with a medical provider 24 hours a day, seven days a week via a mobile device or tablet or logging into a secure website from your computer. You can access [facility] from anywhere in the [place]. A virtual visit might be right for you when you have a simple condition and feel like you just dont want to get out of bed, or cant get away from work for an appointment, when your regular [employer] provider is not available (evenings, weekends or holidays), or when youre out of town and need minor care. Electronic visits cost only $49 and if the [employer] 24/[employer] provider determines a prescription is needed to treat your condition, one can be electronically transmitted to a nearby pharmacy*. Please take a moment to enroll today if you have not already done so. The enrollment process is free and takes just a few minutes. To enroll, please download the [employer] 24/7 carmen to your tablet or phone, or visit [email or web address] to enroll on your computer. And, as an [address] [place] [address] patient with a [employer] account, the results of your visits will be scanned into your electronic medical record and your primary care provider will be able to view the scanned results. We urge you to continue to see your regular [employer] provider for your ongoing medical care. And while your primary care provider may not be the one available when you seek a [facility] virtual visit, the peace of mind you get from getting a real diagnosis real time can be priceless. For more information on [facility], view our Frequently Asked Questions (FAQs) at [email or web address]. Sincerely, 
 
Thomas Chen MD 
Chief Medical Officer [employer] *:  certain medications cannot be prescribed via [employer] Unresulted Labs-Please follow up with your PCP about these lab tests Order Current Status CULTURE, BLOOD Preliminary result CULTURE, BLOOD Preliminary result Providers Seen During Your Hospitalization Provider Specialty Primary office phone Julia Esqueda, [address] [facility] Emergency Medicine [phone] eWn Cuevas MD Internal Medicine [phone] Osbaldo Mccarty MD Internal Medicine [phone] Your Primary Care Physician (PCP) Primary Care Physician Office Phone Office Fax Mac Bronson [phone] You are allergic to the following Allergen Reactions Sulfa (Sulfonamide Antibiotics) Rash Recent Documentation Height Weight BMI Smoking Status 1.854 m 118.8 kg 34.57 kg/m2 Former Smoker Emergency Contacts Name Discharge Info Relation Home Work Mobile Bess Murray DISCHARGE CAREGIVER [3] Spouse [3] [phone] Patient Belongings The following personal items are in your possession at time of discharge: 
     Visual Aid: None Please provide this summary of care documentation to your next provider. Signatures-by signing, you are acknowledging that this After Visit Summary has been reviewed with you and you have received a copy. Patient Signature:  ____________________________________________________________ Date:  ____________________________________________________________  
  
[facility] Provider Signature:  ____________________________________________________________ Date:  ____________________________________________________________

## 2018-08-16 NOTE — H&P
[facility]    Hospitalist Admission Note      NAME:  Panfilo Hernández   :   1945   MRN:  [id number]     PCP:  Brent Coreas MD     Date/Time:  2018 6:52 PM          Subjective:     CHIEF COMPLAINT: fever     HISTORY OF PRESENT ILLNESS:     Mr. Bernice Porter is a 68 y.o.  male who presented to the Emergency Department complaining of fever. It began today. With chills. He had a TKA last week. During that he has a goetz cath. After goetz removed 5 days ago, some penile bleeding. Otherwise was recovering from surgery okay, but today fever. ER workup finds UTI with sepsis criteria. We will admit him for rmanagent.     Past Medical History:   Diagnosis Date    Arthritis     Asthma     R/T ENVIRONMENTAL ALLERGIES; INHALER USE DAILY    CAD (coronary artery disease)     MI    GERD (gastroesophageal reflux disease)     History of BPH     History of kidney stones     Hx: UTI (urinary tract infection)     Hypertension     Lymphedema     SANTA on CPAP         Past Surgical History:   Procedure Laterality Date    BREAST SURGERY PROCEDURE UNLISTED Right     EXCISION OF BREAST LUMP (BENIGN)    HX HEART CATHETERIZATION  ,     STENTS X3  (INGRID--DR NUNEZ)    HX LITHOTRIPSY      2-3X    HX ORTHOPAEDIC Right 1964    FEMUR ORIF  (DR PRESCOTT)    HX TONSILLECTOMY      HX UROLOGICAL      CYSTO    VASCULAR SURGERY PROCEDURE UNLIST Right     LEG VEIN BYPASS (INGRID)       Social History   Substance Use Topics    Smoking status: Former Smoker     Packs/day: 2.00     Years: 20.00     Quit date:     Smokeless tobacco: Never Used    Alcohol use No        Family History   Problem Relation Age of Onset    Lung Disease Mother     Asthma Mother     Cancer Father      LUNG    Cancer Brother      BRAIN    Heart Disease Brother     Heart Disease Brother     Heart Disease Brother     Anesth Problems Neg Hx      Allergies   Allergen Reactions    Sulfa (Sulfonamide Antibiotics) Rash        Prior to Admission medications    Medication Sig Start Date End Date Taking? Authorizing Provider   rivaroxaban (XARELTO) 10 mg tablet Take 1 Tab by mouth daily (with lunch) for 12 days. Indications: KNEE REPLACEMENT DEEP VEIN THROMBOSIS PREVENTION 8/9/18 8/21/18  Cassia Spear PA-C   oxyCODONE IR (ROXICODONE) 5 mg immediate release tablet Take 1 Tab by mouth every four (4) hours as needed for Pain. Max Daily Amount: 30 mg. 8/9/18   Cassia Spear PA-C   mometasone-formoterol [facility]) 100-5 mcg/actuation HFA inhaler Take 2 Puffs by inhalation two (2) times a day. Historical Provider   dutasteride (AVODART) 0.5 mg capsule Take 0.5 mg by mouth daily. Historical Provider   mecobal/levomefolat Ca/B6 phos (FOLTANX PO) Take  by mouth daily. Historical Provider   albuterol-ipratropium (DUO-NEB) 2.5 mg-0.5 mg/3 ml nebu 3 mL by Nebulization route every six (6) hours as needed. Historical Provider   levocetirizine (XYZAL) 5 mg tablet Take  by mouth nightly. Historical Provider   metoprolol succinate (TOPROL-XL) 25 mg XL tablet Take  by mouth daily. Historical Provider   montelukast (SINGULAIR) 10 mg tablet Take 10 mg by mouth nightly. Historical Provider   cyanocobalamin (VITAMIN B-12) 1,000 mcg tablet Take 1,000 mcg by mouth every seven (7) days. Historical Provider   calcium-cholecalciferol, d3, (CALCIUM 600 + D) 600-125 mg-unit tab Take  by mouth daily. Historical Provider   coenzyme Q-10 (CO Q-10) 200 mg capsule Take  by mouth daily. Historical Provider   Omeprazole delayed release (PRILOSEC D/R) 20 mg tablet Take 20 mg by mouth two (2) times a day. Historical Provider   cranberry fruit extract (CRANBERRY CONCENTRATE PO) Take 360 mg by mouth nightly. Historical Provider   magnesium oxide (MAG-OX) 400 mg tablet Take 400 mg by mouth daily.     Historical Provider       Review of Systems:  (bold if positive, if negative)    Gen:   fever, chills, fatigueEyes:  ENT:  CVS:  Pulm:  GI:    : Hematuria  MS:  Pain, weakness, swelling, arthritisSkin:   erythemawound,Psych:  Endo:    Hem:  Renal:    Neuro:  weakness      Objective:      VITALS:    Vital signs reviewed; most recent are:    Visit Vitals    /77 (BP 1 Location: Right arm, BP Patient Position: At rest)    Pulse 93    Temp 100.1 °F (37.8 °C)    Resp 20    Ht 6' 1\" (1.854 m)    Wt 118.8 kg (262 lb)    SpO2 97%    BMI 34.57 kg/m2     SpO2 Readings from Last 6 Encounters:   08/16/18 97%   08/11/18 94%        No intake or output data in the 24 hours ending 08/16/18 1852     Exam:     Physical Exam:    Gen:  Obese, in no acute distress  HEENT:  Pink conjunctivae, PERRL, hearing intact to voice, moist mucous membranes  Neck:  Supple, without masses, thyroid non-tender  Resp:  No accessory muscle use, distant breath sounds without wheezes rales or rhonchi  Card:  No murmurs, tachycardic S1, S2 without thrills, bruits, 4+ peripheral edema  Abd:  Soft, non-tender, non-distended, normoactive bowel sounds are present, no mass  Lymph:  No cervical or inguinal adenopathy  Musc:  No cyanosis or clubbing  Skin:  No rashes or ulcers, skin turgor is good  Neuro:  Cranial nerves are grossly intact, general and post op motor weakness, follows commands   Psych:  Good insight, oriented to person, place and time, alert     Labs:    Recent Labs      08/16/18   1453   WBC  14.4*   HGB  12.6   HCT  39.0   PLT  318     Recent Labs      08/16/18   1453   NA  136   K  4.1   CL  105   CO2  22   GLU  125*   BUN  14   CREA  1.10   CA  8.7   ALB  2.9*   TBILI  1.2*   SGOT  29   ALT  57     Lab Results   Component Value Date/Time    Glucose (POC) 95 08/08/2018 10:31 AM     No results for input(s): PH, PCO2, PO2, HCO3, FIO2 in the last 72 hours. No results for input(s): INR in the last 72 hours.     No lab exists for component: INREXT  All Micro Results     Procedure Component Value Units Date/Time    CULTURE, URINE [[id number]]     Order Status:  Sent Specimen:  Urine from Clean catch     CULTURE, BLOOD [[id number]]     Order Status:  Sent Specimen:  Blood from Blood     CULTURE, BLOOD [[id number]]     Order Status:  Sent Specimen:  Blood from Blood     URINE CULTURE HOLD SAMPLE [[id number]] Collected:  08/16/18 1631    Order Status:  Completed Specimen:  Urine from Serum Updated:  08/16/18 1643     Urine culture hold         URINE ON HOLD IN MICROBIOLOGY DEPT FOR 3 DAYS. IF UNPRESERVED URINE IS SUBMITTED, IT CANNOT BE USED FOR ADDITIONAL TESTING AFTER 24 HRS, RECOLLECTION WILL BE REQUIRED. I have reviewed previous records       Assessment and Plan:      UTI (urinary tract infection) / History of BPH / History of kidney stones /  Hx UTI (urinary tract infection) - UTI POA, likely CAUTI form recent knee surgery. Empiric ceftriaxone. Awaiting cx. Continue avodart    Sepsis / Leukocytosis / Fever / Sinus tachycardia - POA due to UTI. NOT severe sepsis. No end organ damage. IVF and IV abx as above. Await urine and blood cx. CAD (coronary artery disease) / Hypertension - No symptoms. continue metoprolol. Not on ASA while on xarelto. SANTA on CPAP - May continue home CPAP at night. Asthma - Continue dulera and prn duonebs. No symptoms    Primary osteoarthritis of right knee - courtesy consult to ortho to review progress. Surgery has him on xarelto. GERD (gastroesophageal reflux disease) - no symptoms. conitnue PPI    Lymphedema - Chronic. Followed as outpatient. Limit IVF.          Telemetry reviewed:   normal sinus rhythm    Risk of deterioration: high      Total time spent with patient: [address] discussed with: Patient, Family, Nursing Staff and >50% of time spent in counseling and coordination of care    Discussed:  Care Plan       ___________________________________________________    Attending Physician: Charon Ormond, MD

## 2018-08-16 NOTE — IP AVS SNAPSHOT
[address] 
[phone] Patient: Breanne Lutz MRN: [id number] YTE:[birth year] A check dimple indicates which time of day the medication should be taken. My Medications START taking these medications Instructions Each Dose to Equal  
 Morning Noon Evening Bedtime  
 cephALEXin 500 mg capsule Commonly known as:  Anish Jimenez Your last dose was: Your next dose is: Take 1 Cap by mouth two (2) times a day for 7 days. 500 mg CONTINUE taking these medications Instructions Each Dose to Equal  
 Morning Noon Evening Bedtime  
 albuterol-ipratropium 2.5 mg-0.5 mg/3 ml Nebu Commonly known as:  Smiley Krabbe Your last dose was: Your next dose is:    
   
   
 3 mL by Nebulization route every six (6) hours as needed. 3 mL  
    
   
   
   
  
 calcium-vitamin D 500 mg(1,250mg) -200 unit per tablet Commonly known as:  OYSTER SHELL Your last dose was: Your next dose is: Take 1 Tab by mouth daily. 1 Tab  
    
   
   
   
  
 CO Q-10 200 mg capsule Generic drug:  coenzyme Q-10 Your last dose was: Your next dose is: Take 200 mg by mouth daily. 200 mg CRANBERRY CONCENTRATE PO Your last dose was: Your next dose is: Take 360 mg by mouth nightly. 360 mg  
    
   
   
   
  
 DULERA 100-5 mcg/actuation HFA inhaler Generic drug:  mometasone-formoterol Your last dose was: Your next dose is: Take 1 Puff by inhalation two (2) times a day. 1 Puff  
    
   
   
   
  
 dutasteride 0.5 mg capsule Commonly known as:  AVODART Your last dose was: Your next dose is: Take 0.5 mg by mouth daily. 0.5 mg  
    
   
   
   
  
 FLOMAX 0.4 mg capsule Generic drug:  tamsulosin Your last dose was: Your next dose is: Take 0.4 mg by mouth daily. 0.4 mg  
    
   
   
   
  
 FOLTANX PO Your last dose was: Your next dose is: Take  by mouth daily. levocetirizine 5 mg tablet Commonly known as:  Cali Samano Your last dose was: Your next dose is: Take 5 mg by mouth nightly. 5 mg  
    
   
   
   
  
 magnesium oxide 400 mg tablet Commonly known as:  MAG-OX Your last dose was: Your next dose is: Take 400 mg by mouth daily. 400 mg  
    
   
   
   
  
 metoprolol succinate 25 mg XL tablet Commonly known as:  TOPROL-XL Your last dose was: Your next dose is: Take 25 mg by mouth daily. 25 mg  
    
   
   
   
  
 montelukast 10 mg tablet Commonly known as:  SINGULAIR Your last dose was: Your next dose is: Take 10 mg by mouth nightly. 10 mg Omeprazole delayed release 20 mg tablet Commonly known as:  PRILOSEC D/R Your last dose was: Your next dose is: Take 20 mg by mouth two (2) times a day. 20 mg  
    
   
   
   
  
 rivaroxaban 10 mg tablet Commonly known as:  Reshma Mejia Your last dose was: Your next dose is: Take 1 Tab by mouth daily (with lunch) for 12 days. Indications: KNEE REPLACEMENT DEEP VEIN THROMBOSIS PREVENTION  
 10 mg  
    
   
   
   
  
 TYLENOL EXTRA STRENGTH 500 mg tablet Generic drug:  acetaminophen Your last dose was: Your next dose is: Take 1,000 mg by mouth every six (6) hours as needed for Pain. 1000 mg  
    
   
   
   
  
 VITAMIN B-12 1,000 mcg tablet Generic drug:  cyanocobalamin Your last dose was: Your next dose is: Take 1,000 mcg by mouth every seven (7) days. 1000 mcg VITAMIN D2 50,000 unit capsule Generic drug:  ergocalciferol Your last dose was: Your next dose is: Take 50,000 Units by mouth Every Thursday. 36867 Units Where to Get Your Medications Information on where to get these meds will be given to you by the nurse or doctor. ! Ask your nurse or doctor about these medications  
  cephALEXin 500 mg capsule

## 2018-08-16 NOTE — ED NOTES
Patient incontinent of urine, linen and brief changed, repositioned patient in bed. MD aware of elevated temp.

## 2018-08-16 NOTE — ED NOTES
Patient attempting to provide urine specimen using urinal at this time.  He is in no apparent distress, family member present to assist.

## 2018-08-16 NOTE — PROGRESS NOTES
8/16/2018  3:57 PM  Case Management note    Met with patient and daughter to discuss discharge planning. Confirmed demographics. Patient lives alone in a trailer with a ramp. He and is wife are  and his daughter is at bedside. She is to be the contact [address] [place], [place] [address]way [phone]. Patient had knee surgery earlier in the month. Before that patient drove and performed ADL's independently. He uses [employer] in [facility]. Patient has a walker, could use a BSC has [facility]. Patient follows with Dr. Dilma Mathur. No NN. Reason for Readmission:     Unable to urinate, fever, knee pain         RRAT Score and Risk Level:     8     Level of Readmission:          Care Conference scheduled:   Not scheduled at this time       Resources/supports as identified by patient/family:   Daughter at bedside, wife and patient are seperated       Top Challenges facing patient (as identified by patient/family and CM): Finances/Medication cost?     [facility]  Transportation      Daughter can transport and patient drives when well  Support system or lack thereof? Some family support daughter and her    Living arrangements? Lives alone with ramp  Self-care/ADLs/Cognition? Could do self care before surgery - fair health cognition       Current Advanced Directive/Advance Care Plan:  not        discussed  Plan for utilizing home health:   Open to [facility]             Likelihood of additional readmission:   Low/green but could be higher due lives alone, fair health cognition             Transition of Care Plan:    Based on readmission, the patient's previous Plan of Care   has been evaluated and/or modified.  The current Transition of Care Plan is:       Has home health, unable to determine at this time      Reason for Admission:   Knee pain, fever, unable to urinate                   RRAT Score:          8           Plan for utilizing home health:      Open to [address] Likelihood of Readmission:  Low/green, possibly higher dues to lives alone, fair health cognition                         Transition of Care Plan:       Home with home health services. Care Management Interventions  PCP Verified by CM:  Yes  Mode of Transport at Discharge: Self  Transition of Care Consult (CM Consult): Discharge Planning  Current Support Network: Lives Alone  Confirm Follow Up Transport: Family  Plan discussed with Pt/Family/Caregiver: Yes  Discharge Location  Discharge Placement: Unable to determine at this time  Brie Puente

## 2018-08-16 NOTE — ROUTINE PROCESS
TRANSFER - OUT REPORT:    Verbal report given to [facility] RN(name) on Elizabeth He  being transferred to 4th floor(unit) for routine progression of care       Report consisted of patients Situation, Background, Assessment and   Recommendations(SBAR). Information from the following report(s) ED Summary was reviewed with the receiving nurse. Lines:   Peripheral IV 08/16/18 Left Hand (Active)   Site Assessment Clean, dry, & intact 8/16/2018  4:04 PM   Phlebitis Assessment 0 8/16/2018  4:04 PM   Infiltration Assessment 0 8/16/2018  4:04 PM   Dressing Status Clean, dry, & intact 8/16/2018  4:04 PM   Dressing Type Tape;Transparent 8/16/2018  4:04 PM   Hub Color/Line Status Pink;Flushed;Patent 8/16/2018  4:04 PM   Action Taken Blood drawn 8/16/2018  4:04 PM        Opportunity for questions and clarification was provided.       Patient transported with:   [facility]

## 2018-08-16 NOTE — PROGRESS NOTES
[facility]: MED RECONCILIATION    Information obtained from: Patient     Allergies: Sulfa (sulfonamide antibiotics)    Comment:  Added Tamsulosin to the list. Patient states name sounds familiar. RX Query confirms regular dispensing. Please consider to resume it if clinically appropriate. Prior to Admission Medications:     Medication Documentation Review Audit       Reviewed by Adrien Hernandez PHARMD (Pharmacist) on 08/16/18 at 1933         Medication Sig Documenting Provider Last Dose Status Taking?      acetaminophen (TYLENOL EXTRA STRENGTH) 500 mg tablet Take 1,000 mg by mouth every six (6) hours as needed for Pain. Historical Provider  Active Yes    albuterol-ipratropium (DUO-NEB) 2.5 mg-0.5 mg/3 ml nebu 3 mL by Nebulization route every six (6) hours as needed. Historical Provider  Active Yes             Med Note Florin Aaron   Thu Aug 16, 2018  7:28 PM):        calcium-vitamin D (OYSTER SHELL) 500 mg(1,250mg) -200 unit per tablet Take 1 Tab by mouth daily. Historical Provider  Active Yes    coenzyme Q-10 (CO Q-10) 200 mg capsule Take 200 mg by mouth daily. Historical Provider  Active Yes             Med Note (Florin Aaron   Thu Aug 16, 2018  7:29 PM):        cranberry fruit extract (CRANBERRY CONCENTRATE PO) Take 360 mg by mouth nightly. Historical Provider  Active Yes             Med Note Florin Aaron   Thu Aug 16, 2018  7:29 PM):        cyanocobalamin (VITAMIN B-12) 1,000 mcg tablet Take 1,000 mcg by mouth every seven (7) days. Historical Provider  Active Yes    dutasteride (AVODART) 0.5 mg capsule Take 0.5 mg by mouth daily. Historical Provider  Active Yes    ergocalciferol (VITAMIN D2) 50,000 unit capsule Take 50,000 Units by mouth Every Thursday. Historical Provider  Active Yes    levocetirizine (XYZAL) 5 mg tablet Take 5 mg by mouth nightly. Historical Provider  Active Yes    magnesium oxide (MAG-OX) 400 mg tablet Take 400 mg by mouth daily.  Historical Provider  Active Yes mecobal/levomefolat Ca/B6 phos (FOLTANX PO) Take  by mouth daily. Historical Provider  Active Yes             Med Note Dennise Arnett   Thu Aug 16, 2018  7:29 PM):        metoprolol succinate (TOPROL-XL) 25 mg XL tablet Take 25 mg by mouth daily. Historical Provider  Active Yes             Med Note Dennise Arnett   Thu Aug 16, 2018  7:29 PM):        mometasone-formoterol (DULERA) 100-5 mcg/actuation HFA inhaler Take 1 Puff by inhalation two (2) times a day. Historical Provider  Active Yes             Med Note Dennise Arnett   Thu Aug 16, 2018  7:29 PM):        montelukast (SINGULAIR) 10 mg tablet Take 10 mg by mouth nightly. Historical Provider  Active Yes    Omeprazole delayed release (PRILOSEC D/R) 20 mg tablet Take 20 mg by mouth two (2) times a day. Historical Provider  Active Yes             Med Note Dennise Arnett   Thu Aug 16, 2018  7:29 PM):        rivaroxaban (XARELTO) 10 mg tablet Take 1 Tab by mouth daily (with lunch) for 12 days. Indications: KNEE REPLACEMENT DEEP VEIN THROMBOSIS PREVENTION Meri Pearson PA-C 8/15/2018 1200 Active Yes    tamsulosin (FLOMAX) 0.4 mg capsule Take 0.4 mg by mouth daily.  Historical Provider  Active Yes                  [address], [place]   Contact: [phone]

## 2018-08-16 NOTE — ED TRIAGE NOTES
Pt c/o increased urinary frequency with chills that started this AM. +recent right knee replacement. Afebrile, last Tylenol dose was at 1330.

## 2018-08-17 LAB
ANION GAP SERPL CALC-SCNC: 7 MMOL/L (ref 5–15)
ATRIAL RATE: 92 BPM
BUN SERPL-MCNC: 12 MG/DL (ref 6–20)
BUN/CREAT SERPL: 12 (ref 12–20)
CALCIUM SERPL-MCNC: 8.6 MG/DL (ref 8.5–10.1)
CALCULATED P AXIS, ECG09: 56 DEGREES
CALCULATED R AXIS, ECG10: -11 DEGREES
CALCULATED T AXIS, ECG11: 21 DEGREES
CHLORIDE SERPL-SCNC: 107 MMOL/L (ref 97–108)
CO2 SERPL-SCNC: 24 MMOL/L (ref 21–32)
CREAT SERPL-MCNC: 1 MG/DL (ref 0.7–1.3)
DIAGNOSIS, 93000: NORMAL
ERYTHROCYTE [DISTWIDTH] IN BLOOD BY AUTOMATED COUNT: 14.4 % (ref 11.5–14.5)
GLUCOSE SERPL-MCNC: 117 MG/DL (ref 65–100)
HCT VFR BLD AUTO: 37 % (ref 36.6–50.3)
HGB BLD-MCNC: 11.5 G/DL (ref 12.1–17)
MAGNESIUM SERPL-MCNC: 2.3 MG/DL (ref 1.6–2.4)
MCH RBC QN AUTO: 28.8 PG (ref 26–34)
MCHC RBC AUTO-ENTMCNC: 31.1 G/DL (ref 30–36.5)
MCV RBC AUTO: 92.7 FL (ref 80–99)
NRBC # BLD: 0 K/UL (ref 0–0.01)
NRBC BLD-RTO: 0 PER 100 WBC
P-R INTERVAL, ECG05: 152 MS
PLATELET # BLD AUTO: 268 K/UL (ref 150–400)
PMV BLD AUTO: 9.4 FL (ref 8.9–12.9)
POTASSIUM SERPL-SCNC: 4 MMOL/L (ref 3.5–5.1)
Q-T INTERVAL, ECG07: 354 MS
QRS DURATION, ECG06: 96 MS
QTC CALCULATION (BEZET), ECG08: 437 MS
RBC # BLD AUTO: 3.99 M/UL (ref 4.1–5.7)
SODIUM SERPL-SCNC: 138 MMOL/L (ref 136–145)
VENTRICULAR RATE, ECG03: 92 BPM
WBC # BLD AUTO: 15.3 K/UL (ref 4.1–11.1)

## 2018-08-17 PROCEDURE — 36415 COLL VENOUS BLD VENIPUNCTURE: CPT | Performed by: INTERNAL MEDICINE

## 2018-08-17 PROCEDURE — 74011250636 HC RX REV CODE- 250/636: Performed by: INTERNAL MEDICINE

## 2018-08-17 PROCEDURE — 94640 AIRWAY INHALATION TREATMENT: CPT

## 2018-08-17 PROCEDURE — 97161 PT EVAL LOW COMPLEX 20 MIN: CPT

## 2018-08-17 PROCEDURE — 97165 OT EVAL LOW COMPLEX 30 MIN: CPT

## 2018-08-17 PROCEDURE — 77030018846 HC SOL IRR STRL H20 ICUM -A

## 2018-08-17 PROCEDURE — 65660000000 HC RM CCU STEPDOWN

## 2018-08-17 PROCEDURE — 77030013140 HC MSK NEB VYRM -A

## 2018-08-17 PROCEDURE — 94664 DEMO&/EVAL PT USE INHALER: CPT

## 2018-08-17 PROCEDURE — 74011250637 HC RX REV CODE- 250/637: Performed by: INTERNAL MEDICINE

## 2018-08-17 PROCEDURE — 85027 COMPLETE CBC AUTOMATED: CPT | Performed by: INTERNAL MEDICINE

## 2018-08-17 PROCEDURE — 77030029684 HC NEB SM VOL KT MONA -A

## 2018-08-17 PROCEDURE — 97535 SELF CARE MNGMENT TRAINING: CPT

## 2018-08-17 PROCEDURE — 74011000258 HC RX REV CODE- 258: Performed by: INTERNAL MEDICINE

## 2018-08-17 PROCEDURE — 74011000250 HC RX REV CODE- 250: Performed by: INTERNAL MEDICINE

## 2018-08-17 PROCEDURE — 80048 BASIC METABOLIC PNL TOTAL CA: CPT | Performed by: INTERNAL MEDICINE

## 2018-08-17 PROCEDURE — 97116 GAIT TRAINING THERAPY: CPT

## 2018-08-17 PROCEDURE — 65270000029 HC RM PRIVATE

## 2018-08-17 PROCEDURE — 83735 ASSAY OF MAGNESIUM: CPT | Performed by: INTERNAL MEDICINE

## 2018-08-17 RX ORDER — TAMSULOSIN HYDROCHLORIDE 0.4 MG/1
0.4 CAPSULE ORAL DAILY
Status: DISCONTINUED | OUTPATIENT
Start: 2018-08-17 | End: 2018-08-19 | Stop reason: HOSPADM

## 2018-08-17 RX ADMIN — ARFORMOTEROL TARTRATE 15 MCG: 15 SOLUTION RESPIRATORY (INHALATION) at 19:54

## 2018-08-17 RX ADMIN — DUTASTERIDE 0.5 MG: 0.5 CAPSULE, LIQUID FILLED ORAL at 09:30

## 2018-08-17 RX ADMIN — METOPROLOL SUCCINATE 25 MG: 25 TABLET, EXTENDED RELEASE ORAL at 09:30

## 2018-08-17 RX ADMIN — TAMSULOSIN HYDROCHLORIDE 0.4 MG: 0.4 CAPSULE ORAL at 09:30

## 2018-08-17 RX ADMIN — Medication 10 ML: at 21:36

## 2018-08-17 RX ADMIN — PANTOPRAZOLE SODIUM 40 MG: 40 TABLET, DELAYED RELEASE ORAL at 15:51

## 2018-08-17 RX ADMIN — BUDESONIDE 500 MCG: 0.5 INHALANT RESPIRATORY (INHALATION) at 19:54

## 2018-08-17 RX ADMIN — ACETAMINOPHEN 650 MG: 325 TABLET ORAL at 15:51

## 2018-08-17 RX ADMIN — RIVAROXABAN 10 MG: 10 TABLET, FILM COATED ORAL at 11:20

## 2018-08-17 RX ADMIN — CEFTRIAXONE SODIUM 1 G: 1 INJECTION, POWDER, FOR SOLUTION INTRAMUSCULAR; INTRAVENOUS at 17:59

## 2018-08-17 RX ADMIN — Medication 10 ML: at 03:41

## 2018-08-17 RX ADMIN — BUDESONIDE 500 MCG: 0.5 INHALANT RESPIRATORY (INHALATION) at 07:44

## 2018-08-17 RX ADMIN — ARFORMOTEROL TARTRATE 15 MCG: 15 SOLUTION RESPIRATORY (INHALATION) at 07:48

## 2018-08-17 RX ADMIN — MONTELUKAST SODIUM 10 MG: 10 TABLET, COATED ORAL at 21:36

## 2018-08-17 RX ADMIN — ACETAMINOPHEN 650 MG: 325 TABLET ORAL at 21:36

## 2018-08-17 RX ADMIN — PANTOPRAZOLE SODIUM 40 MG: 40 TABLET, DELAYED RELEASE ORAL at 06:40

## 2018-08-17 RX ADMIN — ACETAMINOPHEN 650 MG: 325 TABLET ORAL at 09:39

## 2018-08-17 NOTE — PROGRESS NOTES
Bedside and Verbal shift change report given to Mali Barrera RN (oncoming nurse) by Shaniqua Alvarado RN (offgoing nurse). Report included the following information SBAR and Kardex.

## 2018-08-17 NOTE — PROGRESS NOTES
Delon More cecilia Paia [address]  [address] [facility], [address] [place] [address]  [phone]      Medical Progress Note      NAME:         Summer Richmond   :        1945  MRM:        [id number]    Date:          2018      Subjective: Patient has been seen and examined as a follow up for a UTI. Chart, labs, diagnostics reviewed. He feels generally weak. Has dysuria. No fever or chills. He has no abdominal discomfort. Objective:    Vital Signs:    Visit Vitals    /70 (BP 1 Location: Left arm, BP Patient Position: At rest)    Pulse 76    Temp 98 °F (36.7 °C)    Resp 18    Ht 6' 1\" (1.854 m)    Wt 118.8 kg (262 lb)    SpO2 97%    BMI 34.57 kg/m2        Intake/Output Summary (Last 24 hours) at 18 1007  Last data filed at 18 0942   Gross per 24 hour   Intake              240 ml   Output              950 ml   Net             -710 ml        Physical Examination:    General:   Weak looking but not in any distress   Eyes:   pink conjunctivae, PERRLA with no discharge. ENT:   no ottorrhea or rhinorrhea with moist mucous membranes  Neck: no masses, thyroid non-tender and trachea central.  Pulm:  no accessory muscle use, clear breath sounds without crackles or wheezes  Card:  no JVD or murmurs, has regular and normal S1, S2 without thrills, bruits. + peripheral edema  Abd:  Soft, non-tender, non-distended, normoactive bowel sounds with no palpable organomegaly  Musc:  No cyanosis, clubbing, atrophy or deformities. Skin:  No rashes, bruising or ulcers. Neuro: Awake and alert.  Generally a non focal exam. Follows commands appropriately  Psych:  Has a good insight and is oriented x 3    Current Facility-Administered Medications   Medication Dose Route Frequency    tamsulosin (FLOMAX) capsule 0.4 mg  0.4 mg Oral DAILY    sodium chloride (NS) flush 5-10 mL  5-10 mL IntraVENous PRN    cefTRIAXone (ROCEPHIN) 1 g in 0.9% sodium chloride (MBP/ADV) 50 mL  1 g IntraVENous Q24H    albuterol-ipratropium (DUO-NEB) 2.5 MG-0.5 MG/3 ML  3 mL Nebulization Q6H PRN    dutasteride (AVODART) capsule 0.5 mg  0.5 mg Oral DAILY    metoprolol succinate (TOPROL-XL) XL tablet 25 mg  25 mg Oral DAILY    montelukast (SINGULAIR) tablet 10 mg  10 mg Oral QHS    pantoprazole (PROTONIX) tablet 40 mg  40 mg Oral ACB&D    rivaroxaban (XARELTO) tablet 10 mg  10 mg Oral DAILY WITH LUNCH    naloxone (NARCAN) injection 0.4 mg  0.4 mg IntraVENous PRN    acetaminophen (TYLENOL) tablet 650 mg  650 mg Oral Q4H PRN    HYDROcodone-acetaminophen (NORCO) 5-325 mg per tablet 1 Tab  1 Tab Oral Q4H PRN    diphenhydrAMINE (BENADRYL) capsule 25 mg  25 mg Oral Q4H PRN    ondansetron (ZOFRAN) injection 4 mg  4 mg IntraVENous Q4H PRN    arformoterol (BROVANA) neb solution 15 mcg  15 mcg Nebulization BID RT    budesonide (PULMICORT) 500 mcg/2 ml nebulizer suspension  500 mcg Nebulization BID RT        Laboratory data and review:    Recent Labs      08/17/18   0341  08/16/18   1453   WBC  15.3*  14.4*   HGB  11.5*  12.6   HCT  37.0  39.0   PLT  268  318     Recent Labs      08/17/18   0341  08/16/18   1453   NA  138  136   K  4.0  4.1   CL  107  105   CO2  24  22   GLU  117*  125*   BUN  12  14   CREA  1.00  1.10   CA  8.6  8.7   MG  2.3   --    ALB   --   2.9*   SGOT   --   29   ALT   --   57     No components found for: GLPOC    Assessment and Plan:    UTI (urinary tract infection) / History of BPH / History of kidney stones /  Hx UTI (urinary tract infection) - UTI POA, likely CAUTI form recent knee surgery. Cultures neg thus far. Continue empiric IV Ceftriaxone and Avodart. Follow cultures     Sepsis / Leukocytosis / Fever POA: due to UTI. NOT severe sepsis. No end organ damage. Continue IV abx as above and follow clinically.      CAD (coronary artery disease) / Hypertension: stable. Asymptomatic. Continue Metoprolol. Not on ASA while on xarelto.     SANTA on CPAP : Continue CPAP at night.     Asthma: No wheezing. Continue dulera and prn duonebs. No symptoms     Primary osteoarthritis of right knee: courtesy consult to ortho to review progress. Surgery has him on xarelto.     GERD (gastroesophageal reflux disease): stable. Continue PPI     Lymphedema - Chronic. Followed as outpatient.     Total time spent for the patient's care: [address] [facility] discussed with: Patient, Care Manager and Nursing Staff    Discussed:  Care Plan and D/C Planning    Prophylaxis:  Lovenox    Anticipated Disposition:  Home w/Family           ___________________________________________________    Attending Physician:   Amberly Dubois MD

## 2018-08-17 NOTE — PROGRESS NOTES
8/17/2018 11:16 AM Resumption of care for home health order requested and received. Spoke with pt's attending, planning for discharge on 8/19. Nursing on day of discharge please call [facility] at [phone] to notify. Please fax pt's avs to [facility] at [phone]. Thank you.   Kameron Oliver BSW

## 2018-08-17 NOTE — PROGRESS NOTES
Problem: Falls - Risk of  Goal: *Absence of Falls  Document Buzz Fall Risk and appropriate interventions in the flowsheet.   Outcome: Progressing Towards Goal  Fall Risk Interventions:  Mobility Interventions: Communicate number of staff needed for ambulation/transfer, Bed/chair exit alarm, Patient to call before getting OOB, PT Consult for mobility concerns, PT Consult for assist device competence, OT consult for ADLs         Medication Interventions: Bed/chair exit alarm, Evaluate medications/consider consulting pharmacy, Patient to call before getting OOB, Teach patient to arise slowly, Utilize gait belt for transfers/ambulation    Elimination Interventions: Bed/chair exit alarm, Call light in reach, Elevated toilet seat, Patient to call for help with toileting needs, Toilet paper/wipes in reach, Toileting schedule/hourly rounds, Urinal in reach

## 2018-08-17 NOTE — PROGRESS NOTES
TRANSFER - IN REPORT:    Verbal report received from Skye Wallace RN(name) on Adán Galeana  being received from ED(unit) for routine progression of care      Report consisted of patients Situation, Background, Assessment and   Recommendations(SBAR). Information from the following report(s) SBAR, Kardex, Intake/Output, MAR and Recent Results was reviewed with the receiving nurse. Opportunity for questions and clarification was provided. Assessment completed upon patients arrival to unit and care assumed.

## 2018-08-17 NOTE — PROGRESS NOTES
Problem: Self Care Deficits Care Plan (Adult)  Goal: *Acute Goals and Plan of Care (Insert Text)  Occupational Therapy Goals  Initiated 8/17/2018  1. Patient will perform lower body ADLs with modified independence within 7 day(s). 2.  Patient will perform upper body ADLs standing 5 mins without fatigue or LOB with modified independence within 7 day(s). 3.  Patient will perform full body bathing with modified independence within 7 days. 4.  Patient will participate in upper extremity therapeutic exercise/activities with independence for 10 minutes within 7 day(s). 5.  Patient will utilize energy conservation techniques during functional activities without cues within 7 day(s). Occupational Therapy EVALUATION  Patient: Marialuisa Moreira (68 y.o. male)  Date: 8/17/2018  Primary Diagnosis: UTI (urinary tract infection)  UTI (urinary tract infection)        Precautions:   WBAT, Fall    ASSESSMENT :  Based on the objective data described below, the patient presents with overall CGA x1 with RW for functional mobility, up to [address] for lower body ADLs, and independent for upper body ADLs s/p fever and incontinence, diagnosis of UTI. Patient had R TKR on 8/8/2018 at [facility], has been living home alone with family support since then. Patient requiring few cues for RW safety as well as PLB techniques, O2 sats WNL, >98% on RA. Patient tolerating standing at sink for >3 minutes for simple grooming with RW and ambulating 120' without rest break. Patient progressing well and reporting he feels comfortable with plan to return home with family assist when medically stable. Will continue to follow to maximize comfort, safety, and independence with all ADLs during acute admission. Patient will benefit from skilled intervention to address the above impairments.   Patients rehabilitation potential is considered to be Good  Factors which may influence rehabilitation potential include:   []             None noted  []             Mental ability/status  []             Medical condition  []             Home/family situation and support systems  []             Safety awareness  []             Pain tolerance/management  []             Other:      PLAN :  Recommendations and Planned Interventions:  [x]               Self Care Training                  [x]        Therapeutic Activities  [x]               Functional Mobility Training    []        Cognitive Retraining  [x]               Therapeutic Exercises           [x]        Endurance Activities  [x]               Balance Training                   []        Neuromuscular Re-Education  []               Visual/Perceptual Training     [x]   Home Safety Training  [x]               Patient Education                 [x]        Family Training/Education  []               Other (comment):    Frequency/Duration: Patient will be followed by occupational therapy 3 times a week to address goals. Discharge Recommendations: None  Further Equipment Recommendations for Discharge: None noted     SUBJECTIVE:   Patient stated I have 3-4 people who really help me out around the house when I need it.     OBJECTIVE DATA SUMMARY:   HISTORY:   Past Medical History:   Diagnosis Date    Arthritis     Asthma     R/T ENVIRONMENTAL ALLERGIES; INHALER USE DAILY    CAD (coronary artery disease) 2007    MI    GERD (gastroesophageal reflux disease)     History of BPH     History of kidney stones     Hx: UTI (urinary tract infection)     Hypertension     Lymphedema     SANTA on CPAP      Past Surgical History:   Procedure Laterality Date    BREAST SURGERY PROCEDURE UNLISTED Right     EXCISION OF BREAST LUMP (BENIGN)    HX HEART CATHETERIZATION  2007, 2016    STENTS X3  (INGRID--DR NUNEZ)    HX LITHOTRIPSY      2-3X    HX ORTHOPAEDIC Right 1964    FEMUR ORIF  (DR PRESCOTT)    HX TONSILLECTOMY      HX UROLOGICAL      CYSTO    VASCULAR SURGERY PROCEDURE UNLIST Right     LEG VEIN BYPASS (INGRID)       Prior Level of Function/Environment/Context: per patient report, lives at home alone. Independent. Has supportive children and ex-wife. States he has \"some aide assistance\" occasionally for IADLs. Patient with R TKR on 8/8/2018 at [facility], d/c home after procedure. Home Situation  Home Environment: Private residence  # Steps to Enter: 0  Wheelchair Ramp: Yes  One/Two Story Residence: One story  Living Alone: No  Support Systems: Family member(s), Friends \ neighbors  Patient Expects to be Discharged to[de-identified] Private residence  Current DME Used/Available at Home: Walker, rolling    Hand dominance: Right    EXAMINATION OF PERFORMANCE DEFICITS:  Cognitive/Behavioral Status:  Neurologic State: Alert  Orientation Level: Oriented X4  Cognition: Appropriate decision making; Appropriate for age attention/concentration; Appropriate safety awareness; Follows commands  Perception: Appears intact  Perseveration: No perseveration noted  Safety/Judgement: Awareness of environment    Skin: Appears intact    Edema: None noted in BUEs    Hearing: Auditory  Auditory Impairment: None    Vision/Perceptual:    Tracking: Able to track stimulus in all quadrants w/o difficulty                      Acuity: Within Defined Limits         Range of Motion:  In BUEs  AROM: Within functional limits  PROM: Within functional limits    Strength: In BUEs  Strength: Within functional limits    Coordination:  Coordination: Within functional limits  Fine Motor Skills-Upper: Left Intact; Right Intact    Gross Motor Skills-Upper: Left Intact; Right Intact    Tone & Sensation:  In BUEs  Tone: Normal  Sensation: Intact    Balance:  Sitting: Without support  Sitting - Static: Good (unsupported)  Sitting - Dynamic: Good (unsupported)  Standing: With support  Standing - Static: Good  Standing - Dynamic : Fair    Functional Mobility and Transfers for ADLs:  Bed Mobility:  Supine to Sit: Additional time;Contact guard assistance  Scooting:  Additional time;Supervision    Transfers:  Sit to Stand: Contact guard assistance; Additional time  Stand to Sit: Contact guard assistance; Additional time  Toilet Transfer : Contact guard assistance; Additional time (Inferred per obs of functional mobilty)    ADL Assessment:  Feeding: Independent (Inferred per obs of BUE ROM)    Oral Facial Hygiene/Grooming: Supervision (Standing at sink)    Bathing: Minimum assistance (For distal BLEs d/t painful RLE)    Upper Body Dressing: Independent (Inferred per obs of BUE ROM)    Lower Body Dressing: Minimum assistance (For distal BLEs d/t painful RLE)    Toileting: Independent (Inferred per obs of BUE ROM)    ADL Intervention and task modifications:  Grooming  Brushing Teeth: Supervision/set-up    Cognitive Retraining  Safety/Judgement: Awareness of environment    Therapeutic Exercise:  - Patient able to tolerate simple grooming standing at sink as well as 120' ambulation with RW     Functional Measure:  Barthel Index:    Bathin  Bladder: 10  Bowels: 10  Groomin  Dressin  Feeding: 10  Mobility: 0  Stairs: 0  Toilet Use: 5  Transfer (Bed to Chair and Back): 10  Total: 55       Barthel and G-code impairment scale:  Percentage of impairment CH  0% CI  1-19% CJ  20-39% CK  40-59% CL  60-79% CM  80-99% CN  100%   Barthel Score 0-100 100 99-80 79-60 59-40 20-39 1-19   0   Barthel Score 0-20 20 17-19 13-16 9-12 5-8 1-4 0      The Barthel ADL Index: Guidelines  1. The index should be used as a record of what a patient does, not as a record of what a patient could do. 2. The main aim is to establish degree of independence from any help, physical or verbal, however minor and for whatever reason. 3. The need for supervision renders the patient not independent. 4. A patient's performance should be established using the best available evidence. Asking the patient, friends/relatives and nurses are the usual sources, but direct observation and common sense are also important.  However direct testing is not needed. 5. Usually the patient's performance over the preceding 24-48 hours is important, but occasionally longer periods will be relevant. 6. Middle categories imply that the patient supplies over 50 per cent of the effort. 7. Use of aids to be independent is allowed. Citlali Diaz., Barthel, D.W. ([id number]). Functional evaluation: the Barthel Index. [facility] (14)2. ALISON Ramos, Rachel Lagunas., Sweetie Flowres, Franco, [facility] (1999). Measuring the change indisability after inpatient rehabilitation; comparison of the responsiveness of the Barthel Index and Functional [place] Measure. Journal of Neurology, Neurosurgery, and Psychiatry, 66(4), 640-178. Goyo Joseph, N.J.SKIP, KAREEM Kelly, & Juliana Perez M.A. (2004.) Assessment of post-stroke quality of life in cost-effectiveness studies: The usefulness of the Barthel Index and the EuroQoL-5D. Quality of Life Research, 13, 383-03         G codes: In compliance with CMSs Claims Based Outcome Reporting, the following G-code set was chosen for this patient based on their primary functional limitation being treated: The outcome measure chosen to determine the severity of the functional limitation was the Barthel Index with a score of 55/100 which was correlated with the impairment scale. ? Self Care:     - CURRENT STATUS: CK - 40%-59% impaired, limited or restricted    - GOAL STATUS: CJ - 20%-39% impaired, limited or restricted    - D/C STATUS:  ---------------To be determined---------------     Occupational Therapy Evaluation Charge Determination   History Examination Decision-Making   LOW Complexity : Brief history review  LOW Complexity : 1-3 performance deficits relating to physical, cognitive , or psychosocial skils that result in activity limitations and / or participation restrictions  MEDIUM Complexity : Patient may present with comorbidities that affect occupational performnce.  Miniml to moderate modification of tasks or assistance (eg, physical or verbal ) with assesment(s) is necessary to enable patient to complete evaluation       Based on the above components, the patient evaluation is determined to be of the following complexity level: LOW   Pain:  Pain Scale 1: Numeric (0 - 10)  Pain Intensity 1: 0              Activity Tolerance:   Good. Please refer to the flowsheet for vital signs taken during this treatment. After treatment:   [] Patient left in no apparent distress sitting up in chair  [x] Patient left in no apparent distress in bed  [x] Call bell left within reach  [x] Nursing notified  [x] Caregiver present  [] Bed alarm activated    COMMUNICATION/EDUCATION:   The patients plan of care was discussed with: Physical Therapist and Registered Nurse. [x] Home safety education was provided and the patient/caregiver indicated understanding. [x] Patient/family have participated as able in goal setting and plan of care. [] Patient/family agree to work toward stated goals and plan of care. [] Patient understands intent and goals of therapy, but is neutral about his/her participation. [] Patient is unable to participate in goal setting and plan of care. This patients plan of care is appropriate for delegation to LAVONNE.     Thank you for this referral.  Srinath Canada, OT  Time Calculation: 21 mins

## 2018-08-17 NOTE — PROGRESS NOTES
Problem: Mobility Impaired (Adult and Pediatric)  Goal: *Acute Goals and Plan of Care (Insert Text)  Physical Therapy Goals  Initiated 8/17/2018  1. Patient will move from supine to sit and sit to supine  in bed with modified independence within 7 day(s). 2.  Patient will transfer from bed to chair and chair to bed with supervision using the least restrictive device within 7 day(s). 3.  Patient will perform sit to stand with supervision/set-up within 7 day(s). 4.  Patient will ambulate with supervision/set-up for 150 feet with the least restrictive device within 7 day(s). 5.  Patient will demonstrate AROM at R knee 0-90 degrees within 7 days. physical Therapy EVALUATION  Patient: Alayna Sanchez (68 y.o. male)  Date: 8/17/2018  Primary Diagnosis: UTI (urinary tract infection)  UTI (urinary tract infection)        Precautions:   WBAT, Fall    ASSESSMENT :  Based on the objective data described below, the patient presents with decreased strength, AROM, and endurance; impaired balance; and limited functional mobility on day 1 with UTI sepsis. Pt received R TKA 8/8/18 at [facility] and was receiving HHPT prior to current admission. He reports ambulating using RW prior to admission and denies fall history. On PT evaluation pt offers excellent efforts  and tolerates treatment without complaints. He requires minimal assistance for transfers and brief flat surface mobility. HR as high as 108 with activity and decreases with seated rest.    Patient will benefit from skilled intervention to address the above impairments.   Patients rehabilitation potential is considered to be good  Factors which may influence rehabilitation potential include:   [x]         None noted  []         Mental ability/status  []         Medical condition  []         Home/family situation and support systems  []         Safety awareness  []         Pain tolerance/management  []         Other:      PLAN :  Recommendations and Planned Interventions:  [x]           Bed Mobility Training             []    Neuromuscular Re-Education  [x]           Transfer Training                   []    Orthotic/Prosthetic Training  [x]           Gait Training                         []    Modalities  [x]           Therapeutic Exercises           []    Edema Management/Control  [x]           Therapeutic Activities            [x]    Patient and Family Training/Education  []           Other (comment):    Frequency/Duration: Patient will be followed by physical therapy  5 times a week to address goals. Discharge Recommendations: Home Health  Further Equipment Recommendations for Discharge: none     SUBJECTIVE:   Patient stated I haven't walked much in a couple days. \"    Pt received supine, agreeable to PT and cleared by RN. BLE compression stockings donned but rolled to ankles; adjusted to proper fit by PT and pt educated accordingly. OBJECTIVE DATA SUMMARY:   HISTORY:    Past Medical History:   Diagnosis Date    Arthritis     Asthma     R/T ENVIRONMENTAL ALLERGIES; INHALER USE DAILY    CAD (coronary artery disease) 2007    MI    GERD (gastroesophageal reflux disease)     History of BPH     History of kidney stones     Hx: UTI (urinary tract infection)     Hypertension     Lymphedema     SANTA on CPAP      Past Surgical History:   Procedure Laterality Date    BREAST SURGERY PROCEDURE UNLISTED Right     EXCISION OF BREAST LUMP (BENIGN)    HX HEART CATHETERIZATION  2007, 2016    STENTS X3  (INGRID--DR NUNEZ)    HX LITHOTRIPSY      2-3X    HX ORTHOPAEDIC Right 1964    FEMUR ORIF  (DR PRESCOTT)    HX TONSILLECTOMY      HX UROLOGICAL      CYSTO    VASCULAR SURGERY PROCEDURE UNLIST Right     LEG VEIN BYPASS (INGRID)     Prior Level of Function/Home Situation: states mod I ambulation using RW immediately prior to admission. States he is receiving care from family and multiple friends.   Personal factors and/or comorbidities impacting plan of care: as above    Home Situation  Home Environment: Private residence  # Steps to Enter: 0  Wheelchair Ramp: Yes  One/Two Story Residence: One story  Living Alone: No  Support Systems: Family member(s), Friends \ neighbors  Patient Expects to be Discharged to[de-identified] Private residence  Current DME Used/Available at Home: Walker, rolling    EXAMINATION/PRESENTATION/DECISION MAKING:   Critical Behavior:  Neurologic State: Alert  Orientation Level: Oriented X4        Hearing: Auditory  Auditory Impairment: None  Skin:  R knee incision approximated, open to air with staples intact, no drainage noted. Wound assessed by wound care nurse during PT encouhnter. Edema: + BLE edema; pt states consistent with baseline lymphedema  Range Of Motion:         generally decreased throughout LEs. Grossly decreased R knee. Strength:           generally decreased throughout. R knee 3/5 extension              Tone & Sensation:             normal and intact                     Coordination:   WFL  Functional Mobility:  Bed Mobility:     Supine to Sit: Additional time;Contact guard assistance     Scooting: Additional time;Supervision  Transfers:  Sit to Stand: Additional time;Minimum assistance  Stand to Sit: Additional time;Minimum assistance                       Balance:   Sitting: Without support  Sitting - Static: Good (unsupported)  Sitting - Dynamic: Good (unsupported)  Standing: With support  Standing - Static: Good  Standing - Dynamic : Fair  Ambulation/Gait Training:  Distance (ft): 25 Feet (ft)  Assistive Device: Walker, rolling;Gait belt  Ambulation - Level of Assistance: Minimal assistance        Gait Abnormalities: Antalgic;Decreased step clearance; Step to gait        Base of Support: Widened  Stance: Right decreased  Speed/Sandra: Pace decreased (<100 feet/min)                     Very limited RLE weight bearing, occasional weight bearing to forefoot only. Therapeutic Exercises:    Ankle pumps; discussed importance of LE elevation. Functional Measure:  Barthel Index:    Bathin  Bladder: 10  Bowels: 10  Groomin  Dressin  Feeding: 10  Mobility: 0  Stairs: 0  Toilet Use: 5  Transfer (Bed to Chair and Back): 10  Total: 55       Barthel and G-code impairment scale:  Percentage of impairment CH  0% CI  1-19% CJ  20-39% CK  40-59% CL  60-79% CM  80-99% CN  100%   Barthel Score 0-100 100 99-80 79-60 59-40 20-39 1-19   0   Barthel Score 0-20 20 17-19 13-16 9-12 5-8 1-4 0      The Barthel ADL Index: Guidelines  1. The index should be used as a record of what a patient does, not as a record of what a patient could do. 2. The main aim is to establish degree of independence from any help, physical or verbal, however minor and for whatever reason. 3. The need for supervision renders the patient not independent. 4. A patient's performance should be established using the best available evidence. Asking the patient, friends/relatives and nurses are the usual sources, but direct observation and common sense are also important. However direct testing is not needed. 5. Usually the patient's performance over the preceding 24-48 hours is important, but occasionally longer periods will be relevant. 6. Middle categories imply that the patient supplies over 50 per cent of the effort. 7. Use of aids to be independent is allowed. Link ., Barthel, D.W. ([id number]). Functional evaluation: the Barthel Index. [facility] (14)2. Sue Sanders, JDannaJDannaMNAOMI, Gustabo Dowell., Dru Adams., Franco, [address] [place] [address] (). Measuring the change indisability after inpatient rehabilitation; comparison of the responsiveness of the Barthel Index and Functional [place] Measure. Journal of Neurology, Neurosurgery, and Psychiatry, 66(4), 131-090. Inga Johnston, N.J.A, KAREEM Kelly, & Farhana Mclaughlin MDannaA. (2004.) Assessment of post-stroke quality of life in cost-effectiveness studies:  The usefulness of the Barthel Index and the EuroQoL-5D. Quality of Life Research, 13, 621-79         G codes: In compliance with CMSs Claims Based Outcome Reporting, the following G-code set was chosen for this patient based on their primary functional limitation being treated: The outcome measure chosen to determine the severity of the functional limitation was the barthel with a score of 55/100 which was correlated with the impairment scale. ? Mobility - Walking and Moving Around:     - CURRENT STATUS: CK - 40%-59% impaired, limited or restricted    - GOAL STATUS: CJ - 20%-39% impaired, limited or restricted    - D/C STATUS:  ---------------To be determined---------------      Physical Therapy Evaluation Charge Determination   History Examination Presentation Decision-Making   HIGH Complexity :3+ comorbidities / personal factors will impact the outcome/ POC  HIGH Complexity : 4+ Standardized tests and measures addressing body structure, function, activity limitation and / or participation in recreation  MEDIUM Complexity : Evolving with changing characteristics  Other outcome measures barthel  MEDIUM      Based on the above components, the patient evaluation is determined to be of the following complexity level: MEDIUM    Pain:  Pain Scale 1: Numeric (0 - 10)  Pain Intensity 1: 0              Activity Tolerance:   Limited by fatigue. Please refer to the flowsheet for vital signs taken during this treatment. After treatment:   [x]         Patient left in no apparent distress sitting up in chair, LEs elevated  []         Patient left in no apparent distress in bed  [x]         Call bell left within reach  [x]         Nursing notified  []         Caregiver present  [x]         Chair alarm activated    Pt educated regarding safety precautions including proper footwear and need to contact staff for assistance with all mobility. Pt verbalizes understanding.       COMMUNICATION/EDUCATION:   The patients plan of care was discussed with: Registered Nurse and Rehabilitation Attendant. [x]         Fall prevention education was provided and the patient/caregiver indicated understanding. [x]         Patient/family have participated as able in goal setting and plan of care. [x]         Patient/family agree to work toward stated goals and plan of care. []         Patient understands intent and goals of therapy, but is neutral about his/her participation. []         Patient is unable to participate in goal setting and plan of care.     Thank you for this referral.  Dev Sanders, PT, DPT   Time Calculation: 30 mins

## 2018-08-17 NOTE — WOUND CARE
Wound care  Nurse consult to assess the right anterior knee incision, S/P TKR last week per ortho. Up in the chair, no distress- working with PT. Assessment  Bilateral lower legs edematous history of lymphedema - compression hose in place. Right anterior knee incision approximated with staples intact, no drainage noted. Open to air. No wound care needs at this time. Reconsult if needed.      Yissel Kovacs

## 2018-08-17 NOTE — PROGRESS NOTES
Primary Nurse Leilani Beard RN and Teresa Le RN performed a dual skin assessment on this patient Impairment noted- see wound doc flow sheet  Skip score is 18

## 2018-08-18 LAB
BACTERIA SPEC CULT: ABNORMAL
BACTERIA SPEC CULT: ABNORMAL
CC UR VC: ABNORMAL
SERVICE CMNT-IMP: ABNORMAL

## 2018-08-18 PROCEDURE — 94660 CPAP INITIATION&MGMT: CPT

## 2018-08-18 PROCEDURE — 74011250637 HC RX REV CODE- 250/637: Performed by: INTERNAL MEDICINE

## 2018-08-18 PROCEDURE — 94640 AIRWAY INHALATION TREATMENT: CPT

## 2018-08-18 PROCEDURE — 74011000258 HC RX REV CODE- 258: Performed by: INTERNAL MEDICINE

## 2018-08-18 PROCEDURE — 74011250636 HC RX REV CODE- 250/636: Performed by: INTERNAL MEDICINE

## 2018-08-18 PROCEDURE — 65660000000 HC RM CCU STEPDOWN

## 2018-08-18 PROCEDURE — 74011000250 HC RX REV CODE- 250: Performed by: INTERNAL MEDICINE

## 2018-08-18 RX ADMIN — ACETAMINOPHEN 650 MG: 325 TABLET ORAL at 16:36

## 2018-08-18 RX ADMIN — Medication 10 ML: at 06:50

## 2018-08-18 RX ADMIN — TAMSULOSIN HYDROCHLORIDE 0.4 MG: 0.4 CAPSULE ORAL at 09:29

## 2018-08-18 RX ADMIN — ACETAMINOPHEN 650 MG: 325 TABLET ORAL at 02:11

## 2018-08-18 RX ADMIN — ARFORMOTEROL TARTRATE 15 MCG: 15 SOLUTION RESPIRATORY (INHALATION) at 07:53

## 2018-08-18 RX ADMIN — ARFORMOTEROL TARTRATE 15 MCG: 15 SOLUTION RESPIRATORY (INHALATION) at 20:13

## 2018-08-18 RX ADMIN — CEFTRIAXONE SODIUM 1 G: 1 INJECTION, POWDER, FOR SOLUTION INTRAMUSCULAR; INTRAVENOUS at 16:31

## 2018-08-18 RX ADMIN — PANTOPRAZOLE SODIUM 40 MG: 40 TABLET, DELAYED RELEASE ORAL at 06:49

## 2018-08-18 RX ADMIN — BUDESONIDE 500 MCG: 0.5 INHALANT RESPIRATORY (INHALATION) at 20:13

## 2018-08-18 RX ADMIN — PANTOPRAZOLE SODIUM 40 MG: 40 TABLET, DELAYED RELEASE ORAL at 16:30

## 2018-08-18 RX ADMIN — RIVAROXABAN 10 MG: 10 TABLET, FILM COATED ORAL at 13:05

## 2018-08-18 RX ADMIN — ACETAMINOPHEN 650 MG: 325 TABLET ORAL at 06:49

## 2018-08-18 RX ADMIN — BUDESONIDE 500 MCG: 0.5 INHALANT RESPIRATORY (INHALATION) at 07:53

## 2018-08-18 RX ADMIN — DUTASTERIDE 0.5 MG: 0.5 CAPSULE, LIQUID FILLED ORAL at 09:29

## 2018-08-18 RX ADMIN — METOPROLOL SUCCINATE 25 MG: 25 TABLET, EXTENDED RELEASE ORAL at 09:29

## 2018-08-18 RX ADMIN — MONTELUKAST SODIUM 10 MG: 10 TABLET, COATED ORAL at 21:53

## 2018-08-18 NOTE — DISCHARGE INSTRUCTIONS
HOSPITALIST DISCHARGE INSTRUCTIONS    NAME:             Bradly Segura   :  1945   MRN:  [id number]     Date:     2018    ADMIT DATE: 2018     DISCHARGE DATE: 2018     PRINCIPAL ADMITTING DIAGNOSIS:  UTI (urinary tract infection)    DISCHARGE DIAGNOSES:  Principal Problem:    UTI (urinary tract infection) (2018)    Primary osteoarthritis of right knee (8/3/2018)    Asthma: Overview: R/T ENVIRONMENTAL ALLERGIES; INHALER USE DAILY    CAD (coronary artery disease) (2007): Overview: MI    GERD (gastroesophageal reflux disease)     History of BPH     History of kidney stones     Hx: UTI (urinary tract infection)     Hypertension     Lymphedema     SANTA on CPAP     MEDICATIONS:    · It is important that medications are taken exactly as they are prescribed on the discharge medication instructions and keep them your  in the bottles provided by the pharmacist.   · Keep a list of the medication names, dosages, and times to be taken at all times. · Do not take other medications without consulting your doctor. Recommended diet:  Cardiac Diet    Recommended activity: Activity as tolerated    Post discharge care:    Notify follow up health care provider or return to the emergency department if you cannot get hold of your doctor if you feel worse or experience symptoms similar to those that brought you to hospital    Follow-up Information     Follow up With Details Comments Contact Marlene Cox [phone]          Information obtained by :  I understand that if any problems occur once I am at home I am to contact my physician and I understand and acknowledge receipt of the instructions indicated above.                                                                                                                                            Physician's or R.N.'s Signature Date/Time                                                                                                                                              Patient or Representative Signature                                                          Date/Time

## 2018-08-18 NOTE — PROGRESS NOTES
Delon More cecilia Solomons 79  [address] [facility], [address] [place] [address]  [phone]      Medical Progress Note      NAME:         Perry Knapp   :        1945  MRM:        [id number]    Date:          2018      Subjective: Patient has been seen and examined as a follow up for a UTI. Chart, labs, diagnostics reviewed. He remains wean but not in any much distress. Afebrile. No nausea or vomiting. Objective:    Vital Signs:    Visit Vitals    /90 (BP 1 Location: Right arm, BP Patient Position: At rest)    Pulse 76    Temp 97.9 °F (36.6 °C)    Resp 15    Ht 6' 1\" (1.854 m)    Wt 118.8 kg (262 lb)    SpO2 99%    BMI 34.57 kg/m2          Intake/Output Summary (Last 24 hours) at 18 1022  Last data filed at 18 0933   Gross per 24 hour   Intake              255 ml   Output             1150 ml   Net             -895 ml        Physical Examination:    General:   Weak looking but not in any distress   Eyes:   pink conjunctivae, PERRLA with no discharge. ENT:   no ottorrhea or rhinorrhea with moist mucous membranes  Pulm: clear breath sounds without crackles or wheezes  Card:  no JVD or murmurs, has regular and normal S1, S2 without thrills, bruits. + peripheral edema  Abd:  Soft, non-tender, non-distended, normoactive bowel sounds   Musc:  No cyanosis, clubbing, atrophy or deformities. Skin:  No rashes, bruising or ulcers. Neuro: Awake and alert.  Generally a non focal exam. Follows commands appropriately  Psych:  Has a good insight and is oriented x 3    Current Facility-Administered Medications   Medication Dose Route Frequency    tamsulosin (FLOMAX) capsule 0.4 mg  0.4 mg Oral DAILY    sodium chloride (NS) flush 5-10 mL  5-10 mL IntraVENous PRN    cefTRIAXone (ROCEPHIN) 1 g in 0.9% sodium chloride (MBP/ADV) 50 mL  1 g IntraVENous Q24H    albuterol-ipratropium (DUO-NEB) 2.5 MG-0.5 MG/3 ML  3 mL Nebulization Q6H PRN    dutasteride (AVODART) capsule 0.5 mg  0.5 mg Oral DAILY    metoprolol succinate (TOPROL-XL) XL tablet 25 mg  25 mg Oral DAILY    montelukast (SINGULAIR) tablet 10 mg  10 mg Oral QHS    pantoprazole (PROTONIX) tablet 40 mg  40 mg Oral ACB&D    rivaroxaban (XARELTO) tablet 10 mg  10 mg Oral DAILY WITH LUNCH    naloxone (NARCAN) injection 0.4 mg  0.4 mg IntraVENous PRN    acetaminophen (TYLENOL) tablet 650 mg  650 mg Oral Q4H PRN    HYDROcodone-acetaminophen (NORCO) 5-325 mg per tablet 1 Tab  1 Tab Oral Q4H PRN    diphenhydrAMINE (BENADRYL) capsule 25 mg  25 mg Oral Q4H PRN    ondansetron (ZOFRAN) injection 4 mg  4 mg IntraVENous Q4H PRN    arformoterol (BROVANA) neb solution 15 mcg  15 mcg Nebulization BID RT    budesonide (PULMICORT) 500 mcg/2 ml nebulizer suspension  500 mcg Nebulization BID RT        Laboratory data and review:    Recent Labs      08/17/18   0341  08/16/18   1453   WBC  15.3*  14.4*   HGB  11.5*  12.6   HCT  37.0  39.0   PLT  268  318     Recent Labs      08/17/18   0341  08/16/18   1453   NA  138  136   K  4.0  4.1   CL  107  105   CO2  24  22   GLU  117*  125*   BUN  12  14   CREA  1.00  1.10   CA  8.6  8.7   MG  2.3   --    ALB   --   2.9*   SGOT   --   29   ALT   --   57     No components found for: GLPOC    Assessment and Plan:    UTI (urinary tract infection) / History of BPH / History of kidney stones /  Hx UTI (urinary tract infection): UTI POA, likely CAUTI form recent knee surgery. Cultures growing Gram neg rods. Continue empiric IV Ceftriaxone and Avodart. Follow cultures     Sepsis / Leukocytosis / Fever POA: due to UTI. NOT severe sepsis. No end organ damage. Stable. Continue IV abx as above and monitor       CAD (coronary artery disease) / Hypertension: stable. Asymptomatic. Continue Metoprolol. Not on ASA while on xarelto. Asthma: No wheezing. Continue dulera and prn duonebs.   No symptoms     Primary osteoarthritis of right knee: courtesy consult to ortho to review progress. Surgery has him on xarelto.     GERD (gastroesophageal reflux disease): stable. Continue PPI     Lymphedema - Chronic. Followed as outpatient. SANTA on CPAP : Continue CPAP at night.     Total time spent for the patient's care: [address] [facility] discussed with: Patient, Care Manager and Nursing Staff    Discussed:  Care Plan and D/C Planning    Prophylaxis:  Lovenox    Anticipated Disposition:  Home w/Family           ___________________________________________________    Attending Physician:   Nehemiah Ramon MD

## 2018-08-18 NOTE — PROGRESS NOTES
Problem: Falls - Risk of  Goal: *Absence of Falls  Document Buzz Fall Risk and appropriate interventions in the flowsheet.    Outcome: Progressing Towards Goal  Fall Risk Interventions:  Mobility Interventions: Bed/chair exit alarm, Communicate number of staff needed for ambulation/transfer, Patient to call before getting OOB         Medication Interventions: Bed/chair exit alarm, Evaluate medications/consider consulting pharmacy, Patient to call before getting OOB    Elimination Interventions: Bed/chair exit alarm, Call light in reach, Elevated toilet seat, Patient to call for help with toileting needs

## 2018-08-18 NOTE — PROGRESS NOTES
Bedside and Verbal shift change report given to Saravanan Villalobos RN (oncoming nurse) by Domenic Burch RN (offgoing nurse). Report included the following information SBAR and Kardex.

## 2018-08-19 VITALS
TEMPERATURE: 97.7 F | BODY MASS INDEX: 34.72 KG/M2 | DIASTOLIC BLOOD PRESSURE: 84 MMHG | HEIGHT: 73 IN | WEIGHT: 262 LBS | SYSTOLIC BLOOD PRESSURE: 151 MMHG | RESPIRATION RATE: 16 BRPM | HEART RATE: 87 BPM | OXYGEN SATURATION: 95 %

## 2018-08-19 PROCEDURE — 94660 CPAP INITIATION&MGMT: CPT

## 2018-08-19 PROCEDURE — 74011250637 HC RX REV CODE- 250/637: Performed by: INTERNAL MEDICINE

## 2018-08-19 PROCEDURE — 74011000250 HC RX REV CODE- 250: Performed by: INTERNAL MEDICINE

## 2018-08-19 PROCEDURE — 94640 AIRWAY INHALATION TREATMENT: CPT

## 2018-08-19 RX ORDER — CEPHALEXIN 500 MG/1
500 CAPSULE ORAL 2 TIMES DAILY
Qty: 14 CAP | Refills: 0 | Status: SHIPPED | OUTPATIENT
Start: 2018-08-19 | End: 2018-08-26

## 2018-08-19 RX ADMIN — ACETAMINOPHEN 650 MG: 325 TABLET ORAL at 03:29

## 2018-08-19 RX ADMIN — ARFORMOTEROL TARTRATE 15 MCG: 15 SOLUTION RESPIRATORY (INHALATION) at 07:27

## 2018-08-19 RX ADMIN — DUTASTERIDE 0.5 MG: 0.5 CAPSULE, LIQUID FILLED ORAL at 09:36

## 2018-08-19 RX ADMIN — TAMSULOSIN HYDROCHLORIDE 0.4 MG: 0.4 CAPSULE ORAL at 00:00

## 2018-08-19 RX ADMIN — BUDESONIDE 500 MCG: 0.5 INHALANT RESPIRATORY (INHALATION) at 07:27

## 2018-08-19 RX ADMIN — METOPROLOL SUCCINATE 25 MG: 25 TABLET, EXTENDED RELEASE ORAL at 09:37

## 2018-08-19 RX ADMIN — PANTOPRAZOLE SODIUM 40 MG: 40 TABLET, DELAYED RELEASE ORAL at 06:31

## 2018-08-19 NOTE — PROGRESS NOTES
Problem: Falls - Risk of  Goal: *Absence of Falls  Document Buzz Fall Risk and appropriate interventions in the flowsheet.    Outcome: Progressing Towards Goal  Fall Risk Interventions:  Mobility Interventions: Bed/chair exit alarm, Communicate number of staff needed for ambulation/transfer, Patient to call before getting OOB, Utilize walker, cane, or other assistive device, Utilize gait belt for transfers/ambulation         Medication Interventions: Bed/chair exit alarm, Patient to call before getting OOB, Teach patient to arise slowly, Utilize gait belt for transfers/ambulation    Elimination Interventions: Bed/chair exit alarm, Call light in reach, Patient to call for help with toileting needs, Toileting schedule/hourly rounds, Urinal in reach

## 2018-08-19 NOTE — PROGRESS NOTES
Bedside and Verbal shift change report given to Beckie Salas RN (oncoming nurse) by Houston Clark RN (offgoing nurse). Report included the following information SBAR, Kardex, ED Summary, Intake/Output, MAR, Accordion and Recent Results.

## 2018-08-19 NOTE — DISCHARGE SUMMARY
Delon More cecilia Jemison [address]  [address]  Tel: [phone]    Physician Discharge Summary    Patient ID: Vandana Older  Age:              68 y.o.    : 1945  MRN:             [id number]     PCP: Glenn Kwok MD     Admit date: 2018    Discharge date: 2018    Principal admission Diagnosis:   UTI (urinary tract infection)  UTI (urinary tract infection)    Discharge Diagnoses:  Principal Problem:    UTI (urinary tract infection) (2018)    Primary osteoarthritis of right knee (8/3/2018)    Asthma: Overview: R/T ENVIRONMENTAL ALLERGIES; INHALER USE DAILY    CAD (coronary artery disease) (2007): Overview: MI    GERD (gastroesophageal reflux disease)     History of BPH     History of kidney stones     Hx: UTI (urinary tract infection)     Hypertension     Lymphedema     SANTA on CPAP     Consults: None    Hospital Course:     Mr. Wanda Arnett is a 68 y.o. admitted to [facility] and treated for the following:    UTI (urinary tract infection) / History of BPH / History of kidney stones /  Hx UTI (urinary tract infection): UTI POA. Cultures isolated insignificant growth with SERRATIA MARCESCENS. He has remained stable and will be discharged home with Keflex to complete course       Sepsis / Leukocytosis / Fever POA: due to UTI.  NOT severe sepsis.  No end organ damage. Resolved         CAD (coronary artery disease) / Hypertension: stable. Asymptomatic. Continue Metoprolol. Not on ASA while on xarelto. Asthma: No wheezing. Continue home medications as below.       Primary osteoarthritis of right knee: Has remained stable. Out patient follow up as scheduled. Surgery have him on xarelto      GERD (gastroesophageal reflux disease): stable. Continue PPI      Lymphedema: Chronic.  Followed as outpatient.     SANTA on CPAP : Continue CPAP at night.     Discharge Exam:    Visit Vitals    /68 (BP 1 Location: Left arm, BP Patient Position: At rest)    Pulse 86    Temp 98 °F (36.7 °C)    Resp 16    Ht 6' 1\" (1.854 m)    Wt 118.8 kg (262 lb)    SpO2 96%    BMI 34.57 kg/m2      General: well looking and stable patient in no acute distress  Pulm: clear breath sounds without wheezes  Card: no murmurs, normal S1, S2 without thrills, bruits   Abd:    soft, non-tender, normoactive bowel sounds  Skin: no rashes or ulcers, skin turgor is good  Neuro: awake, alert and has a non focal     Activity: Activity as tolerated    Diet: Cardiac Diet    Current Discharge Medication List      START taking these medications    Details   cephALEXin (KEFLEX) 500 mg capsule Take 1 Cap by mouth two (2) times a day for 7 days. Qty: 14 Cap, Refills: 0         CONTINUE these medications which have NOT CHANGED    Details   tamsulosin (FLOMAX) 0.4 mg capsule Take 0.4 mg by mouth daily. ergocalciferol (VITAMIN D2) 50,000 unit capsule Take 50,000 Units by mouth Every Thursday. acetaminophen (TYLENOL EXTRA STRENGTH) 500 mg tablet Take 1,000 mg by mouth every six (6) hours as needed for Pain. calcium-vitamin D (OYSTER SHELL) 500 mg(1,250mg) -200 unit per tablet Take 1 Tab by mouth daily. rivaroxaban (XARELTO) 10 mg tablet Take 1 Tab by mouth daily (with lunch) for 12 days. Indications: KNEE REPLACEMENT DEEP VEIN THROMBOSIS PREVENTION  Qty: 12 Tab, Refills: 0      mometasone-formoterol (DULERA) 100-5 mcg/actuation HFA inhaler Take 1 Puff by inhalation two (2) times a day. dutasteride (AVODART) 0.5 mg capsule Take 0.5 mg by mouth daily. mecobal/levomefolat Ca/B6 phos (FOLTANX PO) Take  by mouth daily. albuterol-ipratropium (DUO-NEB) 2.5 mg-0.5 mg/3 ml nebu 3 mL by Nebulization route every six (6) hours as needed. levocetirizine (XYZAL) 5 mg tablet Take 5 mg by mouth nightly. metoprolol succinate (TOPROL-XL) 25 mg XL tablet Take 25 mg by mouth daily.       montelukast (SINGULAIR) 10 mg tablet Take 10 mg by mouth nightly. cyanocobalamin (VITAMIN B-12) 1,000 mcg tablet Take 1,000 mcg by mouth every seven (7) days. coenzyme Q-10 (CO Q-10) 200 mg capsule Take 200 mg by mouth daily. Omeprazole delayed release (PRILOSEC D/R) 20 mg tablet Take 20 mg by mouth two (2) times a day. cranberry fruit extract (CRANBERRY CONCENTRATE PO) Take 360 mg by mouth nightly.      magnesium oxide (MAG-OX) 400 mg tablet Take 400 mg by mouth daily. Follow-up Information     Follow up With Details Comments Contact Ashley Rondon [address] [place] [address]    Malcolm Lin MD Schedule an appointment as soon as possible for a visit for a regular follow up and review [place] [id number] [phone]            Follow-up tests or labs: None    Discharge Condition: Stable    Disposition: home    Time taken to arrange discharge:  35 minutes.     Signed:  Nicki Tillman MD     [facility]  8/19/2018   7:06 AM

## 2018-08-22 LAB
BACTERIA SPEC CULT: NORMAL
BACTERIA SPEC CULT: NORMAL
SERVICE CMNT-IMP: NORMAL
SERVICE CMNT-IMP: NORMAL

## 2018-09-17 NOTE — PROGRESS NOTES
Care Management    Received call from Luci/Charge RN, calling with concerns about home situation. Daughter called back to hospital with concern that her father was transported from hospital to [facility] and has no assistance. Call made to Mr. Billingsley Has, he was alert and oriented to time and situation. He was clear that he had made a choice to come home. He stated to writer he has food available and he is able to get to bathroom. He added he is watching [employer] game right now and will be alone tonight, but has someone coming tomorrow. CRM verified with [employer] [employer], they are scheduled to come out to home on Tuesday, they will add aide. Noted per AVS patient has follow up appointment  Scheduled for Monday.     Melinda Spear, RN, BSN, [place]  ED Care Management  [phone] Body Location Override (Optional - Billing Will Still Be Based On Selected Body Map Location If Applicable): left inf lat forehead

## 2019-07-29 ENCOUNTER — HOSPITAL ENCOUNTER (INPATIENT)
Age: 74
LOS: 3 days | Discharge: HOME | DRG: 550 | End: 2019-08-02
Attending: FAMILY MEDICINE | Admitting: INTERNAL MEDICINE
Payer: MEDICARE

## 2019-07-29 ENCOUNTER — APPOINTMENT (OUTPATIENT)
Dept: GENERAL RADIOLOGY | Age: 74
DRG: 550 | End: 2019-07-29
Attending: FAMILY MEDICINE
Payer: MEDICARE

## 2019-07-29 ENCOUNTER — APPOINTMENT (OUTPATIENT)
Dept: VASCULAR SURGERY | Age: 74
DRG: 550 | End: 2019-07-29
Attending: FAMILY MEDICINE
Payer: MEDICARE

## 2019-07-29 LAB
ALBUMIN SERPL-MCNC: 3.5 G/DL (ref 3.5–5)
ALBUMIN/GLOB SERPL: 1 {RATIO} (ref 1.1–2.2)
ALP SERPL-CCNC: 117 U/L (ref 45–117)
ALT SERPL-CCNC: 23 U/L (ref 12–78)
ANION GAP SERPL CALC-SCNC: 6 MMOL/L (ref 5–15)
APPEARANCE FLD: ABNORMAL
AST SERPL-CCNC: 10 U/L (ref 15–37)
BILIRUB SERPL-MCNC: 0.7 MG/DL (ref 0.2–1)
BODY FLD TYPE: NORMAL
BUN SERPL-MCNC: 15 MG/DL (ref 6–20)
BUN/CREAT SERPL: 18 (ref 12–20)
CALCIUM SERPL-MCNC: 8.6 MG/DL (ref 8.5–10.1)
CHLORIDE SERPL-SCNC: 106 MMOL/L (ref 97–108)
CO2 SERPL-SCNC: 25 MMOL/L (ref 21–32)
COLOR FLD: ABNORMAL
CREAT SERPL-MCNC: 0.82 MG/DL (ref 0.7–1.3)
CRP SERPL-MCNC: 5.34 MG/DL (ref 0–0.6)
CRYSTALS FLD MICRO: NORMAL
ERYTHROCYTE [DISTWIDTH] IN BLOOD BY AUTOMATED COUNT: 14.9 % (ref 11.5–14.5)
ERYTHROCYTE [SEDIMENTATION RATE] IN BLOOD: 7 MM/HR (ref 0–20)
GLOBULIN SER CALC-MCNC: 3.5 G/DL (ref 2–4)
GLUCOSE SERPL-MCNC: 100 MG/DL (ref 65–100)
HCT VFR BLD AUTO: 45.3 % (ref 36.6–50.3)
HGB BLD-MCNC: 14.2 G/DL (ref 12.1–17)
LACTATE SERPL-SCNC: 1.3 MMOL/L (ref 0.4–2)
LYMPHOCYTES NFR FLD: 11 %
MCH RBC QN AUTO: 27.6 PG (ref 26–34)
MCHC RBC AUTO-ENTMCNC: 31.3 G/DL (ref 30–36.5)
MCV RBC AUTO: 88.1 FL (ref 80–99)
NEUTROPHILS NFR FLD: 89 %
NRBC # BLD: 0 K/UL (ref 0–0.01)
NRBC BLD-RTO: 0 PER 100 WBC
NUC CELL # FLD: ABNORMAL /CU MM
PLATELET # BLD AUTO: 147 K/UL (ref 150–400)
PMV BLD AUTO: 10.4 FL (ref 8.9–12.9)
POTASSIUM SERPL-SCNC: 4 MMOL/L (ref 3.5–5.1)
PROT SERPL-MCNC: 7 G/DL (ref 6.4–8.2)
RBC # BLD AUTO: 5.14 M/UL (ref 4.1–5.7)
RBC # FLD: >100 /CU MM
SODIUM SERPL-SCNC: 137 MMOL/L (ref 136–145)
SPECIMEN SOURCE FLD: ABNORMAL
WBC # BLD AUTO: 11.3 K/UL (ref 4.1–11.1)

## 2019-07-29 PROCEDURE — 87205 SMEAR GRAM STAIN: CPT

## 2019-07-29 PROCEDURE — 36415 COLL VENOUS BLD VENIPUNCTURE: CPT

## 2019-07-29 PROCEDURE — 93970 EXTREMITY STUDY: CPT

## 2019-07-29 PROCEDURE — 20610 DRAIN/INJ JOINT/BURSA W/O US: CPT

## 2019-07-29 PROCEDURE — 85027 COMPLETE CBC AUTOMATED: CPT

## 2019-07-29 PROCEDURE — 93005 ELECTROCARDIOGRAM TRACING: CPT

## 2019-07-29 PROCEDURE — 87040 BLOOD CULTURE FOR BACTERIA: CPT

## 2019-07-29 PROCEDURE — 87186 SC STD MICRODIL/AGAR DIL: CPT

## 2019-07-29 PROCEDURE — 74011250636 HC RX REV CODE- 250/636: Performed by: PHYSICIAN ASSISTANT

## 2019-07-29 PROCEDURE — 87077 CULTURE AEROBIC IDENTIFY: CPT

## 2019-07-29 PROCEDURE — 89050 BODY FLUID CELL COUNT: CPT

## 2019-07-29 PROCEDURE — 85652 RBC SED RATE AUTOMATED: CPT

## 2019-07-29 PROCEDURE — 74011250636 HC RX REV CODE- 250/636: Performed by: FAMILY MEDICINE

## 2019-07-29 PROCEDURE — 83605 ASSAY OF LACTIC ACID: CPT

## 2019-07-29 PROCEDURE — 94640 AIRWAY INHALATION TREATMENT: CPT

## 2019-07-29 PROCEDURE — 65270000029 HC RM PRIVATE

## 2019-07-29 PROCEDURE — 86140 C-REACTIVE PROTEIN: CPT

## 2019-07-29 PROCEDURE — 74011000250 HC RX REV CODE- 250: Performed by: FAMILY MEDICINE

## 2019-07-29 PROCEDURE — 73560 X-RAY EXAM OF KNEE 1 OR 2: CPT

## 2019-07-29 PROCEDURE — 74011000258 HC RX REV CODE- 258: Performed by: FAMILY MEDICINE

## 2019-07-29 PROCEDURE — 74011250637 HC RX REV CODE- 250/637: Performed by: FAMILY MEDICINE

## 2019-07-29 PROCEDURE — 80053 COMPREHEN METABOLIC PANEL: CPT

## 2019-07-29 PROCEDURE — 89060 EXAM SYNOVIAL FLUID CRYSTALS: CPT

## 2019-07-29 RX ORDER — TAMSULOSIN HYDROCHLORIDE 0.4 MG/1
0.4 CAPSULE ORAL DAILY
Status: DISCONTINUED | OUTPATIENT
Start: 2019-07-29 | End: 2019-08-02 | Stop reason: HOSPADM

## 2019-07-29 RX ORDER — GUAIFENESIN 100 MG/5ML
81 LIQUID (ML) ORAL DAILY
COMMUNITY
End: 2022-04-22

## 2019-07-29 RX ORDER — GUAIFENESIN 100 MG/5ML
81 LIQUID (ML) ORAL DAILY
Status: DISCONTINUED | OUTPATIENT
Start: 2019-07-29 | End: 2019-08-02 | Stop reason: HOSPADM

## 2019-07-29 RX ORDER — METOPROLOL SUCCINATE 25 MG/1
25 TABLET, EXTENDED RELEASE ORAL DAILY
Status: DISCONTINUED | OUTPATIENT
Start: 2019-07-29 | End: 2019-08-02 | Stop reason: HOSPADM

## 2019-07-29 RX ORDER — VANCOMYCIN/0.9 % SOD CHLORIDE 1.5G/250ML
1500 PLASTIC BAG, INJECTION (ML) INTRAVENOUS EVERY 12 HOURS
Status: DISCONTINUED | OUTPATIENT
Start: 2019-07-29 | End: 2019-07-31

## 2019-07-29 RX ORDER — LIDOCAINE HYDROCHLORIDE 10 MG/ML
5 INJECTION INFILTRATION; PERINEURAL ONCE
Status: COMPLETED | OUTPATIENT
Start: 2019-07-29 | End: 2019-07-29

## 2019-07-29 RX ORDER — DUTASTERIDE 0.5 MG/1
0.5 CAPSULE, LIQUID FILLED ORAL DAILY
Status: DISCONTINUED | OUTPATIENT
Start: 2019-07-29 | End: 2019-08-02 | Stop reason: HOSPADM

## 2019-07-29 RX ORDER — HEPARIN SODIUM 5000 [USP'U]/ML
5000 INJECTION, SOLUTION INTRAVENOUS; SUBCUTANEOUS 3 TIMES DAILY
Status: DISCONTINUED | OUTPATIENT
Start: 2019-07-29 | End: 2019-08-02 | Stop reason: HOSPADM

## 2019-07-29 RX ORDER — BUDESONIDE 0.5 MG/2ML
500 INHALANT ORAL
Status: DISCONTINUED | OUTPATIENT
Start: 2019-07-29 | End: 2019-08-02 | Stop reason: HOSPADM

## 2019-07-29 RX ORDER — ACETAMINOPHEN 325 MG/1
650 TABLET ORAL
Status: DISCONTINUED | OUTPATIENT
Start: 2019-07-29 | End: 2019-08-02 | Stop reason: HOSPADM

## 2019-07-29 RX ORDER — VANCOMYCIN HYDROCHLORIDE
1250 ONCE
Status: DISCONTINUED | OUTPATIENT
Start: 2019-07-29 | End: 2019-07-29 | Stop reason: DRUGHIGH

## 2019-07-29 RX ORDER — SODIUM CHLORIDE 0.9 % (FLUSH) 0.9 %
5-40 SYRINGE (ML) INJECTION AS NEEDED
Status: DISCONTINUED | OUTPATIENT
Start: 2019-07-29 | End: 2019-08-02 | Stop reason: HOSPADM

## 2019-07-29 RX ORDER — SODIUM CHLORIDE 0.9 % (FLUSH) 0.9 %
5-40 SYRINGE (ML) INJECTION EVERY 8 HOURS
Status: DISCONTINUED | OUTPATIENT
Start: 2019-07-29 | End: 2019-08-02 | Stop reason: HOSPADM

## 2019-07-29 RX ORDER — ARFORMOTEROL TARTRATE 15 UG/2ML
15 SOLUTION RESPIRATORY (INHALATION)
Status: DISCONTINUED | OUTPATIENT
Start: 2019-07-29 | End: 2019-08-02 | Stop reason: HOSPADM

## 2019-07-29 RX ORDER — SODIUM CHLORIDE 9 MG/ML
75 INJECTION, SOLUTION INTRAVENOUS CONTINUOUS
Status: DISCONTINUED | OUTPATIENT
Start: 2019-07-29 | End: 2019-07-30

## 2019-07-29 RX ORDER — LANOLIN ALCOHOL/MO/W.PET/CERES
400 CREAM (GRAM) TOPICAL DAILY
Status: DISCONTINUED | OUTPATIENT
Start: 2019-07-29 | End: 2019-08-02 | Stop reason: HOSPADM

## 2019-07-29 RX ORDER — PANTOPRAZOLE SODIUM 20 MG/1
20 TABLET, DELAYED RELEASE ORAL 2 TIMES DAILY
Status: DISCONTINUED | OUTPATIENT
Start: 2019-07-29 | End: 2019-08-02 | Stop reason: HOSPADM

## 2019-07-29 RX ADMIN — METOPROLOL SUCCINATE 25 MG: 25 TABLET, EXTENDED RELEASE ORAL at 17:46

## 2019-07-29 RX ADMIN — SODIUM CHLORIDE 75 ML/HR: 900 INJECTION, SOLUTION INTRAVENOUS at 13:45

## 2019-07-29 RX ADMIN — TAMSULOSIN HYDROCHLORIDE 0.4 MG: 0.4 CAPSULE ORAL at 17:46

## 2019-07-29 RX ADMIN — Medication 10 ML: at 14:00

## 2019-07-29 RX ADMIN — HEPARIN SODIUM 5000 UNITS: 5000 INJECTION INTRAVENOUS; SUBCUTANEOUS at 18:25

## 2019-07-29 RX ADMIN — ACETAMINOPHEN 650 MG: 325 TABLET ORAL at 17:50

## 2019-07-29 RX ADMIN — ARFORMOTEROL TARTRATE 15 MCG: 15 SOLUTION RESPIRATORY (INHALATION) at 21:20

## 2019-07-29 RX ADMIN — SODIUM CHLORIDE 75 ML/HR: 900 INJECTION, SOLUTION INTRAVENOUS at 18:23

## 2019-07-29 RX ADMIN — LIDOCAINE HYDROCHLORIDE 5 ML: 10 INJECTION, SOLUTION INFILTRATION; PERINEURAL at 13:45

## 2019-07-29 RX ADMIN — CEFEPIME HYDROCHLORIDE 2 G: 2 INJECTION, POWDER, FOR SOLUTION INTRAVENOUS at 23:30

## 2019-07-29 RX ADMIN — VANCOMYCIN HYDROCHLORIDE 1500 MG: 10 INJECTION, POWDER, LYOPHILIZED, FOR SOLUTION INTRAVENOUS at 18:24

## 2019-07-29 RX ADMIN — ASPIRIN 81 MG 81 MG: 81 TABLET ORAL at 17:46

## 2019-07-29 RX ADMIN — PANTOPRAZOLE SODIUM 20 MG: 20 TABLET, DELAYED RELEASE ORAL at 17:46

## 2019-07-29 RX ADMIN — MAGNESIUM OXIDE TAB 400 MG (241.3 MG ELEMENTAL MG) 400 MG: 400 (241.3 MG) TAB at 17:46

## 2019-07-29 RX ADMIN — CEFEPIME HYDROCHLORIDE 2 G: 2 INJECTION, POWDER, FOR SOLUTION INTRAVENOUS at 13:45

## 2019-07-29 RX ADMIN — HEPARIN SODIUM 5000 UNITS: 5000 INJECTION INTRAVENOUS; SUBCUTANEOUS at 21:11

## 2019-07-29 RX ADMIN — BUDESONIDE 500 MCG: 0.5 INHALANT RESPIRATORY (INHALATION) at 21:20

## 2019-07-29 NOTE — PROGRESS NOTES
Day #1 of Vancomycin  Indication:  Septic arthritis/pre-tibial cellulitis s/p TKR two years ago  Current regimen:  Vancomycin 2500mg IV x1 given this morning ~5:30am at [facility] ED (infused over 2.5hrs)  Abx regimen:  Vancomycin/Cefepime    ID Following ?: NO  Frequency of BMP:  Daily    No results for input(s): WBC, CREA, BUN in the last 72 hours. Scr 0.9 this morning at [facility] ED  Est CrCl: 60-70 ml/min  Temp (24hrs), Av.8 °F (36.6 °C), Min:97.8 °F (36.6 °C), Max:97.8 °F (36.6 °C)    Cultures:   none    Goal trough ~15mcg/ml for now    Recent trough history (date/time/level/dose/action taken):  none    Plan:  Continue with Vancomycin 1500mg IV q12h for predicted trough ~17mcg/ml.

## 2019-07-29 NOTE — CONSULTS
Ortho Consult    Please see Note by Chioma Keith regarding overall plan of care. Procedure:    Patient found to have right knee swelling and pain and suspect septic arthritis. Risks and benefits of aspiration of joint detailed with patient who agrees to proceed. Leg was prepped with chlorhexidine and right leg held in position of comfort with right leg extended. Injection site was anesthetized using 3cc of 1% lidocaine. 18g needle was then advanced into joint and 25cc of fluid was obtained which was intially cloudy yellow but transitioned to primarily blood at the end. Fluid was sent for cell count, culture and gram stain and crystal analysis. Patient tolerated procedure well.     Jackalyn Phalen, PA-C  Orthopedic Trauma Service  [address] [facility]

## 2019-07-29 NOTE — H&P
[address]  HISTORY AND PHYSICAL    Name:  Liban Wagner  MR#:  [id number]  :  1945  ACCOUNT #:  [de-identified]  ADMIT DATE:  2019      CHIEF COMPLAINT:  Right knee pain. HISTORY OF PRESENT ILLNESS:  The patient is a 80-year-old gentleman status post right TKA, hypertension, hyperlipidemia, coronary artery disease, who presents to the hospital with the above-mentioned symptom. The patient reports that about 2 days back he started having significant pain and discomfort in his right knee. The patient reports the right knee swell up, he was not able to walk much, and he was having quite a bit of pain and discomfort. Yesterday, the patient started having some chills associated with fatigue, got concerned and decided to go to [facility]. The patient reports that he recently had a cortisone injection in the foot by the orthopedic surgeon possibly for plantar fasciitis. He also reports that had a total knee replacement done by Dr. Luis Miguel at [facility] 2 years back. The patient reports his knee pain got worse, came to [facility] earlier today morning, had blood work done and had the joint tapped. Per the patient, they told him that \"the joint is infected\" and they started him on antibiotics. The patient requested to be transferred to [facility] and the patient was transferred to [facility].    The patient currently is resting in bed. He reports significant pain and discomfort in his right knee. Does admit to chills, but denies any fevers. The patient denies any other complaints or problems.   Denies any headache, blurry vision, sore throat, trouble swallowing, trouble with speech, chest pain, shortness of breath, cough, fever, abdominal pain, constipation, diarrhea, urinary symptoms, focal or generalized neurological weakness, recent travel, sick contacts, falls, injuries, hematemesis, melena, hemoptysis, hematuria, or any other concerns or problems. PAST MEDICAL HISTORY:  See above. HOME MEDICATIONS:  Currently, the patient is on:  1. Cranberry extract. 2.  Dulera 200 mcg/5 mcg inhalation 2 puffs. 3.  Avodart 0.5 mg capsule daily. 4.  Flomax 0.4 mg capsule daily. 5.  DuoNebs p.r.n.  6.  Levocetirizine 5 mg as needed. 7.  Lidoderm patch. 8.  Metoprolol 25 mg daily. 8.  Montelukast 10 mg daily. 9.  Nitroglycerin 0.4 mg as needed. 10.  Oxycodone 5 mg daily. 11.  Crestor 10 mg daily. 12.  Tylenol. 13.  Vitamin. 14.  Vitamin D3. SOCIAL HISTORY:  Denies tobacco abuse, alcohol use, or IV drug abuse. Lives at home. REVIEW OF SYMPTOMS:  All systems were reviewed and found to be essentially negative, except for the symptoms mentioned above. ALLERGIES:  SULFA MEDICATION. FAMILY HISTORY:  Discussed. Strong family history of coronary artery disease on both sides of the family. PHYSICAL EXAMINATION:  VITAL SIGNS:  Temperature 97.8, pulse 68, respiratory rate 16, blood pressure 121/73, pulse oximetry 96% on room air. GENERAL:  Alert x3, awake, moderately distressed, pleasant male, appears to be stated age. HEENT:  Pupils equal and reactive to light. Dry mucous membranes. Tympanic membranes clear. NECK:  Supple. CHEST:  Clear to auscultation bilaterally. CORONARY:  S1 and S2 are heard. ABDOMEN:  Soft, nontender, nondistended. Bowel sounds are physiological.  EXTREMITIES:  Significant swelling of the right knee with possible effusion. Decreased range of motion. There is some erythema on the right knee. NEURO/PSYCH:  Pleasant mood and affect. Cranial nerves II through XII grossly intact. Sensory grossly within normal limits. DTR 2+ x4. Strength 5/5. SKIN:  Warm. LABORATORY DATA:  White count 9.0, hemoglobin 14.3, hematocrit 45, platelets 510.   Sodium 139, potassium 4.0, chloride 108, bicarbonate 21, anion gap 10, calcium 9.0, glucose 109, BUN 19, creatinine 0.9, albumin 3.7, bilirubin total 0.7, alkaline phosphatase 104, AST 16, ALT 17. The synovial fluid showed 44,900 WBCs, 800 RBCs, 94% segs, 4 lymphs, 3 monos, and 0 eosinophils. ASSESSMENT AND PLAN:  1. Septic arthritis. The patient will be admitted to the ortho bed. We will start the patient on broad-spectrum IV antibiotics, IV fluids. Ortho consult has been requested. May need surgical intervention. We will defer to Orthopedics. We will provide pain control, supportive care, close monitoring, and further intervention per hospital course. If symptoms persist, may consider further intervention and diagnostics. Await culture results and monitor for now. Reassess as needed. 2.  History of hypertension. Continue home medications. Continue to monitor. Further intervention per hospital course. 3.  History of coronary artery disease, stable. Continue home medications and continue to monitor. Further intervention per hospital course. Reassess as needed. 4.  History of benign prostatic hyperplasia. Continue home medications and continue to monitor. Reassess as needed. 5.  Gastrointestinal and deep venous thrombosis prophylaxis. The patient will be on .       Farhana Benoit MD      [id number]  D:  07/29/2019 10:05  T:  07/29/2019 10:47  JOB #:  [id number]

## 2019-07-29 NOTE — PROGRESS NOTES
TRANSFER - IN REPORT:    Verbal report received from [facility]name[facility] on Dave Mckeon  being received from [facility]) for urgent transfer      Report consisted of patients Situation, Background, Assessment and   Recommendations(SBAR). Information from the following report(s) SBAR, ED Summary, Intake/Output, MAR and Recent Results was reviewed with the receiving nurse. Opportunity for questions and clarification was provided. Assessment completed upon patients arrival to unit and care assumed.

## 2019-07-29 NOTE — PROGRESS NOTES
8154- Paged Dr. Calvo Poulan for orders  Primary Nurse Ashish Vaca and ABNER Ram performed a dual skin assessment on this patient Impairment noted  Skip score is 20.     Patient has some redness on his bottom (blanchable)

## 2019-07-29 NOTE — PROGRESS NOTES
Tried to get blood and get an IV in patient x2  Charge Nurse tried x2  Called CCU for IV start and blood draws  Vascular Team started an IV in the patient and afua labs

## 2019-07-29 NOTE — PROGRESS NOTES
ORTHOPAEDIC PROGRESS NOTE    2019  Admit Date:   2019        HPI:    Carissa Nichols is admitted for concern of right total knee infection. Patient has 24 hours of increasing pain/swelling of the right knee. Has history of R TKR approximately 1 year ago by Dr. Pate. He denies any recent trauma, injury or incident in regards to onset of pain. He was seen at the [facility] ([place]) in which the knee was aspirated. Per patient, they got a vial of suspicious appearing fluid. He notes it feels partially better today. He does admit to chills yesterday. CBC 15.3. Denies chest pain, SOB. He does admit to an injury to the knee 6 months ago which a \"broom stick\" lacerated the left knee with a subsequent cellulitis treated successfully with antibiotics. Patient admits to unbearable pain with weightbearing. He does note heel pain present over the course of the last 2 weeks. He has a steady history of lower leg edema bilaterally. PT/OT:   Gait:                    Vital Signs:    Patient Vitals for the past 8 hrs:   BP Temp Pulse Resp SpO2   19 0903 121/73 97.8 °F (36.6 °C) 68 16 96 %     Temp (24hrs), Av.8 °F (36.6 °C), Min:97.8 °F (36.6 °C), Max:97.8 °F (36.6 °C)      PHYSICAL EXAM:   Patient has localized tenderness to the lateral knee, around the level of the joint space. No similar tenderness diffusely. Well-healed incision, no erythema or significant warmth. Capable of moving the knee through 10 - 45 degrees of ROM. Difficulty with straight leg raise but no obvious deficit in extensor mechanism. No significant effusion. Does have lower extremity non-pitting edema. No swelling, pain in the lower leg.          Meds:  Current Facility-Administered Medications   Medication Dose Route Frequency    aspirin chewable tablet 81 mg  81 mg Oral DAILY    dutasteride (AVODART) capsule 0.5 mg  0.5 mg Oral DAILY    magnesium oxide (MAG-OX) tablet 400 mg  400 mg Oral DAILY    metoprolol succinate (TOPROL-XL) XL tablet 25 mg  25 mg Oral DAILY    tamsulosin (FLOMAX) capsule 0.4 mg  0.4 mg Oral DAILY    pantoprazole (PROTONIX) tablet 20 mg  20 mg Oral BID    sodium chloride (NS) flush 5-40 mL  5-40 mL IntraVENous Q8H    sodium chloride (NS) flush 5-40 mL  5-40 mL IntraVENous PRN    0.9% sodium chloride infusion  75 mL/hr IntraVENous CONTINUOUS    vancomycin (VANCOCIN) 1250 mg in  ml infusion  1,250 mg IntraVENous ONCE    acetaminophen (TYLENOL) tablet 650 mg  650 mg Oral Q4H PRN    heparin (porcine) injection 5,000 Units  5,000 Units SubCUTAneous TID    cefepime (MAXIPIME) 1 g in 0.9% sodium chloride (MBP/ADV) 50 mL  1 g IntraVENous Q8H    arformoterol (BROVANA) neb solution 15 mcg  15 mcg Nebulization BID RT    And    budesonide (PULMICORT) 500 mcg/2 ml nebulizer suspension  500 mcg Nebulization BID RT    vancomycin - pharmacy to dose   Other Rx Dosing/Monitoring       LAB:    No results for input(s): HCT, HGB, INR, HCTEXT, HGBEXT in the last 72 hours. No lab exists for component: INREXT    Assessment Comment:    Right total knee, possible septic arthritis    Active Problems:    * No active hospital problems. *      Plan:    Ortho team working on Tio Walters from [facility].  X-rays on order. Possible aspiration depending on access to previous labs. Discussed case with Dr. Nelly Rodriguez and Audrey Harris. Will follow for further intervention.       Emmett Felton PA-C

## 2019-07-30 PROBLEM — M00.9 SEPTIC ARTHRITIS (HCC): Status: ACTIVE | Noted: 2019-07-30

## 2019-07-30 LAB
ANION GAP SERPL CALC-SCNC: 10 MMOL/L (ref 5–15)
ATRIAL RATE: 75 BPM
BASOPHILS # BLD: 0.1 K/UL (ref 0–0.1)
BASOPHILS NFR BLD: 1 % (ref 0–1)
BUN SERPL-MCNC: 13 MG/DL (ref 6–20)
BUN/CREAT SERPL: 17 (ref 12–20)
CALCIUM SERPL-MCNC: 8.4 MG/DL (ref 8.5–10.1)
CALCULATED P AXIS, ECG09: 66 DEGREES
CALCULATED R AXIS, ECG10: 7 DEGREES
CALCULATED T AXIS, ECG11: 20 DEGREES
CHLORIDE SERPL-SCNC: 109 MMOL/L (ref 97–108)
CO2 SERPL-SCNC: 20 MMOL/L (ref 21–32)
CREAT SERPL-MCNC: 0.78 MG/DL (ref 0.7–1.3)
DIAGNOSIS, 93000: NORMAL
DIFFERENTIAL METHOD BLD: ABNORMAL
EOSINOPHIL # BLD: 0.3 K/UL (ref 0–0.4)
EOSINOPHIL NFR BLD: 3 % (ref 0–7)
ERYTHROCYTE [DISTWIDTH] IN BLOOD BY AUTOMATED COUNT: 15 % (ref 11.5–14.5)
GLUCOSE SERPL-MCNC: 102 MG/DL (ref 65–100)
HCT VFR BLD AUTO: 45.6 % (ref 36.6–50.3)
HGB BLD-MCNC: 13.7 G/DL (ref 12.1–17)
IMM GRANULOCYTES # BLD AUTO: 0 K/UL (ref 0–0.04)
IMM GRANULOCYTES NFR BLD AUTO: 0 % (ref 0–0.5)
LYMPHOCYTES # BLD: 1.6 K/UL (ref 0.8–3.5)
LYMPHOCYTES NFR BLD: 18 % (ref 12–49)
MCH RBC QN AUTO: 27.7 PG (ref 26–34)
MCHC RBC AUTO-ENTMCNC: 30 G/DL (ref 30–36.5)
MCV RBC AUTO: 92.1 FL (ref 80–99)
MONOCYTES # BLD: 1 K/UL (ref 0–1)
MONOCYTES NFR BLD: 11 % (ref 5–13)
NEUTS SEG # BLD: 6 K/UL (ref 1.8–8)
NEUTS SEG NFR BLD: 67 % (ref 32–75)
NRBC # BLD: 0 K/UL (ref 0–0.01)
NRBC BLD-RTO: 0 PER 100 WBC
P-R INTERVAL, ECG05: 174 MS
PLATELET # BLD AUTO: 151 K/UL (ref 150–400)
PMV BLD AUTO: 10.3 FL (ref 8.9–12.9)
POTASSIUM SERPL-SCNC: 4.1 MMOL/L (ref 3.5–5.1)
Q-T INTERVAL, ECG07: 394 MS
QRS DURATION, ECG06: 104 MS
QTC CALCULATION (BEZET), ECG08: 439 MS
RBC # BLD AUTO: 4.95 M/UL (ref 4.1–5.7)
SODIUM SERPL-SCNC: 139 MMOL/L (ref 136–145)
VENTRICULAR RATE, ECG03: 75 BPM
WBC # BLD AUTO: 9 K/UL (ref 4.1–11.1)

## 2019-07-30 PROCEDURE — 74011000258 HC RX REV CODE- 258: Performed by: FAMILY MEDICINE

## 2019-07-30 PROCEDURE — 94640 AIRWAY INHALATION TREATMENT: CPT

## 2019-07-30 PROCEDURE — 85025 COMPLETE CBC W/AUTO DIFF WBC: CPT

## 2019-07-30 PROCEDURE — 77010033678 HC OXYGEN DAILY

## 2019-07-30 PROCEDURE — 74011000250 HC RX REV CODE- 250: Performed by: FAMILY MEDICINE

## 2019-07-30 PROCEDURE — 74011250636 HC RX REV CODE- 250/636: Performed by: FAMILY MEDICINE

## 2019-07-30 PROCEDURE — 65270000029 HC RM PRIVATE

## 2019-07-30 PROCEDURE — 74011250637 HC RX REV CODE- 250/637: Performed by: INTERNAL MEDICINE

## 2019-07-30 PROCEDURE — 94664 DEMO&/EVAL PT USE INHALER: CPT

## 2019-07-30 PROCEDURE — 80048 BASIC METABOLIC PNL TOTAL CA: CPT

## 2019-07-30 PROCEDURE — 36415 COLL VENOUS BLD VENIPUNCTURE: CPT

## 2019-07-30 PROCEDURE — 74011250637 HC RX REV CODE- 250/637: Performed by: FAMILY MEDICINE

## 2019-07-30 RX ORDER — POLYETHYLENE GLYCOL 3350 17 G/17G
17 POWDER, FOR SOLUTION ORAL DAILY
Status: DISCONTINUED | OUTPATIENT
Start: 2019-07-31 | End: 2019-08-02 | Stop reason: HOSPADM

## 2019-07-30 RX ORDER — SODIUM CHLORIDE 0.9 % (FLUSH) 0.9 %
5-40 SYRINGE (ML) INJECTION AS NEEDED
Status: CANCELLED | OUTPATIENT
Start: 2019-07-30

## 2019-07-30 RX ORDER — SODIUM CHLORIDE, SODIUM LACTATE, POTASSIUM CHLORIDE, CALCIUM CHLORIDE 600; 310; 30; 20 MG/100ML; MG/100ML; MG/100ML; MG/100ML
125 INJECTION, SOLUTION INTRAVENOUS CONTINUOUS
Status: CANCELLED | OUTPATIENT
Start: 2019-07-30

## 2019-07-30 RX ORDER — SODIUM CHLORIDE 0.9 % (FLUSH) 0.9 %
5-40 SYRINGE (ML) INJECTION EVERY 8 HOURS
Status: CANCELLED | OUTPATIENT
Start: 2019-07-30

## 2019-07-30 RX ORDER — FENTANYL CITRATE 50 UG/ML
50 INJECTION, SOLUTION INTRAMUSCULAR; INTRAVENOUS AS NEEDED
Status: CANCELLED | OUTPATIENT
Start: 2019-07-30

## 2019-07-30 RX ORDER — ONDANSETRON 2 MG/ML
4 INJECTION INTRAMUSCULAR; INTRAVENOUS AS NEEDED
Status: CANCELLED | OUTPATIENT
Start: 2019-07-30

## 2019-07-30 RX ORDER — AMOXICILLIN 250 MG
1 CAPSULE ORAL DAILY
Status: DISCONTINUED | OUTPATIENT
Start: 2019-07-31 | End: 2019-07-30

## 2019-07-30 RX ORDER — ACETAMINOPHEN 325 MG/1
650 TABLET ORAL ONCE
Status: CANCELLED | OUTPATIENT
Start: 2019-07-30 | End: 2019-07-30

## 2019-07-30 RX ORDER — SODIUM CHLORIDE 9 MG/ML
50 INJECTION, SOLUTION INTRAVENOUS CONTINUOUS
Status: CANCELLED | OUTPATIENT
Start: 2019-07-30 | End: 2019-07-31

## 2019-07-30 RX ORDER — MIDAZOLAM HYDROCHLORIDE 1 MG/ML
0.5 INJECTION, SOLUTION INTRAMUSCULAR; INTRAVENOUS
Status: CANCELLED | OUTPATIENT
Start: 2019-07-30

## 2019-07-30 RX ORDER — EPHEDRINE SULFATE/0.9% NACL/PF 50 MG/5 ML
5 SYRINGE (ML) INTRAVENOUS AS NEEDED
Status: CANCELLED | OUTPATIENT
Start: 2019-07-30

## 2019-07-30 RX ORDER — MIDAZOLAM HYDROCHLORIDE 1 MG/ML
1 INJECTION, SOLUTION INTRAMUSCULAR; INTRAVENOUS AS NEEDED
Status: CANCELLED | OUTPATIENT
Start: 2019-07-30

## 2019-07-30 RX ORDER — MORPHINE SULFATE 10 MG/ML
2 INJECTION, SOLUTION INTRAMUSCULAR; INTRAVENOUS
Status: CANCELLED | OUTPATIENT
Start: 2019-07-30

## 2019-07-30 RX ORDER — FENTANYL CITRATE 50 UG/ML
25 INJECTION, SOLUTION INTRAMUSCULAR; INTRAVENOUS
Status: CANCELLED | OUTPATIENT
Start: 2019-07-30

## 2019-07-30 RX ORDER — HYDROMORPHONE HYDROCHLORIDE 1 MG/ML
0.2 INJECTION, SOLUTION INTRAMUSCULAR; INTRAVENOUS; SUBCUTANEOUS
Status: CANCELLED | OUTPATIENT
Start: 2019-07-30

## 2019-07-30 RX ORDER — OXYCODONE AND ACETAMINOPHEN 5; 325 MG/1; MG/1
1 TABLET ORAL AS NEEDED
Status: CANCELLED | OUTPATIENT
Start: 2019-07-30

## 2019-07-30 RX ORDER — AMOXICILLIN 250 MG
1 CAPSULE ORAL DAILY
Status: DISCONTINUED | OUTPATIENT
Start: 2019-07-30 | End: 2019-08-02 | Stop reason: HOSPADM

## 2019-07-30 RX ORDER — SODIUM CHLORIDE, SODIUM LACTATE, POTASSIUM CHLORIDE, CALCIUM CHLORIDE 600; 310; 30; 20 MG/100ML; MG/100ML; MG/100ML; MG/100ML
125 INJECTION, SOLUTION INTRAVENOUS CONTINUOUS
Status: CANCELLED | OUTPATIENT
Start: 2019-07-30 | End: 2019-07-31

## 2019-07-30 RX ORDER — DIPHENHYDRAMINE HYDROCHLORIDE 50 MG/ML
12.5 INJECTION, SOLUTION INTRAMUSCULAR; INTRAVENOUS AS NEEDED
Status: CANCELLED | OUTPATIENT
Start: 2019-07-30 | End: 2019-07-30

## 2019-07-30 RX ORDER — SODIUM CHLORIDE 9 MG/ML
1000 INJECTION, SOLUTION INTRAVENOUS CONTINUOUS
Status: CANCELLED | OUTPATIENT
Start: 2019-07-30

## 2019-07-30 RX ORDER — LIDOCAINE HYDROCHLORIDE 10 MG/ML
0.1 INJECTION, SOLUTION EPIDURAL; INFILTRATION; INTRACAUDAL; PERINEURAL AS NEEDED
Status: CANCELLED | OUTPATIENT
Start: 2019-07-30

## 2019-07-30 RX ADMIN — BUDESONIDE 500 MCG: 0.5 INHALANT RESPIRATORY (INHALATION) at 09:10

## 2019-07-30 RX ADMIN — ACETAMINOPHEN 650 MG: 325 TABLET ORAL at 03:18

## 2019-07-30 RX ADMIN — VANCOMYCIN HYDROCHLORIDE 1500 MG: 10 INJECTION, POWDER, LYOPHILIZED, FOR SOLUTION INTRAVENOUS at 05:51

## 2019-07-30 RX ADMIN — DUTASTERIDE 0.5 MG: 0.5 CAPSULE, LIQUID FILLED ORAL at 09:34

## 2019-07-30 RX ADMIN — Medication 10 ML: at 14:58

## 2019-07-30 RX ADMIN — Medication 10 ML: at 19:17

## 2019-07-30 RX ADMIN — PANTOPRAZOLE SODIUM 20 MG: 20 TABLET, DELAYED RELEASE ORAL at 19:08

## 2019-07-30 RX ADMIN — SENNOSIDES, DOCUSATE SODIUM 1 TABLET: 50; 8.6 TABLET, FILM COATED ORAL at 19:08

## 2019-07-30 RX ADMIN — HEPARIN SODIUM 5000 UNITS: 5000 INJECTION INTRAVENOUS; SUBCUTANEOUS at 14:56

## 2019-07-30 RX ADMIN — BUDESONIDE 500 MCG: 0.5 INHALANT RESPIRATORY (INHALATION) at 20:04

## 2019-07-30 RX ADMIN — ARFORMOTEROL TARTRATE 15 MCG: 15 SOLUTION RESPIRATORY (INHALATION) at 09:10

## 2019-07-30 RX ADMIN — VANCOMYCIN HYDROCHLORIDE 1500 MG: 10 INJECTION, POWDER, LYOPHILIZED, FOR SOLUTION INTRAVENOUS at 19:17

## 2019-07-30 RX ADMIN — ACETAMINOPHEN 650 MG: 325 TABLET ORAL at 14:53

## 2019-07-30 RX ADMIN — MAGNESIUM OXIDE TAB 400 MG (241.3 MG ELEMENTAL MG) 400 MG: 400 (241.3 MG) TAB at 09:34

## 2019-07-30 RX ADMIN — ASPIRIN 81 MG 81 MG: 81 TABLET ORAL at 09:34

## 2019-07-30 RX ADMIN — TAMSULOSIN HYDROCHLORIDE 0.4 MG: 0.4 CAPSULE ORAL at 09:34

## 2019-07-30 RX ADMIN — ACETAMINOPHEN 650 MG: 325 TABLET ORAL at 19:10

## 2019-07-30 RX ADMIN — ACETAMINOPHEN 650 MG: 325 TABLET ORAL at 09:34

## 2019-07-30 RX ADMIN — HEPARIN SODIUM 5000 UNITS: 5000 INJECTION INTRAVENOUS; SUBCUTANEOUS at 22:16

## 2019-07-30 RX ADMIN — CEFEPIME HYDROCHLORIDE 2 G: 2 INJECTION, POWDER, FOR SOLUTION INTRAVENOUS at 23:55

## 2019-07-30 RX ADMIN — SODIUM CHLORIDE 75 ML/HR: 900 INJECTION, SOLUTION INTRAVENOUS at 05:51

## 2019-07-30 RX ADMIN — Medication 10 ML: at 22:16

## 2019-07-30 RX ADMIN — PANTOPRAZOLE SODIUM 20 MG: 20 TABLET, DELAYED RELEASE ORAL at 09:34

## 2019-07-30 RX ADMIN — METOPROLOL SUCCINATE 25 MG: 25 TABLET, EXTENDED RELEASE ORAL at 09:34

## 2019-07-30 RX ADMIN — CEFEPIME HYDROCHLORIDE 2 G: 2 INJECTION, POWDER, FOR SOLUTION INTRAVENOUS at 12:29

## 2019-07-30 RX ADMIN — ARFORMOTEROL TARTRATE 15 MCG: 15 SOLUTION RESPIRATORY (INHALATION) at 20:04

## 2019-07-30 NOTE — PROGRESS NOTES
Infectious Diseases Consultation     Please see dictated note     Impression      1. Infected right TKR -- acute onset 7/27 by his history    2. Venous insufficiency of LEs    3. OHD -- IHD    4. Hx of nephrolithiasis      Recommend:      1.  Continue Vanc + Cefepime pending cultures     Thanks,   Hanna Davis MD  7/29/2019  10:26 PM

## 2019-07-30 NOTE — PROGRESS NOTES
Bedside shift change report given to Brando Issa RN (oncoming nurse) by ABBY Kingsley RN (offgoing nurse). Report included the following information SBAR, Kardex and Recent Results.

## 2019-07-30 NOTE — CONSULTS
[address]    Name:  Elías Pro  MR#:  [id number]  :  1945  ACCOUNT #:  [de-identified]  DATE OF SERVICE:  2019      HISTORY OF PRESENT ILLNESS:  The patient is well known to me status post right total knee replacement in 2018. He did have an episode about 6 months ago, where he struck a sharp edge of a stick into the area of his knee. He was treated for his cellulitis at that time. He has actually done well over the last 6 months. I saw him 2 weeks ago and he had no problems with his knee. Two days ago, he had developed a pain in the right knee. He was initially seen at [facility] and is now transferred to [facility]. PHYSICAL EXAMINATION:  On exam, he has mild tenderness about the knee. There is no erythema. Minimal effusion. IMPRESSION:  Status post right total knee replacement. I am concerned about periprosthetic infection or knee joint infection. He has been seen by Dr. Mone Urbina. He is started on antibiotics. We are awaiting cultures. I explained to the patient if the cultures reveal infection that he will likely need incision and drainage of the knee with polyethylene exchange. This sounds to be a recent or acute flare-up and I am hopeful that if he does indeed have infection that he will only need incision and drainage with polyethylene exchange followed by antibiotics. There is a possibility of long-term infection. We will discuss this more depending on culture results.         Kourtney Mccullough MD [email or web address]  D:  2019 9:41  T:  2019 11:57  JOB #:  [id number]

## 2019-07-30 NOTE — PROGRESS NOTES
Hospitalist Progress Note  Hetal Jackman MD  Answering service: [phone] OR [phone] from in house phone        Date of Service:  2019  NAME:  Domitila Jones  :  1945  MRN:  [id number]  PCP: Dwain Ellis MD    Chief Complaint: right knee pain      Admission Summary: Domitila Jones is a 76 y.o. male who presented with sudden onset of pain right knee    Interval history / Subjective:   Patient seen for Follow up of CC: septic arthritis  First encounter  Patient seen and examined by the bedside  Labs, images and notes reviewed  Patient is comfortable, Denies any chest pain, No fevers or chills, No nausea or vomiting  Right knee is s/p arthrocentesis, pain and swelling is better after tapping. Cont to have leg and knee swelling. Discussed with nursing staff, no acute issues overnight, orders reviewed. Assessment & Plan:     - right TKR Septic arthritis. S/P arthrocentesis, cx pending  Moderately elevated white coutn in synovial fluid  ID on board, cont vanc and Cefepime until final cx available, pharmacy to dose vacn  Ortho consult noted. Pain control    - History of hypertension. Continue home medications. Continue to monitor. Further intervention per hospital course. - History of coronary artery disease, stable. Continue home medications and continue to monitor. no acute issues    - History of benign prostatic hyperplasia. Continue home medications and continue to monitor. Reassess as needed. Code status: Full Code    DVT prophylaxis: heparin SQ    Care Plan discussed with: Patient/Family    Disposition: TBD    Hospital Problems  Date Reviewed: 2018          Codes Class Noted POA    Septic arthritis [facility]) ICD-10-CM: M00.9  ICD-9-CM: 711.00  2019 Unknown                Review of Systems:   A comprehensive review of systems was negative.          Physical Examination:     General appearance: alert, cooperative, no distress, appears stated age  Head: Normocephalic, without obvious abnormality, atraumatic  Lungs: clear to auscultation bilaterally  Heart: regular rate and rhythm, S1, S2 normal, no murmur, click, rub or gallop  Abdomen: soft, non-tender. Bowel sounds normal. No masses,  no organomegaly  Extremities/MSK: large right knee effusion, surgical scar of TKR is well healed, painful active and passive movement right knee, increased warmth of RLE compared to LLE. LLE appears normal  Neurologic: Alert and oriented X 3, normal strength and tone. Vital Signs:    Last 24hrs VS reviewed since prior progress note. Most recent are:    Visit Vitals  /86 (BP 1 Location: Left arm, BP Patient Position: Head of bed elevated (Comment degrees); At rest)   Pulse 63   Temp 98.3 °F (36.8 °C)   Resp 16   Wt 122 kg (269 lb)   SpO2 95%   BMI 35.49 kg/m²         Intake/Output Summary (Last 24 hours) at 2019 1411  Last data filed at 2019 1229  Gross per 24 hour   Intake 215 ml   Output 2250 ml   Net -2035 ml        Tmax:  Temp (24hrs), Av.7 °F (37.1 °C), Min:98.3 °F (36.8 °C), Max:99.1 °F (37.3 °C)      Data Review:   Data reviewed by myself:  Xr Knee Rt Max 2 Vws    Result Date: 2019  EXAM: XR KNEE RT MAX 2 VWS INDICATION: Right knee increased pain and swelling, possible infection. Right total knee arthroplasty one year ago. COMPARISON: None. FINDINGS: AP and crosstable lateral views of the right knee demonstrate prosthesis in good position. There is a probable suprapatellar joint effusion. No lucency around the prosthesis. Normal alignment. No acute fracture. Mild osteopenia. No evidence of osteomyelitis. Irregular lucencies in the subchondral patella is most likely due to previous surgery. IMPRESSION: Probable joint effusion. No fracture or evidence of osteomyelitis.     No results found for: SDES  Lab Results   Component Value Date/Time    Culture result: Culture performed on Unspun Fluid 2019 01:15 PM    Culture result: NO GROWTH AFTER 22 HOURS 07/29/2019 01:15 PM    Culture result: NO GROWTH AFTER 19 HOURS 07/29/2019 11:28 AM     All Micro Results     Procedure Component Value Units Date/Time    CULTURE, BODY FLUID Frederick Anderson STAIN [[id number]] Collected:  07/29/19 1315    Order Status:  Completed Specimen:  Knee Fluid Updated:  07/30/19 1335     Special Requests: NO SPECIAL REQUESTS        GRAM STAIN 2+ WBCS SEEN         NO ORGANISMS SEEN        Culture result:       Culture performed on Unspun Fluid            NO GROWTH AFTER 22 HOURS       CULTURE, BLOOD, PAIRED [[id number]] Collected:  07/29/19 1128    Order Status:  Completed Specimen:  Blood Updated:  07/30/19 0945     Special Requests: NO SPECIAL REQUESTS        Culture result: NO GROWTH AFTER 19 HOURS             Labs: reviewed by myself. Recent Labs     07/30/19 0327 07/29/19  1332   WBC 9.0 11.3*   HGB 13.7 14.2   HCT 45.6 45.3    147*     Recent Labs     07/30/19 0327 07/29/19  1332    137   K 4.1 4.0   * 106   CO2 20* 25   BUN 13 15   CREA 0.78 0.82   * 100   CA 8.4* 8.6     Recent Labs     07/29/19  1332   SGOT 10*   ALT 23      TBILI 0.7   TP 7.0   ALB 3.5   GLOB 3.5     No results for input(s): INR, PTP, APTT in the last 72 hours. No lab exists for component: INREXT   No results for input(s): FE, TIBC, PSAT, FERR in the last 72 hours. No results found for: FOL, RBCF   No results for input(s): PH, PCO2, PO2 in the last 72 hours. No results for input(s): CPK, CKNDX, TROIQ in the last 72 hours.     No lab exists for component: CPKMB  No results found for: CHOL, CHOLX, CHLST, CHOLV, HDL, LDL, LDLC, DLDLP, TGLX, TRIGL, TRIGP, CHHD, CHHDX  Lab Results   Component Value Date/Time    Glucose (POC) 95 08/08/2018 10:31 AM     Lab Results   Component Value Date/Time    Color YELLOW/STRAW 08/16/2018 04:31 PM    Appearance TURBID (A) 08/16/2018 04:31 PM    Specific gravity 1.022 08/16/2018 04:31 PM    pH (UA) 7.0 08/16/2018 04:31 PM    Protein 30 (A) 08/16/2018 04:31 PM    Glucose NEGATIVE  08/16/2018 04:31 PM    Ketone TRACE (A) 08/16/2018 04:31 PM    Bilirubin NEGATIVE  08/16/2018 04:31 PM    Urobilinogen 1.0 08/16/2018 04:31 PM    Nitrites NEGATIVE  08/16/2018 04:31 PM    Leukocyte Esterase LARGE (A) 08/16/2018 04:31 PM    Epithelial cells FEW 08/16/2018 04:31 PM    Bacteria 2+ (A) 08/16/2018 04:31 PM    WBC >100 (H) 08/16/2018 04:31 PM    RBC 10-20 08/16/2018 04:31 PM         Medications Reviewed:     Current Facility-Administered Medications   Medication Dose Route Frequency    [START ON 7/31/2019] Vancomycin trough due 7/31 @ 0600.     Other ONCE    aspirin chewable tablet 81 mg  81 mg Oral DAILY    dutasteride (AVODART) capsule 0.5 mg  0.5 mg Oral DAILY    magnesium oxide (MAG-OX) tablet 400 mg  400 mg Oral DAILY    metoprolol succinate (TOPROL-XL) XL tablet 25 mg  25 mg Oral DAILY    tamsulosin (FLOMAX) capsule 0.4 mg  0.4 mg Oral DAILY    pantoprazole (PROTONIX) tablet 20 mg  20 mg Oral BID    sodium chloride (NS) flush 5-40 mL  5-40 mL IntraVENous Q8H    sodium chloride (NS) flush 5-40 mL  5-40 mL IntraVENous PRN    0.9% sodium chloride infusion  75 mL/hr IntraVENous CONTINUOUS    acetaminophen (TYLENOL) tablet 650 mg  650 mg Oral Q4H PRN    heparin (porcine) injection 5,000 Units  5,000 Units SubCUTAneous TID    cefepime (MAXIPIME) 2 g in 0.9% sodium chloride (MBP/ADV) 100 mL  2 g IntraVENous Q12H    arformoterol (BROVANA) neb solution 15 mcg  15 mcg Nebulization BID RT    And    budesonide (PULMICORT) 500 mcg/2 ml nebulizer suspension  500 mcg Nebulization BID RT    vancomycin - pharmacy to dose   Other Rx Dosing/Monitoring    vancomycin (VANCOCIN) 1500 mg in  ml infusion  1,500 mg IntraVENous Q12H     ______________________________________________________________________  EXPECTED LENGTH OF STAY: - - -  ACTUAL LENGTH OF STAY:          1                 Makayla Lim MD

## 2019-07-30 NOTE — ROUTINE PROCESS
Bedside shift change report given to Trevon Blue (oncoming nurse) by Meron Marino (offgoing nurse). Report included the following information SBAR.

## 2019-07-30 NOTE — PROGRESS NOTES
LESLIE: TBD, pending ortho and ID plans. Care Management Interventions  PCP Verified by CM: Yes  Mode of Transport at Discharge: Other (see comment)(family/car)  Transition of Care Consult (CM Consult): Discharge Planning  Current Support Network: Own Home, Lives Alone, Family Lives Nearby(patient has supportive daughter and son-in-law that lives nearby)  Confirm Follow Up Transport: Family  Plan discussed with Pt/Family/Caregiver: Yes  Discharge Location  Discharge Placement: Other:(TBD)     Reason for Admission: transfer from [facility], concerns for right total knee infection                  RRAT Score:     14             Do you (patient/family) have any concerns for transition/discharge? None at this time              Plan for utilizing home health:   TBD. Patient lives in [place], will need to use an agency in that area. Patient has used [facility] in the past.    Current Advanced Directive/Advance Care Plan:  Not on file            Transition of Care Plan:  TBD, pending ortho and ID plan. CM met with patient at bedside. Patient lives alone and is independent with ADLs and drives himself. CM confirmed demographics on face sheet. CM will follow for ortho and ID plans.     GRACIELA MedinaW/CRM

## 2019-07-31 ENCOUNTER — ANESTHESIA EVENT (OUTPATIENT)
Dept: ANESTHESIOLOGY | Age: 74
DRG: 550 | End: 2019-07-31
Payer: MEDICARE

## 2019-07-31 ENCOUNTER — ANESTHESIA (OUTPATIENT)
Dept: ANESTHESIOLOGY | Age: 74
DRG: 550 | End: 2019-07-31
Payer: MEDICARE

## 2019-07-31 LAB
ANION GAP SERPL CALC-SCNC: 7 MMOL/L (ref 5–15)
BASOPHILS # BLD: 0.1 K/UL (ref 0–0.1)
BASOPHILS NFR BLD: 1 % (ref 0–1)
BUN SERPL-MCNC: 10 MG/DL (ref 6–20)
BUN/CREAT SERPL: 14 (ref 12–20)
CALCIUM SERPL-MCNC: 8.7 MG/DL (ref 8.5–10.1)
CHLORIDE SERPL-SCNC: 109 MMOL/L (ref 97–108)
CO2 SERPL-SCNC: 22 MMOL/L (ref 21–32)
CREAT SERPL-MCNC: 0.74 MG/DL (ref 0.7–1.3)
DATE LAST DOSE: NORMAL
DIFFERENTIAL METHOD BLD: ABNORMAL
EOSINOPHIL # BLD: 0.4 K/UL (ref 0–0.4)
EOSINOPHIL NFR BLD: 5 % (ref 0–7)
ERYTHROCYTE [DISTWIDTH] IN BLOOD BY AUTOMATED COUNT: 14.6 % (ref 11.5–14.5)
GLUCOSE SERPL-MCNC: 108 MG/DL (ref 65–100)
HCT VFR BLD AUTO: 43 % (ref 36.6–50.3)
HGB BLD-MCNC: 13.6 G/DL (ref 12.1–17)
IMM GRANULOCYTES # BLD AUTO: 0 K/UL (ref 0–0.04)
IMM GRANULOCYTES NFR BLD AUTO: 1 % (ref 0–0.5)
LYMPHOCYTES # BLD: 1.7 K/UL (ref 0.8–3.5)
LYMPHOCYTES NFR BLD: 20 % (ref 12–49)
MCH RBC QN AUTO: 27.7 PG (ref 26–34)
MCHC RBC AUTO-ENTMCNC: 31.6 G/DL (ref 30–36.5)
MCV RBC AUTO: 87.6 FL (ref 80–99)
MONOCYTES # BLD: 0.9 K/UL (ref 0–1)
MONOCYTES NFR BLD: 10 % (ref 5–13)
NEUTS SEG # BLD: 5.4 K/UL (ref 1.8–8)
NEUTS SEG NFR BLD: 64 % (ref 32–75)
NRBC # BLD: 0 K/UL (ref 0–0.01)
NRBC BLD-RTO: 0 PER 100 WBC
PLATELET # BLD AUTO: 153 K/UL (ref 150–400)
PMV BLD AUTO: 10.2 FL (ref 8.9–12.9)
POTASSIUM SERPL-SCNC: 4 MMOL/L (ref 3.5–5.1)
RBC # BLD AUTO: 4.91 M/UL (ref 4.1–5.7)
REPORTED DOSE,DOSE: NORMAL UNITS
REPORTED DOSE/TIME,TMG: NORMAL
SODIUM SERPL-SCNC: 138 MMOL/L (ref 136–145)
VANCOMYCIN TROUGH SERPL-MCNC: 9.4 UG/ML (ref 5–10)
WBC # BLD AUTO: 8.4 K/UL (ref 4.1–11.1)

## 2019-07-31 PROCEDURE — 80202 ASSAY OF VANCOMYCIN: CPT

## 2019-07-31 PROCEDURE — 74011250636 HC RX REV CODE- 250/636: Performed by: INTERNAL MEDICINE

## 2019-07-31 PROCEDURE — 74011000250 HC RX REV CODE- 250: Performed by: FAMILY MEDICINE

## 2019-07-31 PROCEDURE — 74011000250 HC RX REV CODE- 250: Performed by: INTERNAL MEDICINE

## 2019-07-31 PROCEDURE — 80048 BASIC METABOLIC PNL TOTAL CA: CPT

## 2019-07-31 PROCEDURE — 85025 COMPLETE CBC W/AUTO DIFF WBC: CPT

## 2019-07-31 PROCEDURE — 74011250636 HC RX REV CODE- 250/636: Performed by: FAMILY MEDICINE

## 2019-07-31 PROCEDURE — 36415 COLL VENOUS BLD VENIPUNCTURE: CPT

## 2019-07-31 PROCEDURE — 74011250637 HC RX REV CODE- 250/637: Performed by: INTERNAL MEDICINE

## 2019-07-31 PROCEDURE — 74011250637 HC RX REV CODE- 250/637: Performed by: FAMILY MEDICINE

## 2019-07-31 PROCEDURE — 74011250636 HC RX REV CODE- 250/636

## 2019-07-31 PROCEDURE — 0S9C3ZZ DRAINAGE OF RIGHT KNEE JOINT, PERCUTANEOUS APPROACH: ICD-10-PCS | Performed by: PHYSICIAN ASSISTANT

## 2019-07-31 PROCEDURE — 65270000029 HC RM PRIVATE

## 2019-07-31 PROCEDURE — 94640 AIRWAY INHALATION TREATMENT: CPT

## 2019-07-31 PROCEDURE — 74011000258 HC RX REV CODE- 258: Performed by: FAMILY MEDICINE

## 2019-07-31 RX ORDER — VANCOMYCIN 2 GRAM/500 ML IN 0.9 % SODIUM CHLORIDE INTRAVENOUS
2000 EVERY 12 HOURS
Status: DISCONTINUED | OUTPATIENT
Start: 2019-07-31 | End: 2019-08-02 | Stop reason: HOSPADM

## 2019-07-31 RX ORDER — POLYVINYL ALCOHOL 14 MG/ML
1 SOLUTION/ DROPS OPHTHALMIC AS NEEDED
Status: DISCONTINUED | OUTPATIENT
Start: 2019-07-31 | End: 2019-07-31 | Stop reason: RX

## 2019-07-31 RX ADMIN — HEPARIN SODIUM 5000 UNITS: 5000 INJECTION INTRAVENOUS; SUBCUTANEOUS at 22:25

## 2019-07-31 RX ADMIN — ACETAMINOPHEN 650 MG: 325 TABLET ORAL at 02:27

## 2019-07-31 RX ADMIN — SENNOSIDES, DOCUSATE SODIUM 1 TABLET: 50; 8.6 TABLET, FILM COATED ORAL at 11:24

## 2019-07-31 RX ADMIN — MAGNESIUM OXIDE TAB 400 MG (241.3 MG ELEMENTAL MG) 400 MG: 400 (241.3 MG) TAB at 11:24

## 2019-07-31 RX ADMIN — HEPARIN SODIUM 5000 UNITS: 5000 INJECTION INTRAVENOUS; SUBCUTANEOUS at 16:34

## 2019-07-31 RX ADMIN — ACETAMINOPHEN 650 MG: 325 TABLET ORAL at 11:24

## 2019-07-31 RX ADMIN — Medication 10 ML: at 13:00

## 2019-07-31 RX ADMIN — ACETAMINOPHEN 650 MG: 325 TABLET ORAL at 17:33

## 2019-07-31 RX ADMIN — PANTOPRAZOLE SODIUM 20 MG: 20 TABLET, DELAYED RELEASE ORAL at 17:33

## 2019-07-31 RX ADMIN — TAMSULOSIN HYDROCHLORIDE 0.4 MG: 0.4 CAPSULE ORAL at 11:24

## 2019-07-31 RX ADMIN — ARFORMOTEROL TARTRATE 15 MCG: 15 SOLUTION RESPIRATORY (INHALATION) at 08:32

## 2019-07-31 RX ADMIN — ASPIRIN 81 MG 81 MG: 81 TABLET ORAL at 11:24

## 2019-07-31 RX ADMIN — METOPROLOL SUCCINATE 25 MG: 25 TABLET, EXTENDED RELEASE ORAL at 12:56

## 2019-07-31 RX ADMIN — Medication 10 ML: at 22:25

## 2019-07-31 RX ADMIN — Medication 1 DROP: at 22:25

## 2019-07-31 RX ADMIN — Medication 1 DROP: at 12:55

## 2019-07-31 RX ADMIN — PANTOPRAZOLE SODIUM 20 MG: 20 TABLET, DELAYED RELEASE ORAL at 12:56

## 2019-07-31 RX ADMIN — DUTASTERIDE 0.5 MG: 0.5 CAPSULE, LIQUID FILLED ORAL at 12:56

## 2019-07-31 RX ADMIN — ACETAMINOPHEN 650 MG: 325 TABLET ORAL at 23:09

## 2019-07-31 RX ADMIN — CEFEPIME HYDROCHLORIDE 2 G: 2 INJECTION, POWDER, FOR SOLUTION INTRAVENOUS at 12:20

## 2019-07-31 RX ADMIN — VANCOMYCIN HYDROCHLORIDE 1500 MG: 10 INJECTION, POWDER, LYOPHILIZED, FOR SOLUTION INTRAVENOUS at 06:53

## 2019-07-31 RX ADMIN — VANCOMYCIN HYDROCHLORIDE 2000 MG: 10 INJECTION, POWDER, LYOPHILIZED, FOR SOLUTION INTRAVENOUS at 17:29

## 2019-07-31 RX ADMIN — Medication 10 ML: at 06:00

## 2019-07-31 RX ADMIN — BUDESONIDE 500 MCG: 0.5 INHALANT RESPIRATORY (INHALATION) at 08:32

## 2019-07-31 NOTE — PROGRESS NOTES
Day #3 of Vancomycin  Indication:  Infected right TKR  Current regimen:  Vancomycin 1500 mg IV every 12 hours  Abx regimen:  Vancomycin + Cefepime  ID Following ?: YES  Concomitant nephrotoxic drugs (requires more frequent monitoring): None  Frequency of BMP?: Daily    Recent Labs     19  0651 19  0327 19  1332   WBC 8.4 9.0 11.3*   CREA 0.74 0.78 0.82   BUN 10 13 15     Est CrCl: >100 ml/min; UO: 0.8 ml/kg/hr  Temp (24hrs), Av.2 °F (36.8 °C), Min:97.8 °F (36.6 °C), Max:98.8 °F (37.1 °C)    Cultures:    Blood- NG x 2 days (prelim)   Knee fluid- NGTD (prelim)    Goal trough = 15 - 20 mcg/mL    Recent trough history (date/time/level/dose/action taken):   Trough: 9.4 mcg/mL (~11.5 hours post dose), extrapolated \"true\" trough 8.9 mcg/mL    Plan: Increase dose to vancomcyin 2000 mg IV every 12 hours given sub-therapeutic trough. Calculated trough for new dose not quite therapeutic, however increasing dose conservatively given elderly patient. Patient currently also afebrile, WBC wnl, and NGTD on cultures. Will adjust start time for new dose to 1700 (~ 2 hours earlier) since patient sub-therapeutic. Pharmacy will continue to monitor patient daily and will make dosage adjustments as necessary.

## 2019-07-31 NOTE — PROGRESS NOTES
Hospitalist Progress Note  Rc Champion MD  Answering service: [phone] from in house phone        Date of Service:  2019  NAME:  Bernadette Moreno  :  1945  MRN:  [id number]  PCP: Peña Quiñones MD    Chief Complaint: right knee pain      Admission Summary: Bernadette Moreno is a 76 y.o. male who presented with sudden onset of pain right knee    Interval history / Subjective:   Patient seen for Follow up of CC: septic arthritis  Patient seen and examined by the bedside, Labs, images and notes reviewed  Pain is improved, more mobility of the right knee, complains of dry eyes. comfortable, Denies any chest pain, abdominal pain, fevers, chills. No N/V/D  Discussed with nursing staff, no acute issues overnight, orders reviewed. Assessment & Plan:     - right TKR Septic arthritis. S/P arthrocentesis, cx pending  Moderately elevated white coutn in synovial fluid  ID on board, cont vanc and Cefepime until final cx available, pharmacy to dose vacn  Ortho consult noted. Tentative Plans for I & D  Pain control    - History of hypertension. control, cont current regimen  - Dry eyes: artifical tears prn  - History of coronary artery disease, stable. no acute issues, cont GDMT  - History of benign prostatic hyperplasia. Continue home medications and continue to monitor. Reassess as needed. Code status: Full Code    DVT prophylaxis: heparin SQ    Care Plan discussed with: Patient/Family    Disposition: TBD    Hospital Problems  Date Reviewed: 2018          Codes Class Noted POA    Septic arthritis [facility]) ICD-10-CM: M00.9  ICD-9-CM: 711.00  2019 Unknown                Review of Systems:   A comprehensive review of systems was negative.          Physical Examination:     General appearance: alert, cooperative, no distress, appears stated age  Head: Normocephalic, without obvious abnormality, atraumatic  Lungs: clear to auscultation bilaterally  Heart: regular rate and rhythm, S1, S2 normal, no murmur, click, rub or gallop  Abdomen: soft, non-tender. Bowel sounds normal. No masses,  no organomegaly  Extremities/MSK: large right knee effusion, surgical scar of TKR is well healed, painful active and passive movement right knee, increased warmth of RLE compared to LLE. LLE appears normal  Neurologic: Alert and oriented X 3, normal strength and tone. Vital Signs:    Last 24hrs VS reviewed since prior progress note. Most recent are:    Visit Vitals  /78 (BP 1 Location: Left arm, BP Patient Position: At rest)   Pulse 69   Temp 98 °F (36.7 °C)   Resp 14   Ht 6' 0.84\" (1.85 m)   Wt 114.3 kg (252 lb)   SpO2 95%   BMI 33.40 kg/m²         Intake/Output Summary (Last 24 hours) at 2019 1207  Last data filed at 2019 4039  Gross per 24 hour   Intake --   Output 2450 ml   Net -2450 ml        Tmax:  Temp (24hrs), Av.1 °F (36.7 °C), Min:97.8 °F (36.6 °C), Max:98.8 °F (37.1 °C)      Data Review:   Data reviewed by myself:  Xr Knee Rt Max 2 Vws    Result Date: 2019  EXAM: XR KNEE RT MAX 2 VWS INDICATION: Right knee increased pain and swelling, possible infection. Right total knee arthroplasty one year ago. COMPARISON: None. FINDINGS: AP and crosstable lateral views of the right knee demonstrate prosthesis in good position. There is a probable suprapatellar joint effusion. No lucency around the prosthesis. Normal alignment. No acute fracture. Mild osteopenia. No evidence of osteomyelitis. Irregular lucencies in the subchondral patella is most likely due to previous surgery. IMPRESSION: Probable joint effusion. No fracture or evidence of osteomyelitis. No results found for: SDES  Lab Results   Component Value Date/Time    Culture result: Culture performed on Unspun Fluid 2019 01:15 PM    Culture result: No growth thus far, holding 14 days.  2019 01:15 PM    Culture result: NO GROWTH 2 DAYS 2019 11:28 AM     All Micro Results     Procedure Component Value Units Date/Time    CULTURE, BODY FLUID Pillo Sand STAIN [[id number]] Collected:  07/29/19 1315    Order Status:  Completed Specimen:  Knee Fluid Updated:  07/31/19 0835     Special Requests: NO SPECIAL REQUESTS        GRAM STAIN 2+ WBCS SEEN         NO ORGANISMS SEEN        Culture result:       Culture performed on Unspun Fluid                  No growth thus far, holding 14 days. CULTURE, BLOOD, PAIRED [[id number]] Collected:  07/29/19 1128    Order Status:  Completed Specimen:  Blood Updated:  07/31/19 0502     Special Requests: NO SPECIAL REQUESTS        Culture result: NO GROWTH 2 DAYS             Labs: reviewed by myself. Recent Labs     07/31/19  0651 07/30/19  0327   WBC 8.4 9.0   HGB 13.6 13.7   HCT 43.0 45.6    151     Recent Labs     07/31/19  0651 07/30/19  0327 07/29/19  1332    139 137   K 4.0 4.1 4.0   * 109* 106   CO2 22 20* 25   BUN 10 13 15   CREA 0.74 0.78 0.82   * 102* 100   CA 8.7 8.4* 8.6     Recent Labs     07/29/19  1332   SGOT 10*   ALT 23      TBILI 0.7   TP 7.0   ALB 3.5   GLOB 3.5     No results for input(s): INR, PTP, APTT in the last 72 hours. No lab exists for component: INREXT, INREXT   No results for input(s): FE, TIBC, PSAT, FERR in the last 72 hours. No results found for: FOL, RBCF   No results for input(s): PH, PCO2, PO2 in the last 72 hours. No results for input(s): CPK, CKNDX, TROIQ in the last 72 hours.     No lab exists for component: CPKMB  No results found for: CHOL, CHOLX, CHLST, CHOLV, HDL, LDL, LDLC, DLDLP, TGLX, TRIGL, TRIGP, CHHD, CHHDX  Lab Results   Component Value Date/Time    Glucose (POC) 95 08/08/2018 10:31 AM     Lab Results   Component Value Date/Time    Color YELLOW/STRAW 08/16/2018 04:31 PM    Appearance TURBID (A) 08/16/2018 04:31 PM    Specific gravity 1.022 08/16/2018 04:31 PM    pH (UA) 7.0 08/16/2018 04:31 PM    Protein 30 (A) 08/16/2018 04:31 PM    Glucose NEGATIVE  08/16/2018 04:31 PM    Ketone TRACE (A) 08/16/2018 04:31 PM    Bilirubin NEGATIVE  08/16/2018 04:31 PM    Urobilinogen 1.0 08/16/2018 04:31 PM    Nitrites NEGATIVE  08/16/2018 04:31 PM    Leukocyte Esterase LARGE (A) 08/16/2018 04:31 PM    Epithelial cells FEW 08/16/2018 04:31 PM    Bacteria 2+ (A) 08/16/2018 04:31 PM    WBC >100 (H) 08/16/2018 04:31 PM    RBC 10-20 08/16/2018 04:31 PM         Medications Reviewed:     Current Facility-Administered Medications   Medication Dose Route Frequency    vancomycin (VANCOCIN) 2000 mg in  ml infusion  2,000 mg IntraVENous Q12H    polyvinyl alcohol-povidone 0.5-0.6 % drop 1 Drop  1 Drop Both Eyes PRN    polyethylene glycol (MIRALAX) packet 17 g  17 g Oral DAILY    senna-docusate (PERICOLACE) 8.6-50 mg per tablet 1 Tab  1 Tab Oral DAILY    aspirin chewable tablet 81 mg  81 mg Oral DAILY    dutasteride (AVODART) capsule 0.5 mg  0.5 mg Oral DAILY    magnesium oxide (MAG-OX) tablet 400 mg  400 mg Oral DAILY    metoprolol succinate (TOPROL-XL) XL tablet 25 mg  25 mg Oral DAILY    tamsulosin (FLOMAX) capsule 0.4 mg  0.4 mg Oral DAILY    pantoprazole (PROTONIX) tablet 20 mg  20 mg Oral BID    sodium chloride (NS) flush 5-40 mL  5-40 mL IntraVENous Q8H    sodium chloride (NS) flush 5-40 mL  5-40 mL IntraVENous PRN    acetaminophen (TYLENOL) tablet 650 mg  650 mg Oral Q4H PRN    heparin (porcine) injection 5,000 Units  5,000 Units SubCUTAneous TID    cefepime (MAXIPIME) 2 g in 0.9% sodium chloride (MBP/ADV) 100 mL  2 g IntraVENous Q12H    arformoterol (BROVANA) neb solution 15 mcg  15 mcg Nebulization BID RT    And    budesonide (PULMICORT) 500 mcg/2 ml nebulizer suspension  500 mcg Nebulization BID RT    vancomycin - pharmacy to dose   Other Rx Dosing/Monitoring     ______________________________________________________________________  EXPECTED LENGTH OF STAY: - - -  ACTUAL LENGTH OF STAY:          2                 Francis Travis MD

## 2019-07-31 NOTE — PROGRESS NOTES
Problem: Falls - Risk of  Goal: *Absence of Falls  Description  Document Americo Simpson Fall Risk and appropriate interventions in the flowsheet.   Outcome: Progressing Towards Goal  Note:   Fall Risk Interventions:  Mobility Interventions: Communicate number of staff needed for ambulation/transfer, Patient to call before getting OOB         Medication Interventions: Evaluate medications/consider consulting pharmacy, Patient to call before getting OOB    Elimination Interventions: Patient to call for help with toileting needs, Call light in reach              Problem: Patient Education: Go to Patient Education Activity  Goal: Patient/Family Education  Outcome: Progressing Towards Goal

## 2019-07-31 NOTE — PROGRESS NOTES
Infectious Diseases Progress Note          Subjective:     Right knee feels better with improved ROM    Objective:     Vitals:   Visit Vitals  /79 (BP 1 Location: Left arm, BP Patient Position: At rest)   Pulse 71   Temp 97.9 °F (36.6 °C)   Resp 16   Wt 122 kg (269 lb)   SpO2 95%   BMI 35.49 kg/m²        Tmax:  Temp (24hrs), Av.2 °F (36.8 °C), Min:97.9 °F (36.6 °C), Max:98.3 °F (36.8 °C)      Exam:  General appearance: alert, no distress  Lungs: clear to auscultation bilaterally  Heart: regular rate and rhythm  Abdomen: soft, non-tender. Bowel sounds normal. No masses,  no organomegaly  Right knee: effusion smaller    IV Lines: peripheral    Labs:    Recent Labs     19  0327 19  1332   WBC 9.0 11.3*   HGB 13.7 14.2    147*   BUN 13 15   CREA 0.78 0.82   TBILI  --  0.7   SGOT  --  10*   AP  --  117     Culture right knee synovial fluid in [place] on  = pending    Culture right knee synovial fluid at [facility]  = NGSF    Assessment:     1. Infected right TKR -- acute onset  by his history     2. Venous insufficiency of LEs     3. OHD -- IHD     4. Hx of nephrolithiasis    Plan:     1.  Continue Vanc & Cefepime pending cultures    Jf Vieira MD

## 2019-07-31 NOTE — PROGRESS NOTES
Occupational Therapy Screening:  Services maybe indicated. Order is required. [facility] was triggered for occupational therapy based on results obtained during the nursing admission assessment. The patients chart was reviewed and the patient maybe appropriate for a skilled therapy evaluation if there has been a decline in change in ability to perform ADL tasks. Please order a consult for occupational therapy if you are in agreement and would like an evaluation to be completed. Thank you.     Zachery Ramirez OT

## 2019-07-31 NOTE — PROGRESS NOTES
Ortho:  Patient feeling OK and denies right knee pain currently. T 98, HR 69, /78. Right knee warmth and mild effusion. No obvious erythema. Able active SLR. AROM 0-75. Moves LLE OK. Aspiration right knee here [facility] 35k TNC but NGTD. Imp:  Painful right prosthetic knee. Plan:  1. Dr. Cristiane Aguilar will d/w Dr. Randy Couch and attempt to obtain current results from outside aspiration in order to decide if I&D needed. 2. Cefepime and Vanc IV. 3. Hold Heparin and keep NPO until decision by Dr. Cristiane Aguilar regarding potential I&D today.     TL Benítez

## 2019-07-31 NOTE — PROGRESS NOTES
Bedside and Verbal shift change report given to Presley Kasper RN (oncoming nurse) by Joel Hicks RN (offgoing nurse). Report included the following information SBAR, Kardex, Procedure Summary, Intake/Output, MAR and Recent Results.

## 2019-08-01 LAB
ANION GAP SERPL CALC-SCNC: 7 MMOL/L (ref 5–15)
BASOPHILS # BLD: 0.1 K/UL (ref 0–0.1)
BASOPHILS NFR BLD: 1 % (ref 0–1)
BUN SERPL-MCNC: 11 MG/DL (ref 6–20)
BUN/CREAT SERPL: 14 (ref 12–20)
CALCIUM SERPL-MCNC: 8.5 MG/DL (ref 8.5–10.1)
CHLORIDE SERPL-SCNC: 109 MMOL/L (ref 97–108)
CO2 SERPL-SCNC: 22 MMOL/L (ref 21–32)
CREAT SERPL-MCNC: 0.79 MG/DL (ref 0.7–1.3)
DIFFERENTIAL METHOD BLD: ABNORMAL
EOSINOPHIL # BLD: 0.4 K/UL (ref 0–0.4)
EOSINOPHIL NFR BLD: 4 % (ref 0–7)
ERYTHROCYTE [DISTWIDTH] IN BLOOD BY AUTOMATED COUNT: 14.7 % (ref 11.5–14.5)
GLUCOSE SERPL-MCNC: 99 MG/DL (ref 65–100)
HCT VFR BLD AUTO: 43.3 % (ref 36.6–50.3)
HGB BLD-MCNC: 13.6 G/DL (ref 12.1–17)
IMM GRANULOCYTES # BLD AUTO: 0.1 K/UL (ref 0–0.04)
IMM GRANULOCYTES NFR BLD AUTO: 1 % (ref 0–0.5)
LYMPHOCYTES # BLD: 2 K/UL (ref 0.8–3.5)
LYMPHOCYTES NFR BLD: 21 % (ref 12–49)
MCH RBC QN AUTO: 27.6 PG (ref 26–34)
MCHC RBC AUTO-ENTMCNC: 31.4 G/DL (ref 30–36.5)
MCV RBC AUTO: 87.8 FL (ref 80–99)
MONOCYTES # BLD: 1 K/UL (ref 0–1)
MONOCYTES NFR BLD: 11 % (ref 5–13)
NEUTS SEG # BLD: 5.8 K/UL (ref 1.8–8)
NEUTS SEG NFR BLD: 62 % (ref 32–75)
NRBC # BLD: 0 K/UL (ref 0–0.01)
NRBC BLD-RTO: 0 PER 100 WBC
PLATELET # BLD AUTO: 194 K/UL (ref 150–400)
PMV BLD AUTO: 10 FL (ref 8.9–12.9)
POTASSIUM SERPL-SCNC: 4 MMOL/L (ref 3.5–5.1)
RBC # BLD AUTO: 4.93 M/UL (ref 4.1–5.7)
SODIUM SERPL-SCNC: 138 MMOL/L (ref 136–145)
WBC # BLD AUTO: 9.3 K/UL (ref 4.1–11.1)

## 2019-08-01 PROCEDURE — 74011000258 HC RX REV CODE- 258: Performed by: FAMILY MEDICINE

## 2019-08-01 PROCEDURE — 74011250636 HC RX REV CODE- 250/636: Performed by: FAMILY MEDICINE

## 2019-08-01 PROCEDURE — 65270000029 HC RM PRIVATE

## 2019-08-01 PROCEDURE — 77010033678 HC OXYGEN DAILY

## 2019-08-01 PROCEDURE — 74011250636 HC RX REV CODE- 250/636: Performed by: INTERNAL MEDICINE

## 2019-08-01 PROCEDURE — 74011000250 HC RX REV CODE- 250: Performed by: FAMILY MEDICINE

## 2019-08-01 PROCEDURE — 94664 DEMO&/EVAL PT USE INHALER: CPT

## 2019-08-01 PROCEDURE — 74011250637 HC RX REV CODE- 250/637: Performed by: FAMILY MEDICINE

## 2019-08-01 PROCEDURE — 36415 COLL VENOUS BLD VENIPUNCTURE: CPT

## 2019-08-01 PROCEDURE — 85025 COMPLETE CBC W/AUTO DIFF WBC: CPT

## 2019-08-01 PROCEDURE — 80048 BASIC METABOLIC PNL TOTAL CA: CPT

## 2019-08-01 PROCEDURE — 94640 AIRWAY INHALATION TREATMENT: CPT

## 2019-08-01 RX ADMIN — ACETAMINOPHEN 650 MG: 325 TABLET ORAL at 21:00

## 2019-08-01 RX ADMIN — ACETAMINOPHEN 650 MG: 325 TABLET ORAL at 07:15

## 2019-08-01 RX ADMIN — CEFEPIME HYDROCHLORIDE 2 G: 2 INJECTION, POWDER, FOR SOLUTION INTRAVENOUS at 11:59

## 2019-08-01 RX ADMIN — DUTASTERIDE 0.5 MG: 0.5 CAPSULE, LIQUID FILLED ORAL at 09:56

## 2019-08-01 RX ADMIN — ARFORMOTEROL TARTRATE 15 MCG: 15 SOLUTION RESPIRATORY (INHALATION) at 08:48

## 2019-08-01 RX ADMIN — BUDESONIDE 500 MCG: 0.5 INHALANT RESPIRATORY (INHALATION) at 21:53

## 2019-08-01 RX ADMIN — VANCOMYCIN HYDROCHLORIDE 2000 MG: 10 INJECTION, POWDER, LYOPHILIZED, FOR SOLUTION INTRAVENOUS at 17:04

## 2019-08-01 RX ADMIN — VANCOMYCIN HYDROCHLORIDE 2000 MG: 10 INJECTION, POWDER, LYOPHILIZED, FOR SOLUTION INTRAVENOUS at 05:00

## 2019-08-01 RX ADMIN — HEPARIN SODIUM 5000 UNITS: 5000 INJECTION INTRAVENOUS; SUBCUTANEOUS at 15:05

## 2019-08-01 RX ADMIN — PANTOPRAZOLE SODIUM 20 MG: 20 TABLET, DELAYED RELEASE ORAL at 17:04

## 2019-08-01 RX ADMIN — PANTOPRAZOLE SODIUM 20 MG: 20 TABLET, DELAYED RELEASE ORAL at 09:56

## 2019-08-01 RX ADMIN — MAGNESIUM OXIDE TAB 400 MG (241.3 MG ELEMENTAL MG) 400 MG: 400 (241.3 MG) TAB at 09:56

## 2019-08-01 RX ADMIN — ACETAMINOPHEN 650 MG: 325 TABLET ORAL at 14:37

## 2019-08-01 RX ADMIN — ARFORMOTEROL TARTRATE 15 MCG: 15 SOLUTION RESPIRATORY (INHALATION) at 21:53

## 2019-08-01 RX ADMIN — METOPROLOL SUCCINATE 25 MG: 25 TABLET, EXTENDED RELEASE ORAL at 09:56

## 2019-08-01 RX ADMIN — TAMSULOSIN HYDROCHLORIDE 0.4 MG: 0.4 CAPSULE ORAL at 09:56

## 2019-08-01 RX ADMIN — BUDESONIDE 500 MCG: 0.5 INHALANT RESPIRATORY (INHALATION) at 08:47

## 2019-08-01 RX ADMIN — CEFEPIME HYDROCHLORIDE 2 G: 2 INJECTION, POWDER, FOR SOLUTION INTRAVENOUS at 00:11

## 2019-08-01 RX ADMIN — Medication 10 ML: at 14:00

## 2019-08-01 RX ADMIN — Medication 10 ML: at 05:03

## 2019-08-01 NOTE — PROGRESS NOTES
Hospitalist Progress Note  Vno Rodriguez MD  Answering service: [phone] from in house phone        Date of Service:  2019  NAME:  Destinee Rebolledo  :  1945  MRN:  [id number]  PCP: Monse Leary MD    Chief Complaint: right knee pain    Admission Summary: Destinee Rebolledo is a 76 y.o. male who presented with sudden onset of pain right knee    Interval history / Subjective:   Patient seen for Follow up of CC: septic arthritis  Patient seen and examined by the bedside, Labs, images and notes reviewed  Feels ok, eyes looks slightly erythematous, no pus, vision ok, possible viral conjunctivitis     Assessment & Plan:   #. Right TKR Septic arthritis. - S/P arthrocentesis, cx NGTD- ID following, cont vanc and Cefepime  - Moderately elevated white coutn in synovial fluid  - Ortho following, plans for surgery - Pain control    #. Hypertension. control, cont current regimen  #. Possible Viral conjunctivitis: artifical tears prn, monitor  #. CAD: stable. no acute issues, cont GDMT  #. BPH: Continue home medications and continue to monitor. Reassess as needed. Code status: Full Code  DVT prophylaxis: heparin SQ  Care Plan discussed with: Patient/Family  Disposition: TBD    Hospital Problems  Date Reviewed: 2018          Codes Class Noted POA    Septic arthritis [facility]) ICD-10-CM: M00.9  ICD-9-CM: 711.00  2019 Unknown            Review of Systems:   A comprehensive review of systems was negative. Physical Examination:     General appearance: alert, cooperative, no distress, appears stated age, mildly red eye  Lungs: clear to auscultation bilaterally  Heart: regular rate and rhythm, S1, S2 normal,  Abdomen: soft, non-tender. Extremities/MSK: large right knee effusion, surgical scar of TKR is well healed, painful active and passive movement right knee,   Neurologic: Alert and oriented X 3, normal strength and tone.     Vital Signs:    Last 24hrs VS reviewed since prior progress note. Most recent are:    Visit Vitals  /82   Pulse 69   Temp 97.6 °F (36.4 °C)   Resp 16   Ht 6' 0.84\" (1.85 m)   Wt 114.3 kg (252 lb)   SpO2 94%   BMI 33.40 kg/m²         Intake/Output Summary (Last 24 hours) at 2019 1138  Last data filed at 2019 2954  Gross per 24 hour   Intake 375 ml   Output 1875 ml   Net -1500 ml        Tmax:  Temp (24hrs), Av.2 °F (36.8 °C), Min:97.6 °F (36.4 °C), Max:99 °F (37.2 °C)      Data Review:   Data reviewed by myself:  Xr Knee Rt Max 2 Vws    Result Date: 2019  EXAM: XR KNEE RT MAX 2 VWS INDICATION: Right knee increased pain and swelling, possible infection. Right total knee arthroplasty one year ago. COMPARISON: None. FINDINGS: AP and crosstable lateral views of the right knee demonstrate prosthesis in good position. There is a probable suprapatellar joint effusion. No lucency around the prosthesis. Normal alignment. No acute fracture. Mild osteopenia. No evidence of osteomyelitis. Irregular lucencies in the subchondral patella is most likely due to previous surgery. IMPRESSION: Probable joint effusion. No fracture or evidence of osteomyelitis. No results found for: SDES  Lab Results   Component Value Date/Time    Culture result: Culture performed on Unspun Fluid 2019 01:15 PM    Culture result: No growth thus far, holding 14 days.  2019 01:15 PM    Culture result: NO GROWTH 3 DAYS 2019 11:28 AM     All Micro Results     Procedure Component Value Units Date/Time    CULTURE, BLOOD, PAIRED [[id number]] Collected:  19 1128    Order Status:  Completed Specimen:  Blood Updated:  19 5149     Special Requests: NO SPECIAL REQUESTS        Culture result: NO GROWTH 3 DAYS       CULTURE, BODY FLUID Nadege Rowell [[id number]] Collected:  19 1315    Order Status:  Completed Specimen:  Knee Fluid Updated:  19 5132     Special Requests: NO SPECIAL REQUESTS        GRAM STAIN 2+ WBCS SEEN         NO ORGANISMS SEEN Culture result:       Culture performed on Unspun Fluid                  No growth thus far, holding 14 days. Labs: reviewed by myself. Recent Labs     08/01/19  0114 07/31/19  0651   WBC 9.3 8.4   HGB 13.6 13.6   HCT 43.3 43.0    153     Recent Labs     08/01/19  0114 07/31/19  0651 07/30/19  0327    138 139   K 4.0 4.0 4.1   * 109* 109*   CO2 22 22 20*   BUN 11 10 13   CREA 0.79 0.74 0.78   GLU 99 108* 102*   CA 8.5 8.7 8.4*     Recent Labs     07/29/19  1332   SGOT 10*   ALT 23      TBILI 0.7   TP 7.0   ALB 3.5   GLOB 3.5     No results for input(s): INR, PTP, APTT in the last 72 hours. No lab exists for component: INREXT, INREXT   No results for input(s): FE, TIBC, PSAT, FERR in the last 72 hours. No results found for: FOL, RBCF   No results for input(s): PH, PCO2, PO2 in the last 72 hours. No results for input(s): CPK, CKNDX, TROIQ in the last 72 hours.     No lab exists for component: CPKMB  No results found for: CHOL, CHOLX, CHLST, CHOLV, HDL, LDL, LDLC, DLDLP, TGLX, TRIGL, TRIGP, CHHD, CHHDX  Lab Results   Component Value Date/Time    Glucose (POC) 95 08/08/2018 10:31 AM     Lab Results   Component Value Date/Time    Color YELLOW/STRAW 08/16/2018 04:31 PM    Appearance TURBID (A) 08/16/2018 04:31 PM    Specific gravity 1.022 08/16/2018 04:31 PM    pH (UA) 7.0 08/16/2018 04:31 PM    Protein 30 (A) 08/16/2018 04:31 PM    Glucose NEGATIVE  08/16/2018 04:31 PM    Ketone TRACE (A) 08/16/2018 04:31 PM    Bilirubin NEGATIVE  08/16/2018 04:31 PM    Urobilinogen 1.0 08/16/2018 04:31 PM    Nitrites NEGATIVE  08/16/2018 04:31 PM    Leukocyte Esterase LARGE (A) 08/16/2018 04:31 PM    Epithelial cells FEW 08/16/2018 04:31 PM    Bacteria 2+ (A) 08/16/2018 04:31 PM    WBC >100 (H) 08/16/2018 04:31 PM    RBC 10-20 08/16/2018 04:31 PM         Medications Reviewed:     Current Facility-Administered Medications   Medication Dose Route Frequency    vancomycin (VANCOCIN) 2000 mg in  ml infusion  2,000 mg IntraVENous Q12H    polyvinyl alcohol-povidone 0.5-0.6 % drop 1 Drop  1 Drop Both Eyes PRN    polyethylene glycol (MIRALAX) packet 17 g  17 g Oral DAILY    senna-docusate (PERICOLACE) 8.6-50 mg per tablet 1 Tab  1 Tab Oral DAILY    aspirin chewable tablet 81 mg  81 mg Oral DAILY    dutasteride (AVODART) capsule 0.5 mg  0.5 mg Oral DAILY    magnesium oxide (MAG-OX) tablet 400 mg  400 mg Oral DAILY    metoprolol succinate (TOPROL-XL) XL tablet 25 mg  25 mg Oral DAILY    tamsulosin (FLOMAX) capsule 0.4 mg  0.4 mg Oral DAILY    pantoprazole (PROTONIX) tablet 20 mg  20 mg Oral BID    sodium chloride (NS) flush 5-40 mL  5-40 mL IntraVENous Q8H    sodium chloride (NS) flush 5-40 mL  5-40 mL IntraVENous PRN    acetaminophen (TYLENOL) tablet 650 mg  650 mg Oral Q4H PRN    heparin (porcine) injection 5,000 Units  5,000 Units SubCUTAneous TID    cefepime (MAXIPIME) 2 g in 0.9% sodium chloride (MBP/ADV) 100 mL  2 g IntraVENous Q12H    arformoterol (BROVANA) neb solution 15 mcg  15 mcg Nebulization BID RT    And    budesonide (PULMICORT) 500 mcg/2 ml nebulizer suspension  500 mcg Nebulization BID RT    vancomycin - pharmacy to dose   Other Rx Dosing/Monitoring     ______________________________________________________________________  EXPECTED LENGTH OF STAY: 2d 16h  ACTUAL LENGTH OF STAY:          [address] [place],9D, MD

## 2019-08-01 NOTE — PROGRESS NOTES
Problem: Patient Education: Go to Patient Education Activity  Goal: Patient/Family Education  Outcome: Progressing Towards Goal     Problem: General Infection Care Plan (Adult and Pediatric)  Goal: Improvement in signs and symptoms of infection  Outcome: Progressing Towards Goal  Goal: *Optimize nutritional status  Outcome: Progressing Towards Goal     Problem: Patient Education: Go to Patient Education Activity  Goal: Patient/Family Education  Outcome: Progressing Towards Goal     Problem: General Medical Care Plan  Goal: *Labs within defined limits  Outcome: Progressing Towards Goal  Goal: *Absence of infection signs and symptoms  Outcome: Progressing Towards Goal  Goal: *Optimal pain control at patient's stated goal  Outcome: Progressing Towards Goal  Goal: *Skin integrity maintained  Outcome: Progressing Towards Goal  Goal: *Fluid volume balance  Outcome: Progressing Towards Goal  Goal: *Optimize nutritional status  Outcome: Progressing Towards Goal     Problem: Patient Education: Go to Patient Education Activity  Goal: Patient/Family Education  Outcome: Progressing Towards Goal

## 2019-08-01 NOTE — PROGRESS NOTES
Bedside shift change report given to Lisset Mayorga (oncoming nurse) by Tim Guardado (offgoing nurse). Report included the following information SBAR.

## 2019-08-01 NOTE — PROGRESS NOTES
Patient ambulated to the bathroom and voided. Patient asymptomatic and returned to bed. Bedside and Verbal shift change report given to Lakshmi (oncoming nurse) by Rebeca Musa (offgoing nurse). Report included the following information SBAR, Kardex, MAR and Recent Results.

## 2019-08-01 NOTE — PROGRESS NOTES
Infectious Diseases Progress Note          Subjective:     No new complaints    ROS:  Constitutional: no fever or chills  HEENT: no HA  Skin: no rash or pruritis  Resp: no cough  CV: no CP  GI: no abd pain, N, V, D    Objective:     Vitals:   Visit Vitals  /74 (BP 1 Location: Left arm)   Pulse 67   Temp 97.7 °F (36.5 °C)   Resp 16   Ht 6' 0.84\" (1.85 m)   Wt 114.3 kg (252 lb)   SpO2 95%   BMI 33.40 kg/m²        Tmax:  Temp (24hrs), Av.2 °F (36.8 °C), Min:97.7 °F (36.5 °C), Max:98.8 °F (37.1 °C)      Exam:  General appearance: alert, no distress  Skin: no rash  Lungs: clear to auscultation bilaterally  Heart: regular rate and rhythm  Abdomen: soft, non-tender. Bowel sounds normal. No masses,  no organomegaly  Right knee: knee feels tight with pain at about 60 degrees of flexion    IV Lines: peripheral    Labs:    Recent Labs     19  0651 19  0327 19  1332   WBC 8.4 9.0 11.3*   HGB 13.6 13.7 14.2    151 147*   BUN 10 13 15   CREA 0.74 0.78 0.82   TBILI  --   --  0.7   SGOT  --   --  10*   AP  --   --  117     Culture right knee synovial fluid in [place] on  = probable \"Staph\"    Culture right knee synovial fluid at [facility]  = NGSF    Assessment:     1. Infected right TKR -- acute onset  by his history: He now recalls that the pain began within 24 hours of jumping out of his truck. I reviewed the treatment options at length with him:     A. Surgical I&D with poly exchange followed by ~ 6 weeks of IV antibiotics and then chronic suppression with po antibiotics. I explained the risk of failure. In the case of failure, he would need resection arthroplasty. B. Resection arthroplasty followed by ~ 6 weeks of IV antibiotics and then possible revision arthroplasty     2. Venous insufficiency of LEs     3. OHD -- IHD     4. Hx of nephrolithiasis    Plan:     1. Continue Vanc & Cefepime pending cultures    2.  Anticipate surgery  -- he will refect on the options      Carolin Amos Joe To MD

## 2019-08-01 NOTE — PROGRESS NOTES
A Spiritual Care Partner Volunteer visited patient in  554 on 8/01/2019.   Documented by:  Chaplain Lance MDiv, MS, [address] [place] [address] ([id number])

## 2019-08-01 NOTE — PROGRESS NOTES
Bedside and Verbal shift change report given to Jeff Min RN (oncoming nurse) by Izaiah Webb RN (offgoing nurse). Report included the following information SBAR, Kardex, Procedure Summary, Intake/Output, MAR and Recent Results.

## 2019-08-01 NOTE — PROGRESS NOTES
Ortho Progress Note    66yo male, 1 year s/p right total knee  Started with pain and swelling 5 days ago  Labs on 7/29 : WBC 11.4, ESR 7, CRP 5.34  Aspiration at outside facility reported to have scant staph growth  Today right knee is swollen, tender, painful with ROM  Plan for I&D and poly exchange this afternoon    [address] [facility]. Archana

## 2019-08-02 VITALS
WEIGHT: 254.8 LBS | DIASTOLIC BLOOD PRESSURE: 60 MMHG | SYSTOLIC BLOOD PRESSURE: 108 MMHG | OXYGEN SATURATION: 92 % | HEIGHT: 73 IN | HEART RATE: 81 BPM | RESPIRATION RATE: 16 BRPM | BODY MASS INDEX: 33.77 KG/M2 | TEMPERATURE: 97.9 F

## 2019-08-02 LAB
ANION GAP SERPL CALC-SCNC: 7 MMOL/L (ref 5–15)
BASOPHILS # BLD: 0.1 K/UL (ref 0–0.1)
BASOPHILS NFR BLD: 1 % (ref 0–1)
BUN SERPL-MCNC: 14 MG/DL (ref 6–20)
BUN/CREAT SERPL: 17 (ref 12–20)
CALCIUM SERPL-MCNC: 8.8 MG/DL (ref 8.5–10.1)
CHLORIDE SERPL-SCNC: 110 MMOL/L (ref 97–108)
CO2 SERPL-SCNC: 22 MMOL/L (ref 21–32)
CREAT SERPL-MCNC: 0.83 MG/DL (ref 0.7–1.3)
DATE LAST DOSE: ABNORMAL
DIFFERENTIAL METHOD BLD: ABNORMAL
EOSINOPHIL # BLD: 0.4 K/UL (ref 0–0.4)
EOSINOPHIL NFR BLD: 5 % (ref 0–7)
ERYTHROCYTE [DISTWIDTH] IN BLOOD BY AUTOMATED COUNT: 14.7 % (ref 11.5–14.5)
GLUCOSE SERPL-MCNC: 103 MG/DL (ref 65–100)
HCT VFR BLD AUTO: 41.1 % (ref 36.6–50.3)
HGB BLD-MCNC: 12.9 G/DL (ref 12.1–17)
IMM GRANULOCYTES # BLD AUTO: 0 K/UL (ref 0–0.04)
IMM GRANULOCYTES NFR BLD AUTO: 1 % (ref 0–0.5)
LYMPHOCYTES # BLD: 2 K/UL (ref 0.8–3.5)
LYMPHOCYTES NFR BLD: 25 % (ref 12–49)
MCH RBC QN AUTO: 27.6 PG (ref 26–34)
MCHC RBC AUTO-ENTMCNC: 31.4 G/DL (ref 30–36.5)
MCV RBC AUTO: 88 FL (ref 80–99)
MONOCYTES # BLD: 0.8 K/UL (ref 0–1)
MONOCYTES NFR BLD: 10 % (ref 5–13)
NEUTS SEG # BLD: 4.7 K/UL (ref 1.8–8)
NEUTS SEG NFR BLD: 58 % (ref 32–75)
NRBC # BLD: 0 K/UL (ref 0–0.01)
NRBC BLD-RTO: 0 PER 100 WBC
PLATELET # BLD AUTO: 200 K/UL (ref 150–400)
PMV BLD AUTO: 9.7 FL (ref 8.9–12.9)
POTASSIUM SERPL-SCNC: 4.2 MMOL/L (ref 3.5–5.1)
RBC # BLD AUTO: 4.67 M/UL (ref 4.1–5.7)
REPORTED DOSE,DOSE: ABNORMAL UNITS
REPORTED DOSE/TIME,TMG: ABNORMAL
SODIUM SERPL-SCNC: 139 MMOL/L (ref 136–145)
VANCOMYCIN TROUGH SERPL-MCNC: 14.2 UG/ML (ref 5–10)
WBC # BLD AUTO: 8 K/UL (ref 4.1–11.1)

## 2019-08-02 PROCEDURE — 74011000258 HC RX REV CODE- 258: Performed by: FAMILY MEDICINE

## 2019-08-02 PROCEDURE — 36415 COLL VENOUS BLD VENIPUNCTURE: CPT

## 2019-08-02 PROCEDURE — 80202 ASSAY OF VANCOMYCIN: CPT

## 2019-08-02 PROCEDURE — 97161 PT EVAL LOW COMPLEX 20 MIN: CPT

## 2019-08-02 PROCEDURE — 74011250636 HC RX REV CODE- 250/636: Performed by: FAMILY MEDICINE

## 2019-08-02 PROCEDURE — 74011000250 HC RX REV CODE- 250: Performed by: FAMILY MEDICINE

## 2019-08-02 PROCEDURE — 85025 COMPLETE CBC W/AUTO DIFF WBC: CPT

## 2019-08-02 PROCEDURE — 94640 AIRWAY INHALATION TREATMENT: CPT

## 2019-08-02 PROCEDURE — 74011250636 HC RX REV CODE- 250/636: Performed by: INTERNAL MEDICINE

## 2019-08-02 PROCEDURE — 80048 BASIC METABOLIC PNL TOTAL CA: CPT

## 2019-08-02 PROCEDURE — 74011250637 HC RX REV CODE- 250/637: Performed by: INTERNAL MEDICINE

## 2019-08-02 PROCEDURE — 97116 GAIT TRAINING THERAPY: CPT

## 2019-08-02 PROCEDURE — 74011250637 HC RX REV CODE- 250/637: Performed by: FAMILY MEDICINE

## 2019-08-02 PROCEDURE — 77030028907 HC WRP KNEE WO BGS SOLM -B

## 2019-08-02 RX ORDER — POLYETHYLENE GLYCOL 3350 17 G/17G
17 POWDER, FOR SOLUTION ORAL DAILY
Qty: 30 PACKET | Refills: 0 | Status: SHIPPED | OUTPATIENT
Start: 2019-08-03 | End: 2019-09-02

## 2019-08-02 RX ADMIN — ACETAMINOPHEN 650 MG: 325 TABLET ORAL at 05:13

## 2019-08-02 RX ADMIN — DUTASTERIDE 0.5 MG: 0.5 CAPSULE, LIQUID FILLED ORAL at 09:09

## 2019-08-02 RX ADMIN — HEPARIN SODIUM 5000 UNITS: 5000 INJECTION INTRAVENOUS; SUBCUTANEOUS at 09:08

## 2019-08-02 RX ADMIN — VANCOMYCIN HYDROCHLORIDE 2000 MG: 10 INJECTION, POWDER, LYOPHILIZED, FOR SOLUTION INTRAVENOUS at 05:05

## 2019-08-02 RX ADMIN — ASPIRIN 81 MG 81 MG: 81 TABLET ORAL at 09:09

## 2019-08-02 RX ADMIN — PANTOPRAZOLE SODIUM 20 MG: 20 TABLET, DELAYED RELEASE ORAL at 09:10

## 2019-08-02 RX ADMIN — BUDESONIDE 500 MCG: 0.5 INHALANT RESPIRATORY (INHALATION) at 08:03

## 2019-08-02 RX ADMIN — TAMSULOSIN HYDROCHLORIDE 0.4 MG: 0.4 CAPSULE ORAL at 09:09

## 2019-08-02 RX ADMIN — ARFORMOTEROL TARTRATE 15 MCG: 15 SOLUTION RESPIRATORY (INHALATION) at 08:03

## 2019-08-02 RX ADMIN — METOPROLOL SUCCINATE 25 MG: 25 TABLET, EXTENDED RELEASE ORAL at 09:10

## 2019-08-02 RX ADMIN — CEFEPIME HYDROCHLORIDE 2 G: 2 INJECTION, POWDER, FOR SOLUTION INTRAVENOUS at 11:24

## 2019-08-02 RX ADMIN — MAGNESIUM OXIDE TAB 400 MG (241.3 MG ELEMENTAL MG) 400 MG: 400 (241.3 MG) TAB at 09:09

## 2019-08-02 RX ADMIN — HEPARIN SODIUM 5000 UNITS: 5000 INJECTION INTRAVENOUS; SUBCUTANEOUS at 00:40

## 2019-08-02 RX ADMIN — CEFEPIME HYDROCHLORIDE 2 G: 2 INJECTION, POWDER, FOR SOLUTION INTRAVENOUS at 00:40

## 2019-08-02 NOTE — CONSULTS
Vish Cervantes    Name:  Elías Pro  MR#:  [id number]  :  1945  ACCOUNT #:  [de-identified]  DATE OF SERVICE:  2019      The patient has mild discomfort in his right knee, but not a lot. On exam, he has an effusion, it is quite mild. There is no erythema. Neurovascular status is intact. He does not have calf pain or findings of DVT. I had a lengthy discussion with Dr. Mone Urbina earlier today. I also had a lengthy discussion with the patient this afternoon. I explained that cultures from the lab at [facility] are no growth. He had cultures three days ago in [facility], that is showing a very scant growth of coagulase negative Staph. Dr. Mone Urbina and I discussed these findings at length and I explained to him that there is a possibility that he has a Staph coagulase negative infection. All the cultures at [facility] are negative. We discussed treatment options. One option would be incision and drainage of the knee with polyethylene exchange, followed by six-week course of antibiotics. I reviewed with him the procedure and expectations. I explained that I cannot guarantee that if we proceeded with that procedure, this would eradicate any infection if present. I explained the other option is to stop the antibiotics and follow closely, should he redevelop symptoms, obtain additional cultures and workup at that time. I explained that if he were to undergo incision and drainage with antibiotics at this time, there is a potential of salvaging his knee. I explained that if the symptoms and signs of infection develop at a later time, we may not be able to salvage the knee and he may require excisional arthroplasty. I reviewed with him the procedure of excisional arthroplasty and the postoperative course related to this. I had a lengthy discussion concerning different options and explained that I would favor incision and drainage with polyethylene exchange.   I feel that Dr. Jt Connell would recommend the same. He indicates that he would not like to proceed with incision and drainage at this time. He would like to go home off antibiotics and to follow up. He does understand implications of doing this. I answered all of his questions and had a lengthy discussion concerning this.         Gale Pablo MD [email or web address]  D:  08/01/2019 17:21  T:  08/02/2019 2:43  JOB #:  [id number]  CC:  Attila Almonte MD

## 2019-08-02 NOTE — PROGRESS NOTES
Infectious Diseases Progress Note          Subjective:     No new complaints    Objective:     Vitals:   Visit Vitals  /66   Pulse (!) 59   Temp 98 °F (36.7 °C)   Resp 16   Ht 6' 0.84\" (1.85 m)   Wt 114.3 kg (252 lb)   SpO2 97%   BMI 33.40 kg/m²        Tmax:  Temp (24hrs), Av.9 °F (36.6 °C), Min:97.6 °F (36.4 °C), Max:98.3 °F (36.8 °C)      Exam:  General appearance: alert, no distress  Lungs: clear to auscultation bilaterally  Heart: regular rate and rhythm  Abdomen: soft, non-tender. Bowel sounds normal. No masses,  no organomegaly  Right knee: no change    IV Lines: peripheral    Labs:    Recent Labs     19  0114 19  0651 19  0327   WBC 9.3 8.4 9.0   HGB 13.6 13.6 13.7    153 151   BUN 11 10 13   CREA 0.79 0.74 0.78     Culture right knee synovial fluid in [place] on  = scant coag neg Staph    Culture right knee synovial fluid at [facility]  = NGSF    Assessment:     1. Infected right TKR -- acute onset  by his history: He now wants to stop antibiotics and wait and wartch     2. Venous insufficiency of LEs     3. OHD -- IHD     4. Hx of nephrolithiasis    Plan:     1.  Note plans for discharge off antibiotics with F/U with Dr. Radha Leblanc in 10 days -- repeat arthrocetesis if indicated      Aimee Pardo MD

## 2019-08-02 NOTE — PROGRESS NOTES
Pharmacist Note - Vancomycin Dosing  Therapy day 5  Indication: Infected right TKR, septic arthritis  Current regimen: 2000 mg IV q 12 h    A Trough Level resulted at 14.2 mcg/mL which was obtained 12 hrs post-dose. Goal trough: 15 - 20 mcg/mL     Plan: Continue current regimen. Pharmacy will continue to monitor this patient daily for changes in clinical status and renal function.

## 2019-08-02 NOTE — ROUTINE PROCESS
Bedside shift change report given to Dinora Pope (oncoming nurse) by Denia Pickering (offgoing nurse). Report included the following information SBAR.

## 2019-08-02 NOTE — PROGRESS NOTES
Chart reviewed. Noted different treatment options were discussed with the patient, and patient would orefer to be discharged home off antibiotics with follow-up with Dr. Juhi Servin. Therapy has cleared the patient for discharge, no home health is being recommended. Patient's daughter Willy Short lives 5 minutes away and will be checking on him. Willy Short will transport him home via car.      PRINCE Rojas/CRM

## 2019-08-02 NOTE — PROGRESS NOTES
ORTHO POSTOP PROGRESS NOTE      Subjective:     Post-Admit Day: 4 Status Post right possible septic arthritis  Systemic or Specific Complaints:No Complaints    Objective:     Patient Vitals for the past 24 hrs:   BP Temp Pulse Resp SpO2 Weight   08/02/19 0834 108/60 97.9 °F (36.6 °C) 81 16 92 % --   08/02/19 0804 -- -- -- -- 95 % --   08/02/19 0657 -- -- -- -- -- 115.6 kg (254 lb 12.8 oz)   08/02/19 0207 118/68 98.1 °F (36.7 °C) 69 16 92 % --   08/01/19 2155 -- -- -- -- 97 % --   08/01/19 2100 100/66 98 °F (36.7 °C) (!) 59 16 97 % --   08/01/19 1515 135/90 97.6 °F (36.4 °C) 78 16 94 % --       General: alert, cooperative, no distress, appears stated age   Wound: Wound clean and dry no evidence of infection. , No Erythema, No Edema, No Drainage and Wound Intact   Motion: Extension: Full Extension   DVT Exam: No evidence of DVT seen on physical exam.  Negative Kathi's sign. No cords or calf tenderness. No significant calf/ankle edema.      Data Review:    Recent Results (from the past 24 hour(s))   METABOLIC PANEL, BASIC    Collection Time: 08/02/19  5:04 AM   Result Value Ref Range    Sodium 139 136 - 145 mmol/L    Potassium 4.2 3.5 - 5.1 mmol/L    Chloride 110 (H) 97 - 108 mmol/L    CO2 22 21 - 32 mmol/L    Anion gap 7 5 - 15 mmol/L    Glucose 103 (H) 65 - 100 mg/dL    BUN 14 6 - 20 MG/DL    Creatinine 0.83 0.70 - 1.30 MG/DL    BUN/Creatinine ratio 17 12 - 20      GFR est AA >60 >60 ml/min/1.73m2    GFR est non-AA >60 >60 ml/min/1.73m2    Calcium 8.8 8.5 - 10.1 MG/DL   CBC WITH AUTOMATED DIFF    Collection Time: 08/02/19  5:04 AM   Result Value Ref Range    WBC 8.0 4.1 - 11.1 K/uL    RBC 4.67 4.10 - 5.70 M/uL    HGB 12.9 12.1 - 17.0 g/dL    HCT 41.1 36.6 - 50.3 %    MCV 88.0 80.0 - 99.0 FL    MCH 27.6 26.0 - 34.0 PG    MCHC 31.4 30.0 - 36.5 g/dL    RDW 14.7 (H) 11.5 - 14.5 %    PLATELET 299 996 - 395 K/uL    MPV 9.7 8.9 - 12.9 FL    NRBC 0.0 0  WBC    ABSOLUTE NRBC 0.00 0.00 - 0.01 K/uL    NEUTROPHILS 58 32 - 75 %    LYMPHOCYTES 25 12 - 49 %    MONOCYTES 10 5 - 13 %    EOSINOPHILS 5 0 - 7 %    BASOPHILS 1 0 - 1 %    IMMATURE GRANULOCYTES 1 (H) 0.0 - 0.5 %    ABS. NEUTROPHILS 4.7 1.8 - 8.0 K/UL    ABS. LYMPHOCYTES 2.0 0.8 - 3.5 K/UL    ABS. MONOCYTES 0.8 0.0 - 1.0 K/UL    ABS. EOSINOPHILS 0.4 0.0 - 0.4 K/UL    ABS. BASOPHILS 0.1 0.0 - 0.1 K/UL    ABS. IMM. GRANS. 0.0 0.00 - 0.04 K/UL    DF AUTOMATED     VANCOMYCIN, TROUGH    Collection Time: 08/02/19  5:04 AM   Result Value Ref Range    Vancomycin,trough 14.2 (H) 5.0 - 10.0 ug/mL    Reported dose date: NOT PROVIDED      Reported dose time: NOT PROVIDED      Reported dose: NOT PROVIDED UNITS         Assessment:     Status Post right knee possible septic arthritis. Patient is doing significantly better from a presentation standpoint. Patient has been cleared by PT for discharge. Cultures from the hospital have remained negative. Patient preference is for discharge without antibiotics with close observation of symptomatic change. Plan:     Patient may discharge home per orthopedics. Discussed with patient to identify any changes with orthopedics immediately. WIll followup with Dr. Enid Lopez in 10 days.

## 2019-08-02 NOTE — PROGRESS NOTES
PHYSICAL THERAPY EVALUATION/DISCHARGE  Patient: Ramiro Tipton (76 y.o. male)  Date: 8/2/2019  Primary Diagnosis: Septic arthritis ([facility] [address] [M00.9]  Procedure(s) (LRB):  INCISION AND DRAINAGE RIGHT KNEE  POSSIBLE POLY EXCHANGE/  (Right) 3 Days Post-Op   Precautions:   Fall, TTWB      ASSESSMENT  Based on the objective data described below, the patient presents at baseline level of function. Patient demonstrated modified independence with all of bed mobility and transfers. Ambulated 300 ft with RW and supervision for safety. Gait steady and safe with RW. Ambulated 39 ft with no AD but gait was a bit wobbly. Patient has RW and cane at home and he will be using RW. Patient states that he has all of necessary equipment at home. He states that he will be able to function safely in the home and that he will have intermittent assistance of family as needed (daughter and grandson). Lives in one level home and has a ramp with bilateral rails to enter. Functional Outcome Measure: The patient scored 80/100 on the Barthel outcome measure which is indicative of 1-19% functional impairment with mobility and ADLs. .      Other factors to consider for discharge: High PLOF/Motivated/A & O x 4/Supportive Family    Patient is cleared for discharge from PT mobility standpoint. Do not feel that he needs any services after discharge. Further skilled acute physical therapy is not indicated at this time. PLAN :  Recommendation for discharge: (in order for the patient to meet his/her long term goals)  No skilled physical therapy/ follow up rehabilitation needs identified at this time. This discharge recommendation:  Has been made in collaboration with the attending provider and/or case management    Equipment recommendations for successful discharge: None-per patient, has cane and rolling walker. SUBJECTIVE:   Patient stated I am ready to go home.     OBJECTIVE DATA SUMMARY:   HISTORY:    Past Medical History: Diagnosis Date    Arthritis     Asthma     R/T ENVIRONMENTAL ALLERGIES; INHALER USE DAILY    CAD (coronary artery disease) 2007    MI    GERD (gastroesophageal reflux disease)     History of BPH     History of kidney stones     Hx: UTI (urinary tract infection)     Hypertension     Lymphedema     SANTA on CPAP      Past Surgical History:   Procedure Laterality Date    BREAST SURGERY PROCEDURE UNLISTED Right     EXCISION OF BREAST LUMP (BENIGN)    HX HEART CATHETERIZATION  2007, 2016    STENTS X3  (INGRID--DR NUNEZ)    HX LITHOTRIPSY      2-3X    HX ORTHOPAEDIC Right 1964    FEMUR ORIF  (DR PRESCOTT)    HX TONSILLECTOMY      HX UROLOGICAL      CYSTO    VASCULAR SURGERY PROCEDURE UNLIST Right     LEG VEIN BYPASS (INGRID)       Prior level of function: PLOF: Modified independent with ADLs and some IADLs. Family live nearby and can assist as needed. Personal factors and/or comorbidities impacting plan of care: Motivated/A & O x 4/Supportive Family    Home Situation  Home Environment: Trailer/mobile home  One/Two Story Residence: One story  Living Alone: Yes  Support Systems: Child(nichole), Spouse/Significant Other/Partner  Patient Expects to be Discharged to[de-identified] Private residence(trailer)  Current DME Used/Available at Home: CPAP    EXAMINATION/PRESENTATION/DECISION MAKING:   Critical Behavior:  Neurologic State: Appropriate for age  Orientation Level: Appropriate for age  Cognition: Appropriate for age attention/concentration     Hearing:   Auditory  Auditory Impairment: None  Range Of Motion:  AROM: Generally decreased, functional           PROM: Generally decreased, functional           Strength:    Strength: Generally decreased, functional                    Tone & Sensation:   Tone: Normal              Sensation: Intact               Coordination:  Coordination: Within functional limits  Vision:      Functional Mobility:  Bed Mobility:  Rolling: Modified independent  Supine to Sit: Modified independent  Sit to Supine: Modified independent  Scooting: Modified independent  Transfers:  Sit to Stand: Modified independent  Stand to Sit: Modified independent        Bed to Chair: Modified independent              Balance:   Sitting: Intact  Standing: Impaired; Without support  Standing - Static: Good;Constant support  Standing - Dynamic : Good;Constant support  Ambulation/Gait Training:  Distance (ft): 300 Feet (ft)  Assistive Device: Walker, rolling;Gait belt  Ambulation - Level of Assistance: Supervision; Other (comment)(for safety)        Gait Abnormalities: Antalgic; Other;Decreased step clearance(decreasede swing bilaterally)  Right Side Weight Bearing: As tolerated  Left Side Weight Bearing: Full  Base of Support: Widened;Shift to left  Stance: Right decreased  Speed/Sandra: Slow  Step Length: Left shortened  Swing Pattern: Right asymmetrical           Stairs:  Patient has a ramp at home to enter his home with bilateral rails. Functional Measure:  Barthel Index:    Bathin  Bladder: 10  Bowels: 10  Groomin  Dressin  Feeding: 10  Mobility: 10  Stairs: 5  Toilet Use: 10  Transfer (Bed to Chair and Back): 15  Total: 80/100       Percentage of impairment   0%   1-19%   20-39%   40-59%   60-79%   80-99%   100%   Barthel Score 0-100 100 99-80 79-60 59-40 20-39 1-19   0     The Barthel ADL Index: Guidelines  1. The index should be used as a record of what a patient does, not as a record of what a patient could do. 2. The main aim is to establish degree of independence from any help, physical or verbal, however minor and for whatever reason. 3. The need for supervision renders the patient not independent. 4. A patient's performance should be established using the best available evidence. Asking the patient, friends/relatives and nurses are the usual sources, but direct observation and common sense are also important. However direct testing is not needed.   5. Usually the patient's performance over the preceding 24-48 hours is important, but occasionally longer periods will be relevant. 6. Middle categories imply that the patient supplies over 50 per cent of the effort. 7. Use of aids to be independent is allowed. Shayla Colbert., Barthel, D.W. (1071). Functional evaluation: the Barthel Index. [facility] (14)2. ALISON Francois, Kenny Pratt., Lg Marie., Willis, [facility] (1999). Measuring the change indisability after inpatient rehabilitation; comparison of the responsiveness of the Barthel Index and Functional [place] Measure. Journal of Neurology, Neurosurgery, and Psychiatry, 66(4), 209-313. Ríos Kaykay, N.J.A, KAREEM Kelly, & Samina Villavicencio M.A. (2004.) Assessment of post-stroke quality of life in cost-effectiveness studies: The usefulness of the Barthel Index and the EuroQoL-5D. Quality of Life Research, 15, 217-99          Physical Therapy Evaluation Charge Determination   History Examination Presentation Decision-Making   LOW Complexity : Zero comorbidities / personal factors that will impact the outcome / POC LOW Complexity : 1-2 Standardized tests and measures addressing body structure, function, activity limitation and / or participation in recreation  LOW Complexity : Stable, uncomplicated  LOW Complexity : FOTO score of       Based on the above components, the patient evaluation is determined to be of the following complexity level: LOW     Pain Rating:  None reported    Activity Tolerance:   Good      After treatment patient left in no apparent distress:   Patient left on toilet for BM, call bell in place. Instructed to call nurse when ready to get up. Nurse Jeff Min notified. COMMUNICATION/EDUCATION:   The patients plan of care was discussed with: Registered Nurse and . Fall prevention education was provided and the patient/caregiver indicated understanding., Patient/family have participated as able in goal setting and plan of care.  and Patient/family agree to work toward stated goals and plan of care.     Thank you for this referral.  Vidal Comas   Time Calculation: 30 mins

## 2019-08-02 NOTE — DISCHARGE INSTRUCTIONS
Discharge Instructions       PATIENT ID: Kady Bowden  MRN: [id number]   YOB: 1945    DATE OF ADMISSION: 7/29/2019  8:53 AM    DATE OF DISCHARGE: 8/2/2019    PRIMARY CARE PROVIDER: Cleo Ron MD     ATTENDING PHYSICIAN: Neha Maxwell MD  DISCHARGING PROVIDER: Kaylie Morgan MD    To contact this individual call [phone] and ask the  to page. If unavailable ask to be transferred the Adult Hospitalist Department. DISCHARGE DIAGNOSES suspect septic Arthritis- of knee    CONSULTATIONS: IP CONSULT TO ORTHOPEDIC SURGERY    PROCEDURES/SURGERIES: Procedure(s):  INCISION AND DRAINAGE RIGHT KNEE  POSSIBLE POLY EXCHANGE/     FOLLOW UP APPOINTMENTS:   Follow-up Information     Follow up With Specialties Details Why Contact Info    Cleo Ron MD [facility] In 1 week  [address]      Isela Allen MD Orthopedic Surgery In 10 days  [address]  [phone]             ADDITIONAL CARE RECOMMENDATIONS: follow up with PCP and Orthopedics    DIET: Resume previous diet    DISCHARGE MEDICATIONS:      · It is important that you take the medication exactly as they are prescribed. · Keep your medication in the bottles provided by the pharmacist and keep a list of the medication names, dosages, and times to be taken in your wallet. · Do not take other medications without consulting your doctor. NOTIFY YOUR PHYSICIAN FOR ANY OF THE FOLLOWING:   Fever over 101 degrees for 24 hours. Chest pain, shortness of breath, fever, chills, nausea, vomiting, diarrhea, change in mentation, falling, weakness, bleeding. Severe pain or pain not relieved by medications. Or, any other signs or symptoms that you may have questions about. It was our pleasure to help take care of you and we hope you get well very soon.     DISPOSITION:    Home With:   OT  PT  [facility]  RN       SNF/Inpatient Rehab/LTAC   x Independent/assisted living    Hospice    Other:     CDMP Checked:   Yes x     PROBLEM LIST Updated:  Yes x     Signed:   Bonilla Rose MD  8/2/2019  9:45 AM

## 2019-08-02 NOTE — DISCHARGE SUMMARY
Discharge Summary       PATIENT ID: Israel Thomas  MRN: [id number]   YOB: 1945    DATE OF ADMISSION: 7/29/2019  8:53 AM    DATE OF DISCHARGE: 8/2/2019   PRIMARY CARE PROVIDER: Elton Spencer MD     ATTENDING PHYSICIAN: Alyssa Torres MD  DISCHARGING PROVIDER: Alyssa Torres MD    To contact this individual call [phone] and ask the  to page. If unavailable ask to be transferred the Adult Hospitalist Department. CONSULTATIONS: IP CONSULT TO ORTHOPEDIC SURGERY    PROCEDURES/SURGERIES: Procedure(s):  INCISION AND DRAINAGE RIGHT KNEE  POSSIBLE POLY EXCHANGE/ RM [address] COURSE:   Admitted with R knee pain, knee arthrocentesis, one time had coag negative staph, other times was -ve. Orthopedics evaluated him and had plans for surgery, however couldn't do it for couple of days, and patient later refused to have surgery and wants to take a chance in case there is no infection. Will f/u with orthopedics op    Risk of Re-Admission: high  DISCHARGE DIAGNOSES / PLAN:      #. Right TKR Septic arthritis. - S/P arthrocentesis, cx NGTD- ID following, cont vanc and Cefepime- will stop abx on discharge- Moderately elevated white count in synovial fluid  - Ortho following, plans for surgery, patient declined surgery and wants to dc without abx, and will f/u with orthopedics, if there is no infection in knee he will get better next few days, if there is infection he will relapse with possible sepsis and present back to hospital. Cleared by PT/OT for dischare.      #. Hypertension.  control, cont current regimen  #. Possible Viral conjunctivitis: artifical tears prn, monitor  #. CAD: stable. no acute issues, cont GDMT  #. BPH: Continue home medications and continue to monitor.  Reassess as needed.      FOLLOW UP APPOINTMENTS:    Follow-up Information     Follow up With Specialties Details Why Contact Info    Elton Spencer MD Family Practice In 1 week  [address] [place], [place] [address]pietro Hartman MD Orthopedic Surgery In 10 days  [address]  [phone]           ADDITIONAL CARE RECOMMENDATIONS:  Follow up with PCP and Orthopedics    DIET: Resume previous diet    ACTIVITY: Activity as tolerated    DISCHARGE MEDICATIONS:  Current Discharge Medication List      START taking these medications    Details   polyethylene glycol (MIRALAX) 17 gram packet Take 1 Packet by mouth daily for 30 days. Qty: 30 Packet, Refills: 0         CONTINUE these medications which have NOT CHANGED    Details   aspirin 81 mg chewable tablet Take 81 mg by mouth daily. ergocalciferol (VITAMIN D2) 50,000 unit capsule Take 50,000 Units by mouth Every Thursday. calcium-vitamin D (OYSTER SHELL) 500 mg(1,250mg) -200 unit per tablet Take 1 Tab by mouth daily. dutasteride (AVODART) 0.5 mg capsule Take 0.5 mg by mouth daily. mecobal/levomefolat Ca/B6 phos (FOLTANX PO) Take  by mouth daily. levocetirizine (XYZAL) 5 mg tablet Take 5 mg by mouth nightly. metoprolol succinate (TOPROL-XL) 25 mg XL tablet Take 25 mg by mouth daily. montelukast (SINGULAIR) 10 mg tablet Take 10 mg by mouth nightly. coenzyme Q-10 (CO Q-10) 200 mg capsule Take 200 mg by mouth daily. magnesium oxide (MAG-OX) 400 mg tablet Take 400 mg by mouth daily. tamsulosin (FLOMAX) 0.4 mg capsule Take 0.4 mg by mouth daily. acetaminophen (TYLENOL EXTRA STRENGTH) 500 mg tablet Take 1,000 mg by mouth every six (6) hours as needed for Pain.      mometasone-formoterol (DULERA) 100-5 mcg/actuation HFA inhaler Take 1 Puff by inhalation two (2) times a day. albuterol-ipratropium (DUO-NEB) 2.5 mg-0.5 mg/3 ml nebu 3 mL by Nebulization route every six (6) hours as needed. cyanocobalamin (VITAMIN B-12) 1,000 mcg tablet Take 1,000 mcg by mouth every seven (7) days.       Omeprazole delayed release (PRILOSEC D/R) 20 mg tablet Take 20 mg by mouth two (2) times a day. cranberry fruit extract (CRANBERRY CONCENTRATE PO) Take 360 mg by mouth nightly. NOTIFY YOUR PHYSICIAN FOR ANY OF THE FOLLOWING:   Fever over 101 degrees for 24 hours. Chest pain, shortness of breath, fever, chills, nausea, vomiting, diarrhea, change in mentation, falling, weakness, bleeding. Severe pain or pain not relieved by medications. Or, any other signs or symptoms that you may have questions about. DISPOSITION:    Home With:   OT  PT  HH  RN       Long term SNF/Inpatient Rehab   x Independent/assisted living    Hospice    Other:     PATIENT CONDITION AT DISCHARGE:     Functional status    Poor     Deconditioned     Independent      Cognition     Lucid     Forgetful     Dementia      Catheters/lines (plus indication)    Argueta     PICC     PEG     None      Code status     Full code     DNR      PHYSICAL EXAMINATION AT DISCHARGE:  Patient seen and examined at bedside, Condition stable, explained discharge and follow up plans. /60   Pulse 81   Temp 97.9 °F (36.6 °C)   Resp 16   Ht 6' 0.84\" (1.85 m)   Wt 115.6 kg (254 lb 12.8 oz)   SpO2 92%   BMI 33.77 kg/m²   General:  Alert, oriented, No acute distress  Resp:  No accessory muscle use, Good AE.   Neuro:  Grossly normal, no focal neuro deficits, follows commands   CHRONIC MEDICAL DIAGNOSES:  Problem List as of 8/2/2019 Date Reviewed: 8/16/2018          Codes Class Noted - Resolved    Septic arthritis ([address] ICD-10-CM: M00.9  ICD-9-CM: 711.00  7/30/2019 - Present        UTI (urinary tract infection) ICD-10-CM: N39.0  ICD-9-CM: 599.0  8/16/2018 - Present        Leukocytosis ICD-10-CM: D72.829  ICD-9-CM: 288.60  8/16/2018 - Present        Fever ICD-10-CM: R50.9  ICD-9-CM: 780.60  8/16/2018 - Present        Sepsis ([address] ICD-10-CM: A41.9  ICD-9-CM: 038.9, 995.91  8/16/2018 - Present        Arthritis ICD-10-CM: M19.90  ICD-9-CM: 716.90  Unknown - Present        Asthma ICD-10-CM: J45.909  ICD-9-CM: 493.90  Unknown - Present    Overview Signed 8/16/2018  6:50 PM by Stu Scanlon MD     R/T ENVIRONMENTAL ALLERGIES; INHALER USE DAILY             GERD (gastroesophageal reflux disease) ICD-10-CM: K21.9  ICD-9-CM: 530.81  Unknown - Present        History of BPH ICD-10-CM: Z87.438  ICD-9-CM: V13.89  Unknown - Present        History of kidney stones ICD-10-CM: Z87.442  ICD-9-CM: V13.01  Unknown - Present        Hx: UTI (urinary tract infection) ICD-10-CM: Z87.440  ICD-9-CM: V13.02  Unknown - Present        Hypertension ICD-10-CM: I10  ICD-9-CM: 401.9  Unknown - Present        Lymphedema ICD-10-CM: I89.0  ICD-9-CM: 457.1  Unknown - Present        SANTA on CPAP ICD-10-CM: G47.33, Z99.89  ICD-9-CM: 327.23, V46.8  Unknown - Present        Primary osteoarthritis of right knee ICD-10-CM: M17.11  ICD-9-CM: 715.16  8/3/2018 - Present        CAD (coronary artery disease) ICD-10-CM: I25.10  ICD-9-CM: 414.00  1/1/2007 - Present    Overview Signed 8/16/2018  6:50 PM by Stu Scanlon MD     MI             RESOLVED: Sleep apnea ICD-10-CM: G47.30  ICD-9-CM: 780.57  Unknown - 8/16/2018    Overview Signed 8/16/2018  6:50 PM by Stu Scanlon MD     CPAP                   37 minutes were spent with the patient on counseling and coordination of care.     Signed:   Masoud Welch MD  8/2/2019  9:45 AM

## 2019-08-03 LAB
BACTERIA SPEC CULT: NORMAL
SERVICE CMNT-IMP: NORMAL

## 2019-08-04 NOTE — CONSULTS
[address]    Name:  Yonathan Blanton  MR#:  [id number]  :  1945  ACCOUNT #:  [de-identified]  DATE OF SERVICE:  2019    LOCATION:  The patient was in room 554. ATTENDING PHYSICIAN:  Tim Cardona MD    CHIEF COMPLAINT:  Right knee pain. REASON FOR CONSULTATION:  Possible infection of the right total knee replacement. The consultation was requested by Dr. Lilia Gomez. The history is obtained by interviewing the patient, discussing the case with Dr. Mliana Matamoros, and review of the medical records. HISTORY OF PRESENT ILLNESS:  This patient is a 31-year-old white male who underwent a right total knee replacement on 2018. He reports that this healed well with no signs of infection. He has done well since then. However, on 2019, he had the rather sudden onset of pain and swelling in the right knee. This has persisted ever since then. He went to his local emergency room early on the morning of 2019. An arthrocentesis was performed and apparently they are about 40,000 white cells in the fluid. Cultures were sent. The patient does not recall receiving any antibiotics while he was at his local hospital, but he is not certain of that. He was then sent to [facility] and seen in the emergency room. A repeat arthrocentesis was performed and the fluid did not reveal 31,765 white blood cells with 89% neutrophils and negative crystal exam.  The Gram stain did not reveal any organisms. I note that the patient was then started on a combination of vancomycin and cefepime. We are now asked to see him for further evaluation. At the present time, the patient is resting fairly comfortably, but still notes pain and swelling and stiffness in the right knee. He does not recall having fever, chills, or sweats. He does not recall being on any oral antibiotics in the past couple of months.   He does not recall any recent infections elsewhere in the body.    PAST MEDICAL HISTORY:  Tobacco, used to smoke 2-3 packs per day, but he quit in 1985. Alcohol none. MEDICATIONS PRIOR TO ADMISSION:  1. Albuterol and ipratropium by nebulizer. 2.  Aspirin 81 mg daily. 3.  Calcium and vitamin D supplement. 4.  Coenzyme Q10 200 mg daily. 5.  Cranberry concentrate 360 mg p.o. nightly. 6.  Vitamin B12 1000 mcg p.o. every 7 days. 7.  Avodart 0.5 mg p.o. daily. 8.  Vitamin D2 50,000 units every Thursday. 9.  Levocetirizine (Xyzal) 5 mg p.o. nightly. 10.  Magnesium oxide 400 mg p.o. daily. 11.  Foltanx one p.o. daily. 12.  Metoprolol XL 25 mg daily. 13.  Dulera inhaler. 14.  Singulair 10 mg p.o. nightly. 15.  Omeprazole 20 mg p.o. b.i.d.  16.  Flomax 0.4 mg p.o. daily. ALLERGIES:  SULFA - RASH. ILLNESSES:  1. History of venous stasis disease. 2.  Nephrolithiasis. 3.  Organic heart disease - ischemic heart disease with previous placement and two coronary artery stents. PAST SURGICAL HISTORY:  1. Right total knee replacement on 08/07/2018.  2.  Surgery on his right femur in the past for fracture. 3.  Bilateral cataract extractions. SOCIAL HISTORY:  The patient lives alone. He owns a local convenient store. FAMILY HISTORY:  Otherwise unremarkable. REVIEW OF SYSTEMS  CONSTITUTIONAL:  No fever, chills, sweats. HEENT:  No headache or visual change and no sore throat. LYMPHATIC:  No history of adenopathy. DERMATOLOGIC:  No recent rashes. RESPIRATORY:  No cough, pleuritic chest pain, hemoptysis. CARDIOVASCULAR:  Chest pain. GASTROINTESTINAL:  No nausea, vomiting, diarrhea. GENITOURINARY:  No dysuria. MUSCULOSKELETAL:  No history of myalgias or arthralgias. NEUROLOGIC:  No history of seizure or stroke. PHYSICAL EXAMINATION:  GENERAL:  No acute distress. VITAL SIGNS:  Blood pressure 126/80, heart rate 74, respiratory rate 16. He is afebrile. HEENT:  Sclerae and conjunctivae benign, oropharynx benign. NECK:  Supple.   NODES:  No adenopathy. SKIN:  No rashes. LUNGS:  Clear to A and P.  CARDIOVASCULAR:  Regular rate and rhythm. ABDOMEN:  Soft, nontender, no masses, bowel sounds are present. BACK:  No CVA tenderness. EXTREMITIES:  Left leg unremarkable. Right leg, there is visible mild swelling of the right knee and there is a small effusion detected on exam.  I really do not detect any warmth or overlying erythema. There is some stiffness and pain with passive range of motion. LABORATORY DATA:  CBC reveals a hemoglobin of 14.2, white count 11,300, and sedimentation rate is 7. The chemistry data reveals BUN 15, creatinine 0.82. Liver function tests were essentially normal.  C-reactive protein was 5.34 mg/dL (0.00-0.60 mg/dL). Analysis of joint fluid aspirated at [facility] in the emergency room from the right knee revealed 31,765 white blood cells with 89% neutrophils. Crystal exam was negative. The Gram stain revealed white cells, but no organisms. IMPRESSION:  1. Probable infection of the right total knee replacement:  The patient had the acute onset of pain and swelling of the right knee on 07/27/2019. He does not recall any injury to the knee. He was not aware of any recent infections. He had an arthrocentesis done at his local hospital in [place] and apparently revealed about 40,000 white cells. Cultures were sent. He did not think that he received any antibiotics. The sample obtained in the [facility] Emergency Room has 31,000 white blood cells. I note the patient is now on vancomycin and cefepime. We will await the cultures. I discussed options for treatment with the patient. One option would be surgical I and D with poly exchange with a prolonged IV antibiotics, followed by oral antibiotics hoping to salvage the joint. The second option would be resection arthroplasty. We will await for the culture results first and then make further decisions.   2.  History of venous insufficiency of lower extremities. 3.  Organic heart disease - ischemic heart disease. 4.  History of nephrolithiasis. PLAN:  I will leave him on vancomycin and cefepime pending cultures. Thank you much for letting us see this patient with you.       Lauren Bond MD      [email or web address]  D:  08/03/2019 16:37  T:  08/03/2019 16:48  JOB #:  [id number]  CC:  MD Harvinder Pryor MD

## 2019-08-12 LAB
BACTERIA SPEC CULT: ABNORMAL
GRAM STN SPEC: ABNORMAL
GRAM STN SPEC: ABNORMAL
SERVICE CMNT-IMP: ABNORMAL

## 2021-05-05 ENCOUNTER — TELEPHONE (OUTPATIENT)
Dept: SURGERY | Age: 76
End: 2021-05-05

## 2021-05-05 NOTE — TELEPHONE ENCOUNTER
Received referral from [facility]. Called pt 5/4 - picked up - announced name of practice and reason for calling and line dropped. Called again today - rings and rings - no VM. [address]  [facility] cannot reach pt to schedule. Will send message to [employer].

## 2021-05-11 ENCOUNTER — OFFICE VISIT (OUTPATIENT)
Dept: SURGERY | Age: 76
End: 2021-05-11
Payer: MEDICARE

## 2021-05-11 VITALS
HEART RATE: 81 BPM | BODY MASS INDEX: 37.25 KG/M2 | SYSTOLIC BLOOD PRESSURE: 124 MMHG | TEMPERATURE: 97.8 F | HEIGHT: 72 IN | WEIGHT: 275 LBS | RESPIRATION RATE: 22 BRPM | OXYGEN SATURATION: 95 % | DIASTOLIC BLOOD PRESSURE: 74 MMHG

## 2021-05-11 DIAGNOSIS — D49.7 ADRENAL NEOPLASM: ICD-10-CM

## 2021-05-11 DIAGNOSIS — G47.33 OBSTRUCTIVE SLEEP APNEA: ICD-10-CM

## 2021-05-11 DIAGNOSIS — K40.90 NON-RECURRENT INGUINAL HERNIA WITHOUT OBSTRUCTION OR GANGRENE, UNSPECIFIED LATERALITY: Primary | ICD-10-CM

## 2021-05-11 DIAGNOSIS — I10 HYPERTENSION, UNSPECIFIED TYPE: ICD-10-CM

## 2021-05-11 PROCEDURE — 99204 OFFICE O/P NEW MOD 45 MIN: CPT | Performed by: SURGERY

## 2021-05-11 RX ORDER — CIPROFLOXACIN 500 MG/1
500 TABLET ORAL 2 TIMES DAILY
COMMUNITY
End: 2021-05-14 | Stop reason: ALTCHOICE

## 2021-05-11 RX ORDER — NITROFURANTOIN (MACROCRYSTALS) 100 MG/1
100 CAPSULE ORAL DAILY
COMMUNITY
Start: 2021-05-06 | End: 2021-05-14 | Stop reason: ALTCHOICE

## 2021-05-11 RX ORDER — TRIAMCINOLONE ACETONIDE 55 UG/1
2 SPRAY, METERED NASAL DAILY
COMMUNITY
End: 2022-09-26

## 2021-05-11 RX ORDER — FUROSEMIDE 40 MG/1
40 TABLET ORAL DAILY
COMMUNITY
End: 2021-05-14

## 2021-05-11 RX ORDER — DEXAMETHASONE 1 MG/1
TABLET ORAL
Qty: 1 TAB | Refills: 0 | Status: SHIPPED | OUTPATIENT
Start: 2021-05-11 | End: 2021-05-14

## 2021-05-11 NOTE — PROGRESS NOTES
1. Have you been to the ER, urgent care clinic since your last visit? Hospitalized since your last visit? No    2. Have you seen or consulted any other health care providers outside of the [address] [place] [address] since your last visit? Include any pap smears or colon screening.  No

## 2021-05-11 NOTE — PROGRESS NOTES
[facility] History and Physical    Chief Complaint: Inguinal hernia    History of Present Illness: Rashmi Carpenter is a 76 y.o. male who presents as a referral from Dr. Monika Salas with concerns for a left inguinal hernia. The patient has had a recent CT scan to evaluate for kidney stones. In addition to the kidney stones this revealed a left inguinal hernia and some inguinal lymphadenopathy, as well as a left adrenal nodule. The patient has not noticed any bulge in his groin. He has not noticed any groin pain. He is not sure if he has had a hernia in the past, but thinks someone might have told him that he did. He denies sweating and flushing. He denies weight gain or loss. He denies fevers. He does have issues with sleep apnea, for which he uses a CPAP.     Past Medical History:   Diagnosis Date    Arthritis     Asthma     R/T ENVIRONMENTAL ALLERGIES; INHALER USE DAILY    CAD (coronary artery disease) 2007    MI    GERD (gastroesophageal reflux disease)     History of BPH     History of kidney stones     Hx: UTI (urinary tract infection)     Hypertension     Lymphedema     SANTA on CPAP        Past Surgical History:   Procedure Laterality Date    HX HEART CATHETERIZATION  2007, 2016    STENTS X3  (INGRID--DR NUNEZ)    HX LITHOTRIPSY      2-3X    HX ORTHOPAEDIC Right 1964    FEMUR ORIF  (DR PRESCOTT)    HX TONSILLECTOMY      HX UROLOGICAL      CYSTO    VT BREAST SURGERY PROCEDURE UNLISTED Right     EXCISION OF BREAST LUMP (BENIGN)    VASCULAR SURGERY PROCEDURE UNLIST Right     LEG VEIN BYPASS (INGRID)       Social History     Socioeconomic History    Marital status:      Spouse name: Not on file    Number of children: Not on file    Years of education: Not on file    Highest education level: Not on file   Occupational History    Not on file   Social Needs    Financial resource strain: Not on file    Food insecurity     Worry: Not on file Inability: Not on file    Transportation needs     Medical: Not on file     Non-medical: Not on file   Tobacco Use    Smoking status: Former Smoker     Packs/day: 2.00     Years: 20.00     Pack years: 40.00     Quit date: 5     Years since quittin.3    Smokeless tobacco: Never Used   Substance and Sexual Activity    Alcohol use: No    Drug use: No    Sexual activity: Not on file   Lifestyle    Physical activity     Days per week: Not on file     Minutes per session: Not on file    Stress: Not on file   Relationships    Social connections     Talks on phone: Not on file     Gets together: Not on file     Attends Yazidi service: Not on file     Active member of club or organization: Not on file     Attends meetings of clubs or organizations: Not on file     Relationship status: Not on file    Intimate partner violence     Fear of current or ex partner: Not on file     Emotionally abused: Not on file     Physically abused: Not on file     Forced sexual activity: Not on file   Other Topics Concern    Not on file   Social History Narrative    Not on file       Family History   Problem Relation Age of Onset    Lung Disease Mother     Asthma Mother     Cancer Father         LUNG    Cancer Brother         BRAIN    Heart Disease Brother     Heart Disease Brother     Heart Disease Brother     Anesth Problems Neg Hx          Current Outpatient Medications:     furosemide (LASIX) 40 mg tablet, Take 40 mg by mouth daily. , Disp: , Rfl:     nitrofurantoin (MACRODANTIN) 100 mg capsule, 100 mg daily. , Disp: , Rfl:     ciprofloxacin HCl (Cipro) 500 mg tablet, Take 500 mg by mouth two (2) times a day., Disp: , Rfl:     aspirin 81 mg chewable tablet, Take 81 mg by mouth daily. , Disp: , Rfl:     tamsulosin (FLOMAX) 0.4 mg capsule, Take 0.4 mg by mouth daily. , Disp: , Rfl:     ergocalciferol (VITAMIN D2) 50,000 unit capsule, Take 50,000 Units by mouth Every Thursday. , Disp: , Rfl:     acetaminophen (TYLENOL EXTRA STRENGTH) 500 mg tablet, Take 1,000 mg by mouth every six (6) hours as needed for Pain., Disp: , Rfl:     calcium-vitamin D (OYSTER SHELL) 500 mg(1,250mg) -200 unit per tablet, Take 1 Tab by mouth daily. , Disp: , Rfl:     mometasone-formoterol (DULERA) 100-5 mcg/actuation HFA inhaler, Take 1 Puff by inhalation two (2) times a day., Disp: , Rfl:     dutasteride (AVODART) 0.5 mg capsule, Take 0.5 mg by mouth daily. , Disp: , Rfl:     mecobal/levomefolat Ca/B6 phos (FOLTANX PO), Take  by mouth daily. , Disp: , Rfl:     albuterol-ipratropium (DUO-NEB) 2.5 mg-0.5 mg/3 ml nebu, 3 mL by Nebulization route every six (6) hours as needed. , Disp: , Rfl:     levocetirizine (XYZAL) 5 mg tablet, Take 5 mg by mouth nightly., Disp: , Rfl:     metoprolol succinate (TOPROL-XL) 25 mg XL tablet, Take 25 mg by mouth daily. , Disp: , Rfl:     montelukast (SINGULAIR) 10 mg tablet, Take 10 mg by mouth nightly., Disp: , Rfl:     cyanocobalamin (VITAMIN B-12) 1,000 mcg tablet, Take 1,000 mcg by mouth every seven (7) days. , Disp: , Rfl:     coenzyme Q-10 (CO Q-10) 200 mg capsule, Take 200 mg by mouth daily. , Disp: , Rfl:     Omeprazole delayed release (PRILOSEC D/R) 20 mg tablet, Take 20 mg by mouth two (2) times a day., Disp: , Rfl:     cranberry fruit extract (CRANBERRY CONCENTRATE PO), Take 360 mg by mouth nightly., Disp: , Rfl:     magnesium oxide (MAG-OX) 400 mg tablet, Take 400 mg by mouth daily. , Disp: , Rfl:     Allergies   Allergen Reactions    Sulfa (Sulfonamide Antibiotics) Rash       ROS   Constitutional: negative  Ears, Nose, Mouth, Throat, and Face: Negative  Respiratory: Negative  Cardiovascular: Negative  Gastrointestinal: negative  Genitourinary: Negative  Integument/Breast: Negative  Hematologic/Lymphatic: Negative  Behavioral/Psychiatric: Negative  Allergic/Immunologic: Negative      Physical Exam:     Visit Vitals  /74 (BP 1 Location: Left upper arm, BP Patient Position: Sitting, BP Cuff Size: Adult)   Pulse 81   Temp 97.8 °F (36.6 °C) (Oral)   Resp 22   Ht 6' (1.829 m)   Wt 275 lb (124.7 kg)   SpO2 95%   BMI 37.30 kg/m²       General - alert and oriented, no apparent distress  HEENT - NC/AT. No scleral icterus  Pulm - CTAB, normal inspiratory effort  CV - RRR, no M/R/G  Abd - obese,soft, NT, ND. Reducible left inguinal hernia present  Ext - warm, well perfused, no edema  Lymphatics - no cervical, supraclavicular or inguinal adenopathy noted. Skin - supple, no rashes  Psychiatric - normal affect, good mood    Labs  none    Imaging  CT scan at an outside facility on 5/4/2021 shows a nonobstructed left inguinal hernia containing a loop of the sigmoid descending colon, multiple nonobstructing renal calculi. And a 13 mm left adrenal adenoma with high washout, consistent with benign adenoma. A right inguinal hernia was also noted. Assessment: Mali Aggarwal is a 76 y.o. male with a left inguinal hernia that is asymptomatic. His recent CT also shows a right inguinal hernia, but I did not feel this on exam today. In addition, he has an incidentally found left adrenal nodule. He does not believe this has been worked up in the past.    Recommendations:     1. We discussed treatment for the inguinal hernias which would be surgical repair, versus observation. As the hernias are asymptomatic at this time, the patient would like to go with observation for the time being. He thinks he may be more amenable to considering elective repair after the Covid pandemic has cooled down some in several months. With regards to the adrenal nodule,  hormonal work-up is indicated, especially when considering the patient has hypertension and obstructive sleep apnea. I have ordered screening labs for adrenal hormone production. There is concern for excess hormone production, I will discuss this further with the patient.   Otherwise he will follow up with me in 6 months or so if he desires to have hernia repair. 45 mins of time was spent with the patient of which > 50% of the time involved face-to-face counseling of the patient regarding the proposed treatment plan. Vicky Mclaughlin MD  5/11/2021    CC:  MD Lani Oleary MD

## 2021-05-14 ENCOUNTER — OFFICE VISIT (OUTPATIENT)
Dept: FAMILY MEDICINE CLINIC | Age: 76
End: 2021-05-14
Payer: MEDICARE

## 2021-05-14 VITALS
RESPIRATION RATE: 20 BRPM | WEIGHT: 271 LBS | TEMPERATURE: 97.8 F | HEART RATE: 77 BPM | DIASTOLIC BLOOD PRESSURE: 84 MMHG | OXYGEN SATURATION: 96 % | HEIGHT: 73 IN | SYSTOLIC BLOOD PRESSURE: 126 MMHG | BODY MASS INDEX: 35.92 KG/M2

## 2021-05-14 DIAGNOSIS — I10 ESSENTIAL HYPERTENSION: ICD-10-CM

## 2021-05-14 DIAGNOSIS — Z13.9 SCREENING DUE: ICD-10-CM

## 2021-05-14 DIAGNOSIS — Z23 ENCOUNTER FOR IMMUNIZATION: ICD-10-CM

## 2021-05-14 DIAGNOSIS — Z87.891 SMOKING HISTORY: Primary | ICD-10-CM

## 2021-05-14 PROCEDURE — G8510 SCR DEP NEG, NO PLAN REQD: HCPCS | Performed by: STUDENT IN AN ORGANIZED HEALTH CARE EDUCATION/TRAINING PROGRAM

## 2021-05-14 PROCEDURE — G8536 NO DOC ELDER MAL SCRN: HCPCS | Performed by: STUDENT IN AN ORGANIZED HEALTH CARE EDUCATION/TRAINING PROGRAM

## 2021-05-14 PROCEDURE — G8417 CALC BMI ABV UP PARAM F/U: HCPCS | Performed by: STUDENT IN AN ORGANIZED HEALTH CARE EDUCATION/TRAINING PROGRAM

## 2021-05-14 PROCEDURE — 99204 OFFICE O/P NEW MOD 45 MIN: CPT | Performed by: STUDENT IN AN ORGANIZED HEALTH CARE EDUCATION/TRAINING PROGRAM

## 2021-05-14 PROCEDURE — G8428 CUR MEDS NOT DOCUMENT: HCPCS | Performed by: STUDENT IN AN ORGANIZED HEALTH CARE EDUCATION/TRAINING PROGRAM

## 2021-05-14 PROCEDURE — 3017F COLORECTAL CA SCREEN DOC REV: CPT | Performed by: STUDENT IN AN ORGANIZED HEALTH CARE EDUCATION/TRAINING PROGRAM

## 2021-05-14 PROCEDURE — G8754 DIAS BP LESS 90: HCPCS | Performed by: STUDENT IN AN ORGANIZED HEALTH CARE EDUCATION/TRAINING PROGRAM

## 2021-05-14 PROCEDURE — G8752 SYS BP LESS 140: HCPCS | Performed by: STUDENT IN AN ORGANIZED HEALTH CARE EDUCATION/TRAINING PROGRAM

## 2021-05-14 PROCEDURE — 1101F PT FALLS ASSESS-DOCD LE1/YR: CPT | Performed by: STUDENT IN AN ORGANIZED HEALTH CARE EDUCATION/TRAINING PROGRAM

## 2021-05-14 NOTE — PROGRESS NOTES
1. Have you been to the ER, urgent care clinic since your last visit? Hospitalized since your last visit? no    2. Have you seen or consulted any other health care providers outside of the [address] [place] [address] since your last visit? Including any pap smears or colon screening.   [facility]: will request records    Reviewed record in preparation for visit and have necessary documentation    Pt did not bring medication to office visit for review    Information was given to pt on Advanced Directives, Living Will  Opportunity was given for questions    Goals that were addressed and/or need to be completed during or after this appointment include   Health Maintenance Due   Topic Date Due    Hepatitis C Screening  Never done    COVID-19 Vaccine (1) Never done    DTaP/Tdap/Td series (1 - Tdap) Never done    Lipid Screen  Never done    Shingrix Vaccine Age 50> (1 of 2) Never done    Colorectal Cancer Screening Combo  Never done    AAA Screening 73-67 YO Male Smoking Patients  Never done    Pneumococcal 65+ years (1 of 1 - PPSV23) Never done    Medicare Yearly Exam  Never done

## 2021-05-14 NOTE — PROGRESS NOTES
Subjective  Cierra Turner is an 76 y.o. male here with no complaints. Wants to establish as he was not happy with his previous PCP. Allergies - reviewed: Allergies   Allergen Reactions    Sulfa (Sulfonamide Antibiotics) Rash         Medications - reviewed:   Current Outpatient Medications   Medication Sig    varicella-zoster recombinant, PF, (SHINGRIX) 50 mcg/0.5 mL susr injection 0.5 mL by IntraMUSCular route once for 1 dose.  diph,pertuss,acel,,tetanus vac,PF, (ADACEL) 2 Lf-(2.5-5-3-5 mcg)-5Lf/0.5 mL syrg vaccine 0.5 mL by IntraMUSCular route once for 1 dose.  triamcinolone (NASACORT AQ) 55 mcg nasal inhaler 2 Sprays by Both Nostrils route daily.  aspirin 81 mg chewable tablet Take 81 mg by mouth daily.  tamsulosin (FLOMAX) 0.4 mg capsule Take 0.4 mg by mouth daily.  ergocalciferol (VITAMIN D2) 50,000 unit capsule Take 50,000 Units by mouth Every Thursday.  acetaminophen (TYLENOL EXTRA STRENGTH) 500 mg tablet Take 1,000 mg by mouth every six (6) hours as needed for Pain.  calcium-vitamin D (OYSTER SHELL) 500 mg(1,250mg) -200 unit per tablet Take 1 Tab by mouth daily.  mometasone-formoterol (DULERA) 100-5 mcg/actuation HFA inhaler Take 1 Puff by inhalation two (2) times a day.  dutasteride (AVODART) 0.5 mg capsule Take 0.5 mg by mouth daily.  mecobal/levomefolat Ca/B6 phos (FOLTANX PO) Take  by mouth daily.  albuterol-ipratropium (DUO-NEB) 2.5 mg-0.5 mg/3 ml nebu 3 mL by Nebulization route every six (6) hours as needed.  metoprolol succinate (TOPROL-XL) 25 mg XL tablet Take 25 mg by mouth daily.  coenzyme Q-10 (CO Q-10) 200 mg capsule Take 200 mg by mouth daily.  Omeprazole delayed release (PRILOSEC D/R) 20 mg tablet Take 20 mg by mouth two (2) times a day.  levocetirizine (XYZAL) 5 mg tablet Take 5 mg by mouth nightly.  montelukast (SINGULAIR) 10 mg tablet Take 10 mg by mouth nightly.     cranberry fruit extract (CRANBERRY CONCENTRATE PO) Take 360 mg by mouth nightly. No current facility-administered medications for this visit.           Past Medical History - reviewed:  Past Medical History:   Diagnosis Date    Arthritis     Asthma     R/T ENVIRONMENTAL ALLERGIES; INHALER USE DAILY    CAD (coronary artery disease)     MI    GERD (gastroesophageal reflux disease)     History of BPH     History of kidney stones     Hx: UTI (urinary tract infection)     Hypertension     Lymphedema     SANTA on CPAP          Past Surgical History - reviewed:   Past Surgical History:   Procedure Laterality Date    HX HEART CATHETERIZATION  ,     STENTS X3  (INGRID--DR NUNEZ)    HX LITHOTRIPSY      2-3X    HX ORTHOPAEDIC Right 1964    FEMUR ORIF  (DR PRESCOTT)    HX TONSILLECTOMY      HX UROLOGICAL      CYSTO    MN BREAST SURGERY PROCEDURE UNLISTED Right     EXCISION OF BREAST LUMP (BENIGN)    VASCULAR SURGERY PROCEDURE UNLIST Right     LEG VEIN BYPASS (INGRID)         Social History - reviewed:  Social History     Socioeconomic History    Marital status:      Spouse name: Not on file    Number of children: Not on file    Years of education: Not on file    Highest education level: Not on file   Occupational History    Not on file   Social Needs    Financial resource strain: Not on file    Food insecurity     Worry: Not on file     Inability: Not on file    Transportation needs     Medical: Not on file     Non-medical: Not on file   Tobacco Use    Smoking status: Former Smoker     Packs/day: 2.00     Years: 20.00     Pack years: 40.00     Quit date:      Years since quittin.3    Smokeless tobacco: Never Used   Substance and Sexual Activity    Alcohol use: No    Drug use: No    Sexual activity: Not on file   Lifestyle    Physical activity     Days per week: Not on file     Minutes per session: Not on file    Stress: Not on file   Relationships    Social connections     Talks on phone: Not on file     Gets together: Not on file     Attends Yazdanism service: Not on file     Active member of club or organization: Not on file     Attends meetings of clubs or organizations: Not on file     Relationship status: Not on file    Intimate partner violence     Fear of current or ex partner: Not on file     Emotionally abused: Not on file     Physically abused: Not on file     Forced sexual activity: Not on file   Other Topics Concern    Not on file   Social History Narrative    Not on file         Family History - reviewed:  Family History   Problem Relation Age of Onset    Lung Disease Mother     Asthma Mother     Cancer Father         LUNG    Cancer Brother         BRAIN    Heart Disease Brother     Heart Disease Brother     Heart Disease Brother     Anesth Problems Neg Hx          Immunizations - reviewed: There is no immunization history on file for this patient. ROS  No fever, chills  No vision or hearing changes  No CP, palpitations  No SOB, cough  No polyuria, polydipsia  No abd pain, N, V, diarrhea, constipation  No dysuria  No numbness, weakness, tingling, HA  Lower Endorses bilat extremity swelling  No Rash itching hives  Sometimes feels tired and like there's a weight he's carrying around. Denies mood problem. Sleeps well other than waking up to urinate.       Physical Exam  Visit Vitals  /84 (BP 1 Location: Right arm, BP Patient Position: Sitting)   Pulse 77   Temp 97.8 °F (36.6 °C) (Oral)   Resp 20   Ht 6' 1\" (1.854 m)   Wt 271 lb (122.9 kg)   SpO2 96%   BMI 35.75 kg/m²       General appearance - alert, well appearing, and in no distress  Eyes - pupils equal and reactive, extraocular eye movements intact  Mouth - mucous membranes moist, pharynx normal without lesions  Neck - supple, no significant adenopathy  Chest - clear to auscultation, no wheezes, rales or rhonchi, symmetric air entry  Heart - normal rate, regular rhythm, normal S1, S2, no murmurs, rubs, clicks or gallops  Abdomen - soft, nontender, nondistended, no masses or organomegaly  Neurological - alert, oriented, normal speech, no focal findings or movement disorder noted  Extremities - peripheral pulses normal, 3+ bilat pitting LE edema to the level of the knee  Skin - normal coloration and turgor, no rashes, no suspicious skin lesions noted      Assessment/Plan    ICD-10-CM ICD-9-CM    1. Smoking history  Z87.891 V15.82 US EXAM SCREENING AAA   2. Screening due  Z13.9 V82.9 LIPID PANEL      CBC W/O DIFF      METABOLIC PANEL, COMPREHENSIVE      HEPATITIS C AB      REFERRAL TO GASTROENTEROLOGY      HEPATITIS C AB      METABOLIC PANEL, COMPREHENSIVE      CBC W/O DIFF      LIPID PANEL   3. Encounter for immunization  Z23 V03.89 varicella-zoster recombinant, PF, (SHINGRIX) 50 mcg/0.5 mL susr injection      diph,pertuss,acel,,tetanus vac,PF, (ADACEL) 2 Lf-(2.5-5-3-5 mcg)-5Lf/0.5 mL syrg vaccine      DISCONTINUED: varicella-zoster recombinant, PF, (SHINGRIX) 50 mcg/0.5 mL susr injection      DISCONTINUED: diph,pertuss,acel,,tetanus vac,PF, (ADACEL) 2 Lf-(2.5-5-3-5 mcg)-5Lf/0.5 mL syrg vaccine   4. Essential hypertension  I10 401.9 LIPID PANEL      CBC W/O DIFF      METABOLIC PANEL, COMPREHENSIVE      HEPATITIS C AB      HEPATITIS C AB      METABOLIC PANEL, COMPREHENSIVE      CBC W/O DIFF      LIPID PANEL     History of smoking: Given patients age and history AAA screening is indicated  - Aortic US ordered    Screening labs: No recent lipid panel on file. Hep C screening never performed. - Collect lipid panel, CBC, CMP, hep C    Bilat LE edema: Likely d/t noncompliance with diuretics. He hasn't taken lasix in years  - Follow up at medicare wellness visit    Encounter for immunization: Patient due for shingrix and TDaP vaccine. Denied pneumococcus  - Shingrix and TDaP script sent to pharmacy  - Flu out of season    Patient due for colonoscopy: Last colonoscopy 15 years ago.  Not on file  - Colonoscopy ordered    Adrenal incidentaloma: Mentioned in recent surgery note. Said imaging not in file. Labs ordered by surgery  - Will collect all ordered labs today    Patient due for medicare wellness visit:  - Will schedule visit where some of these screening results can be followed up on. I have discussed the diagnosis with the patient and the intended plan as seen in the above orders. Patient verbalized understanding of the plan and agrees with the plan. The patient has received an after-visit summary and questions were answered concerning future plans. I have discussed medication side effects and warnings with the patient as well. Informed patient to return to the office if new symptoms arise.         Nabor Ruiz MD  Family Medicine Resident

## 2021-05-15 LAB
ALBUMIN SERPL-MCNC: 3.6 G/DL (ref 3.5–5)
ALBUMIN/GLOB SERPL: 1.1 {RATIO} (ref 1.1–2.2)
ALP SERPL-CCNC: 122 U/L (ref 45–117)
ALT SERPL-CCNC: 24 U/L (ref 12–78)
ANION GAP SERPL CALC-SCNC: 5 MMOL/L (ref 5–15)
AST SERPL-CCNC: 13 U/L (ref 15–37)
BILIRUB SERPL-MCNC: 0.4 MG/DL (ref 0.2–1)
BUN SERPL-MCNC: 20 MG/DL (ref 6–20)
BUN/CREAT SERPL: 24 (ref 12–20)
CALCIUM SERPL-MCNC: 8.7 MG/DL (ref 8.5–10.1)
CHLORIDE SERPL-SCNC: 112 MMOL/L (ref 97–108)
CHOLEST SERPL-MCNC: 108 MG/DL
CO2 SERPL-SCNC: 23 MMOL/L (ref 21–32)
CREAT SERPL-MCNC: 0.84 MG/DL (ref 0.7–1.3)
ERYTHROCYTE [DISTWIDTH] IN BLOOD BY AUTOMATED COUNT: 15.2 % (ref 11.5–14.5)
GLOBULIN SER CALC-MCNC: 3.2 G/DL (ref 2–4)
GLUCOSE SERPL-MCNC: 106 MG/DL (ref 65–100)
HCT VFR BLD AUTO: 47.9 % (ref 36.6–50.3)
HCV AB SERPL QL IA: NONREACTIVE
HCV COMMENT,HCGAC: NORMAL
HDLC SERPL-MCNC: 41 MG/DL
HDLC SERPL: 2.6 {RATIO} (ref 0–5)
HGB BLD-MCNC: 14.9 G/DL (ref 12.1–17)
LDLC SERPL CALC-MCNC: 27 MG/DL (ref 0–100)
MCH RBC QN AUTO: 27.3 PG (ref 26–34)
MCHC RBC AUTO-ENTMCNC: 31.1 G/DL (ref 30–36.5)
MCV RBC AUTO: 87.9 FL (ref 80–99)
NRBC # BLD: 0 K/UL (ref 0–0.01)
NRBC BLD-RTO: 0 PER 100 WBC
PLATELET # BLD AUTO: 215 K/UL (ref 150–400)
PMV BLD AUTO: 11.1 FL (ref 8.9–12.9)
POTASSIUM SERPL-SCNC: 4.4 MMOL/L (ref 3.5–5.1)
PROT SERPL-MCNC: 6.8 G/DL (ref 6.4–8.2)
RBC # BLD AUTO: 5.45 M/UL (ref 4.1–5.7)
SODIUM SERPL-SCNC: 140 MMOL/L (ref 136–145)
TRIGL SERPL-MCNC: 200 MG/DL (ref ?–150)
VLDLC SERPL CALC-MCNC: 40 MG/DL
WBC # BLD AUTO: 8.7 K/UL (ref 4.1–11.1)

## 2021-05-21 ENCOUNTER — DOCUMENTATION ONLY (OUTPATIENT)
Dept: FAMILY MEDICINE CLINIC | Age: 76
End: 2021-05-21

## 2021-05-21 NOTE — PROGRESS NOTES
Reviewed documentation from [facility], [place], operated by [facility] urology. Most importantly CT of abdomen and pelvis:    Nephrolithiasis: Bilateral. Largest in left kidney 4mm. No sizes noted in right kidney.  - Management per urology  - Patient already on flomax   - Follow expectantly (if urology does not address)    Renal cysts of right kidney: Bosniak 1 and 2.  - Management per urology  - Follow up with US in 6 months (if urology does not address)    Left Adrenal incidentaloma: 13mm with high washout (84%).  Most likely benign thought must rule out subclinical cushings, aldosteronoma and pheo given patients hypertension.  - Repeat imaging in 12 months  - Patient has no known primary malignancy elsewhere and therefore FNA is not indicated  - No concerning features - excision not indicated   - Will collect plasma fractionated metanephrines to rule out pheo  - Will collect PRA and plasma aldosterone  - Will collect baseline dehydroepiandrosterone sulfate (DHEAS) and then 1mg overnight dexamethasone suppression test (DST) to rule out subclinical cushings    Steven Wong MD

## 2021-05-24 ENCOUNTER — TELEPHONE (OUTPATIENT)
Dept: FAMILY MEDICINE CLINIC | Age: 76
End: 2021-05-24

## 2021-05-24 NOTE — TELEPHONE ENCOUNTER
----- Message from Fausto Beard sent at 5/24/2021  2:07 PM EDT -----  Regarding: Dr. Ryan Elliott / telephone  General Message/Vendor Calls    Caller's first and last name:Allen Mccrary      Reason for call: pt is concerned as he had to go to ER last night due to sever UTI. Callback required yes/no and why:      Best contact number(s):[phone]      Details to clarify the request: Pt is urinating bright red and is concerned if it's the medication or not. As he was given Azo and and  a strong antibiotic that starts w/a C. Would like to speak w/nurse.     Fausto Beard

## 2021-05-25 ENCOUNTER — OFFICE VISIT (OUTPATIENT)
Dept: FAMILY MEDICINE CLINIC | Age: 76
End: 2021-05-25
Payer: MEDICARE

## 2021-05-25 VITALS
HEART RATE: 87 BPM | DIASTOLIC BLOOD PRESSURE: 76 MMHG | SYSTOLIC BLOOD PRESSURE: 119 MMHG | HEIGHT: 73 IN | BODY MASS INDEX: 35.52 KG/M2 | RESPIRATION RATE: 20 BRPM | TEMPERATURE: 97.8 F | WEIGHT: 268 LBS | OXYGEN SATURATION: 92 %

## 2021-05-25 DIAGNOSIS — R60.0 LOWER EXTREMITY EDEMA: ICD-10-CM

## 2021-05-25 DIAGNOSIS — E27.8 ADRENAL INCIDENTALOMA (HCC): Primary | ICD-10-CM

## 2021-05-25 PROBLEM — E78.5 HYPERLIPIDEMIA: Status: ACTIVE | Noted: 2021-05-25

## 2021-05-25 PROBLEM — G47.33 OBSTRUCTIVE SLEEP APNEA SYNDROME: Status: ACTIVE | Noted: 2018-12-10

## 2021-05-25 PROCEDURE — G8536 NO DOC ELDER MAL SCRN: HCPCS | Performed by: STUDENT IN AN ORGANIZED HEALTH CARE EDUCATION/TRAINING PROGRAM

## 2021-05-25 PROCEDURE — G8417 CALC BMI ABV UP PARAM F/U: HCPCS | Performed by: STUDENT IN AN ORGANIZED HEALTH CARE EDUCATION/TRAINING PROGRAM

## 2021-05-25 PROCEDURE — G8510 SCR DEP NEG, NO PLAN REQD: HCPCS | Performed by: STUDENT IN AN ORGANIZED HEALTH CARE EDUCATION/TRAINING PROGRAM

## 2021-05-25 PROCEDURE — G8754 DIAS BP LESS 90: HCPCS | Performed by: STUDENT IN AN ORGANIZED HEALTH CARE EDUCATION/TRAINING PROGRAM

## 2021-05-25 PROCEDURE — G8428 CUR MEDS NOT DOCUMENT: HCPCS | Performed by: STUDENT IN AN ORGANIZED HEALTH CARE EDUCATION/TRAINING PROGRAM

## 2021-05-25 PROCEDURE — 3017F COLORECTAL CA SCREEN DOC REV: CPT | Performed by: STUDENT IN AN ORGANIZED HEALTH CARE EDUCATION/TRAINING PROGRAM

## 2021-05-25 PROCEDURE — 1101F PT FALLS ASSESS-DOCD LE1/YR: CPT | Performed by: STUDENT IN AN ORGANIZED HEALTH CARE EDUCATION/TRAINING PROGRAM

## 2021-05-25 PROCEDURE — 99214 OFFICE O/P EST MOD 30 MIN: CPT | Performed by: STUDENT IN AN ORGANIZED HEALTH CARE EDUCATION/TRAINING PROGRAM

## 2021-05-25 PROCEDURE — G8752 SYS BP LESS 140: HCPCS | Performed by: STUDENT IN AN ORGANIZED HEALTH CARE EDUCATION/TRAINING PROGRAM

## 2021-05-25 RX ORDER — NITROGLYCERIN 0.4 MG/1
TABLET SUBLINGUAL
COMMUNITY

## 2021-05-25 RX ORDER — ROSUVASTATIN CALCIUM 10 MG/1
TABLET, COATED ORAL
COMMUNITY

## 2021-05-25 RX ORDER — FUROSEMIDE 20 MG/1
20 TABLET ORAL DAILY
Qty: 30 TABLET | Refills: 0 | Status: SHIPPED | OUTPATIENT
Start: 2021-05-25 | End: 2021-06-23

## 2021-05-25 RX ORDER — FUROSEMIDE 40 MG/1
TABLET ORAL
COMMUNITY
Start: 2020-11-06 | End: 2021-05-25

## 2021-05-25 RX ORDER — CEPHALEXIN 500 MG/1
500 CAPSULE ORAL 3 TIMES DAILY
COMMUNITY
Start: 2021-05-23 | End: 2021-08-04 | Stop reason: ALTCHOICE

## 2021-05-25 NOTE — PROGRESS NOTES
Subjective  Anel Ley is an 76 y.o. male who presents for follow up on incidentaloma and LE edema as well as follow up on his ER visit. Patient states he didn't feel well and was taken to ER via EMS. Diagnosed with UTI and sent home the same day with antibiotics. He reports he feels much better now and that his UG symptoms are back to baseline. He had restarted taking lasix 40mg every day since his last visit here at [facility], though since his ER visit he has stopped taking it as he believes it may have contributed to his UTI. He had lost 5 pounds. Allergies - reviewed: Allergies   Allergen Reactions    Sulfa (Sulfonamide Antibiotics) Rash         Medications - reviewed:   Current Outpatient Medications   Medication Sig    rosuvastatin (CRESTOR) 10 mg tablet rosuvastatin 10 mg tablet   TAKE 1 TABLET BY MOUTH AT BEDTIME    cephALEXin (KEFLEX) 500 mg capsule     nitroglycerin (NITROSTAT) 0.4 mg SL tablet nitroglycerin 0.4 mg sublingual tablet    furosemide (LASIX) 20 mg tablet Take 1 Tablet by mouth daily.  triamcinolone (NASACORT AQ) 55 mcg nasal inhaler 2 Sprays by Both Nostrils route daily.  aspirin 81 mg chewable tablet Take 81 mg by mouth daily.  tamsulosin (FLOMAX) 0.4 mg capsule Take 0.4 mg by mouth daily.  ergocalciferol (VITAMIN D2) 50,000 unit capsule Take 50,000 Units by mouth Every Thursday.  acetaminophen (TYLENOL EXTRA STRENGTH) 500 mg tablet Take 1,000 mg by mouth every six (6) hours as needed for Pain.  calcium-vitamin D (OYSTER SHELL) 500 mg(1,250mg) -200 unit per tablet Take 1 Tab by mouth daily.  mometasone-formoterol (DULERA) 100-5 mcg/actuation HFA inhaler Take 1 Puff by inhalation two (2) times a day.  dutasteride (AVODART) 0.5 mg capsule Take 0.5 mg by mouth daily.  mecobal/levomefolat Ca/B6 phos (FOLTANX PO) Take  by mouth daily.  albuterol-ipratropium (DUO-NEB) 2.5 mg-0.5 mg/3 ml nebu 3 mL by Nebulization route every six (6) hours as needed.  levocetirizine (XYZAL) 5 mg tablet Take 5 mg by mouth nightly.  metoprolol succinate (TOPROL-XL) 25 mg XL tablet Take 25 mg by mouth daily.  montelukast (SINGULAIR) 10 mg tablet Take 10 mg by mouth nightly.  coenzyme Q-10 (CO Q-10) 200 mg capsule Take 200 mg by mouth daily.  Omeprazole delayed release (PRILOSEC D/R) 20 mg tablet Take 20 mg by mouth two (2) times a day.  cranberry fruit extract (CRANBERRY CONCENTRATE PO) Take 360 mg by mouth nightly. No current facility-administered medications for this visit.          Past Medical History - reviewed:  Past Medical History:   Diagnosis Date    Arthritis     Asthma     R/T ENVIRONMENTAL ALLERGIES; INHALER USE DAILY    CAD (coronary artery disease)     MI    GERD (gastroesophageal reflux disease)     History of BPH     History of kidney stones     Hx: UTI (urinary tract infection)     Hypertension     Lymphedema     SANTA on CPAP          Past Surgical History - reviewed:   Past Surgical History:   Procedure Laterality Date    HX HEART CATHETERIZATION  ,     STENTS X3  (INGRID--DR NUNEZ)    HX LITHOTRIPSY      2-3X    HX ORTHOPAEDIC Right 1964    FEMUR ORIF  (DR PRESCOTT)    HX TONSILLECTOMY      HX UROLOGICAL      CYSTO    CT BREAST SURGERY PROCEDURE UNLISTED Right     EXCISION OF BREAST LUMP (BENIGN)    VASCULAR SURGERY PROCEDURE UNLIST Right     LEG VEIN BYPASS (INGRID)         Social History - reviewed:  Social History     Socioeconomic History    Marital status:      Spouse name: Not on file    Number of children: Not on file    Years of education: Not on file    Highest education level: Not on file   Occupational History    Not on file   Tobacco Use    Smoking status: Former Smoker     Packs/day: 2.00     Years: 20.00     Pack years: 40.00     Quit date:      Years since quittin.4    Smokeless tobacco: Never Used   Vaping Use    Vaping Use: Never used   Substance and Sexual Activity    Alcohol use: No    Drug use: No    Sexual activity: Not on file   Other Topics Concern    Not on file   Social History Narrative    Not on file     Social Determinants of Health     Financial Resource Strain:     Difficulty of Paying Living Expenses:    Food Insecurity:     Worried About Running Out of Food in the Last Year:     [address] in the Last Year:    Transportation Needs:     Lack of Transportation (Medical):  Lack of Transportation (Non-Medical):    Physical Activity:     Days of Exercise per Week:     Minutes of Exercise per Session:    Stress:     Feeling of Stress :    Social Connections:     Frequency of Communication with Friends and Family:     Frequency of Social Gatherings with Friends and Family:     Attends [employer] Services:     Active Member of Clubs or Organizations:     Attends Club or Organization Meetings:     Marital Status:    Intimate Partner Violence:     Fear of Current or Ex-Partner:     Emotionally Abused:     Physically Abused:     Sexually Abused:          Family History - reviewed:  Family History   Problem Relation Age of Onset    Lung Disease Mother     Asthma Mother     Cancer Father         LUNG    Cancer Brother         BRAIN    Heart Disease Brother     Heart Disease Brother     Heart Disease Brother     Anesth Problems Neg Hx          Immunizations - reviewed: There is no immunization history on file for this patient. ROS  No fever, chills, night sweats, weight loss, dysuria, CP, SOB. Endorses difficulty urinating and dribbling which is his baseline.  Patient denies cough and congestion      Physical Exam  Visit Vitals  /76   Pulse 87   Temp 97.8 °F (36.6 °C) (Oral)   Resp 20   Ht 6' 1\" (1.854 m)   Wt 268 lb (121.6 kg)   SpO2 92%   BMI 35.36 kg/m²       General appearance - alert, well appearing, and in no distress  Eyes - pupils equal and reactive, extraocular eye movements intact  Mouth - mucous membranes moist, pharynx normal without lesions  Neck - supple, no significant adenopathy  Chest - Bibasilar crackles. Otherwise CTAB  Heart - normal rate, regular rhythm, normal S1, S2, no murmurs, rubs, clicks or gallops  Abdomen - soft, nontender, nondistended, no masses or organomegaly  Neurological - alert, oriented, normal speech, no focal findings or movement disorder noted  Skin - 3+ bilat LE edema      Assessment/Plan    ICD-10-CM ICD-9-CM    1. Adrenal incidentaloma (HCC)  E27.8 255.8 DHEA SULFATE      METANEPHRINES, PLASMA      RENIN ACTIVITY      ALDOSTERONE      CANCELED: METANEPHRINES, PLASMA      CANCELED: RENIN ACTIVITY      CANCELED: ALDOSTERONE      CANCELED: DHEA SULFATE   2. Lower extremity edema  R60.0 782.3 furosemide (LASIX) 20 mg tablet      METABOLIC PANEL, BASIC     Left Adrenal incidentaloma: 13mm with high washout (84%). Most likely benign thought must rule out subclinical cushings, aldosteronoma and pheo given patients hypertension.  - Repeat imaging in 12 months  - Patient has no known primary malignancy elsewhere and therefore FNA is not indicated  - No concerning features - excision not indicated  - Will arrange for am fasting lab visit to collect below labs:  · Plasma fractionated metanephrines to rule out pheo  · PRA and plasma aldosterone  · Baseline dehydroepiandrosterone sulfate (DHEAS) and then 1mg overnight dexamethasone suppression test (DST) to rule out subclinical cushings     Bilateral lower extremity edema: Patient was previous on lasix though he stopped taking. Fluid overloaded on exam.  - Start lasix 20mg every day  - Follow up with BMP in 1 month        I have discussed the diagnosis with the patient and the intended plan as seen in the above orders. Patient verbalized understanding of the plan and agrees with the plan. The patient has received an after-visit summary and questions were answered concerning future plans.   I have discussed medication side effects and warnings with the patient as well. Informed patient to return to the office if new symptoms arise.         Dede Curling, MD  Family Medicine Resident

## 2021-05-25 NOTE — PROGRESS NOTES
1. Have you been to the ER, urgent care clinic since your last visit? Hospitalized since your last visit? No    2. Have you seen or consulted any other health care providers outside of the [address] [place] [address] since your last visit? Include any pap smears or colon screening.  No  Reviewed record in preparation for visit and have necessary documentation  Pt did not bring medication to office visit for review    Goals that were addressed and/or need to be completed during or after this appointment include   Health Maintenance Due   Topic Date Due    COVID-19 Vaccine (1) Never done    DTaP/Tdap/Td series (1 - Tdap) Never done    Shingrix Vaccine Age 50> (1 of 2) Never done    Colorectal Cancer Screening Combo  Never done    AAA Screening 73-69 YO Male Smoking Patients  Never done    Pneumococcal 65+ years (1 of 1 - PPSV23) Never done    Medicare Yearly Exam  Never done

## 2021-05-26 ENCOUNTER — VIRTUAL VISIT (OUTPATIENT)
Dept: FAMILY MEDICINE CLINIC | Age: 76
End: 2021-05-26

## 2021-05-26 ENCOUNTER — TELEPHONE (OUTPATIENT)
Dept: FAMILY MEDICINE CLINIC | Age: 76
End: 2021-05-26

## 2021-05-26 NOTE — TELEPHONE ENCOUNTER
Pt's daughter called and said they never received a link or phone call for their visit today. The phone number given is not his number nor are they familiar with it. Pt's phone number is [phone]. This is the only number he has. Rescheduled for tomorrow.

## 2021-05-26 NOTE — PROGRESS NOTES
Sent pt doxy. me link but no response. Attempted to call patient but no response. Attempted to call pt again, but no response, left a voicemail. Pt will need to reschedule, unclear if visit was made for [address] or review medication which was in visit notes considering pt had [id number] clinic appointment yesterday.    Itzel Rangel MD

## 2021-05-27 ENCOUNTER — VIRTUAL VISIT (OUTPATIENT)
Dept: FAMILY MEDICINE CLINIC | Age: 76
End: 2021-05-27
Payer: MEDICARE

## 2021-05-27 DIAGNOSIS — N30.00 ACUTE CYSTITIS WITHOUT HEMATURIA: Primary | ICD-10-CM

## 2021-05-27 DIAGNOSIS — M79.89 LEG SWELLING: ICD-10-CM

## 2021-05-27 PROCEDURE — 99441 PR PHYS/QHP TELEPHONE EVALUATION 5-10 MIN: CPT | Performed by: FAMILY MEDICINE

## 2021-05-27 NOTE — PROGRESS NOTES
Sandra Duenas is a 76 y.o. male evaluated via Telephone on 21. Patient Identity confirmed by . Consent:  He and/or health care decision maker is aware that that he may receive a bill for this telephone service, depending on his insurance coverage, and has provided verbal consent to proceed: Yes    Physician Location: Office  Patient Location: Home  Nurse Assisting with Encounter: Troy Lisa LPN    Chief Complaint   Patient presents with    Medication Evaluation      Information gathered from patient and/or health care decision maker. HPI:   Patient called to review medications and reported multiple issues. Noting has improved from UTI with his Keflex taking TID and reporting some improved energy, though still has low energy. Noting leg swelling is persistent, but has started on Lasix for fluid retention and noting some weight loss already. Patient mentioned some issues with Kidney Stones as well, does reports stopping Tylenol PM.      Review of Systems   Constitutional: Positive for malaise/fatigue. Negative for chills and fever. HENT: Negative for congestion. Eyes: Negative for blurred vision. Respiratory: Negative for cough. Cardiovascular: Negative for chest pain. Gastrointestinal: Negative for nausea and vomiting. Limited Exam:  Due to this being a TeleHealth evaluation, many elements of the physical examination are unable to be assessed. Constitutional: Appears well-developed and well-nourished in no apparent distress   Mental status: Alert and awake, Oriented to person/place/time, Able to follow commands  Psychiatric: Normal affect, normal judgment/insight. No hallucinations     Current Outpatient Medications on File Prior to Visit   Medication Sig Dispense Refill    rosuvastatin (CRESTOR) 10 mg tablet rosuvastatin 10 mg tablet   TAKE 1 TABLET BY MOUTH AT BEDTIME      cephALEXin (KEFLEX) 500 mg capsule Take 500 mg by mouth three (3) times daily.       nitroglycerin (NITROSTAT) 0.4 mg SL tablet nitroglycerin 0.4 mg sublingual tablet      furosemide (LASIX) 20 mg tablet Take 1 Tablet by mouth daily. 30 Tablet 0    triamcinolone (NASACORT AQ) 55 mcg nasal inhaler 2 Sprays by Both Nostrils route daily.  aspirin 81 mg chewable tablet Take 81 mg by mouth daily.  tamsulosin (FLOMAX) 0.4 mg capsule Take 0.4 mg by mouth daily.  acetaminophen (TYLENOL EXTRA STRENGTH) 500 mg tablet Take 1,000 mg by mouth every six (6) hours as needed for Pain.  mometasone-formoterol (DULERA) 100-5 mcg/actuation HFA inhaler Take 1 Puff by inhalation two (2) times a day.  dutasteride (AVODART) 0.5 mg capsule Take 0.5 mg by mouth daily.  mecobal/levomefolat Ca/B6 phos (FOLTANX PO) Take  by mouth daily.  albuterol-ipratropium (DUO-NEB) 2.5 mg-0.5 mg/3 ml nebu 3 mL by Nebulization route every six (6) hours as needed.  levocetirizine (XYZAL) 5 mg tablet Take 5 mg by mouth nightly.  metoprolol succinate (TOPROL-XL) 25 mg XL tablet Take 25 mg by mouth daily.  montelukast (SINGULAIR) 10 mg tablet Take 10 mg by mouth nightly.  coenzyme Q-10 (CO Q-10) 200 mg capsule Take 200 mg by mouth daily.  Omeprazole delayed release (PRILOSEC D/R) 20 mg tablet Take 20 mg by mouth two (2) times a day.  cranberry fruit extract (CRANBERRY CONCENTRATE PO) Take 360 mg by mouth nightly.  ergocalciferol (VITAMIN D2) 50,000 unit capsule Take 50,000 Units by mouth Every Thursday. (Patient not taking: Reported on 5/27/2021)      calcium-vitamin D (OYSTER SHELL) 500 mg(1,250mg) -200 unit per tablet Take 1 Tab by mouth daily. (Patient not taking: Reported on 5/27/2021)       No current facility-administered medications on file prior to visit.         Allergies   Allergen Reactions    Sulfa (Sulfonamide Antibiotics) Rash        Patient Active Problem List    Diagnosis Date Noted    Hyperlipidemia 05/25/2021    Septic arthritis ([address] 07/30/2019    Obstructive sleep apnea syndrome 12/10/2018    UTI (urinary tract infection) 08/16/2018    Leukocytosis 08/16/2018    Fever 08/16/2018    Sepsis ([address] 08/16/2018    Arthritis     GERD (gastroesophageal reflux disease)     History of BPH     History of kidney stones     Hx: UTI (urinary tract infection)     Hypertension     Lymphedema     SANTA on CPAP     Primary osteoarthritis of right knee 08/03/2018    Arteriosclerosis of coronary artery 01/01/2007        Health Maintenance Due   Topic Date Due    COVID-19 Vaccine (1) Never done    DTaP/Tdap/Td series (1 - Tdap) Never done    Shingrix Vaccine Age 50> (1 of 2) Never done    Colorectal Cancer Screening Combo  Never done    AAA Screening 73-69 YO Male Smoking Patients  Never done    Pneumococcal 65+ years (1 of 1 - PPSV23) Never done    Medicare Yearly Exam  Never done        Assessment/Plan:  Details of this discussion including any medical advice provided: Appears to be improving overall and in good cheer. Advised to continue therapies as prescribed for now. Labs scheduled for tomorrow morning. ICD-10-CM ICD-9-CM    1. Acute cystitis without hematuria  N30.00 595.0    2. Leg swelling  M79.89 729.81      Follow-up and Dispositions    · Return if symptoms worsen or fail to improve. Total Time: minutes: 5-10 minutes was spent on telemedicine encounter discussing above problems and plans. Patient Problem list, medications, and Allergies were reviewed during this encounter. Pursuant to the emergency declaration under the [facility], [facility] waiver authority and the [employer] and [employer] Act, this Telephone Visit was conducted, with patient's consent, to reduce the patient's risk of exposure to COVID-19 and provide continuity of care for an established patient.      I affirm this is a Patient Initiated Episode with an Established Patient who has not had a related appointment within my department in the past 7 days or scheduled within the next 24 hours. Discussed diagnoses in detail with patient. Medication risks/benefits/side effects discussed with patient. All of the patient's questions were addressed. The patient understands and agrees with our plan of care. The patient knows to call back if they are unsure of or forget any changes we discussed today or if the symptoms change.     Note: not billable if this call serves to triage the patient into an appointment for the relevant concern    MD LESTER Perrin & CANDELARIO AMBRIZ [facility]  05/27/21

## 2021-05-27 NOTE — PROGRESS NOTES
Chief Complaint   Patient presents with    Medication Evaluation     1. Have you been to the ER, urgent care clinic since your last visit? Hospitalized since your last visit? No    2. Have you seen or consulted any other health care providers outside of the [address] [place] [address] since your last visit? Include any pap smears or colon screening.  No    Reviewed record in preparation for visit and have necessary documentation  Pt did not bring medication to office visit for review  opportunity was given for questions  Goals that were addressed and/or need to be completed during or after this appointment include     Health Maintenance Due   Topic Date Due    COVID-19 Vaccine (1) Never done    DTaP/Tdap/Td series (1 - Tdap) Never done    Shingrix Vaccine Age 50> (1 of 2) Never done    Colorectal Cancer Screening Combo  Never done    AAA Screening 73-69 YO Male Smoking Patients  Never done    Pneumococcal 65+ years (1 of 1 - PPSV23) Never done    Medicare Yearly Exam  Never done

## 2021-05-28 ENCOUNTER — LAB ONLY (OUTPATIENT)
Dept: FAMILY MEDICINE CLINIC | Age: 76
End: 2021-05-28

## 2021-05-28 DIAGNOSIS — E27.8 ADRENAL INCIDENTALOMA (HCC): ICD-10-CM

## 2021-05-28 DIAGNOSIS — R60.0 LOWER EXTREMITY EDEMA: ICD-10-CM

## 2021-05-28 LAB
ANION GAP SERPL CALC-SCNC: 7 MMOL/L (ref 5–15)
BUN SERPL-MCNC: 16 MG/DL (ref 6–20)
BUN/CREAT SERPL: 22 (ref 12–20)
CALCIUM SERPL-MCNC: 8.6 MG/DL (ref 8.5–10.1)
CHLORIDE SERPL-SCNC: 110 MMOL/L (ref 97–108)
CO2 SERPL-SCNC: 25 MMOL/L (ref 21–32)
CREAT SERPL-MCNC: 0.73 MG/DL (ref 0.7–1.3)
GLUCOSE SERPL-MCNC: 104 MG/DL (ref 65–100)
POTASSIUM SERPL-SCNC: 5 MMOL/L (ref 3.5–5.1)
SODIUM SERPL-SCNC: 142 MMOL/L (ref 136–145)

## 2021-05-30 LAB — DHEA-S SERPL-MCNC: 31.6 UG/DL (ref 20.8–226.4)

## 2021-06-02 LAB
METANEPH FREE SERPL-MCNC: 30.6 PG/ML (ref 0–88)
NORMETANEPHRINE SERPL-MCNC: 117.2 PG/ML (ref 0–191.8)

## 2021-06-03 LAB
ALDOST SERPL-MCNC: 2.1 NG/DL (ref 0–30)
RENIN PLAS-CCNC: 1.43 NG/ML/HR (ref 0.17–5.38)

## 2021-06-04 ENCOUNTER — TELEPHONE (OUTPATIENT)
Dept: FAMILY MEDICINE CLINIC | Age: 76
End: 2021-06-04

## 2021-06-04 ENCOUNTER — VIRTUAL VISIT (OUTPATIENT)
Dept: FAMILY MEDICINE CLINIC | Age: 76
End: 2021-06-04
Payer: MEDICARE

## 2021-06-04 DIAGNOSIS — N30.01 ACUTE CYSTITIS WITH HEMATURIA: Primary | ICD-10-CM

## 2021-06-04 DIAGNOSIS — R35.0 URINARY FREQUENCY: ICD-10-CM

## 2021-06-04 LAB
BILIRUB UR QL STRIP: NORMAL
GLUCOSE UR-MCNC: NEGATIVE MG/DL
KETONES P FAST UR STRIP-MCNC: NORMAL MG/DL
PH UR STRIP: 5.5 [PH] (ref 4.6–8)
PROT UR QL STRIP: NORMAL
SP GR UR STRIP: 1.02 (ref 1–1.03)
UA UROBILINOGEN AMB POC: NORMAL (ref 0.2–1)
URINALYSIS CLARITY POC: CLEAR
URINALYSIS COLOR POC: YELLOW
URINE BLOOD POC: NORMAL
URINE LEUKOCYTES POC: NORMAL
URINE NITRITES POC: POSITIVE

## 2021-06-04 PROCEDURE — 3017F COLORECTAL CA SCREEN DOC REV: CPT | Performed by: STUDENT IN AN ORGANIZED HEALTH CARE EDUCATION/TRAINING PROGRAM

## 2021-06-04 PROCEDURE — 99213 OFFICE O/P EST LOW 20 MIN: CPT | Performed by: STUDENT IN AN ORGANIZED HEALTH CARE EDUCATION/TRAINING PROGRAM

## 2021-06-04 PROCEDURE — G8756 NO BP MEASURE DOC: HCPCS | Performed by: STUDENT IN AN ORGANIZED HEALTH CARE EDUCATION/TRAINING PROGRAM

## 2021-06-04 PROCEDURE — G8432 DEP SCR NOT DOC, RNG: HCPCS | Performed by: STUDENT IN AN ORGANIZED HEALTH CARE EDUCATION/TRAINING PROGRAM

## 2021-06-04 PROCEDURE — G8428 CUR MEDS NOT DOCUMENT: HCPCS | Performed by: STUDENT IN AN ORGANIZED HEALTH CARE EDUCATION/TRAINING PROGRAM

## 2021-06-04 PROCEDURE — 81003 URINALYSIS AUTO W/O SCOPE: CPT | Performed by: STUDENT IN AN ORGANIZED HEALTH CARE EDUCATION/TRAINING PROGRAM

## 2021-06-04 PROCEDURE — 1101F PT FALLS ASSESS-DOCD LE1/YR: CPT | Performed by: STUDENT IN AN ORGANIZED HEALTH CARE EDUCATION/TRAINING PROGRAM

## 2021-06-04 RX ORDER — CIPROFLOXACIN 500 MG/1
500 TABLET ORAL 2 TIMES DAILY
Qty: 14 TABLET | Refills: 0 | Status: SHIPPED | OUTPATIENT
Start: 2021-06-04 | End: 2021-06-11

## 2021-06-04 NOTE — PROGRESS NOTES
[address] Medicine Residency Attending Addendum:  Dr. Bjorn Gee MD,  the patient and I were not physically present during this encounter. The resident and I are concurrently monitoring the patient care through appropriate telecommunication technology. I discussed the findings, assessment and plan with the resident and agree with the resident's findings and plan as documented in the resident's note.       Harrison De La Rosa MD

## 2021-06-04 NOTE — PROGRESS NOTES
Padilla Betancur  76 y.o. male  1945  [address]  [id number]   [facility]:    Telemedicine Progress Note  Mary Prelsey MD       Encounter Date and Time: June 4, 2021 at 11:15 AM    Consent:  He and/or the health care decision maker is aware that that he may receive a bill for this telephone/video service, depending on his insurance coverage, and has provided verbal consent to proceed: Yes    Chief Complaint   Patient presents with    Bladder Infection     History of Present Illness   Padilla Betancur is a 76 y.o. male was evaluated by synchronous (real-time) audio-video technology from home. Pt reports recent UTI about 2 weeks ago that was successfully treated with Keflex, and now he is worried it has come back. Has had 24 hours of increased urinary frequency which feels like his prior UTI. No dysuria at this time. Denies any fever, chills, low back pain, ABD pain, blood in urine or stool, or N/V. Allergic to sulfa antibiotics. Review of Systems   Review of Systems   Constitutional: Negative for chills and fever. Respiratory: Negative for cough and shortness of breath. Gastrointestinal: Negative for abdominal pain, diarrhea, nausea and vomiting. Genitourinary: Positive for frequency. Negative for dysuria, flank pain and hematuria. Neurological: Negative for dizziness and headaches. Vitals/Objective:     General: alert, cooperative, no distress   Mental  status: mental status: alert, oriented to person, place, and time, normal mood, behavior, speech, dress, motor activity, and thought processes   Resp: resp: normal effort, no respiratory distress and not coughing   Neuro: neuro: no gross deficits   Skin: skin: no discoloration or lesions of concern on visible areas   Due to this being a TeleHealth evaluation, many elements of the physical examination are unable to be assessed.       Assessment and Plan:   Time-based coding, delete if not needed: I spent at least 15 minutes with this established patient, and >50% of the time was spent counseling and/or coordinating care regarding UTI    Rashmi Caprenter is a 76 y.o. male with UTI symptoms for past 24 hrs after recent UTI treated with Keflex, pt will come in to give urine sample then will prescribe antibiotic if looks like infection    1. Acute cystitis with hematuria - prior urine culture showed serratia marcescens 3 yrs ago, pan sensitive   - AMB POC URINALYSIS DIP STICK AUTO W/O MICRO  - CULTURE, URINE  ADDENDUM: POC urinalysis consistent with UTI will prescribe Cipro as recently on Keflex, allergic to sulfa, and >65yrs old so will avoid Macrobid  Results for orders placed or performed in visit on 06/04/21   AMB POC URINALYSIS DIP STICK AUTO W/O MICRO     Status: None   Result Value Ref Range Status    Color (UA POC) Yellow  Final    Clarity (UA POC) Clear  Final    Glucose (UA POC) Negative Negative Final    Bilirubin (UA POC) 1+ Negative Final    Ketones (UA POC) Trace Negative Final    Specific gravity (UA POC) 1.025 1.001 - 1.035 Final    Blood (UA POC) 2+ Negative Final    pH (UA POC) 5.5 4.6 - 8.0 Final    Protein (UA POC) 1+ Negative Final    Urobilinogen (UA POC) 1 mg/dL 0.2 - 1 Final    Nitrites (UA POC) Positive Negative Final    Leukocyte esterase (UA POC) 2+ Negative Final     -START ciprofloxacin HCl (CIPRO) 500 mg tablet; Take 1 Tablet by mouth two (2) times a day for 7 days. Dispense: 14 Tablet; Refill: 0  -Pt instructed not to take Cipro at same time as calcium tablet, documented in phone note  -Encouraging good PO hydration  -Return to clinic if symptoms worsen or do not improve  -Will f/u urine culture        Time spent in direct conversation with the patient to include medical condition(s) discussed, assessment and treatment plan:       We discussed the expected course, resolution and complications of the diagnosis(es) in detail.   Medication risks, benefits, costs, interactions, and alternatives were discussed as indicated. I advised him to contact the office if his condition worsens, changes or fails to improve as anticipated. He expressed understanding with the diagnosis(es) and plan. Patient understands that this encounter was a temporary measure, and the importance of further follow up and examination was emphasized. Patient verbalized understanding. Patient informed to follow up: Follow-up and Dispositions  ·   Return if symptoms worsen or fail to improve. Electronically Signed: Jamir Galvez MD    Providers location when delivering service (clinic, hospital, home): Clinic      ICD-10-CM ICD-9-CM    1. Acute cystitis with hematuria  N30.01 595.0 AMB POC URINALYSIS DIP STICK AUTO W/O MICRO      CULTURE, URINE      CULTURE, URINE      ciprofloxacin HCl (CIPRO) 500 mg tablet   2. Urinary frequency  R35.0 788.41          Pursuant to the emergency declaration under the [address] [place] [address], [facility] waiver authority and the Effective Measure and [employer] Act, this Virtual  Visit was conducted, with patient's consent, to reduce the patient's risk of exposure to COVID-19 and provide continuity of care for an established patient. Services were provided through a video synchronous discussion virtually to substitute for in-person clinic visit. History   Patients past medical, surgical and family histories were reviewed.     Past Medical History:   Diagnosis Date    Arthritis     Asthma     R/T ENVIRONMENTAL ALLERGIES; INHALER USE DAILY    CAD (coronary artery disease) 2007    MI    GERD (gastroesophageal reflux disease)     History of BPH     History of kidney stones     Hx: UTI (urinary tract infection)     Hypertension     Lymphedema     SANTA on CPAP      Past Surgical History:   Procedure Laterality Date    HX HEART CATHETERIZATION  2007, 2016    STENTS X3  (INGRID--DR Handy)    HX LITHOTRIPSY 2-3X    HX ORTHOPAEDIC Right 1964    FEMUR ORIF  (DR PRESCOTT)    HX TONSILLECTOMY      HX UROLOGICAL      CYSTO    ND BREAST SURGERY PROCEDURE UNLISTED Right     EXCISION OF BREAST LUMP (BENIGN)    VASCULAR SURGERY PROCEDURE UNLIST Right     LEG VEIN BYPASS (INGRID)     Family History   Problem Relation Age of Onset    Lung Disease Mother     Asthma Mother     Cancer Father         LUNG    Cancer Brother         BRAIN    Heart Disease Brother     Heart Disease Brother     Heart Disease Brother     Anesth Problems Neg Hx      Social History     Socioeconomic History    Marital status:      Spouse name: Not on file    Number of children: Not on file    Years of education: Not on file    Highest education level: Not on file   Occupational History    Not on file   Tobacco Use    Smoking status: Former Smoker     Packs/day: 2.00     Years: 20.00     Pack years: 40.00     Quit date:      Years since quittin.4    Smokeless tobacco: Never Used   Vaping Use    Vaping Use: Never used   Substance and Sexual Activity    Alcohol use: No    Drug use: No    Sexual activity: Not on file   Other Topics Concern    Not on file   Social History Narrative    Not on file     Social Determinants of Health     Financial Resource Strain:     Difficulty of Paying Living Expenses:    Food Insecurity:     Worried About Running Out of Food in the Last Year:     [address] in the Last Year:    Transportation Needs:     Lack of Transportation (Medical):      Lack of Transportation (Non-Medical):    Physical Activity:     Days of Exercise per Week:     Minutes of Exercise per Session:    Stress:     Feeling of Stress :    Social Connections:     Frequency of Communication with Friends and Family:     Frequency of Social Gatherings with Friends and Family:     Attends Islam Services:     Active Member of Clubs or Organizations:     Attends Club or Organization Meetings:    [address] [place] [address] Marital Status:    Intimate Partner Violence:     Fear of Current or Ex-Partner:     Emotionally Abused:     Physically Abused:     Sexually Abused:      Patient Active Problem List   Diagnosis Code    Primary osteoarthritis of right knee M17.11    UTI (urinary tract infection) N39.0    Leukocytosis D72.829    Fever R50.9    Sepsis ([address] A41.9    Arthritis M19.90    Obstructive sleep apnea syndrome G47.33    Arteriosclerosis of coronary artery I25.10    GERD (gastroesophageal reflux disease) K21.9    History of BPH Z87.438    History of kidney stones Z87.442    Hx: UTI (urinary tract infection) Z87.440    Hypertension I10    Lymphedema I89.0    SANTA on CPAP G47.33, Z99.89    Septic arthritis (HCC) M00.9    Hyperlipidemia E78.5          Current Medications/Allergies   Medications and Allergies reviewed:    Current Outpatient Medications   Medication Sig Dispense Refill    ciprofloxacin HCl (CIPRO) 500 mg tablet Take 1 Tablet by mouth two (2) times a day for 7 days. 14 Tablet 0    rosuvastatin (CRESTOR) 10 mg tablet rosuvastatin 10 mg tablet   TAKE 1 TABLET BY MOUTH AT BEDTIME      cephALEXin (KEFLEX) 500 mg capsule Take 500 mg by mouth three (3) times daily.  nitroglycerin (NITROSTAT) 0.4 mg SL tablet nitroglycerin 0.4 mg sublingual tablet      furosemide (LASIX) 20 mg tablet Take 1 Tablet by mouth daily. 30 Tablet 0    triamcinolone (NASACORT AQ) 55 mcg nasal inhaler 2 Sprays by Both Nostrils route daily.  aspirin 81 mg chewable tablet Take 81 mg by mouth daily.  tamsulosin (FLOMAX) 0.4 mg capsule Take 0.4 mg by mouth daily.  ergocalciferol (VITAMIN D2) 50,000 unit capsule Take 50,000 Units by mouth Every Thursday. (Patient not taking: Reported on 5/27/2021)      acetaminophen (TYLENOL EXTRA STRENGTH) 500 mg tablet Take 1,000 mg by mouth every six (6) hours as needed for Pain.       calcium-vitamin D (OYSTER SHELL) 500 mg(1,250mg) -200 unit per tablet Take 1 Tab by mouth daily. (Patient not taking: Reported on 5/27/2021)      mometasone-formoterol (DULERA) 100-5 mcg/actuation HFA inhaler Take 1 Puff by inhalation two (2) times a day.  dutasteride (AVODART) 0.5 mg capsule Take 0.5 mg by mouth daily.  mecobal/levomefolat Ca/B6 phos (FOLTANX PO) Take  by mouth daily.  albuterol-ipratropium (DUO-NEB) 2.5 mg-0.5 mg/3 ml nebu 3 mL by Nebulization route every six (6) hours as needed.  levocetirizine (XYZAL) 5 mg tablet Take 5 mg by mouth nightly.  metoprolol succinate (TOPROL-XL) 25 mg XL tablet Take 25 mg by mouth daily.  montelukast (SINGULAIR) 10 mg tablet Take 10 mg by mouth nightly.  coenzyme Q-10 (CO Q-10) 200 mg capsule Take 200 mg by mouth daily.  Omeprazole delayed release (PRILOSEC D/R) 20 mg tablet Take 20 mg by mouth two (2) times a day.  cranberry fruit extract (CRANBERRY CONCENTRATE PO) Take 360 mg by mouth nightly.        Allergies   Allergen Reactions    Sulfa (Sulfonamide Antibiotics) Rash

## 2021-06-04 NOTE — TELEPHONE ENCOUNTER
unfortunately the urine sample was disposed up before I could rum POC U/A. Lab collected urine culture.

## 2021-06-04 NOTE — TELEPHONE ENCOUNTER
Called pt back with lab results. POC UA consistent with a UTI, will send antibiotic to patient's pharmacy. Will f/u urine culture results. All questions answered, pt will try to  antibiotic and start taking tonight. Will take at least 2 hrs before and/or 6 hours after calcium tablet as can reduce absorption of quinolone.    Artemio Geronimo MD

## 2021-06-07 LAB
BACTERIA SPEC CULT: ABNORMAL
CC UR VC: ABNORMAL
SERVICE CMNT-IMP: ABNORMAL

## 2021-06-08 ENCOUNTER — TELEPHONE (OUTPATIENT)
Dept: FAMILY MEDICINE CLINIC | Age: 76
End: 2021-06-08

## 2021-06-08 DIAGNOSIS — E27.8 ADRENAL INCIDENTALOMA (HCC): Primary | ICD-10-CM

## 2021-06-08 NOTE — TELEPHONE ENCOUNTER
Called pt to discuss lab results. Urine cCx grew enterobacter aerogenes resistant to defazolin, cefoxitin, nitrofurantoin, and piperacillin/tazobactam. Pt is on ciprofloxacin which it is susceptible to. Will continue this antibiotic and pt to RTC if symptoms do not resolve or to go to ER if symptoms worsen. Pt says still having some urinary frequency, but not worse. All questions answered.    Nila Ron MD

## 2021-06-08 NOTE — PROGRESS NOTES
Labs reviewed. So far unremarkable. Patient will need 1mg overnight dexa suppression test with DHEAS to complete workup.     Sarah Levy MD

## 2021-06-08 NOTE — PROGRESS NOTES
Results noted. Enterobacter with multiple resistances but susceptible to ciprofloxacin so will continue this. Pt called with results, will RTC if symptoms do not resolve or worsen.      Sharon Burnham MD  PGY3 Family Medicine Resident

## 2021-06-09 NOTE — PROGRESS NOTES
Baseline labs for incidentaloma collected. Patient will need dexa supressed DHEAS.  Will refer to endocrine to arrange this and any possible further testing they may deem necessary.  - Endocrine referral placed  - Patient to be informed by nursing staff    Alba Boone MD

## 2021-06-10 ENCOUNTER — TELEPHONE (OUTPATIENT)
Dept: FAMILY MEDICINE CLINIC | Age: 76
End: 2021-06-10

## 2021-06-10 NOTE — TELEPHONE ENCOUNTER
Call made to pt. Pt made aware per Dr. Laura Razo: This is the gentleman with the adrenal incidentaloma. So far the endocrine workup has been negative. There is one test where he needs to take a dose of steroid at a certain time before bed and then get a lab drawn at a certain time in the morning. Little too complicated (and perhaps too tedious) for us to arrange so I've put in an endocrine referral. Could you call and let him know that I've referred him and he'll likely just need the one test but may need more depending on what they think. We've done a very thorough workup so it should just be the one. Pt verbalized understanding.

## 2021-06-14 ENCOUNTER — TELEPHONE (OUTPATIENT)
Dept: FAMILY MEDICINE CLINIC | Age: 76
End: 2021-06-14

## 2021-06-14 NOTE — TELEPHONE ENCOUNTER
----- Message from Blayne Spence sent at 6/14/2021 11:40 AM EDT -----  Regarding: Dr. Ender Myles Telephone  General Message/Vendor Calls    Caller's first and last name: Luis Miguel Zafar      Reason for call: Referral information. Callback required yes/no and why: yes      Best contact number(s): [phone]      Details to clarify the request: Caller stated the patient had a referral and they are unable to find out what the referral is for and would like to find this information out.     Blayne Spence

## 2021-06-22 DIAGNOSIS — R60.0 LOWER EXTREMITY EDEMA: ICD-10-CM

## 2021-06-23 RX ORDER — FUROSEMIDE 20 MG/1
20 TABLET ORAL DAILY
Qty: 30 TABLET | Refills: 0 | Status: SHIPPED | OUTPATIENT
Start: 2021-06-23 | End: 2021-07-26

## 2021-07-21 DIAGNOSIS — R60.0 LOWER EXTREMITY EDEMA: ICD-10-CM

## 2021-07-26 RX ORDER — FUROSEMIDE 20 MG/1
20 TABLET ORAL DAILY
Qty: 30 TABLET | Refills: 2
Start: 2021-07-26 | End: 2022-01-11

## 2021-07-27 ENCOUNTER — OFFICE VISIT (OUTPATIENT)
Dept: FAMILY MEDICINE CLINIC | Age: 76
End: 2021-07-27
Payer: MEDICARE

## 2021-07-27 VITALS
DIASTOLIC BLOOD PRESSURE: 78 MMHG | SYSTOLIC BLOOD PRESSURE: 112 MMHG | OXYGEN SATURATION: 94 % | WEIGHT: 267 LBS | HEART RATE: 86 BPM | TEMPERATURE: 98.5 F | BODY MASS INDEX: 35.39 KG/M2 | RESPIRATION RATE: 20 BRPM | HEIGHT: 73 IN

## 2021-07-27 DIAGNOSIS — M54.40 CHRONIC LOW BACK PAIN WITH SCIATICA, SCIATICA LATERALITY UNSPECIFIED, UNSPECIFIED BACK PAIN LATERALITY: ICD-10-CM

## 2021-07-27 DIAGNOSIS — R60.0 LOWER EXTREMITY EDEMA: ICD-10-CM

## 2021-07-27 DIAGNOSIS — M17.11 PRIMARY OSTEOARTHRITIS OF RIGHT KNEE: Primary | ICD-10-CM

## 2021-07-27 DIAGNOSIS — E66.01 SEVERE OBESITY (BMI 35.0-35.9 WITH COMORBIDITY) (HCC): ICD-10-CM

## 2021-07-27 DIAGNOSIS — G89.29 CHRONIC LOW BACK PAIN WITH SCIATICA, SCIATICA LATERALITY UNSPECIFIED, UNSPECIFIED BACK PAIN LATERALITY: ICD-10-CM

## 2021-07-27 PROCEDURE — G8752 SYS BP LESS 140: HCPCS | Performed by: STUDENT IN AN ORGANIZED HEALTH CARE EDUCATION/TRAINING PROGRAM

## 2021-07-27 PROCEDURE — G8510 SCR DEP NEG, NO PLAN REQD: HCPCS | Performed by: STUDENT IN AN ORGANIZED HEALTH CARE EDUCATION/TRAINING PROGRAM

## 2021-07-27 PROCEDURE — G8754 DIAS BP LESS 90: HCPCS | Performed by: STUDENT IN AN ORGANIZED HEALTH CARE EDUCATION/TRAINING PROGRAM

## 2021-07-27 PROCEDURE — G8417 CALC BMI ABV UP PARAM F/U: HCPCS | Performed by: STUDENT IN AN ORGANIZED HEALTH CARE EDUCATION/TRAINING PROGRAM

## 2021-07-27 PROCEDURE — 1101F PT FALLS ASSESS-DOCD LE1/YR: CPT | Performed by: STUDENT IN AN ORGANIZED HEALTH CARE EDUCATION/TRAINING PROGRAM

## 2021-07-27 PROCEDURE — G8536 NO DOC ELDER MAL SCRN: HCPCS | Performed by: STUDENT IN AN ORGANIZED HEALTH CARE EDUCATION/TRAINING PROGRAM

## 2021-07-27 PROCEDURE — G8428 CUR MEDS NOT DOCUMENT: HCPCS | Performed by: STUDENT IN AN ORGANIZED HEALTH CARE EDUCATION/TRAINING PROGRAM

## 2021-07-27 PROCEDURE — 99213 OFFICE O/P EST LOW 20 MIN: CPT | Performed by: STUDENT IN AN ORGANIZED HEALTH CARE EDUCATION/TRAINING PROGRAM

## 2021-07-27 NOTE — PROGRESS NOTES
1. Have you been to the ER, urgent care clinic since your last visit? Hospitalized since your last visit? No    2. Have you seen or consulted any other health care providers outside of the [address] [place] [address] since your last visit? Include any pap smears or colon screening.  No  Reviewed record in preparation for visit and have necessary documentation  Pt did not bring medication to office visit for review    Goals that were addressed and/or need to be completed during or after this appointment include   Health Maintenance Due   Topic Date Due    COVID-19 Vaccine (1) Never done    DTaP/Tdap/Td series (1 - Tdap) Never done    Shingrix Vaccine Age 50> (1 of 2) Never done    Pneumococcal 65+ years (1 of 1 - PPSV23) Never done    Medicare Yearly Exam  Never done     Pt declines all vaccines at this time

## 2021-07-28 LAB
ANION GAP SERPL CALC-SCNC: 8 MMOL/L (ref 5–15)
BUN SERPL-MCNC: 14 MG/DL (ref 6–20)
BUN/CREAT SERPL: 16 (ref 12–20)
CALCIUM SERPL-MCNC: 9.2 MG/DL (ref 8.5–10.1)
CHLORIDE SERPL-SCNC: 108 MMOL/L (ref 97–108)
CO2 SERPL-SCNC: 25 MMOL/L (ref 21–32)
CREAT SERPL-MCNC: 0.86 MG/DL (ref 0.7–1.3)
GLUCOSE SERPL-MCNC: 95 MG/DL (ref 65–100)
POTASSIUM SERPL-SCNC: 4.2 MMOL/L (ref 3.5–5.1)
SODIUM SERPL-SCNC: 141 MMOL/L (ref 136–145)

## 2021-08-10 RX ORDER — TAMSULOSIN HYDROCHLORIDE 0.4 MG/1
0.4 CAPSULE ORAL DAILY
Qty: 30 CAPSULE | Refills: 2 | Status: SHIPPED | OUTPATIENT
Start: 2021-08-10 | End: 2021-11-05

## 2021-08-18 ENCOUNTER — OFFICE VISIT (OUTPATIENT)
Dept: ENDOCRINOLOGY | Age: 76
End: 2021-08-18
Payer: MEDICARE

## 2021-08-18 VITALS
HEIGHT: 73 IN | HEART RATE: 94 BPM | SYSTOLIC BLOOD PRESSURE: 150 MMHG | OXYGEN SATURATION: 95 % | DIASTOLIC BLOOD PRESSURE: 89 MMHG | TEMPERATURE: 97.5 F | WEIGHT: 270.6 LBS | BODY MASS INDEX: 35.86 KG/M2

## 2021-08-18 DIAGNOSIS — D35.02 ADRENAL ADENOMA, LEFT: Primary | ICD-10-CM

## 2021-08-18 PROCEDURE — G8536 NO DOC ELDER MAL SCRN: HCPCS | Performed by: INTERNAL MEDICINE

## 2021-08-18 PROCEDURE — G8754 DIAS BP LESS 90: HCPCS | Performed by: INTERNAL MEDICINE

## 2021-08-18 PROCEDURE — 99204 OFFICE O/P NEW MOD 45 MIN: CPT | Performed by: INTERNAL MEDICINE

## 2021-08-18 PROCEDURE — G8427 DOCREV CUR MEDS BY ELIG CLIN: HCPCS | Performed by: INTERNAL MEDICINE

## 2021-08-18 PROCEDURE — G8510 SCR DEP NEG, NO PLAN REQD: HCPCS | Performed by: INTERNAL MEDICINE

## 2021-08-18 PROCEDURE — G8753 SYS BP > OR = 140: HCPCS | Performed by: INTERNAL MEDICINE

## 2021-08-18 PROCEDURE — G8417 CALC BMI ABV UP PARAM F/U: HCPCS | Performed by: INTERNAL MEDICINE

## 2021-08-18 PROCEDURE — 1101F PT FALLS ASSESS-DOCD LE1/YR: CPT | Performed by: INTERNAL MEDICINE

## 2021-08-18 NOTE — PROGRESS NOTES
History and Physical    Patient: Zandra Leo MRN: [id number]  SSN: [id number]    YOB: 1945  Age: 68 y.o. Sex: male      Subjective: Zandra Leo is a 68 y.o. male with past medical history of coronary artery disease, GERD, BPH, recurrent kidney stones, UTI is sent to me by primary care physician Dr. Laqueta Schirmer for adrenal incidentaloma. Adrenal incidentaloma: Noted when patient had CT abdomen in April 2021, ordered by his urologist because of UTI and kidney stones. Following this patient saw primary care physician who did labs to rule out primary aldosteronism and pheochromocytoma. He was sent here for further evaluation and management.     History of hypertension: no  Episodes of spiking blood pressure, chest pain, headache, anxiety: no    History of hypertension, diabetes, prediabetes: no  Proximal muscle weakness: no  Obesity/weight gain: no  Abnormal stretch marks: no  Acne: no  Hair loss: no  Easy bruising: no  Bone fractures: no    History of hypokalemia: no  History of hypertension: no    Past Medical History:   Diagnosis Date    Arthritis     Asthma     R/T ENVIRONMENTAL ALLERGIES; INHALER USE DAILY    CAD (coronary artery disease) 2007    MI    GERD (gastroesophageal reflux disease)     History of BPH     History of kidney stones     Hx: UTI (urinary tract infection)     Hypertension     Lymphedema     SANTA on CPAP      Past Surgical History:   Procedure Laterality Date    HX HEART CATHETERIZATION  2007, 2016    STENTS X3  (INGRID--DR NUNEZ)    HX LITHOTRIPSY      2-3X    HX ORTHOPAEDIC Right 1964    FEMUR ORIF  (DR PRESCOTT)    HX TONSILLECTOMY      HX UROLOGICAL      CYSTO    MA BREAST SURGERY PROCEDURE UNLISTED Right     EXCISION OF BREAST LUMP (BENIGN)    VASCULAR SURGERY PROCEDURE UNLIST Right     LEG VEIN BYPASS (INGRID)      Family History   Problem Relation Age of Onset    Lung Disease Mother     Asthma Mother     Cancer Father LUNG    Cancer Brother         BRAIN    Heart Disease Brother     Heart Disease Brother     Heart Disease Brother     Anesth Problems Neg Hx      Social History     Tobacco Use    Smoking status: Former Smoker     Packs/day: 2.00     Years: 20.00     Pack years: 40.00     Quit date:      Years since quittin.6    Smokeless tobacco: Never Used   Substance Use Topics    Alcohol use: No      Prior to Admission medications    Medication Sig Start Date End Date Taking? Authorizing Provider   tamsulosin (Flomax) 0.4 mg capsule Take 1 Capsule by mouth daily. 8/10/21  Yes Dakota Brown MD   furosemide (LASIX) 20 mg tablet TAKE 1 TABLET BY MOUTH DAILY 21  Yes Dakota Brown MD   rosuvastatin (CRESTOR) 10 mg tablet rosuvastatin 10 mg tablet   TAKE 1 TABLET BY MOUTH AT BEDTIME   Yes Provider, Historical   nitroglycerin (NITROSTAT) 0.4 mg SL tablet nitroglycerin 0.4 mg sublingual tablet   Yes Provider, Historical   triamcinolone (NASACORT AQ) 55 mcg nasal inhaler 2 Sprays by Both Nostrils route daily. Yes Provider, Historical   aspirin 81 mg chewable tablet Take 81 mg by mouth daily. Yes Provider, Historical   acetaminophen (TYLENOL EXTRA STRENGTH) 500 mg tablet Take 1,000 mg by mouth every six (6) hours as needed for Pain. Yes Provider, Historical   calcium-vitamin D (OYSTER SHELL) 500 mg(1,250mg) -200 unit per tablet Take 1 Tablet by mouth daily. Yes Provider, Historical   mometasone-formoterol (DULERA) 100-5 mcg/actuation HFA inhaler Take 1 Puff by inhalation two (2) times a day. Yes Provider, Historical   dutasteride (AVODART) 0.5 mg capsule Take 0.5 mg by mouth daily. Yes Provider, Historical   mecobal/levomefolat Ca/B6 phos (FOLTANX PO) Take  by mouth daily. Yes Provider, Historical   albuterol-ipratropium (DUO-NEB) 2.5 mg-0.5 mg/3 ml nebu 3 mL by Nebulization route every six (6) hours as needed. Yes Provider, Historical   levocetirizine (XYZAL) 5 mg tablet Take 5 mg by mouth nightly. Yes Provider, Historical   metoprolol succinate (TOPROL-XL) 25 mg XL tablet Take 25 mg by mouth daily. Yes Provider, Historical   montelukast (SINGULAIR) 10 mg tablet Take 10 mg by mouth nightly. Yes Provider, Historical   coenzyme Q-10 (CO Q-10) 200 mg capsule Take 200 mg by mouth daily. Yes Provider, Historical   Omeprazole delayed release (PRILOSEC D/R) 20 mg tablet Take 20 mg by mouth two (2) times a day. Yes Provider, Historical   cranberry fruit extract (CRANBERRY CONCENTRATE PO) Take 360 mg by mouth nightly. Yes Provider, Historical        Allergies   Allergen Reactions    Sulfa (Sulfonamide Antibiotics) Rash       Review of Systems:  ROS    A comprehensive review of systems was preformed and it is negative except mentioned in HPI    Objective:     Vitals:    08/18/21 1455   BP: (!) 150/89   Pulse: 94   Temp: 97.5 °F (36.4 °C)   TempSrc: Temporal   SpO2: 95%   Weight: 270 lb 9.6 oz (122.7 kg)   Height: 6' 1\" (1.854 m)        Physical Exam:    Physical Exam  Vitals and nursing note reviewed. Constitutional:       Appearance: He is obese. HENT:      Head: Normocephalic and atraumatic. Cardiovascular:      Rate and Rhythm: Normal rate and regular rhythm. Pulmonary:      Effort: Pulmonary effort is normal.      Breath sounds: Normal breath sounds. Musculoskeletal:         General: Swelling present. Normal range of motion. Cervical back: Neck supple. Skin:     General: Skin is warm. Neurological:      General: No focal deficit present. Mental Status: He is alert and oriented to person, place, and time.    Psychiatric:         Mood and Affect: Mood normal.         Behavior: Behavior normal.          Labs and Imaging:    Last 3 Recorded Weights in this Encounter    08/18/21 1455   Weight: 270 lb 9.6 oz (122.7 kg)        Lab Results   Component Value Date/Time    Hemoglobin A1c 5.7 07/31/2018 12:34 PM        Assessment:     Patient Active Problem List   Diagnosis Code    Primary osteoarthritis of right knee M17.11    UTI (urinary tract infection) N39.0    Leukocytosis D72.829    Fever R50.9    Sepsis (HCC) A41.9    Arthritis M19.90    Obstructive sleep apnea syndrome G47.33    Arteriosclerosis of coronary artery I25.10    GERD (gastroesophageal reflux disease) K21.9    History of BPH Z87.438    History of kidney stones Z87.442    Hx: UTI (urinary tract infection) Z87.440    Hypertension I10    Lymphedema I89.0    SANTA on CPAP G47.33, Z99.89    Septic arthritis (HCC) M00.9    Hyperlipidemia E78.5           Plan:     Left adrenal incidentaloma:  I reviewed labs and notes from the referring provider's office. 4-:  CT abdomen with and without contrast  Small benign left adrenal adenoma is noted measuring up to 13 mm with an absolute washout of 84%. No mass right adrenal gland. 5-: Morning fasting labs  Potassium 5  Aldosterone normal at 2.1 (030)  Renin activity normal at 1.43 (5.2090.52) ruling out primary aldosteronism    DHEA-sulfate low normal at 31.6 (20.8226.4)    Normal plasma fractionated metanephrine ruling out pheochromocytoma    Plan:  Rule out subclinical Cushing's by checking 1 mg dexamethasone suppression test.  Patient already has 1 mg dexamethasone that was sent by PCP. I am ordering labs. Gave written instructions to patient about how to get this test done. If everything looks okay I will see him back in 1 year with repeat CT scan prior to the visit.     Orders Placed This Encounter    DEXAMETHASONE    CORTISOL    ACTH        Signed By: Abbie Hernandez MD     August 18, 2021      Return to clinic 1 year

## 2021-08-18 NOTE — PATIENT INSTRUCTIONS
Take dexamethasone at 11 pm and get blood drawn before 8 am next morning, nothing to eat after midnight.

## 2021-08-18 NOTE — LETTER
8/18/2021    Patient: Bebeto Gray   YOB: 1945   Date of Visit: 8/18/2021     Sage Hou MD  [facility] [address]  Via In Basket    Dear Sage Hou MD,      Thank you for referring Mr. Suraj Alfaro to [address] [place] [address] ENDOCRINOLOGY for evaluation. My notes for this consultation are attached. If you have questions, please do not hesitate to call me. I look forward to following your patient along with you.       Sincerely,    Meron Limon MD

## 2021-08-20 ENCOUNTER — OFFICE VISIT (OUTPATIENT)
Dept: FAMILY MEDICINE CLINIC | Age: 76
End: 2021-08-20
Payer: MEDICARE

## 2021-08-20 VITALS
WEIGHT: 272 LBS | DIASTOLIC BLOOD PRESSURE: 74 MMHG | HEIGHT: 73 IN | OXYGEN SATURATION: 94 % | RESPIRATION RATE: 20 BRPM | SYSTOLIC BLOOD PRESSURE: 108 MMHG | BODY MASS INDEX: 36.05 KG/M2 | TEMPERATURE: 98 F | HEART RATE: 77 BPM

## 2021-08-20 DIAGNOSIS — J02.9 SORE THROAT: Primary | ICD-10-CM

## 2021-08-20 LAB
S PYO AG THROAT QL: NEGATIVE
VALID INTERNAL CONTROL?: YES

## 2021-08-20 PROCEDURE — 1101F PT FALLS ASSESS-DOCD LE1/YR: CPT | Performed by: FAMILY MEDICINE

## 2021-08-20 PROCEDURE — G8536 NO DOC ELDER MAL SCRN: HCPCS | Performed by: FAMILY MEDICINE

## 2021-08-20 PROCEDURE — G8427 DOCREV CUR MEDS BY ELIG CLIN: HCPCS | Performed by: FAMILY MEDICINE

## 2021-08-20 PROCEDURE — G8754 DIAS BP LESS 90: HCPCS | Performed by: FAMILY MEDICINE

## 2021-08-20 PROCEDURE — G8752 SYS BP LESS 140: HCPCS | Performed by: FAMILY MEDICINE

## 2021-08-20 PROCEDURE — G8417 CALC BMI ABV UP PARAM F/U: HCPCS | Performed by: FAMILY MEDICINE

## 2021-08-20 PROCEDURE — 99213 OFFICE O/P EST LOW 20 MIN: CPT | Performed by: FAMILY MEDICINE

## 2021-08-20 PROCEDURE — G8432 DEP SCR NOT DOC, RNG: HCPCS | Performed by: FAMILY MEDICINE

## 2021-08-20 PROCEDURE — 87880 STREP A ASSAY W/OPTIC: CPT | Performed by: FAMILY MEDICINE

## 2021-08-20 NOTE — PROGRESS NOTES
1. Have you been to the ER, urgent care clinic since your last visit? Hospitalized since your last visit? No    2. Have you seen or consulted any other health care providers outside of the [address] [place] [address] since your last visit? Include any pap smears or colon screening.  No  Reviewed record in preparation for visit and have necessary documentation  Pt did not bring medication to office visit for review    Goals that were addressed and/or need to be completed during or after this appointment include   Health Maintenance Due   Topic Date Due    COVID-19 Vaccine (1) Never done    DTaP/Tdap/Td series (1 - Tdap) Never done    Shingrix Vaccine Age 50> (1 of 2) Never done    Pneumococcal 65+ years (1 of 1 - PPSV23) Never done    Medicare Yearly Exam  Never done

## 2021-08-20 NOTE — PATIENT INSTRUCTIONS
Sore Throat: Care Instructions  Your Care Instructions     Infection by bacteria or a virus causes most sore throats. Cigarette smoke, dry air, air pollution, allergies, and yelling can also cause a sore throat. Sore throats can be painful and annoying. Fortunately, most sore throats go away on their own. If you have a bacterial infection, your doctor may prescribe antibiotics. Follow-up care is a key part of your treatment and safety. Be sure to make and go to all appointments, and call your doctor if you are having problems. It's also a good idea to know your test results and keep a list of the medicines you take. How can you care for yourself at home? · If your doctor prescribed antibiotics, take them as directed. Do not stop taking them just because you feel better. You need to take the full course of antibiotics. · Gargle with warm salt water once an hour to help reduce swelling and relieve discomfort. Use 1 teaspoon of salt mixed in 1 cup of warm water. · Take an over-the-counter pain medicine, such as acetaminophen (Tylenol), ibuprofen (Advil, Motrin), or naproxen (Aleve). Read and follow all instructions on the label. · Be careful when taking over-the-counter cold or flu medicines and Tylenol at the same time. Many of these medicines have acetaminophen, which is Tylenol. Read the labels to make sure that you are not taking more than the recommended dose. Too much acetaminophen (Tylenol) can be harmful. · Drink plenty of fluids. Fluids may help soothe an irritated throat. Hot fluids, such as tea or soup, may help decrease throat pain. · Use over-the-counter throat lozenges to soothe pain. Regular cough drops or hard candy may also help. These should not be given to young children because of the risk of choking. · Do not smoke or allow others to smoke around you. If you need help quitting, talk to your doctor about stop-smoking programs and medicines.  These can increase your chances of quitting for good.  · Use a vaporizer or humidifier to add moisture to your bedroom. Follow the directions for cleaning the machine. When should you call for help? Call your doctor now or seek immediate medical care if:    · You have new or worse trouble swallowing.     · Your sore throat gets much worse on one side. Watch closely for changes in your health, and be sure to contact your doctor if you do not get better as expected. Where can you learn more? Go to http://www.gray.com/  Enter U420 in the search box to learn more about \"Sore Throat: Care Instructions. \"  Current as of: December 2, 2020               Content Version: 12.8  © 7600-3643 [employer], [employer]. Care instructions adapted under license by [employer] (which disclaims liability or warranty for this information). If you have questions about a medical condition or this instruction, always ask your healthcare professional. [address] any warranty or liability for your use of this information.

## 2021-08-20 NOTE — PROGRESS NOTES
CC: Sore throat    HPI: Pt is a 68 y.o. male who presents for sore throat. Symptoms present for 3 days. No fevers, increased congestion, ear pain, SOB, cough or wheezing. No known sick contacts. Hasn't tried anything for symptoms. He has a h/o recurrent strep apparently. Past Medical History:   Diagnosis Date    Arthritis     Asthma     R/T ENVIRONMENTAL ALLERGIES; INHALER USE DAILY    CAD (coronary artery disease)     MI    GERD (gastroesophageal reflux disease)     History of BPH     History of kidney stones     Hx: UTI (urinary tract infection)     Hypertension     Lymphedema     SANTA on CPAP        Family History   Problem Relation Age of Onset    Lung Disease Mother     Asthma Mother     Cancer Father         LUNG    Cancer Brother         BRAIN    Heart Disease Brother     Heart Disease Brother     Heart Disease Brother     Anesth Problems Neg Hx        Social History     Tobacco Use    Smoking status: Former Smoker     Packs/day: 2.00     Years: 20.00     Pack years: 40.00     Quit date:      Years since quittin.6    Smokeless tobacco: Never Used   Vaping Use    Vaping Use: Never used   Substance Use Topics    Alcohol use: No    Drug use: No       ROS:  Per HPI    PE:  Visit Vitals  /74   Pulse 77   Temp 98 °F (36.7 °C)   Resp 20   Ht 6' 1\" (1.854 m)   Wt 272 lb (123.4 kg)   SpO2 94%   BMI 35.89 kg/m²     Gen: Pt sitting in chair, in NAD  Head: Normocephalic, atraumatic  Eyes: Sclera anicteric, EOM grossly intact, PERRL  Ears: TM's pearly with good light reflex b/l  Nose: Normal nasal mucosa  Throat: MMM, normal lips, tongue and gums. No tonsillar hypertrophy, erythema or exudate. Neck: Supple  CVS: Normal S1, S2, no m/r/g   Resp: CTAB, no wheezes or rales  Extrem: Atraumatic, no cyanosis or edema  Pulses: 2+   Skin: Warm, dry  Neuro: Alert, oriented, appropriate      A/P:   Encounter Diagnoses     ICD-10-CM ICD-9-CM   1. Sore throat  J02.9 462     1.  Sore throat: Rapid strep negative. Likely viral vs allergies  - AMB POC RAPID STREP A  - Supportive care       RTC prn if symptoms worsen or fail to improve    Discussed diagnoses in detail with patient. Medication risks/benefits/side effects discussed with patient. All of the patient's questions were addressed. The patient understands and agrees with our plan of care. The patient knows to call back if they are unsure of or forget any changes we discussed today or if the symptoms change. The patient received an After-Visit Summary which contains VS, orders, medication list and allergy list. This can be used as a \"mini-medical record\" should they have to seek medical care while out of town. Current Outpatient Medications on File Prior to Visit   Medication Sig Dispense Refill    cpap machine kit by Does Not Apply route.  tamsulosin (Flomax) 0.4 mg capsule Take 1 Capsule by mouth daily. 30 Capsule 2    furosemide (LASIX) 20 mg tablet TAKE 1 TABLET BY MOUTH DAILY 30 Tablet 2    rosuvastatin (CRESTOR) 10 mg tablet rosuvastatin 10 mg tablet   TAKE 1 TABLET BY MOUTH AT BEDTIME      nitroglycerin (NITROSTAT) 0.4 mg SL tablet nitroglycerin 0.4 mg sublingual tablet      triamcinolone (NASACORT AQ) 55 mcg nasal inhaler 2 Sprays by Both Nostrils route daily.  aspirin 81 mg chewable tablet Take 81 mg by mouth daily.  acetaminophen (TYLENOL EXTRA STRENGTH) 500 mg tablet Take 1,000 mg by mouth every six (6) hours as needed for Pain.  calcium-vitamin D (OYSTER SHELL) 500 mg(1,250mg) -200 unit per tablet Take 1 Tablet by mouth daily.  mometasone-formoterol (DULERA) 100-5 mcg/actuation HFA inhaler Take 1 Puff by inhalation two (2) times a day.  dutasteride (AVODART) 0.5 mg capsule Take 0.5 mg by mouth daily.  mecobal/levomefolat Ca/B6 phos (FOLTANX PO) Take  by mouth daily.       albuterol-ipratropium (DUO-NEB) 2.5 mg-0.5 mg/3 ml nebu 3 mL by Nebulization route every six (6) hours as needed.  levocetirizine (XYZAL) 5 mg tablet Take 5 mg by mouth nightly.  metoprolol succinate (TOPROL-XL) 25 mg XL tablet Take 25 mg by mouth daily.  montelukast (SINGULAIR) 10 mg tablet Take 10 mg by mouth nightly.  coenzyme Q-10 (CO Q-10) 200 mg capsule Take 200 mg by mouth daily.  Omeprazole delayed release (PRILOSEC D/R) 20 mg tablet Take 20 mg by mouth two (2) times a day.  cranberry fruit extract (CRANBERRY CONCENTRATE PO) Take 360 mg by mouth nightly. No current facility-administered medications on file prior to visit.

## 2021-08-22 RX ORDER — MONTELUKAST SODIUM 10 MG/1
TABLET ORAL
Qty: 30 TABLET | Refills: 2 | Status: SHIPPED | OUTPATIENT
Start: 2021-08-22 | End: 2021-10-31

## 2021-09-17 RX ORDER — METOPROLOL SUCCINATE 25 MG/1
TABLET, EXTENDED RELEASE ORAL
Qty: 15 TABLET | Refills: 2 | Status: SHIPPED | OUTPATIENT
Start: 2021-09-17 | End: 2021-12-12

## 2021-09-22 ENCOUNTER — TELEPHONE (OUTPATIENT)
Dept: FAMILY MEDICINE CLINIC | Age: 76
End: 2021-09-22

## 2021-09-22 NOTE — TELEPHONE ENCOUNTER
The one tablet that pt was to take before he goes for blood work pt had misplaced it. Can you please refill.  [facility] in [place]

## 2021-09-22 NOTE — TELEPHONE ENCOUNTER
Returned call. Was advised that patient was prescirbed dexAMETHasone prior to lab work ordered by endocrinologist. James Almonte states that patient had this medication filled but misplaced it. I advised her she should contact the endocrinologist as this is prescribed and labs ordered by them, unsure if PCP will do this. She states she will attempt to contact but request to ask Dr. Robert Baugh to refill this medication for patient.

## 2021-09-29 NOTE — TELEPHONE ENCOUNTER
Returned call. Mayford Heimlich states she has not gotten intouch with endo for medication that was misplaced. Advised her to reach out the them for patient so he can have labs completed that was ordered. She verbalized understanding.

## 2021-10-31 RX ORDER — MONTELUKAST SODIUM 10 MG/1
TABLET ORAL
Qty: 30 TABLET | Refills: 2 | Status: SHIPPED | OUTPATIENT
Start: 2021-10-31 | End: 2021-11-17

## 2021-11-05 RX ORDER — TAMSULOSIN HYDROCHLORIDE 0.4 MG/1
0.4 CAPSULE ORAL DAILY
Qty: 30 CAPSULE | Refills: 2 | Status: SHIPPED | OUTPATIENT
Start: 2021-11-05 | End: 2021-11-07

## 2021-11-07 RX ORDER — TAMSULOSIN HYDROCHLORIDE 0.4 MG/1
0.4 CAPSULE ORAL DAILY
Qty: 30 CAPSULE | Refills: 2 | Status: SHIPPED | OUTPATIENT
Start: 2021-11-07 | End: 2022-02-08 | Stop reason: SDUPTHER

## 2021-11-17 RX ORDER — MONTELUKAST SODIUM 10 MG/1
TABLET ORAL
Qty: 30 TABLET | Refills: 2 | Status: SHIPPED | OUTPATIENT
Start: 2021-11-17 | End: 2021-11-30

## 2021-11-23 ENCOUNTER — OFFICE VISIT (OUTPATIENT)
Dept: FAMILY MEDICINE CLINIC | Age: 76
End: 2021-11-23
Payer: MEDICARE

## 2021-11-23 ENCOUNTER — NURSE TRIAGE (OUTPATIENT)
Dept: OTHER | Facility: CLINIC | Age: 76
End: 2021-11-23

## 2021-11-23 VITALS
TEMPERATURE: 97.6 F | HEART RATE: 67 BPM | RESPIRATION RATE: 20 BRPM | DIASTOLIC BLOOD PRESSURE: 87 MMHG | HEIGHT: 73 IN | WEIGHT: 267 LBS | BODY MASS INDEX: 35.39 KG/M2 | OXYGEN SATURATION: 98 % | SYSTOLIC BLOOD PRESSURE: 138 MMHG

## 2021-11-23 DIAGNOSIS — M54.9 ACUTE LEFT-SIDED BACK PAIN, UNSPECIFIED BACK LOCATION: Primary | ICD-10-CM

## 2021-11-23 LAB
BILIRUB UR QL STRIP: NEGATIVE
GLUCOSE UR-MCNC: NEGATIVE MG/DL
KETONES P FAST UR STRIP-MCNC: NEGATIVE MG/DL
PH UR STRIP: 5.5 [PH] (ref 4.6–8)
PROT UR QL STRIP: NEGATIVE
SP GR UR STRIP: 1.03 (ref 1–1.03)
UA UROBILINOGEN AMB POC: NORMAL (ref 0.2–1)
URINALYSIS CLARITY POC: CLEAR
URINALYSIS COLOR POC: YELLOW
URINE BLOOD POC: NEGATIVE
URINE LEUKOCYTES POC: NEGATIVE
URINE NITRITES POC: NEGATIVE

## 2021-11-23 PROCEDURE — G8754 DIAS BP LESS 90: HCPCS | Performed by: STUDENT IN AN ORGANIZED HEALTH CARE EDUCATION/TRAINING PROGRAM

## 2021-11-23 PROCEDURE — G8428 CUR MEDS NOT DOCUMENT: HCPCS | Performed by: STUDENT IN AN ORGANIZED HEALTH CARE EDUCATION/TRAINING PROGRAM

## 2021-11-23 PROCEDURE — 81003 URINALYSIS AUTO W/O SCOPE: CPT | Performed by: STUDENT IN AN ORGANIZED HEALTH CARE EDUCATION/TRAINING PROGRAM

## 2021-11-23 PROCEDURE — 1101F PT FALLS ASSESS-DOCD LE1/YR: CPT | Performed by: STUDENT IN AN ORGANIZED HEALTH CARE EDUCATION/TRAINING PROGRAM

## 2021-11-23 PROCEDURE — G8536 NO DOC ELDER MAL SCRN: HCPCS | Performed by: STUDENT IN AN ORGANIZED HEALTH CARE EDUCATION/TRAINING PROGRAM

## 2021-11-23 PROCEDURE — G8752 SYS BP LESS 140: HCPCS | Performed by: STUDENT IN AN ORGANIZED HEALTH CARE EDUCATION/TRAINING PROGRAM

## 2021-11-23 PROCEDURE — G8417 CALC BMI ABV UP PARAM F/U: HCPCS | Performed by: STUDENT IN AN ORGANIZED HEALTH CARE EDUCATION/TRAINING PROGRAM

## 2021-11-23 PROCEDURE — G8510 SCR DEP NEG, NO PLAN REQD: HCPCS | Performed by: STUDENT IN AN ORGANIZED HEALTH CARE EDUCATION/TRAINING PROGRAM

## 2021-11-23 PROCEDURE — 99214 OFFICE O/P EST MOD 30 MIN: CPT | Performed by: STUDENT IN AN ORGANIZED HEALTH CARE EDUCATION/TRAINING PROGRAM

## 2021-11-23 RX ORDER — TIZANIDINE 2 MG/1
2 TABLET ORAL 2 TIMES DAILY
Qty: 28 TABLET | Refills: 0 | Status: SHIPPED | OUTPATIENT
Start: 2021-11-23 | End: 2021-12-10

## 2021-11-23 NOTE — PROGRESS NOTES
1. Have you been to the ER, urgent care clinic since your last visit? Hospitalized since your last visit? No    2. Have you seen or consulted any other health care providers outside of the [address] [place] [address] since your last visit? Include any pap smears or colon screening. No    Reviewed record in preparation for visit and have necessary documentation  Goals that were addressed and/or need to be completed during or after this appointment include     Health Maintenance Due   Topic Date Due    COVID-19 Vaccine (1) Never done    DTaP/Tdap/Td series (1 - Tdap) Never done    Shingrix Vaccine Age 50> (1 of 2) Never done    Pneumococcal 65+ years (1 of 1 - PPSV23) Never done    Medicare Yearly Exam  Never done    Flu Vaccine (1) Never done       Patient is accompanied by self I have received verbal consent from Maurilio Raman to discuss any/all medical information while they are present in the room.

## 2021-11-23 NOTE — PROGRESS NOTES
[facility] [address] [facility]  P ([phone])  Date of visit:  11/25/2021    Ligia Chadwick is a 68 y.o. male who presents to the clinic today due to left back pain. According to patient, his left back pain started 4 days ago. Constant non-radiating pain. 7/10 in severity. Dull pain. Advil and Tylenol helps a little. Feels the pain when he takes a deep breath. No injury. No difficulty urinating. No dysuria or hematuria. Per patient, the pain does not fell similar to when he had kidney stones. Denies having any injury to the area. Review of Systems   Per HPI      Allergies   Allergies   Allergen Reactions    Sulfa (Sulfonamide Antibiotics) Rash       Medications  Current Outpatient Medications   Medication Sig    tiZANidine (ZANAFLEX) 2 mg tablet Take 1 Tablet by mouth two (2) times a day.  montelukast (SINGULAIR) 10 mg tablet TAKE 1 TABLET BY MOUTH AT BEDTIME FOR BREATHING    tamsulosin (FLOMAX) 0.4 mg capsule TAKE 1 CAPSULE BY MOUTH DAILY    metoprolol succinate (TOPROL-XL) 25 mg XL tablet TAKE 1/2 TABLET BY MOUTH EVERY DAY    cpap machine kit by Does Not Apply route.  furosemide (LASIX) 20 mg tablet TAKE 1 TABLET BY MOUTH DAILY    rosuvastatin (CRESTOR) 10 mg tablet rosuvastatin 10 mg tablet   TAKE 1 TABLET BY MOUTH AT BEDTIME    nitroglycerin (NITROSTAT) 0.4 mg SL tablet nitroglycerin 0.4 mg sublingual tablet    triamcinolone (NASACORT AQ) 55 mcg nasal inhaler 2 Sprays by Both Nostrils route daily.  aspirin 81 mg chewable tablet Take 81 mg by mouth daily.  acetaminophen (TYLENOL EXTRA STRENGTH) 500 mg tablet Take 1,000 mg by mouth every six (6) hours as needed for Pain.  calcium-vitamin D (OYSTER SHELL) 500 mg(1,250mg) -200 unit per tablet Take 1 Tablet by mouth daily.  dutasteride (AVODART) 0.5 mg capsule Take 0.5 mg by mouth daily.  mecobal/levomefolat Ca/B6 phos (FOLTANX PO) Take  by mouth daily.     albuterol-ipratropium (DUO-NEB) 2.5 mg-0.5 mg/3 ml nebu 3 mL by Nebulization route every six (6) hours as needed.  levocetirizine (XYZAL) 5 mg tablet Take 5 mg by mouth nightly.  coenzyme Q-10 (CO Q-10) 200 mg capsule Take 200 mg by mouth daily.  Omeprazole delayed release (PRILOSEC D/R) 20 mg tablet Take 20 mg by mouth two (2) times a day.  cranberry fruit extract (CRANBERRY CONCENTRATE PO) Take 360 mg by mouth nightly.  mometasone-formoterol (DULERA) 100-5 mcg/actuation HFA inhaler Take 1 Puff by inhalation two (2) times a day. No current facility-administered medications for this visit. Medical History  Past Medical History:   Diagnosis Date    Arthritis     Asthma     R/T ENVIRONMENTAL ALLERGIES; INHALER USE DAILY    CAD (coronary artery disease)     MI    GERD (gastroesophageal reflux disease)     History of BPH     History of kidney stones     Hx: UTI (urinary tract infection)     Hypertension     Lymphedema     SANTA on CPAP        Immunizations     There is no immunization history on file for this patient. Social History  Social History     Tobacco Use    Smoking status: Former Smoker     Packs/day: 2.00     Years: 20.00     Pack years: 40.00     Quit date:      Years since quittin.9    Smokeless tobacco: Never Used   Vaping Use    Vaping Use: Never used   Substance Use Topics    Alcohol use: No    Drug use: No       Objective     Visit Vitals  /87 (BP 1 Location: Right upper arm, BP Patient Position: Sitting, BP Cuff Size: Large adult)   Pulse 67   Temp 97.6 °F (36.4 °C) (Oral)   Resp 20   Ht 6' 1\" (1.854 m)   Wt 267 lb (121.1 kg)   SpO2 98%   BMI 35.23 kg/m²       Physical Examination  Physical Exam  Constitutional:       Appearance: Normal appearance. HENT:      Head: Normocephalic and atraumatic. Eyes:      General: No scleral icterus. Abdominal:      General: Bowel sounds are normal. There is no distension. Palpations: Abdomen is soft.       Tenderness: There is no abdominal tenderness. There is no right CVA tenderness or left CVA tenderness. Musculoskeletal:         General: No swelling, tenderness or signs of injury. Normal range of motion. Neurological:      General: No focal deficit present. Mental Status: He is alert. Psychiatric:         Mood and Affect: Mood normal.         Behavior: Behavior normal.             Assessment   Priscila Rodriguez is a 68 y.o. who presents to the clinic due to back pain. Plan     1. Acute left-sided back pain, unspecified back location: Patient presents to the clinic due to left back pain. In the lumber region. NO CVA tenderness. UA is negative for blood, leukocyte esterase and nitrites. Will obtain BMP to evaluate renal function. Less likely due to kidney stone give characteristics for pain. Pain may be due to muscle strain. Results for orders placed or performed in visit on 11/23/21   AMB POC URINALYSIS DIP STICK AUTO W/O MICRO     Status: None   Result Value Ref Range Status    Color (UA POC) Yellow  Final    Clarity (UA POC) Clear  Final    Glucose (UA POC) Negative Negative Final    Bilirubin (UA POC) Negative Negative Final    Ketones (UA POC) Negative Negative Final    Specific gravity (UA POC) 1.030 1.001 - 1.035 Final    Blood (UA POC) Negative Negative Final    pH (UA POC) 5.5 4.6 - 8.0 Final    Protein (UA POC) Negative Negative Final    Urobilinogen (UA POC) 2 mg/dL 0.2 - 1 Final    Nitrites (UA POC) Negative Negative Final    Leukocyte esterase (UA POC) Negative Negative Final     - METABOLIC PANEL, BASIC  -Trial of Tizanidine.   - Follow up if pain does not improve or worsens. Follow-up and Dispositions    · Return if symptoms worsen or fail to improve. I have discussed the aforementioned diagnoses and plan with the patient in detail. I have provided information in person and/or in AVS. All questions answered prior to discharge.      Signed By:  Jesus Alberto Brantley MD    Family Medicine Resident

## 2021-11-23 NOTE — TELEPHONE ENCOUNTER
Received call from [facility] (OUTPATIENT CAMPUS) at [facility] with Red Flag Complaint. Patients daughter called. She is not with her father. Disconnected with pt. Daughter and called patient. Brief description of triage: Lower left back pain    Triage indicates for patient to be seen by PCP today or tomorrow    Care advice provided, patient verbalizes understanding; denies any other questions or concerns; instructed to call back for any new or worsening symptoms. Writer provided warm transfer to  [facility] for appointment scheduling. Attention Provider: Thank you for allowing me to participate in the care of your patient. The patient was connected to triage in response to information provided to the ECC. Please do not respond through this encounter as the response is not directed to a shared pool. Reason for Disposition   Age > 48 and no history of prior similar back pain    Answer Assessment - Initial Assessment Questions  1. ONSET: \"When did the pain begin? \"       2-3 days    2. LOCATION: \"Where does it hurt? \" (upper, mid or lower back)      Left lower , at belt line,also feels when  taking deep breath. Denies pain in rib cage or chest    3. SEVERITY: \"How bad is the pain? \"  (e.g., Scale 1-10; mild, moderate, or severe)    - MILD (1-3): doesn't interfere with normal activities     - MODERATE (4-7): interferes with normal activities or awakens from sleep     - SEVERE (8-10): excruciating pain, unable to do any normal activities      7/10     4. PATTERN: \"Is the pain constant? \" (e.g., yes, no; constant, intermittent)       Constant    5. RADIATION: \"Does the pain shoot into your legs or elsewhere? \"      No    6. CAUSE:  \"What do you think is causing the back pain? \"       Pulled muscle maybe    7. BACK OVERUSE:  [facility] recent lifting of heavy objects, strenuous work or exercise? \"      Yes tried to walk on a treadmill last week for 5 minutes    8. MEDICATIONS: \"What have you taken so far for the pain? \" (e.g., nothing, acetaminophen, NSAIDS)      Advil-last dose this a.m. 9. NEUROLOGIC SYMPTOMS: \"Do you have any weakness, numbness, or problems with bowel/bladder control? \"      Knees feel weak sometimes, started using cane yesterday    10. OTHER SYMPTOMS: \"Do you have any other symptoms? \" (e.g., fever, abdominal pain, burning with urination, blood in urine)        No denies    11. PREGNANCY: \"Is there any chance you are pregnant? \" (e.g., yes, no; LMP)        n/a    Protocols used: BACK PAIN-ADULT-[place]

## 2021-11-24 LAB
BUN SERPL-MCNC: 22 MG/DL (ref 8–27)
BUN/CREAT SERPL: 19 (ref 10–24)
CALCIUM SERPL-MCNC: 9 MG/DL (ref 8.6–10.2)
CHLORIDE SERPL-SCNC: 108 MMOL/L (ref 96–106)
CO2 SERPL-SCNC: 18 MMOL/L (ref 20–29)
CREAT SERPL-MCNC: 1.16 MG/DL (ref 0.76–1.27)
GLUCOSE SERPL-MCNC: 110 MG/DL (ref 65–99)
POTASSIUM SERPL-SCNC: 4.5 MMOL/L (ref 3.5–5.2)
SODIUM SERPL-SCNC: 144 MMOL/L (ref 134–144)

## 2021-11-30 RX ORDER — MONTELUKAST SODIUM 10 MG/1
TABLET ORAL
Qty: 30 TABLET | Refills: 2 | Status: SHIPPED | OUTPATIENT
Start: 2021-11-30 | End: 2022-02-08 | Stop reason: SDUPTHER

## 2021-12-10 RX ORDER — TIZANIDINE 2 MG/1
TABLET ORAL
Qty: 28 TABLET | Refills: 0 | Status: SHIPPED | OUTPATIENT
Start: 2021-12-10 | End: 2021-12-13

## 2021-12-12 RX ORDER — METOPROLOL SUCCINATE 25 MG/1
TABLET, EXTENDED RELEASE ORAL
Qty: 15 TABLET | Refills: 2 | Status: SHIPPED | OUTPATIENT
Start: 2021-12-12 | End: 2022-03-07

## 2021-12-13 ENCOUNTER — OFFICE VISIT (OUTPATIENT)
Dept: FAMILY MEDICINE CLINIC | Age: 76
End: 2021-12-13
Payer: MEDICARE

## 2021-12-13 VITALS
HEART RATE: 79 BPM | DIASTOLIC BLOOD PRESSURE: 86 MMHG | SYSTOLIC BLOOD PRESSURE: 125 MMHG | TEMPERATURE: 98.2 F | BODY MASS INDEX: 34.7 KG/M2 | HEIGHT: 73 IN | WEIGHT: 261.8 LBS | RESPIRATION RATE: 16 BRPM | OXYGEN SATURATION: 94 %

## 2021-12-13 DIAGNOSIS — K21.9 GASTROESOPHAGEAL REFLUX DISEASE, UNSPECIFIED WHETHER ESOPHAGITIS PRESENT: ICD-10-CM

## 2021-12-13 DIAGNOSIS — I10 ESSENTIAL HYPERTENSION: ICD-10-CM

## 2021-12-13 DIAGNOSIS — I25.10 ARTERIOSCLEROSIS OF CORONARY ARTERY: ICD-10-CM

## 2021-12-13 DIAGNOSIS — R42 VERTIGO: Primary | ICD-10-CM

## 2021-12-13 PROCEDURE — G8752 SYS BP LESS 140: HCPCS | Performed by: FAMILY MEDICINE

## 2021-12-13 PROCEDURE — 1101F PT FALLS ASSESS-DOCD LE1/YR: CPT | Performed by: FAMILY MEDICINE

## 2021-12-13 PROCEDURE — G8754 DIAS BP LESS 90: HCPCS | Performed by: FAMILY MEDICINE

## 2021-12-13 PROCEDURE — G8536 NO DOC ELDER MAL SCRN: HCPCS | Performed by: FAMILY MEDICINE

## 2021-12-13 PROCEDURE — G8417 CALC BMI ABV UP PARAM F/U: HCPCS | Performed by: FAMILY MEDICINE

## 2021-12-13 PROCEDURE — G8510 SCR DEP NEG, NO PLAN REQD: HCPCS | Performed by: FAMILY MEDICINE

## 2021-12-13 PROCEDURE — 99214 OFFICE O/P EST MOD 30 MIN: CPT | Performed by: FAMILY MEDICINE

## 2021-12-13 PROCEDURE — G8427 DOCREV CUR MEDS BY ELIG CLIN: HCPCS | Performed by: FAMILY MEDICINE

## 2021-12-13 RX ORDER — MECLIZINE HYDROCHLORIDE 25 MG/1
25 TABLET ORAL
Qty: 30 TABLET | Refills: 1 | Status: SHIPPED | OUTPATIENT
Start: 2021-12-13 | End: 2022-01-11 | Stop reason: SDUPTHER

## 2021-12-13 RX ORDER — PANTOPRAZOLE SODIUM 40 MG/1
40 TABLET, DELAYED RELEASE ORAL DAILY
Qty: 30 TABLET | Refills: 2 | Status: SHIPPED | OUTPATIENT
Start: 2021-12-13 | End: 2022-02-08

## 2021-12-13 NOTE — PROGRESS NOTES
1. Have you been to the ER, urgent care clinic since your last visit? Hospitalized since your last visit? No    2. Have you seen or consulted any other health care providers outside of the [address] [place] [address] since your last visit? Include any pap smears or colon screening. No    Reviewed record in preparation for visit and have necessary documentation  Pt did not bring medication to office visit for review  Patient is accompanied by adult caretaker I have received verbal consent from Aleah Pagan to discuss any/all medical information while they are present in the room.     Goals that were addressed and/or need to be completed during or after this appointment include     Health Maintenance Due   Topic Date Due    COVID-19 Vaccine (1) Never done    DTaP/Tdap/Td series (1 - Tdap) Never done    Shingrix Vaccine Age 50> (1 of 2) Never done    Pneumococcal 65+ years (1 of 1 - PPSV23) Never done    Medicare Yearly Exam  Never done    Flu Vaccine (1) Never done

## 2021-12-14 NOTE — PROGRESS NOTES
Progress Note    Patient: Bhanu Holder MRN: [id number]  SSN: [id number]    YOB: 1945  Age: 68 y.o. Sex: male        Chief Complaint   Patient presents with    Follow Up Chronic Condition    Dizziness     he is a 68y.o. year old male who presents with complaint of spinning sensation when he turns his head rapidly or is laying done. He says his reflux is worsening. He has a hx of HTN, CAD and SANTA. Patient denies HA, otalgia, SOB, CP, abdominal pain, dysuria, acute myalgias or arthralgias. Encounter Diagnoses   Name Primary?  Vertigo Yes    Gastroesophageal reflux disease, unspecified whether esophagitis present     Essential hypertension      BP Readings from Last 3 Encounters:   12/13/21 125/86   11/23/21 138/87   08/20/21 108/74     Wt Readings from Last 3 Encounters:   12/13/21 261 lb 12.8 oz (118.8 kg)   11/23/21 267 lb (121.1 kg)   08/20/21 272 lb (123.4 kg)     Body mass index is 34.54 kg/m². Lab Results   Component Value Date/Time    Cholesterol, total 108 05/14/2021 09:55 AM    HDL Cholesterol 41 05/14/2021 09:55 AM    LDL, calculated 27 05/14/2021 09:55 AM    Triglyceride 200 (H) 05/14/2021 09:55 AM    CHOL/HDL Ratio 2.6 05/14/2021 09:55 AM     Lab Results   Component Value Date/Time    Sodium 144 11/23/2021 12:00 AM    Potassium 4.5 11/23/2021 12:00 AM    Chloride 108 (H) 11/23/2021 12:00 AM    CO2 18 (L) 11/23/2021 12:00 AM    Anion gap 8 07/27/2021 09:20 AM    Glucose 110 (H) 11/23/2021 12:00 AM    BUN 22 11/23/2021 12:00 AM    Creatinine 1.16 11/23/2021 12:00 AM    BUN/Creatinine ratio 19 11/23/2021 12:00 AM    GFR est AA 70 11/23/2021 12:00 AM    GFR est non-AA 61 11/23/2021 12:00 AM    Calcium 9.0 11/23/2021 12:00 AM    Bilirubin, total 0.4 05/14/2021 09:55 AM    ALT (SGPT) 24 05/14/2021 09:55 AM    Alk.  phosphatase 122 (H) 05/14/2021 09:55 AM    Protein, total 6.8 05/14/2021 09:55 AM    Albumin 3.6 05/14/2021 09:55 AM    Globulin 3.2 05/14/2021 09:55 AM    A-G Ratio 1.1 2021 09:55 AM        Patient Active Problem List   Diagnosis Code    Primary osteoarthritis of right knee M17.11    UTI (urinary tract infection) N39.0    Leukocytosis D72.829    Fever R50.9    Sepsis (HCC) A41.9    Arthritis M19.90    Obstructive sleep apnea syndrome G47.33    Arteriosclerosis of coronary artery I25.10    GERD (gastroesophageal reflux disease) K21.9    History of BPH Z87.438    History of kidney stones Z87.442    Hx: UTI (urinary tract infection) Z87.440    Hypertension I10    Lymphedema I89.0    SANTA on CPAP G47.33, Z99.89    Septic arthritis (HCC) M00.9    Hyperlipidemia E78.5     Past Surgical History:   Procedure Laterality Date    HX HEART CATHETERIZATION  ,     STENTS X3  (INGRID--DR NUNEZ)    HX LITHOTRIPSY      2-3X    HX ORTHOPAEDIC Right 1964    FEMUR ORIF  (DR PRESCOTT)    HX TONSILLECTOMY      HX UROLOGICAL      CYSTO    LA BREAST SURGERY PROCEDURE UNLISTED Right     EXCISION OF BREAST LUMP (BENIGN)    VASCULAR SURGERY PROCEDURE UNLIST Right     LEG VEIN BYPASS (INGRID)     Social History     Socioeconomic History    Marital status:      Spouse name: Not on file    Number of children: Not on file    Years of education: Not on file    Highest education level: Not on file   Occupational History    Not on file   Tobacco Use    Smoking status: Former Smoker     Packs/day: 2.00     Years: 20.00     Pack years: 40.00     Quit date:      Years since quittin.9    Smokeless tobacco: Never Used   Vaping Use    Vaping Use: Never used   Substance and Sexual Activity    Alcohol use: No    Drug use: No    Sexual activity: Not on file   Other Topics Concern    Not on file   Social History Narrative    Not on file     Social Determinants of Health     Financial Resource Strain:     Difficulty of Paying Living Expenses: Not on file   Food Insecurity:     Worried About Running Out of Food in the Last Year: Not on file    Ran Out of Food in the Last Year: Not on file   Transportation Needs:     Lack of Transportation (Medical): Not on file    Lack of Transportation (Non-Medical): Not on file   Physical Activity:     Days of Exercise per Week: Not on file    Minutes of Exercise per Session: Not on file   Stress:     Feeling of Stress : Not on file   Social Connections:     Frequency of Communication with Friends and Family: Not on file    Frequency of Social Gatherings with Friends and Family: Not on file    Attends Religion Services: Not on file    Active Member of [address] [place] [address] "[employer]" or Organizations: Not on file    Attends Club or Organization Meetings: Not on file    Marital Status: Not on file   Intimate Partner Violence:     Fear of Current or Ex-Partner: Not on file    Emotionally Abused: Not on file    Physically Abused: Not on file    Sexually Abused: Not on file   Housing Stability:     Unable to Pay for Housing in the Last Year: Not on file    Number of [place] in the Last Year: Not on file    Unstable Housing in the Last Year: Not on file     Family History   Problem Relation Age of Onset    Lung Disease Mother     Asthma Mother     Cancer Father         LUNG    Cancer Brother         BRAIN    Heart Disease Brother     Heart Disease Brother     Heart Disease Brother     Anesth Problems Neg Hx      Current Outpatient Medications   Medication Sig    pantoprazole (PROTONIX) 40 mg tablet Take 1 Tablet by mouth daily.  meclizine (ANTIVERT) 25 mg tablet Take 1 Tablet by mouth three (3) times daily as needed (vertigo).  metoprolol succinate (TOPROL-XL) 25 mg XL tablet TAKE 1/2 TABLET BY MOUTH EVERY DAY    montelukast (SINGULAIR) 10 mg tablet TAKE 1 TABLET BY MOUTH AT BEDTIME FOR BREATHING    tamsulosin (FLOMAX) 0.4 mg capsule TAKE 1 CAPSULE BY MOUTH DAILY    cpap machine kit by Does Not Apply route.     furosemide (LASIX) 20 mg tablet TAKE 1 TABLET BY MOUTH DAILY    rosuvastatin (CRESTOR) 10 mg tablet rosuvastatin 10 mg tablet   TAKE 1 TABLET BY MOUTH AT BEDTIME    nitroglycerin (NITROSTAT) 0.4 mg SL tablet nitroglycerin 0.4 mg sublingual tablet    triamcinolone (NASACORT AQ) 55 mcg nasal inhaler 2 Sprays by Both Nostrils route daily.  aspirin 81 mg chewable tablet Take 81 mg by mouth daily.  acetaminophen (TYLENOL EXTRA STRENGTH) 500 mg tablet Take 1,000 mg by mouth every six (6) hours as needed for Pain.  calcium-vitamin D (OYSTER SHELL) 500 mg(1,250mg) -200 unit per tablet Take 1 Tablet by mouth daily.  mometasone-formoterol (DULERA) 100-5 mcg/actuation HFA inhaler Take 1 Puff by inhalation two (2) times a day.  dutasteride (AVODART) 0.5 mg capsule Take 0.5 mg by mouth daily.  albuterol-ipratropium (DUO-NEB) 2.5 mg-0.5 mg/3 ml nebu 3 mL by Nebulization route every six (6) hours as needed.  levocetirizine (XYZAL) 5 mg tablet Take 5 mg by mouth nightly.  coenzyme Q-10 (CO Q-10) 200 mg capsule Take 200 mg by mouth daily.  cranberry fruit extract (CRANBERRY CONCENTRATE PO) Take 360 mg by mouth nightly.  mecobal/levomefolat Ca/B6 phos (FOLTANX PO) Take  by mouth daily. No current facility-administered medications for this visit.      Allergies   Allergen Reactions    Sulfa (Sulfonamide Antibiotics) Rash       Review of Systems:  Constitutional: Negative for fatigue, malaise  Resp: Negative for cough, wheezing or SOB  CV: Negative for chest pain, dizziness or palpitations  GI: Negative for nausea or abdominal pain  MS: Negative for acute myalgias or arthralgias   Neuro: Negative for HA, weakness or paresthesia  Psych: Negative for depression or anxiety     Vitals:    12/13/21 1437   BP: 125/86   Pulse: 79   Resp: 16   Temp: 98.2 °F (36.8 °C)   TempSrc: Oral   SpO2: 94%   Weight: 261 lb 12.8 oz (118.8 kg)   Height: 6' 1\" (1.854 m)       Physical Examination:  General: Well developed, well nourished, in no acute distress  Head: Normocephalic, atraumatic  Eyes: Sclera clear, EOMI  Neck: Normal range of motion  Respiratory: symmetrical, unlabored effort  Cardiovascular: Regular rate and rhythm  Extremities: Full range of motion, normal gait  Neurologic: No focal deficits  Psych: Active, alert and oriented. Affect appropriate       ICD-10-CM ICD-9-CM    1. Vertigo  R42 780.4 meclizine (ANTIVERT) 25 mg tablet   2. Gastroesophageal reflux disease, unspecified whether esophagitis present  K21.9 530.81 pantoprazole (PROTONIX) 40 mg tablet   3. Essential hypertension  I10 401.9        Plan of care:  Diagnoses were discussed in detail with patient. Medication risks/benefits/side effects discussed with patient. All of the patient's questions were addressed and answered to apparent satisfaction. The patient understands and agrees with our plan of care. The patient knows to call back if they have questions about the plan of care or if symptoms change. The patient received an After-Visit Summary which contains VS, diagnoses, orders, allergy and medication lists. No future appointments.

## 2021-12-27 DIAGNOSIS — R60.0 LOWER EXTREMITY EDEMA: ICD-10-CM

## 2021-12-29 ENCOUNTER — TELEPHONE (OUTPATIENT)
Dept: FAMILY MEDICINE CLINIC | Age: 76
End: 2021-12-29

## 2021-12-29 NOTE — TELEPHONE ENCOUNTER
----- Message from [employer] sent at 12/29/2021 11:38 AM EST -----  Subject: Message to Provider    QUESTIONS  Information for Provider? Patients daughter called to state that the pt   was diagnosed with having vertigo and was prescribed a medication by Dr. Greg Sanderson to help with the vertigo and it did help but now he is out of the   medication and wanted to know if he is able to call in a refill for this   since it did help with the diagnosed vertigo. She was unsure of the name   of the script. Please call patient to confirm. ---------------------------------------------------------------------------  --------------  Finis Lady INFO  What is the best way for the office to contact you? OK to leave message on   voicemail  Preferred Call Back Phone Number? [phone]  ---------------------------------------------------------------------------  --------------  SCRIPT ANSWERS  Relationship to Patient? Other  Representative Name? Sofía Jones   Is the Representative on the appropriate HIPAA document in Epic?  Yes

## 2021-12-29 NOTE — TELEPHONE ENCOUNTER
Returned call and advised patient that rx already has one refill on it and he can contact the pharmacy. Patient verbalized understanding.

## 2021-12-31 RX ORDER — FUROSEMIDE 20 MG/1
20 TABLET ORAL DAILY
Qty: 30 TABLET | Refills: 2 | OUTPATIENT
Start: 2021-12-31

## 2022-01-11 ENCOUNTER — OFFICE VISIT (OUTPATIENT)
Dept: FAMILY MEDICINE CLINIC | Age: 77
End: 2022-01-11
Payer: MEDICARE

## 2022-01-11 VITALS
TEMPERATURE: 97.2 F | HEIGHT: 73 IN | HEART RATE: 64 BPM | WEIGHT: 268 LBS | SYSTOLIC BLOOD PRESSURE: 126 MMHG | DIASTOLIC BLOOD PRESSURE: 73 MMHG | OXYGEN SATURATION: 95 % | BODY MASS INDEX: 35.52 KG/M2 | RESPIRATION RATE: 20 BRPM

## 2022-01-11 DIAGNOSIS — E66.01 SEVERE OBESITY (BMI 35.0-35.9 WITH COMORBIDITY) (HCC): ICD-10-CM

## 2022-01-11 DIAGNOSIS — R42 VERTIGO: ICD-10-CM

## 2022-01-11 DIAGNOSIS — R60.0 LOWER EXTREMITY EDEMA: Primary | ICD-10-CM

## 2022-01-11 PROCEDURE — 99214 OFFICE O/P EST MOD 30 MIN: CPT | Performed by: STUDENT IN AN ORGANIZED HEALTH CARE EDUCATION/TRAINING PROGRAM

## 2022-01-11 PROCEDURE — G8417 CALC BMI ABV UP PARAM F/U: HCPCS | Performed by: STUDENT IN AN ORGANIZED HEALTH CARE EDUCATION/TRAINING PROGRAM

## 2022-01-11 PROCEDURE — G8536 NO DOC ELDER MAL SCRN: HCPCS | Performed by: STUDENT IN AN ORGANIZED HEALTH CARE EDUCATION/TRAINING PROGRAM

## 2022-01-11 PROCEDURE — G8432 DEP SCR NOT DOC, RNG: HCPCS | Performed by: STUDENT IN AN ORGANIZED HEALTH CARE EDUCATION/TRAINING PROGRAM

## 2022-01-11 PROCEDURE — G8754 DIAS BP LESS 90: HCPCS | Performed by: STUDENT IN AN ORGANIZED HEALTH CARE EDUCATION/TRAINING PROGRAM

## 2022-01-11 PROCEDURE — G8752 SYS BP LESS 140: HCPCS | Performed by: STUDENT IN AN ORGANIZED HEALTH CARE EDUCATION/TRAINING PROGRAM

## 2022-01-11 PROCEDURE — G8427 DOCREV CUR MEDS BY ELIG CLIN: HCPCS | Performed by: STUDENT IN AN ORGANIZED HEALTH CARE EDUCATION/TRAINING PROGRAM

## 2022-01-11 PROCEDURE — 1101F PT FALLS ASSESS-DOCD LE1/YR: CPT | Performed by: STUDENT IN AN ORGANIZED HEALTH CARE EDUCATION/TRAINING PROGRAM

## 2022-01-11 RX ORDER — FUROSEMIDE 40 MG/1
TABLET ORAL
Qty: 30 TABLET | Refills: 3 | Status: ON HOLD | OUTPATIENT
Start: 2022-01-11 | End: 2022-04-22 | Stop reason: SDUPTHER

## 2022-01-11 RX ORDER — MECLIZINE HYDROCHLORIDE 25 MG/1
25 TABLET ORAL
Qty: 30 TABLET | Refills: 1 | Status: SHIPPED | OUTPATIENT
Start: 2022-01-11 | End: 2022-04-04 | Stop reason: SDUPTHER

## 2022-01-11 NOTE — PROGRESS NOTES
Progress Note    Patient: Deidra Cano MRN: [id number]  SSN: [id number]    YOB: 1945  Age: 68 y.o. Sex: male        Chief Complaint   Patient presents with    Medication Refill         Subjective:     Problems addressed:  Encounter Diagnoses     ICD-10-CM ICD-9-CM   1. Lower extremity edema  R60.0 782.3   2. Vertigo  R42 780.4   3. Severe obesity (BMI 35.0-35.9 with comorbidity) [facility])  E66.01 278.01    Z68.35 V85.35     Syeda Valle is a 41-year-old male presenting for medication refill. The patient has a long history of bilateral lower extremity edema with a prior diagnosis of lymphedema. The patient reports stable edema but claims he is out of his Lasix. He has also had some episodes of positional vertigo for which he would like a refill on his Meclizine. He is reporting increased fatigue and tiredness but no specific or focal physical symptoms. He denies other associated symptoms of fluid overload, including orthopnea or worsening SOB with exertion. Current and past medical information:    Current Medications after this visit[de-identified]     Current Outpatient Medications   Medication Sig    furosemide (LASIX) 40 mg tablet Take 1 tablet by mouth daily.  meclizine (ANTIVERT) 25 mg tablet Take 1 Tablet by mouth three (3) times daily as needed (vertigo).  metoprolol succinate (TOPROL-XL) 25 mg XL tablet TAKE 1/2 TABLET BY MOUTH EVERY DAY    montelukast (SINGULAIR) 10 mg tablet TAKE 1 TABLET BY MOUTH AT BEDTIME FOR BREATHING    tamsulosin (FLOMAX) 0.4 mg capsule TAKE 1 CAPSULE BY MOUTH DAILY    cpap machine kit by Does Not Apply route.  rosuvastatin (CRESTOR) 10 mg tablet rosuvastatin 10 mg tablet   TAKE 1 TABLET BY MOUTH AT BEDTIME    nitroglycerin (NITROSTAT) 0.4 mg SL tablet nitroglycerin 0.4 mg sublingual tablet    triamcinolone (NASACORT AQ) 55 mcg nasal inhaler 2 Sprays by Both Nostrils route daily.  aspirin 81 mg chewable tablet Take 81 mg by mouth daily.     acetaminophen (TYLENOL EXTRA STRENGTH) 500 mg tablet Take 1,000 mg by mouth every six (6) hours as needed for Pain.  calcium-vitamin D (OYSTER SHELL) 500 mg(1,250mg) -200 unit per tablet Take 1 Tablet by mouth daily.  mometasone-formoterol (DULERA) 100-5 mcg/actuation HFA inhaler Take 1 Puff by inhalation two (2) times a day.  dutasteride (AVODART) 0.5 mg capsule Take 0.5 mg by mouth daily.  mecobal/levomefolat Ca/B6 phos (FOLTANX PO) Take  by mouth daily.  albuterol-ipratropium (DUO-NEB) 2.5 mg-0.5 mg/3 ml nebu 3 mL by Nebulization route every six (6) hours as needed.  levocetirizine (XYZAL) 5 mg tablet Take 5 mg by mouth nightly.  coenzyme Q-10 (CO Q-10) 200 mg capsule Take 200 mg by mouth daily.  cranberry fruit extract (CRANBERRY CONCENTRATE PO) Take 360 mg by mouth nightly.  pantoprazole (PROTONIX) 40 mg tablet Take 1 Tablet by mouth daily. (Patient not taking: Reported on 1/11/2022)     No current facility-administered medications for this visit.        Patient Active Problem List    Diagnosis Date Noted    Hyperlipidemia 05/25/2021    Septic arthritis ([facility] 07/30/2019    Obstructive sleep apnea syndrome 12/10/2018    UTI (urinary tract infection) 08/16/2018    Leukocytosis 08/16/2018    Fever 08/16/2018    Sepsis ([facility] 08/16/2018    Arthritis     GERD (gastroesophageal reflux disease)     History of BPH     History of kidney stones     Hx: UTI (urinary tract infection)     Hypertension     Lymphedema     SANTA on CPAP     Primary osteoarthritis of right knee 08/03/2018    Arteriosclerosis of coronary artery 01/01/2007       Past Medical History:   Diagnosis Date    Arthritis     Asthma     R/T ENVIRONMENTAL ALLERGIES; INHALER USE DAILY    CAD (coronary artery disease) 2007    MI    GERD (gastroesophageal reflux disease)     History of BPH     History of kidney stones     Hx: UTI (urinary tract infection)     Hypertension     Lymphedema     SANTA on CPAP        Allergies Allergen Reactions    Sulfa (Sulfonamide Antibiotics) Rash       Past Surgical History:   Procedure Laterality Date    HX HEART CATHETERIZATION  , 2016    STENTS X3  (INGRID--DR NUNEZ)    HX LITHOTRIPSY      2-3X    HX ORTHOPAEDIC Right 1964    FEMUR ORIF  (DR PRESCOTT)    HX TONSILLECTOMY      HX UROLOGICAL      CYSTO    VT BREAST SURGERY PROCEDURE UNLISTED Right     EXCISION OF BREAST LUMP (BENIGN)    VASCULAR SURGERY PROCEDURE UNLIST Right     LEG VEIN BYPASS (INGRID)       Social History     Socioeconomic History    Marital status:    Tobacco Use    Smoking status: Former Smoker     Packs/day: 2.00     Years: 20.00     Pack years: 40.00     Quit date:      Years since quittin.0    Smokeless tobacco: Never Used   Vaping Use    Vaping Use: Never used   Substance and Sexual Activity    Alcohol use: No    Drug use: No         Review of Systems   Constitutional: Positive for malaise/fatigue. Negative for chills and fever. HENT: Negative for congestion, sinus pain and tinnitus. Eyes: Negative for blurred vision and double vision. Respiratory: Negative for cough, sputum production, shortness of breath and wheezing. Cardiovascular: Positive for leg swelling. Negative for chest pain, palpitations and orthopnea. Gastrointestinal: Negative for abdominal pain, heartburn, nausea and vomiting. Genitourinary: Negative for dysuria and urgency. Musculoskeletal: Positive for joint pain. Negative for falls and myalgias. Neurological: Positive for dizziness. Negative for sensory change, focal weakness and headaches. Psychiatric/Behavioral: Negative for depression. Objective:     Vitals:    22 1549   BP: 126/73   Pulse: 64   Resp: 20   Temp: 97.2 °F (36.2 °C)   TempSrc: Oral   SpO2: 95%   Weight: 268 lb (121.6 kg)   Height: 6' 1\" (1.854 m)      Body mass index is 35.36 kg/m².       Physical Exam  Constitutional:       General: He is not in acute distress. Appearance: Normal appearance. Neck:      Vascular: No carotid bruit. Cardiovascular:      Rate and Rhythm: Normal rate and regular rhythm. Pulses: Normal pulses. Heart sounds: Normal heart sounds. No murmur heard. No friction rub. No gallop. Pulmonary:      Effort: Pulmonary effort is normal.      Breath sounds: Normal breath sounds. No wheezing, rhonchi or rales. Abdominal:      General: Abdomen is flat. Bowel sounds are normal.      Palpations: Abdomen is soft. Musculoskeletal:      Cervical back: Neck supple. No tenderness. Right lower le+ Pitting Edema present. Left lower le+ Pitting Edema present. Lymphadenopathy:      Cervical: No cervical adenopathy. Skin:     General: Skin is warm and dry. Capillary Refill: Capillary refill takes less than 2 seconds. Neurological:      General: No focal deficit present. Mental Status: He is alert and oriented to person, place, and time. Health Maintenance Due   Topic Date Due    COVID-19 Vaccine (1) Never done    DTaP/Tdap/Td series (1 - Tdap) Never done    Shingrix Vaccine Age 50> (1 of 2) Never done    Pneumococcal 65+ years (1 of 1 - PPSV23) Never done    Medicare Yearly Exam  Never done    Flu Vaccine (1) Never done       Assessment and orders:     Encounter Diagnoses     ICD-10-CM ICD-9-CM   1. Lower extremity edema  R60.0 782.3   2. Vertigo  R42 780.4   3. Severe obesity (BMI 35.0-35.9 with comorbidity) ([facility])  E66.01 278.01    Z68.35 V85.35       Patient is a 68-year-old male with PMH significant for lymphedema, CAD s/p MI, HTN, intermittent asthma, obesity, and multiple orthopedic surgeries who presents for medication refill for Furosemide and Meclizine for the treatment of his lower extremity edema and BPPV, respectively. The patient recently had lab work done showing his renal and liver functions were normal. We do not have much documentation of his past cardiac medical history. The patient sees a cardiologist but is not sure of the doctor's full name or specific name of his practice. We have asked him to follow up with his cardiologist so that can obtain his records to see if he has any evidence of prior heart failure or if he has ever been seen by vascular surgery to address his lymphedema. 1. Lower extremity edema  Patient with documented history of lymphedema. No known heart failure, kidney, or liver disease. Recent CMP with normal LFTs, serum Cr, and serum protein levels. Patient does report increased, generalized fatigue with worsened exercise capacity, but denies any symptoms specifically c/w CHF, such as orthopnea. - Furosemide (LASIX) 40 mg tablet; Take 1 tablet by mouth daily. Dispense: 30 Tablet; Refill: 3  - Repeat BMP in 2 weeks  - pro-BNP in 2 weeks to screen for possible CHF    2. Vertigo  Chronic, stable issue. Is positionally-dependent, c/w BPPV. Meclizine helps. - Meclizine (ANTIVERT) 25 mg tablet; Take 1 Tablet by mouth three (3) times daily as needed (vertigo). Dispense: 30 Tablet; Refill: 1    3. Severe obesity (BMI 35.0-35.9 with comorbidity) ([facility]  Patient limited in exercise options due to chronic LE joint pain and excess weight d/t fluid overload. Encouraging low-impact exercises and healthy dietary changes. Patient instructed to return in two weeks, in the morning, for edema re-check and to obtain labs that couldn't be drawn today. We also plan to review his documents that will be sent by his cardiologist.        Plan of care:  Discussed diagnoses in detail with patient. Medication risks/benefits/side effects discussed with patient. All of the patient's questions were addressed. The patient understands and agrees with our plan of care. The patient knows to call back if they are unsure of or forget any changes we discussed today or if the symptoms change.      The patient received an After-Visit Summary which contains VS, orders, medication list and allergy list. This can be used as a \"mini-medical record\" should they have to seek medical care while out of town. Follow-up and Dispositions    · Return in about 2 weeks (around 1/25/2022) for follow up and lab work. .         Future Appointments   Date Time Provider Flaquito Miranda   2/1/2022  8:00 AM Liya Cesar MD [facility] AMB       Signed By: Elda Landry MD     January 11, 2022

## 2022-01-11 NOTE — PROGRESS NOTES
1. Have you been to the ER, urgent care clinic since your last visit? Hospitalized since your last visit? no    2. Have you seen or consulted any other health care providers outside of the [address] [place] [address] since your last visit? Including any pap smears or colon screening. no    Reviewed record in preparation for visit and have necessary documentation    Pt did not bring medication to office visit for review    Opportunity was given for questions    Goals that were addressed and/or need to be completed during or after this appointment include   Health Maintenance Due   Topic Date Due    COVID-19 Vaccine (1) Never done    DTaP/Tdap/Td series (1 - Tdap) Never done    Shingrix Vaccine Age 50> (1 of 2) Never done    Pneumococcal 65+ years (1 of 1 - PPSV23) Never done    Medicare Yearly Exam  Never done    Flu Vaccine (1) Never done     Body mass index is 35.36 kg/m².

## 2022-01-31 ENCOUNTER — TELEPHONE (OUTPATIENT)
Dept: FAMILY MEDICINE CLINIC | Age: 77
End: 2022-01-31

## 2022-01-31 NOTE — TELEPHONE ENCOUNTER
----- Message from Nancy Oscar sent at 1/31/2022 10:56 AM EST -----  Subject: Message to Provider    QUESTIONS  Information for Provider? patient wants to know if labs are fasting   ---------------------------------------------------------------------------  --------------  CALL BACK INFO  What is the best way for the office to contact you? OK to leave message on   voicemail  Preferred Call Back Phone Number? [phone]  ---------------------------------------------------------------------------  --------------  SCRIPT ANSWERS  Relationship to Patient? Guardian  Representative Name? kamari glover   Is the Representative on the appropriate HIPAA document in Epic?  Yes

## 2022-02-02 ENCOUNTER — TELEPHONE (OUTPATIENT)
Dept: FAMILY MEDICINE CLINIC | Age: 77
End: 2022-02-02

## 2022-02-02 NOTE — TELEPHONE ENCOUNTER
Pt's dtr is trying to get a refill on venvonate. Pt was given this at [facility] about a week and a half ago for Covid. Pt has appt on 2-24. Pt also needs handicap information b/c it has been misplaced.

## 2022-02-02 NOTE — TELEPHONE ENCOUNTER
Copy of Disable parking place card form at  for pt to     Pt requesting medication refill for cough   Did not see on medlist  Seen at [address] last week for positive COVID and was prescribed would like refill  Appointment had to be Rescheduled for 2-24-22 with Dr Skip Santos    Please advise

## 2022-02-03 NOTE — TELEPHONE ENCOUNTER
Informed pt per Dr Ziggy aguirre. I can't prescribe anything more substantial without a visit. Please give him ER precautions too. It's a shame how far out his visit is.    appt rescheduled

## 2022-02-08 ENCOUNTER — OFFICE VISIT (OUTPATIENT)
Dept: FAMILY MEDICINE CLINIC | Age: 77
End: 2022-02-08
Payer: MEDICARE

## 2022-02-08 VITALS
HEART RATE: 75 BPM | OXYGEN SATURATION: 95 % | WEIGHT: 264 LBS | HEIGHT: 73 IN | SYSTOLIC BLOOD PRESSURE: 126 MMHG | BODY MASS INDEX: 34.99 KG/M2 | RESPIRATION RATE: 22 BRPM | DIASTOLIC BLOOD PRESSURE: 82 MMHG | TEMPERATURE: 97.1 F

## 2022-02-08 DIAGNOSIS — R60.0 LOWER EXTREMITY EDEMA: Primary | ICD-10-CM

## 2022-02-08 DIAGNOSIS — J45.20 MILD INTERMITTENT ASTHMA WITHOUT COMPLICATION: ICD-10-CM

## 2022-02-08 DIAGNOSIS — R35.0 URINARY FREQUENCY: ICD-10-CM

## 2022-02-08 DIAGNOSIS — K21.9 GASTROESOPHAGEAL REFLUX DISEASE WITHOUT ESOPHAGITIS: ICD-10-CM

## 2022-02-08 PROCEDURE — 1101F PT FALLS ASSESS-DOCD LE1/YR: CPT | Performed by: STUDENT IN AN ORGANIZED HEALTH CARE EDUCATION/TRAINING PROGRAM

## 2022-02-08 PROCEDURE — G8428 CUR MEDS NOT DOCUMENT: HCPCS | Performed by: STUDENT IN AN ORGANIZED HEALTH CARE EDUCATION/TRAINING PROGRAM

## 2022-02-08 PROCEDURE — G8752 SYS BP LESS 140: HCPCS | Performed by: STUDENT IN AN ORGANIZED HEALTH CARE EDUCATION/TRAINING PROGRAM

## 2022-02-08 PROCEDURE — G8754 DIAS BP LESS 90: HCPCS | Performed by: STUDENT IN AN ORGANIZED HEALTH CARE EDUCATION/TRAINING PROGRAM

## 2022-02-08 PROCEDURE — G8536 NO DOC ELDER MAL SCRN: HCPCS | Performed by: STUDENT IN AN ORGANIZED HEALTH CARE EDUCATION/TRAINING PROGRAM

## 2022-02-08 PROCEDURE — G8417 CALC BMI ABV UP PARAM F/U: HCPCS | Performed by: STUDENT IN AN ORGANIZED HEALTH CARE EDUCATION/TRAINING PROGRAM

## 2022-02-08 PROCEDURE — 99214 OFFICE O/P EST MOD 30 MIN: CPT | Performed by: STUDENT IN AN ORGANIZED HEALTH CARE EDUCATION/TRAINING PROGRAM

## 2022-02-08 PROCEDURE — G8432 DEP SCR NOT DOC, RNG: HCPCS | Performed by: STUDENT IN AN ORGANIZED HEALTH CARE EDUCATION/TRAINING PROGRAM

## 2022-02-08 RX ORDER — MONTELUKAST SODIUM 10 MG/1
TABLET ORAL
Qty: 90 TABLET | Refills: 3 | Status: SHIPPED | OUTPATIENT
Start: 2022-02-08

## 2022-02-08 RX ORDER — PHENOL/SODIUM PHENOLATE
20 AEROSOL, SPRAY (ML) MUCOUS MEMBRANE DAILY
Qty: 90 TABLET | Refills: 2 | Status: SHIPPED | OUTPATIENT
Start: 2022-02-08

## 2022-02-08 RX ORDER — TAMSULOSIN HYDROCHLORIDE 0.4 MG/1
0.4 CAPSULE ORAL DAILY
Qty: 90 CAPSULE | Refills: 3 | Status: SHIPPED | OUTPATIENT
Start: 2022-02-08

## 2022-02-08 NOTE — PROGRESS NOTES
Sue Alcazar (: 1945) is a 68 y.o. male, established patient, here for evaluation of the following chief complaint(s):  Follow Up Chronic Condition and Labs       Assessment/Plan:  1. Lower extremity edema- Will collect BNP today to screen for cardiac etiology of LE edema. Nonpitting edema and history most consistent with lymphedema. Recommend resuming compression therapy he previously had aid help with. Elevate as able. -     METABOLIC PANEL, BASIC; Future  -     NT-PRO BNP; Future  2. Urinary frequency-symptoms well controlled on Flomax currently. Refills given today. -     tamsulosin (FLOMAX) 0.4 mg capsule; Take 1 Capsule by mouth daily. , Normal, Disp-90 Capsule, R-3  3. Mild intermittent asthma without complication-symptoms currently well controlled on Singulair. Continue current regimen. -     montelukast (SINGULAIR) 10 mg tablet; TAKE 1 TABLET BY MOUTH AT BEDTIME FOR BREATHING, Normal, Disp-90 Tablet, R-3  4. Gastroesophageal reflux disease without esophagitis-symptoms well controlled on omeprazole. Continue current regimen. -     Omeprazole delayed release (PRILOSEC D/R) 20 mg tablet; Take 1 Tablet by mouth daily. , Normal, Disp-90 Tablet, R-2    Return in about 3 months (around 2022). Subjective/Objective:  HPI  LE edema- Saw Dr. Alyssa Babin last month for LE edema and lasix refill. He requested follow up on this since we have limited cardiac history on this pt. Unclear if edema is due to vascular insufficiency, lymphatic dysfunction or heart failure. Pt states this has been chronic. Minimal hemosiderin skin changes. Physical Exam  Blood pressure 126/82, pulse 75, temperature 97.1 °F (36.2 °C), temperature source Oral, resp. rate 22, height 6' 1\" (1.854 m), weight 264 lb (119.7 kg), SpO2 95 %. Body mass index is 34.83 kg/m². General appearance - Alert, NAD. Head - Atraumatic. Normocephalic. No lymphadenopathy  Eyes - EOMI. Sclera white.    Ears - Hearing grossly normal. Nose - Nares patent, no polyps  Throat - pharynx clear, no exudates. Respiratory - LCTAB. No wheeze/rale/rhonchi  Heart - Normal rate, regular rhythm. No m/r/r  Abdomen - Soft, non tender. Non distended. Neurological - No focal deficits. Speech normal.   Musculoskeletal - Normal ROM, Gait normal.    Extremities - +4 bilateral nonpitting edema bilaterally . Skin - normal coloration and normal turgor. No cyanosis, no rash. Medical History- Reviewed Social History- Reviewed Surgical History- Reviewed   Ina Brower has a past medical history of Arthritis, Asthma, CAD (coronary artery disease) (2007), GERD (gastroesophageal reflux disease), History of BPH, History of kidney stones, UTI (urinary tract infection), Hypertension, Lymphedema, and SANTA on CPAP. Ina Brower reports that he quit smoking about 37 years ago. He has a 40.00 pack-year smoking history. He has never used smokeless tobacco. He reports that he does not drink alcohol and does not use drugs. Ina Brower has a past surgical history that includes hx heart catheterization (2007, 2016); hx tonsillectomy; pr breast surgery procedure unlisted (Right); hx lithotripsy; hx urological; vascular surgery procedure unlist (Right); and hx orthopaedic (Right, 1964).        Problem List- Reviewed   Ina Brower has Primary osteoarthritis of right knee, UTI (urinary tract infection), Leukocytosis, Fever, Sepsis ([address] Arthritis, Obstructive sleep apnea syndrome, Arteriosclerosis of coronary artery, GERD (gastroesophageal reflux disease), History of BPH, History of kidney stones, Hx: UTI (urinary tract infection), Hypertension, Lymphedema, SANTA on CPAP, Septic arthritis ([address] Hyperlipidemia, and Asthma on their problem list.     Current Outpatient Medications   Medication Instructions    acetaminophen (TYLENOL EXTRA STRENGTH) 1,000 mg, Oral, EVERY 6 HOURS AS NEEDED    albuterol-ipratropium (DUO-NEB) 2.5 mg-0.5 mg/3 ml nebu 3 mL, Nebulization, EVERY 6 HOURS AS NEEDED    aspirin 81 mg, Oral, DAILY    calcium-vitamin D (OYSTER SHELL) 500 mg(1,250mg) -200 unit per tablet 1 Tablet, Oral, DAILY    coenzyme Q-10 (CO Q-10) 200 mg, Oral, DAILY    cpap machine kit Does Not Apply    cranberry fruit extract (CRANBERRY CONCENTRATE PO) 360 mg, Oral, EVERY BEDTIME    dutasteride (AVODART) 0.5 mg, Oral, DAILY    furosemide (LASIX) 40 mg tablet Take 1 tablet by mouth daily.  levocetirizine (XYZAL) 5 mg, Oral, EVERY BEDTIME    meclizine (ANTIVERT) 25 mg, Oral, 3 TIMES DAILY AS NEEDED    mecobal/levomefolat Ca/B6 phos (FOLTANX PO) Oral, DAILY    metoprolol succinate (TOPROL-XL) 25 mg XL tablet TAKE 1/2 TABLET BY MOUTH EVERY DAY    mometasone-formoterol (DULERA) 100-5 mcg/actuation HFA inhaler 1 Puff, 2 TIMES DAILY    montelukast (SINGULAIR) 10 mg tablet TAKE 1 TABLET BY MOUTH AT BEDTIME FOR BREATHING    nitroglycerin (NITROSTAT) 0.4 mg SL tablet nitroglycerin 0.4 mg sublingual tablet    Omeprazole delayed release (PRILOSEC D/R) 20 mg, Oral, DAILY    rosuvastatin (CRESTOR) 10 mg tablet rosuvastatin 10 mg tablet   TAKE 1 TABLET BY MOUTH AT BEDTIME    tamsulosin (FLOMAX) 0.4 mg, Oral, DAILY    triamcinolone (NASACORT AQ) 55 mcg nasal inhaler 2 Sprays, Both Nostrils, DAILY           An electronic signature was used to authenticate this note.   -- Luciana Mariano MD

## 2022-02-08 NOTE — PROGRESS NOTES
1. Have you been to the ER, urgent care clinic since your last visit? Hospitalized since your last visit? [facility] for COVID 1/24/2022     2. Have you seen or consulted any other health care providers outside of the [address] [place] [address] since your last visit? Include any pap smears or colon screening. No    Reviewed record in preparation for visit and have necessary documentation  Goals that were addressed and/or need to be completed during or after this appointment include     Health Maintenance Due   Topic Date Due    COVID-19 Vaccine (1) Never done    DTaP/Tdap/Td series (1 - Tdap) Never done    Shingrix Vaccine Age 50> (1 of 2) Never done    Pneumococcal 65+ years (1 of 1 - PPSV23) Never done    Medicare Yearly Exam  Never done    Flu Vaccine (1) Never done       Patient is accompanied by self I have received verbal consent from Lori Kenny to discuss any/all medical information while they are present in the room.

## 2022-02-09 LAB
ANION GAP SERPL CALC-SCNC: 6 MMOL/L (ref 5–15)
BNP SERPL-MCNC: 82 PG/ML
BUN SERPL-MCNC: 17 MG/DL (ref 6–20)
BUN/CREAT SERPL: 16 (ref 12–20)
CALCIUM SERPL-MCNC: 9.2 MG/DL (ref 8.5–10.1)
CHLORIDE SERPL-SCNC: 108 MMOL/L (ref 97–108)
CO2 SERPL-SCNC: 24 MMOL/L (ref 21–32)
CREAT SERPL-MCNC: 1.07 MG/DL (ref 0.7–1.3)
GLUCOSE SERPL-MCNC: 98 MG/DL (ref 65–100)
POTASSIUM SERPL-SCNC: 4.5 MMOL/L (ref 3.5–5.1)
SODIUM SERPL-SCNC: 138 MMOL/L (ref 136–145)

## 2022-03-07 RX ORDER — METOPROLOL SUCCINATE 25 MG/1
TABLET, EXTENDED RELEASE ORAL
Qty: 15 TABLET | Refills: 2 | Status: SHIPPED | OUTPATIENT
Start: 2022-03-07

## 2022-03-08 ENCOUNTER — DOCUMENTATION ONLY (OUTPATIENT)
Dept: FAMILY MEDICINE CLINIC | Age: 77
End: 2022-03-08

## 2022-03-08 NOTE — PROGRESS NOTES
Cards saw patient:  - LDL goal 70  - D/c bicarb.  Patient informed  - Continue current management for everything else    Ruslan Newman MD

## 2022-03-09 ENCOUNTER — TELEPHONE (OUTPATIENT)
Dept: FAMILY MEDICINE CLINIC | Age: 77
End: 2022-03-09

## 2022-03-09 NOTE — TELEPHONE ENCOUNTER
----- Message from [facility] sent at 3/9/2022 11:04 AM EST -----  Subject: Results Request    QUESTIONS  Which lab or imaging result is the patient calling about? CAT Scan  Which provider ordered the test?   At what location was the test performed? Date the test was performed? Additional Information for Provider? CAT scan performed last month with   gastro Dr. PT gave one time authorization to speak with daughter Lamont Ba   about his medical care. Informed PT that he would need to get HIPAA   document signed. They are looking for results of test  ---------------------------------------------------------------------------  --------------  CALL BACK INFO  What is the best way for the office to contact you? OK to leave message on   voicemail  Preferred Call Back Phone Number?  [phone]

## 2022-03-09 NOTE — TELEPHONE ENCOUNTER
Spoke with pt   Informed him that GI results pt would need to call GI for those results and plan of care.   He reports that he needed a pulmonary referral  He would have his daughter, Nani Bryant (on HIPPA) call back to discuss  Pt may need an appointment with PCP to discuss

## 2022-03-19 PROBLEM — G47.33 OBSTRUCTIVE SLEEP APNEA SYNDROME: Status: ACTIVE | Noted: 2018-12-10

## 2022-03-19 PROBLEM — A41.9 SEPSIS (HCC): Status: ACTIVE | Noted: 2018-08-16

## 2022-03-19 PROBLEM — M00.9 SEPTIC ARTHRITIS (HCC): Status: ACTIVE | Noted: 2019-07-30

## 2022-03-19 PROBLEM — M17.11 PRIMARY OSTEOARTHRITIS OF RIGHT KNEE: Status: ACTIVE | Noted: 2018-08-03

## 2022-03-19 PROBLEM — N39.0 UTI (URINARY TRACT INFECTION): Status: ACTIVE | Noted: 2018-08-16

## 2022-03-19 PROBLEM — E78.5 HYPERLIPIDEMIA: Status: ACTIVE | Noted: 2021-05-25

## 2022-03-19 PROBLEM — R50.9 FEVER: Status: ACTIVE | Noted: 2018-08-16

## 2022-03-20 PROBLEM — D72.829 LEUKOCYTOSIS: Status: ACTIVE | Noted: 2018-08-16

## 2022-03-24 ENCOUNTER — OFFICE VISIT (OUTPATIENT)
Dept: ORTHOPEDIC SURGERY | Age: 77
End: 2022-03-24
Payer: MEDICARE

## 2022-03-24 VITALS — BODY MASS INDEX: 36.58 KG/M2 | HEIGHT: 73 IN | WEIGHT: 276 LBS

## 2022-03-24 DIAGNOSIS — G89.29 CHRONIC PAIN OF LEFT KNEE: Primary | ICD-10-CM

## 2022-03-24 DIAGNOSIS — M25.562 CHRONIC PAIN OF LEFT KNEE: Primary | ICD-10-CM

## 2022-03-24 DIAGNOSIS — Z96.651 STATUS POST RIGHT KNEE REPLACEMENT: ICD-10-CM

## 2022-03-24 PROCEDURE — G8417 CALC BMI ABV UP PARAM F/U: HCPCS | Performed by: ORTHOPAEDIC SURGERY

## 2022-03-24 PROCEDURE — G8427 DOCREV CUR MEDS BY ELIG CLIN: HCPCS | Performed by: ORTHOPAEDIC SURGERY

## 2022-03-24 PROCEDURE — G8756 NO BP MEASURE DOC: HCPCS | Performed by: ORTHOPAEDIC SURGERY

## 2022-03-24 PROCEDURE — 99203 OFFICE O/P NEW LOW 30 MIN: CPT | Performed by: ORTHOPAEDIC SURGERY

## 2022-03-24 PROCEDURE — G8432 DEP SCR NOT DOC, RNG: HCPCS | Performed by: ORTHOPAEDIC SURGERY

## 2022-03-24 PROCEDURE — 1101F PT FALLS ASSESS-DOCD LE1/YR: CPT | Performed by: ORTHOPAEDIC SURGERY

## 2022-03-24 PROCEDURE — G8536 NO DOC ELDER MAL SCRN: HCPCS | Performed by: ORTHOPAEDIC SURGERY

## 2022-03-24 PROCEDURE — 20610 DRAIN/INJ JOINT/BURSA W/O US: CPT | Performed by: ORTHOPAEDIC SURGERY

## 2022-03-24 NOTE — LETTER
3/24/2022    Patient: Mikey Rodríguez   YOB: 1945   Date of Visit: 3/24/2022     Faith Sumner MD  [address] [place] [address]  Via In Basket    Dear Faith Sumner MD,      Thank you for referring Mr. Phyllis Peter to [facility] for evaluation. My notes for this consultation are attached. If you have questions, please do not hesitate to call me. I look forward to following your patient along with you.       Sincerely,    Elaina Shi MD

## 2022-03-24 NOTE — PROGRESS NOTES
Raj Powers (: 1945) is a 68 y.o. male, patient, here for evaluation of the following chief complaint(s):  Knee Pain (right knee pain, operated  )       HPI:    Plan is evaluate painful right total knee. 2 years ago status post total knee. Sounds like he healed okay without infection. Does have history of lymphedema and cellulitis. Stays persistently painful swollen warm now. His treating surgeon did aspirate once but I do not have any aspiration results. Here today for second opinion. Allergies   Allergen Reactions    Sulfa (Sulfonamide Antibiotics) Rash       Current Outpatient Medications   Medication Sig    metoprolol succinate (TOPROL-XL) 25 mg XL tablet TAKE 1/2 TABLET BY MOUTH EVERY DAY    tamsulosin (FLOMAX) 0.4 mg capsule Take 1 Capsule by mouth daily.  montelukast (SINGULAIR) 10 mg tablet TAKE 1 TABLET BY MOUTH AT BEDTIME FOR BREATHING    Omeprazole delayed release (PRILOSEC D/R) 20 mg tablet Take 1 Tablet by mouth daily.  furosemide (LASIX) 40 mg tablet Take 1 tablet by mouth daily.  meclizine (ANTIVERT) 25 mg tablet Take 1 Tablet by mouth three (3) times daily as needed (vertigo).  cpap machine kit by Does Not Apply route.  rosuvastatin (CRESTOR) 10 mg tablet rosuvastatin 10 mg tablet   TAKE 1 TABLET BY MOUTH AT BEDTIME    nitroglycerin (NITROSTAT) 0.4 mg SL tablet nitroglycerin 0.4 mg sublingual tablet    triamcinolone (NASACORT AQ) 55 mcg nasal inhaler 2 Sprays by Both Nostrils route daily.  aspirin 81 mg chewable tablet Take 81 mg by mouth daily.  acetaminophen (TYLENOL EXTRA STRENGTH) 500 mg tablet Take 1,000 mg by mouth every six (6) hours as needed for Pain.  calcium-vitamin D (OYSTER SHELL) 500 mg(1,250mg) -200 unit per tablet Take 1 Tablet by mouth daily.  mometasone-formoterol (DULERA) 100-5 mcg/actuation HFA inhaler Take 1 Puff by inhalation two (2) times a day.  (Patient not taking: Reported on 2022)    dutasteride (AVODART) 0.5 mg capsule Take 0.5 mg by mouth daily.  mecobal/levomefolat Ca/B6 phos (FOLTANX PO) Take  by mouth daily.  albuterol-ipratropium (DUO-NEB) 2.5 mg-0.5 mg/3 ml nebu 3 mL by Nebulization route every six (6) hours as needed.  levocetirizine (XYZAL) 5 mg tablet Take 5 mg by mouth nightly.  coenzyme Q-10 (CO Q-10) 200 mg capsule Take 200 mg by mouth daily.  cranberry fruit extract (CRANBERRY CONCENTRATE PO) Take 360 mg by mouth nightly. No current facility-administered medications for this visit.        Past Medical History:   Diagnosis Date    Arthritis     Asthma     R/T ENVIRONMENTAL ALLERGIES; INHALER USE DAILY    CAD (coronary artery disease) 2007    MI    GERD (gastroesophageal reflux disease)     History of BPH     History of kidney stones     Hx: UTI (urinary tract infection)     Hypertension     Lymphedema     SANTA on CPAP         Past Surgical History:   Procedure Laterality Date    HX HEART CATHETERIZATION  2007, 2016    STENTS X3  (INGRID--DR NUNEZ)    HX LITHOTRIPSY      2-3X    HX ORTHOPAEDIC Right 1964    FEMUR ORIF  (DR PRESCOTT)    HX TONSILLECTOMY      HX UROLOGICAL      CYSTO    IL BREAST SURGERY PROCEDURE UNLISTED Right     EXCISION OF BREAST LUMP (BENIGN)    VASCULAR SURGERY PROCEDURE UNLIST Right     LEG VEIN BYPASS (INGRID)       Family History   Problem Relation Age of Onset    Lung Disease Mother     Asthma Mother     Cancer Father         LUNG    Cancer Brother         BRAIN    Heart Disease Brother     Heart Disease Brother     Heart Disease Brother     Anesth Problems Neg Hx         Social History     Socioeconomic History    Marital status:      Spouse name: Not on file    Number of children: Not on file    Years of education: Not on file    Highest education level: Not on file   Occupational History    Not on file   Tobacco Use    Smoking status: Former Smoker     Packs/day: 2.00     Years: 20.00     Pack years: 40.00     Quit date: 5     Years since quittin.2    Smokeless tobacco: Never Used   Vaping Use    Vaping Use: Never used   Substance and Sexual Activity    Alcohol use: No    Drug use: No    Sexual activity: Not on file   Other Topics Concern    Not on file   Social History Narrative    Not on file     Social Determinants of Health     Financial Resource Strain:     Difficulty of Paying Living Expenses: Not on file   Food Insecurity:     Worried About Running Out of Food in the Last Year: Not on file    Marvin of Food in the Last Year: Not on file   Transportation Needs:     Lack of Transportation (Medical): Not on file    Lack of Transportation (Non-Medical): Not on file   Physical Activity:     Days of Exercise per Week: Not on file    Minutes of Exercise per Session: Not on file   Stress:     Feeling of Stress : Not on file   Social Connections:     Frequency of Communication with Friends and Family: Not on file    Frequency of Social Gatherings with Friends and Family: Not on file    Attends Gnosticism Services: Not on file    Active Member of [address] [place] [address] or Organizations: Not on file    Attends Club or Organization Meetings: Not on file    Marital Status: Not on file   Intimate Partner Violence:     Fear of Current or Ex-Partner: Not on file    Emotionally Abused: Not on file    Physically Abused: Not on file    Sexually Abused: Not on file   Housing Stability:     Unable to Pay for Housing in the Last Year: Not on file    Number of [place] in the Last Year: Not on file    Unstable Housing in the Last Year: Not on file       ROS     Positive for: Musculoskeletal    Last edited by Anushka Caballero on 3/24/2022  2:48 PM. (History)            Vitals:  Ht 6' 1\" (1.854 m)   Wt 276 lb (125.2 kg)   BMI 36.41 kg/m²    Body mass index is 36.41 kg/m². PHYSICAL EXAM:  Right lower extremity exam: Knee exam today shows a painless hip range of motion.     Well-healed universal midline total knee incision. Large effusion in his right total knee. Range of motion is 5 to about 115 degrees. No instability is noted. Extremity is sensate and perfused. Straight leg raise and femoral nerve stretch test are negative. Motor testing reveals intact quads, ankle plantar flexors, ankle dorsiflexors. Peripheral pitting edema with a hint of erythema in his right leg. IMAGING:  XR Results (most recent):  Results from Appointment encounter on 03/24/22    XR KNEE RT 3 V    Narrative  3 views right knee. He has a DePuy total knee in place. The implants are well fixed to the bone. Implants are appropriately sized. Patella tracks centrally in the trochlear groove there are no abnormalities with respect to his total knee components. And there is no periosteal reaction. ASSESSMENT/PLAN:  1. Chronic pain of left knee  -     XR KNEE RT 3 V; Future  2. Status post right knee replacement    Painful total knee. With the lymphedema and erythema as well as effusion aspiration of his knee was performed today. I did an alpha defense and work-up via a Synovasure. Patient's fluid aspiration was infected and very opaque consistent with a chronic infection. He was scheduled for resection of his total knee. He understands treatment course which will be a two-stage with temporary knee 6 weeks IV antibiotics via PICC line. An electronic signature was used to authenticate this note.   --Real Rubio MD

## 2022-04-04 DIAGNOSIS — R42 VERTIGO: ICD-10-CM

## 2022-04-05 ENCOUNTER — OFFICE VISIT (OUTPATIENT)
Dept: FAMILY MEDICINE CLINIC | Age: 77
End: 2022-04-05
Payer: MEDICARE

## 2022-04-05 VITALS
RESPIRATION RATE: 16 BRPM | HEIGHT: 73 IN | DIASTOLIC BLOOD PRESSURE: 71 MMHG | WEIGHT: 269 LBS | SYSTOLIC BLOOD PRESSURE: 112 MMHG | BODY MASS INDEX: 35.65 KG/M2 | HEART RATE: 78 BPM | OXYGEN SATURATION: 95 % | TEMPERATURE: 98.3 F

## 2022-04-05 DIAGNOSIS — Z91.81 AT MODERATE RISK FOR FALL: ICD-10-CM

## 2022-04-05 DIAGNOSIS — I25.10 ARTERIOSCLEROSIS OF CORONARY ARTERY: ICD-10-CM

## 2022-04-05 DIAGNOSIS — Z00.01 ENCOUNTER FOR GENERAL ADULT MEDICAL EXAMINATION WITH ABNORMAL FINDINGS: ICD-10-CM

## 2022-04-05 DIAGNOSIS — M25.561 CHRONIC PAIN OF RIGHT KNEE: Primary | ICD-10-CM

## 2022-04-05 DIAGNOSIS — I10 PRIMARY HYPERTENSION: ICD-10-CM

## 2022-04-05 DIAGNOSIS — G89.29 CHRONIC PAIN OF RIGHT KNEE: Primary | ICD-10-CM

## 2022-04-05 PROBLEM — R50.9 FEVER: Status: RESOLVED | Noted: 2018-08-16 | Resolved: 2022-04-05

## 2022-04-05 PROBLEM — N39.0 UTI (URINARY TRACT INFECTION): Status: RESOLVED | Noted: 2018-08-16 | Resolved: 2022-04-05

## 2022-04-05 PROBLEM — A41.9 SEPSIS (HCC): Status: RESOLVED | Noted: 2018-08-16 | Resolved: 2022-04-05

## 2022-04-05 PROBLEM — D72.829 LEUKOCYTOSIS: Status: RESOLVED | Noted: 2018-08-16 | Resolved: 2022-04-05

## 2022-04-05 PROCEDURE — G0439 PPPS, SUBSEQ VISIT: HCPCS | Performed by: FAMILY MEDICINE

## 2022-04-05 PROCEDURE — 93000 ELECTROCARDIOGRAM COMPLETE: CPT | Performed by: FAMILY MEDICINE

## 2022-04-05 PROCEDURE — 99214 OFFICE O/P EST MOD 30 MIN: CPT | Performed by: FAMILY MEDICINE

## 2022-04-05 RX ORDER — MECLIZINE HYDROCHLORIDE 25 MG/1
25 TABLET ORAL
Qty: 30 TABLET | Refills: 1 | Status: CANCELLED | OUTPATIENT
Start: 2022-04-05

## 2022-04-05 RX ORDER — MECLIZINE HYDROCHLORIDE 25 MG/1
25 TABLET ORAL
Qty: 30 TABLET | Refills: 1 | Status: SHIPPED | OUTPATIENT
Start: 2022-04-05 | End: 2022-09-26

## 2022-04-05 NOTE — PROGRESS NOTES
Date of visit: 4/5/2022   The following Annual Medicare Wellness Exam is distinct and separate from the medical evaluation and decision making. This is a Subsequent Medicare Annual Wellness Visit (AWV), (Performed more than 12 months after effective date of Medicare Part B enrollment and 12 months after last preventive visit, Once in a lifetime)    I have reviewed the patient's medical history in detail and updated the computerized patient record. Kanwal Herrera is a 68 y.o. male   History obtained from: the patient. Individual medical history and medications as well as vital signs were reviewed today. A review of cognitive impairment  (MMSE) was performed. The patient was advised to have an advanced care directive in place. Smoking status, fall risk  assessment and home safety were reviewed with patient.         History     Patient Active Problem List   Diagnosis Code    Primary osteoarthritis of right knee M17.11    UTI (urinary tract infection) N39.0    Leukocytosis D72.829    Fever R50.9    Sepsis ([address] A41.9    Arthritis M19.90    Obstructive sleep apnea syndrome G47.33    Arteriosclerosis of coronary artery I25.10    GERD (gastroesophageal reflux disease) K21.9    History of BPH Z87.438    History of kidney stones Z87.442    Hx: UTI (urinary tract infection) Z87.440    Hypertension I10    Lymphedema I89.0    SANTA on CPAP G47.33, Z99.89    Septic arthritis (HCC) M00.9    Hyperlipidemia E78.5    Asthma J45.909     Past Medical History:   Diagnosis Date    Arthritis     Asthma     R/T ENVIRONMENTAL ALLERGIES; INHALER USE DAILY    CAD (coronary artery disease) 2007    MI    GERD (gastroesophageal reflux disease)     History of BPH     History of kidney stones     Hx: UTI (urinary tract infection)     Hypertension     Lymphedema     SANTA on CPAP       Past Surgical History:   Procedure Laterality Date    HX HEART CATHETERIZATION  2007, 2016    STENTS X3  (INGRID-DIEGO NUNEZ)  HX LITHOTRIPSY      2-3X    HX ORTHOPAEDIC Right 1964    FEMUR ORIF  (DR PRESCOTT)    HX TONSILLECTOMY      HX UROLOGICAL      CYSTO    MS BREAST SURGERY PROCEDURE UNLISTED Right     EXCISION OF BREAST LUMP (BENIGN)    VASCULAR SURGERY PROCEDURE UNLIST Right     LEG VEIN BYPASS (INGRID)     Allergies   Allergen Reactions    Sulfa (Sulfonamide Antibiotics) Rash     Current Outpatient Medications   Medication Sig Dispense Refill    meclizine (ANTIVERT) 25 mg tablet Take 1 Tablet by mouth three (3) times daily as needed (vertigo). 30 Tablet 1    metoprolol succinate (TOPROL-XL) 25 mg XL tablet TAKE 1/2 TABLET BY MOUTH EVERY DAY 15 Tablet 2    tamsulosin (FLOMAX) 0.4 mg capsule Take 1 Capsule by mouth daily. 90 Capsule 3    montelukast (SINGULAIR) 10 mg tablet TAKE 1 TABLET BY MOUTH AT BEDTIME FOR BREATHING 90 Tablet 3    Omeprazole delayed release (PRILOSEC D/R) 20 mg tablet Take 1 Tablet by mouth daily. 90 Tablet 2    furosemide (LASIX) 40 mg tablet Take 1 tablet by mouth daily. 30 Tablet 3    cpap machine kit by Does Not Apply route.  rosuvastatin (CRESTOR) 10 mg tablet rosuvastatin 10 mg tablet   TAKE 1 TABLET BY MOUTH AT BEDTIME      nitroglycerin (NITROSTAT) 0.4 mg SL tablet nitroglycerin 0.4 mg sublingual tablet      triamcinolone (NASACORT AQ) 55 mcg nasal inhaler 2 Sprays by Both Nostrils route daily.  aspirin 81 mg chewable tablet Take 81 mg by mouth daily.  acetaminophen (TYLENOL EXTRA STRENGTH) 500 mg tablet Take 1,000 mg by mouth every six (6) hours as needed for Pain.  calcium-vitamin D (OYSTER SHELL) 500 mg(1,250mg) -200 unit per tablet Take 1 Tablet by mouth daily.  mometasone-formoterol (DULERA) 100-5 mcg/actuation HFA inhaler Take 1 Puff by inhalation two (2) times a day.  dutasteride (AVODART) 0.5 mg capsule Take 0.5 mg by mouth daily.  mecobal/levomefolat Ca/B6 phos (FOLTANX PO) Take  by mouth daily.       albuterol-ipratropium (DUO-NEB) 2.5 mg-0.5 mg/3 ml nebu 3 mL by Nebulization route every six (6) hours as needed.  levocetirizine (XYZAL) 5 mg tablet Take 5 mg by mouth nightly.  coenzyme Q-10 (CO Q-10) 200 mg capsule Take 200 mg by mouth daily.  cranberry fruit extract (CRANBERRY CONCENTRATE PO) Take 360 mg by mouth nightly. Family History   Problem Relation Age of Onset    Lung Disease Mother     Asthma Mother     Cancer Father         LUNG    Cancer Brother         BRAIN    Heart Disease Brother     Heart Disease Brother     Heart Disease Brother     Anesth Problems Neg Hx      Social History     Tobacco Use    Smoking status: Former Smoker     Packs/day: 2.00     Years: 20.00     Pack years: 40.00     Quit date:      Years since quittin.2    Smokeless tobacco: Never Used   Substance Use Topics    Alcohol use: No       Specialists/Care Team   Soheila Pena has established care with the following healthcare providers:  Patient Care Team:  Keshia Banuelos MD as PCP - General (Family Medicine)  Kathy Gray MD (Cardiology)  Bard Nolvia MD as Physician (Orthopedic Surgery)    Health Risk Assessment     Demographics   male  68 y.o. General Health Questions   -During the past 4 weeks:   -how would you rate your health in general? Good   -Have you noticed any hearing difficulties? no    Emotional Health Questions   -Do you have a history of depression, anxiety, or emotional problems? no  -Over the past 2 weeks, have you felt down, depressed or hopeless? no  -Over the past 2 weeks, have you felt little interest or pleasure in doing things? no    Health Habits   Do you get 5 servings of fruits or vegetables daily? yes  Do you exercise regularly?  no    Activities of Daily Living and Functional Status   -Do you need help with eating, walking, dressing, bathing, toileting, the phone, transportation, shopping, preparing meals, housework, laundry, medications or managing money? no  -Are you confident are you that you can control and manage most of your health problems? yes    Fall Risk and Home Safety   Have you fallen 2 or more times in the past year? no        Physical Examination     Vitals:    04/05/22 1429   BP: 112/71   Pulse: 78   Resp: 16   Temp: 98.3 °F (36.8 °C)   TempSrc: Oral   SpO2: 95%   Weight: 269 lb (122 kg)   Height: 6' 1\" (1.854 m)     Body mass index is 35.49 kg/m². No exam data present    Evaluation of Cognitive Function   Mood/affect:  happy  Appearance: age appropriate and well dressed  Word recall 3/3  Clock drawing normal          Preventive Services   Reviewed with patient if applicable:  Pneumococcal vaccines   Flu vaccine   Hepatitis B vaccine (if at risk)   Shingles vaccine   TDAP vaccine   COV-19 vaccine   PSA n/a -                               Discussion of Advance Directive   Discussed with Nat Lawler his ability to prepare and advance directive in the case that an injury or illness causes him to be unable to make health care decisions. Advised to have an signed advanced care directive. Assessment/Plan   Z00.01    ICD-10-CM ICD-9-CM    1. Arteriosclerosis of coronary artery  I25.10 414.00 AMB POC EKG ROUTINE W/ 12 LEADS, INTER & REP   2. Chronic pain of right knee  M25.561 719.46     G89.29 338.29        Orders Placed This Encounter    AMB POC EKG ROUTINE W/ 12 LEADS, INTER & REP     He refuses all immunizations. He has aged out of colon cancer and prostate cancer screening.        Eliza Lemos MD

## 2022-04-05 NOTE — PATIENT INSTRUCTIONS
Medicare Wellness Visit, Male    The best way to live healthy is to have a lifestyle where you eat a well-balanced diet, exercise regularly, limit alcohol use, and quit all forms of tobacco/nicotine, if applicable. Regular preventive services are another way to keep healthy. Preventive services (vaccines, screening tests, monitoring & exams) can help personalize your care plan, which helps you manage your own care. Screening tests can find health problems at the earliest stages, when they are easiest to treat. [facility] follows the current, evidence-based guidelines published by the [facility] ([facility]) when recommending preventive services for our patients. Because we follow these guidelines, sometimes recommendations change over time as research supports it. (For example, a prostate screening blood test is no longer routinely recommended for men with no symptoms). Of course, you and your doctor may decide to screen more often for some diseases, based on your risk and co-morbidities (chronic disease you are already diagnosed with). Preventive services for you include:  - Medicare offers their members a free annual wellness visit, which is time for you and your primary care provider to discuss and plan for your preventive service needs. Take advantage of this benefit every year!  -All adults over age 72 should receive the recommended pneumonia vaccines. Current USPSTF guidelines recommend a series of two vaccines for the best pneumonia protection.   -All adults should have a flu vaccine yearly and tetanus vaccine every 10 years.  -All adults age 48 and older should receive the shingles vaccines (series of two vaccines).        -All adults age 38-68 who are overweight should have a diabetes screening test once every three years.   -Other screening tests & preventive services for persons with diabetes include: an eye exam to screen for diabetic retinopathy, a kidney function test, a foot exam, and stricter control over your cholesterol.   -Cardiovascular screening for adults with routine risk involves an electrocardiogram (ECG) at intervals determined by the provider.   -Colorectal cancer screening should be done for adults age 54-65 with no increased risk factors for colorectal cancer. There are a number of acceptable methods of screening for this type of cancer. Each test has its own benefits and drawbacks. Discuss with your provider what is most appropriate for you during your annual wellness visit. The different tests include: colonoscopy (considered the best screening method), a fecal occult blood test, a fecal DNA test, and sigmoidoscopy.  -All adults born between [facility] and [facility] should be screened once for Hepatitis C.  -An Abdominal Aortic Aneurysm (AAA) Screening is recommended for men age 73-68 who has ever smoked in their lifetime. Here is a list of your current Health Maintenance items (your personalized list of preventive services) with a due date:  Health Maintenance Due   Topic Date Due    COVID-19 Vaccine (1) Never done    DTaP/Tdap/Td  (1 - Tdap) Never done    Shingles Vaccine (1 of 2) Never done    Pneumococcal Vaccine (1 of 1 - PPSV23) Never done    Annual Well Visit  Never done          Advance Care Planning: Care Instructions  Your Care Instructions  It can be hard to live with an illness that cannot be cured. But if your health is getting worse, you may want to make decisions about end-of-life care. Planning for the end of your life does not mean that you are giving up. It is a way to make sure that your wishes are met. Clearly stating your wishes can make it easier for your loved ones. Making plans while you are still able may also ease your mind and make your final days less stressful and more meaningful. Follow-up care is a key part of your treatment and safety.  Be sure to make and go to all appointments, and call your doctor if you are having problems. It's also a good idea to know your test results and keep a list of the medicines you take. What can you do to plan for the end of life? · You can bring these issues up with your doctor. You do not need to wait until your doctor starts the conversation. You might start with \"I would not be willing to live with . Meryle Sandman Meryle Sandman Meryle Sandman \" When you complete this sentence it helps your doctor understand your wishes. · Talk openly and honestly with your doctor. This is the best way to understand the decisions you will need to make as your health changes. Know that you can always change your mind. · Ask your doctor about commonly used life-support measures. These include tube feedings, breathing machines, and fluids given through a vein (IV). Understanding these treatments will help you decide whether you want them. · You may choose to have these life-supporting treatments for a limited time. This allows a trial period to see whether they will help you. You may also decide that you want your doctor to take only certain measures to keep you alive. It is important to spell out these conditions so that your doctor and family understand them. · Talk to your doctor about how long you are likely to live. He or she may be able to give you an idea of what usually happens with your specific illness. · Think about preparing papers that state your wishes. This way there will not be any confusion about what you want. You can change your instructions at any time. Which papers should you prepare? Advance directives are legal papers that tell doctors how you want to be cared for at the end of your life. You do not need a  to write these papers. Ask your doctor or your state health department for information on how to write your advance directives. They may have the forms for each of these types of papers.  Make sure your doctor has a copy of these on file, and give a copy to a family member or close friend. · Consider a do-not-resuscitate order (DNR). This order asks that no extra treatments be done if your heart stops or you stop breathing. Extra treatments may include cardiopulmonary resuscitation (CPR), electrical shock to restart your heart, or a machine to breathe for you. If you decide to have a DNR order, ask your doctor to explain and write it. Place the order in your home where everyone can easily see it. · Consider a living will. A living will explains your wishes about life support and other treatments at the end of your life if you become unable to speak for yourself. Living waller tell doctors to use or not use treatments that would keep you alive. You must have one or two witnesses or a notary present when you sign this form. · Consider a durable power of  for health care. This allows you to name a person to make decisions about your care if you are not able to. Most people ask a close friend or family member. Talk to this person about the kinds of treatments you want and those that you do not want. Make sure this person understands your wishes. These legal papers are simple to change. Tell your doctor what you want to change, and ask him or her to make a note in your medical file. Give your family updated copies of the papers. Where can you learn more? Go to http://www.gray.com/. Enter P184 in the search box to learn more about \"Advance Care Planning: Care Instructions. \"  Current as of: November 17, 2016  Content Version: 11.3  © 0963-6331 [employer]. Care instructions adapted under license by [employer] (which disclaims liability or warranty for this information). If you have questions about a medical condition or this instruction, always ask your healthcare professional. [place] [address] any warranty or liability for your use of this information.          Preventing Falls: Care Instructions  Your Care Instructions    Getting around your home safely can be a challenge if you have injuries or health problems that make it easy for you to fall. Loose rugs and furniture in walkways are among the dangers for many older people who have problems walking or who have poor eyesight. People who have conditions such as arthritis, osteoporosis, or dementia also have to be careful not to fall. You can make your home safer with a few simple measures. Follow-up care is a key part of your treatment and safety. Be sure to make and go to all appointments, and call your doctor if you are having problems. It's also a good idea to know your test results and keep a list of the medicines you take. How can you care for yourself at home? Taking care of yourself  · You may get dizzy if you do not drink enough water. To prevent dehydration, drink plenty of fluids, enough so that your urine is light yellow or clear like water. Choose water and other caffeine-free clear liquids. If you have kidney, heart, or liver disease and have to limit fluids, talk with your doctor before you increase the amount of fluids you drink. · Exercise regularly to improve your strength, muscle tone, and balance. Walk if you can. Swimming may be a good choice if you cannot walk easily. · Have your vision and hearing checked each year or any time you notice a change. If you have trouble seeing and hearing, you might not be able to avoid objects and could lose your balance. · Know the side effects of the medicines you take. Ask your doctor or pharmacist whether the medicines you take can affect your balance. Sleeping pills or sedatives can affect your balance. · Limit the amount of alcohol you drink. Alcohol can impair your balance and other senses. · Ask your doctor whether calluses or corns on your feet need to be removed. If you wear loose-fitting shoes because of calluses or corns, you can lose your balance and fall.   · Talk to your doctor if you have numbness in your feet. Preventing falls at home  · Remove raised doorway thresholds, throw rugs, and clutter. Repair loose carpet or raised areas in the floor. · Move furniture and electrical cords to keep them out of walking paths. · Use nonskid floor wax, and wipe up spills right away, especially on ceramic tile floors. · If you use a walker or cane, put rubber tips on it. If you use crutches, clean the bottoms of them regularly with an abrasive pad, such as steel wool. · Keep your house well lit, especially [place]eba [place], and outside walkways. Use night-lights in areas such as hallways and bathrooms. Add extra light switches or use remote switches (such as switches that go on or off when you clap your hands) to make it easier to turn lights on if you have to get up during the night. · Install sturdy handrails on stairways. · Move items in your cabinets so that the things you use a lot are on the lower shelves (about waist level). · Keep a cordless phone and a flashlight with new batteries by your bed. If possible, put a phone in each of the main rooms of your house, or carry a cell phone in case you fall and cannot reach a phone. Or, you can wear a device around your neck or wrist. You push a button that sends a signal for help. · Wear low-heeled shoes that fit well and give your feet good support. Use footwear with nonskid soles. Check the heels and soles of your shoes for wear. Repair or replace worn heels or soles. · Do not wear socks without shoes on wood floors. · Walk on the grass when the sidewalks are slippery. If you live in an area that gets snow and ice in the winter, sprinkle salt on slippery steps and sidewalks. Preventing falls in the bath  · Install grab bars and nonskid mats inside and outside your shower or tub and near the toilet and sinks. · Use shower chairs and bath benches. · Use a hand-held shower head that will allow you to sit while showering.   · Get into a tub or shower by putting the weaker leg in first. Get out of a tub or shower with your strong side first.  · Repair loose toilet seats and consider installing a raised toilet seat to make getting on and off the toilet easier. · Keep your bathroom door unlocked while you are in the shower. Where can you learn more? Go to http://www.galeano.com/. Enter 0476 [id number] in the search box to learn more about \"Preventing Falls: Care Instructions. \"  Current as of: March 16, 2018  Content Version: 11.8  © 8041-8667 [employer]. Care instructions adapted under license by [employer] (which disclaims liability or warranty for this information). If you have questions about a medical condition or this instruction, always ask your healthcare professional. [address] any warranty or liability for your use of this information. Preventing Falls: Care Instructions  Your Care Instructions    Getting around your home safely can be a challenge if you have injuries or health problems that make it easy for you to fall. Loose rugs and furniture in walkways are among the dangers for many older people who have problems walking or who have poor eyesight. People who have conditions such as arthritis, osteoporosis, or dementia also have to be careful not to fall. You can make your home safer with a few simple measures. Follow-up care is a key part of your treatment and safety. Be sure to make and go to all appointments, and call your doctor if you are having problems. It's also a good idea to know your test results and keep a list of the medicines you take. How can you care for yourself at home? Taking care of yourself  · You may get dizzy if you do not drink enough water. To prevent dehydration, drink plenty of fluids, enough so that your urine is light yellow or clear like water. Choose water and other caffeine-free clear liquids.  If you have kidney, heart, or liver disease and have to limit fluids, talk with your doctor before you increase the amount of fluids you drink. · Exercise regularly to improve your strength, muscle tone, and balance. Walk if you can. Swimming may be a good choice if you cannot walk easily. · Have your vision and hearing checked each year or any time you notice a change. If you have trouble seeing and hearing, you might not be able to avoid objects and could lose your balance. · Know the side effects of the medicines you take. Ask your doctor or pharmacist whether the medicines you take can affect your balance. Sleeping pills or sedatives can affect your balance. · Limit the amount of alcohol you drink. Alcohol can impair your balance and other senses. · Ask your doctor whether calluses or corns on your feet need to be removed. If you wear loose-fitting shoes because of calluses or corns, you can lose your balance and fall. · Talk to your doctor if you have numbness in your feet. Preventing falls at home  · Remove raised doorway thresholds, throw rugs, and clutter. Repair loose carpet or raised areas in the floor. · Move furniture and electrical cords to keep them out of walking paths. · Use nonskid floor wax, and wipe up spills right away, especially on ceramic tile floors. · If you use a walker or cane, put rubber tips on it. If you use crutches, clean the bottoms of them regularly with an abrasive pad, such as steel wool. · Keep your house well lit, especially Lenny Capes, and outside walkways. Use night-lights in areas such as hallways and bathrooms. Add extra light switches or use remote switches (such as switches that go on or off when you clap your hands) to make it easier to turn lights on if you have to get up during the night. · Install sturdy handrails on stairways. · Move items in your cabinets so that the things you use a lot are on the lower shelves (about waist level).   · Keep a cordless phone and a flashlight with new batteries by your bed. If possible, put a phone in each of the main rooms of your house, or carry a cell phone in case you fall and cannot reach a phone. Or, you can wear a device around your neck or wrist. You push a button that sends a signal for help. · Wear low-heeled shoes that fit well and give your feet good support. Use footwear with nonskid soles. Check the heels and soles of your shoes for wear. Repair or replace worn heels or soles. · Do not wear socks without shoes on wood floors. · Walk on the grass when the sidewalks are slippery. If you live in an area that gets snow and ice in the winter, sprinkle salt on slippery steps and sidewalks. Preventing falls in the bath  · Install grab bars and nonskid mats inside and outside your shower or tub and near the toilet and sinks. · Use shower chairs and bath benches. · Use a hand-held shower head that will allow you to sit while showering. · Get into a tub or shower by putting the weaker leg in first. Get out of a tub or shower with your strong side first.  · Repair loose toilet seats and consider installing a raised toilet seat to make getting on and off the toilet easier. · Keep your bathroom door unlocked while you are in the shower. Where can you learn more? Go to http://www.galeano.com/. Enter 0476 [id number] in the search box to learn more about \"Preventing Falls: Care Instructions. \"  Current as of: March 16, 2018  Content Version: 11.8  © 2582-2773 [employer]. Care instructions adapted under license by [employer] (which disclaims liability or warranty for this information). If you have questions about a medical condition or this instruction, always ask your healthcare professional. [address]  any warranty or liability for your use of this information.

## 2022-04-05 NOTE — PROGRESS NOTES
1. \"Have you been to the ER, urgent care clinic since your last visit? Hospitalized since your last visit? \" No    2. \"Have you seen or consulted any other health care providers outside of the [address] [place] [address] since your last visit? \" No     3. For patients aged 39-70: Has the patient had a colonoscopy / FIT/ Cologuard? No      If the patient is female:    4. For patients aged 41-77: Has the patient had a mammogram within the past 2 years? NA - based on age or sex      11. For patients aged 21-65: Has the patient had a pap smear?  NA - based on age or sex    Health Maintenance Due   Topic Date Due    COVID-19 Vaccine (1) Never done    DTaP/Tdap/Td series (1 - Tdap) Never done    Shingrix Vaccine Age 50> (1 of 2) Never done    Pneumococcal 65+ years (1 of 1 - PPSV23) Never done    Medicare Yearly Exam  Never done

## 2022-04-05 NOTE — PROGRESS NOTES
Paul Elizabeth MD    Preoperative Evaluation    Date of Exam: 4/5/2022    Cathleen Sidhu is a 68 y.o. male ([id number]) who presents for preoperative evaluation. PREOP    The patient is here for pre-operative evaluation for upcoming surgery. The patient reports no problems with CP, SOB, palpations, fevers, chills, dysuria, urinary frequecny, Abd pain, N/V/D. There is no known personal or FH of reactions to anesthesia in any form. Procedure/Surgery: R total Knee revision, Dr. Syeda Armando for 4/19/22    Primary Physician: Cristine Bai MD    Latex Allergy: no    Recent use of: ASA    Anesthesia Complications: None    Functional Capacity:  able to climb a flight of stairs without chest pain or severe SOB  He has a history of an MI in 2007 and stents x3. He is followed by Dr. Savannah [facility] cardiology was center and saw him about a year ago. He denies any recent chest pain and has not needed to use his nitroglycerin. Allergies- reviewed: Allergies   Allergen Reactions    Sulfa (Sulfonamide Antibiotics) Rash         Medications- reviewed:   Current Outpatient Medications   Medication Sig    meclizine (ANTIVERT) 25 mg tablet Take 1 Tablet by mouth three (3) times daily as needed (vertigo).  metoprolol succinate (TOPROL-XL) 25 mg XL tablet TAKE 1/2 TABLET BY MOUTH EVERY DAY    tamsulosin (FLOMAX) 0.4 mg capsule Take 1 Capsule by mouth daily.  montelukast (SINGULAIR) 10 mg tablet TAKE 1 TABLET BY MOUTH AT BEDTIME FOR BREATHING    Omeprazole delayed release (PRILOSEC D/R) 20 mg tablet Take 1 Tablet by mouth daily.  furosemide (LASIX) 40 mg tablet Take 1 tablet by mouth daily.  cpap machine kit by Does Not Apply route.  rosuvastatin (CRESTOR) 10 mg tablet rosuvastatin 10 mg tablet   TAKE 1 TABLET BY MOUTH AT BEDTIME    nitroglycerin (NITROSTAT) 0.4 mg SL tablet nitroglycerin 0.4 mg sublingual tablet    triamcinolone (NASACORT AQ) 55 mcg nasal inhaler 2 Sprays by Both Nostrils route daily.  aspirin 81 mg chewable tablet Take 81 mg by mouth daily.  acetaminophen (TYLENOL EXTRA STRENGTH) 500 mg tablet Take 1,000 mg by mouth every six (6) hours as needed for Pain.  calcium-vitamin D (OYSTER SHELL) 500 mg(1,250mg) -200 unit per tablet Take 1 Tablet by mouth daily.  mometasone-formoterol (DULERA) 100-5 mcg/actuation HFA inhaler Take 1 Puff by inhalation two (2) times a day.  dutasteride (AVODART) 0.5 mg capsule Take 0.5 mg by mouth daily.  mecobal/levomefolat Ca/B6 phos (FOLTANX PO) Take  by mouth daily.  albuterol-ipratropium (DUO-NEB) 2.5 mg-0.5 mg/3 ml nebu 3 mL by Nebulization route every six (6) hours as needed.  levocetirizine (XYZAL) 5 mg tablet Take 5 mg by mouth nightly.  coenzyme Q-10 (CO Q-10) 200 mg capsule Take 200 mg by mouth daily.  cranberry fruit extract (CRANBERRY CONCENTRATE PO) Take 360 mg by mouth nightly. No current facility-administered medications for this visit.          Past Medical History- reviewed:  Past Medical History:   Diagnosis Date    Arthritis     Asthma     R/T ENVIRONMENTAL ALLERGIES; INHALER USE DAILY    CAD (coronary artery disease) 2007    MI    GERD (gastroesophageal reflux disease)     History of BPH     History of kidney stones     History of kidney stones     Hx: UTI (urinary tract infection)     Hypertension     Lymphedema     SANTA on CPAP     Sepsis ([facility] [address] 8/16/2018         Past Surgical History- reviewed:   Past Surgical History:   Procedure Laterality Date    HX HEART CATHETERIZATION  2007, 2016    STENTS X3  (INGRID--DR NUNEZ)    HX LITHOTRIPSY      2-3X    HX ORTHOPAEDIC Right 1964    FEMUR ORIF  (DR PRESCOTT)    HX TONSILLECTOMY      HX UROLOGICAL      CYSTO    NM BREAST SURGERY PROCEDURE UNLISTED Right     EXCISION OF BREAST LUMP (BENIGN)    VASCULAR SURGERY PROCEDURE UNLIST Right     LEG VEIN BYPASS (INGRID)         Social History- reviewed:  Social History     Tobacco Use    Smoking status: Former Smoker     Packs/day: 2.00     Years: 20.00     Pack years: 40.00     Quit date:      Years since quittin.2    Smokeless tobacco: Never Used   Vaping Use    Vaping Use: Never used   Substance Use Topics    Alcohol use: No    Drug use: No          Immunizations- reviewed: There is no immunization history on file for this patient. Refuses all immunizations      REVIEW OF SYSTEMS:  Review of Systems -   General ROS: negative for - chills or fever  Respiratory ROS: negative for - cough, shortness of breath or wheezing  Cardiovascular ROS: negative for - chest pain or palpitations  Gastrointestinal ROS: negative for - abdominal pain, constipation, diarrhea, nausea, vomiting  Neurological ROS: negative for - dizziness or headaches  Dermatological ROS: negative for - rash or skin lesion changes        EXAM:   Visit Vitals  /71 (BP 1 Location: Left upper arm, BP Patient Position: Sitting, BP Cuff Size: Large adult)   Pulse 78   Temp 98.3 °F (36.8 °C) (Oral)   Resp 16   Ht 6' 1\" (1.854 m)   Wt 269 lb (122 kg)   SpO2 95%   BMI 35.49 kg/m²       General: Patient alert and oriented and in NAD  HEENT: PER/EOMI, no conjunctival pallor or scleral icterus. Neck: no carotid bruit  Heart: Regular rate and rhythm, No murmurs, rubs or gallops. Lungs: Clear to auscultation bilaterally, no wheezing, rales or rhonchi  Abd: +BS, non-tender, non-distended  Ext: 2+ edema  Skin: venous stasis dermatitis BLE  Neuro: AAOx3  Psych: Appropriate mood and affect        DIAGNOSTICS:   EKG: normal sinus rhythm, normal rate 79, no acute evidence of ischemia        Assessment:  Diagnoses and all orders for this visit:    1. Chronic pain of right knee    2. Arteriosclerosis of coronary artery  -     AMB POC EKG ROUTINE W/ 12 LEADS, INTER & REP    3. Encounter for general adult medical examination with abnormal findings    4. Primary hypertension    5.  At moderate risk for fall     ambulates with cane, upcoming planned ortho procedure    Plan:    Pt presents for preoperative evaluation for surgery and is  intermediate risk for this intermediate risk surgery. Chronic conditions that may impact the pre-op, intra-op, and post-op courses include: CAD. He has contacted his cardiologist already; he will need cardiac clearance but otherwise is stable for the planned procedure. I discussed risks versus benefits of such procedure with him today. Per ortho note, he will need IV antibiotics via PICC line post operatively. RECOMMENDATIONS given include: The patient is in my opinion stable to have the surgery with any method of anesthesia deemed appropriate by anesthesiologist.  F/U with us as previously planned for regular check ups on chronic medical problems. I have discussed the aforementioned diagnoses and plan with the patient in detail. I have provided information in person and/or in AVS. All questions answered prior to discharge. Spent 35 min with patient, reviewing chart and face to face exam, clinical documentation.     Signed By:  Korin Domínguez MD

## 2022-04-06 ENCOUNTER — TELEPHONE (OUTPATIENT)
Dept: FAMILY MEDICINE CLINIC | Age: 77
End: 2022-04-06

## 2022-04-12 ENCOUNTER — HOSPITAL ENCOUNTER (OUTPATIENT)
Dept: PREADMISSION TESTING | Age: 77
Discharge: HOME | End: 2022-04-12
Payer: MEDICARE

## 2022-04-12 ENCOUNTER — TELEPHONE (OUTPATIENT)
Dept: FAMILY MEDICINE CLINIC | Age: 77
End: 2022-04-12

## 2022-04-12 VITALS
TEMPERATURE: 97.8 F | WEIGHT: 264.55 LBS | HEIGHT: 73 IN | BODY MASS INDEX: 35.06 KG/M2 | HEART RATE: 73 BPM | SYSTOLIC BLOOD PRESSURE: 132 MMHG | OXYGEN SATURATION: 97 % | DIASTOLIC BLOOD PRESSURE: 79 MMHG

## 2022-04-12 LAB
ABO + RH BLD: NORMAL
ANION GAP SERPL CALC-SCNC: 9 MMOL/L (ref 5–15)
APPEARANCE UR: CLEAR
BACTERIA URNS QL MICRO: NEGATIVE /HPF
BILIRUB UR QL: NEGATIVE
BLOOD GROUP ANTIBODIES SERPL: NORMAL
BUN SERPL-MCNC: 17 MG/DL (ref 6–20)
BUN/CREAT SERPL: 17 (ref 12–20)
CALCIUM SERPL-MCNC: 8.4 MG/DL (ref 8.5–10.1)
CHLORIDE SERPL-SCNC: 110 MMOL/L (ref 97–108)
CO2 SERPL-SCNC: 21 MMOL/L (ref 21–32)
COLOR UR: ABNORMAL
CREAT SERPL-MCNC: 1 MG/DL (ref 0.7–1.3)
EPITH CASTS URNS QL MICRO: ABNORMAL /LPF
ERYTHROCYTE [DISTWIDTH] IN BLOOD BY AUTOMATED COUNT: 15.8 % (ref 11.5–14.5)
EST. AVERAGE GLUCOSE BLD GHB EST-MCNC: 117 MG/DL
GLUCOSE SERPL-MCNC: 128 MG/DL (ref 65–100)
GLUCOSE UR STRIP.AUTO-MCNC: NEGATIVE MG/DL
HBA1C MFR BLD: 5.7 % (ref 4–5.6)
HCT VFR BLD AUTO: 43.3 % (ref 36.6–50.3)
HGB BLD-MCNC: 13.9 G/DL (ref 12.1–17)
HGB UR QL STRIP: NEGATIVE
HYALINE CASTS URNS QL MICRO: ABNORMAL /LPF (ref 0–5)
INR PPP: 1 (ref 0.9–1.1)
KETONES UR QL STRIP.AUTO: NEGATIVE MG/DL
LEUKOCYTE ESTERASE UR QL STRIP.AUTO: ABNORMAL
MCH RBC QN AUTO: 27.7 PG (ref 26–34)
MCHC RBC AUTO-ENTMCNC: 32.1 G/DL (ref 30–36.5)
MCV RBC AUTO: 86.3 FL (ref 80–99)
NITRITE UR QL STRIP.AUTO: NEGATIVE
NRBC # BLD: 0 K/UL (ref 0–0.01)
NRBC BLD-RTO: 0 PER 100 WBC
PH UR STRIP: 6 [PH] (ref 5–8)
PLATELET # BLD AUTO: 205 K/UL (ref 150–400)
PMV BLD AUTO: 10.5 FL (ref 8.9–12.9)
POTASSIUM SERPL-SCNC: 4.1 MMOL/L (ref 3.5–5.1)
PROT UR STRIP-MCNC: NEGATIVE MG/DL
PROTHROMBIN TIME: 10.6 SEC (ref 9–11.1)
RBC # BLD AUTO: 5.02 M/UL (ref 4.1–5.7)
RBC #/AREA URNS HPF: ABNORMAL /HPF (ref 0–5)
SODIUM SERPL-SCNC: 140 MMOL/L (ref 136–145)
SP GR UR REFRACTOMETRY: 1.02 (ref 1–1.03)
SPECIMEN EXP DATE BLD: NORMAL
UA: UC IF INDICATED,UAUC: ABNORMAL
UROBILINOGEN UR QL STRIP.AUTO: 1 EU/DL (ref 0.2–1)
WBC # BLD AUTO: 8.6 K/UL (ref 4.1–11.1)
WBC URNS QL MICRO: ABNORMAL /HPF (ref 0–4)

## 2022-04-12 PROCEDURE — 85610 PROTHROMBIN TIME: CPT

## 2022-04-12 PROCEDURE — 85027 COMPLETE CBC AUTOMATED: CPT

## 2022-04-12 PROCEDURE — 80048 BASIC METABOLIC PNL TOTAL CA: CPT

## 2022-04-12 PROCEDURE — 36415 COLL VENOUS BLD VENIPUNCTURE: CPT

## 2022-04-12 PROCEDURE — 86900 BLOOD TYPING SEROLOGIC ABO: CPT

## 2022-04-12 PROCEDURE — 83036 HEMOGLOBIN GLYCOSYLATED A1C: CPT

## 2022-04-12 PROCEDURE — 81001 URINALYSIS AUTO W/SCOPE: CPT

## 2022-04-12 RX ORDER — IBUPROFEN 200 MG
200 TABLET ORAL
COMMUNITY
End: 2022-04-22

## 2022-04-12 RX ORDER — DOCUSATE SODIUM 100 MG/1
100 CAPSULE, LIQUID FILLED ORAL AS NEEDED
COMMUNITY
End: 2022-04-22

## 2022-04-12 NOTE — PERIOP NOTES
[address] [facility] INSTRUCTIONS  ORTHOPAEDIC    Surgery Date:   4/19/22    [facility] staff will call you between 4 PM- 8 PM the day before surgery with your arrival time. If your surgery is on a Monday, we will call you the preceding Friday. Please call [phone] after 8 PM if you did not receive your arrival time. 1. Please report to [facility] Patient Access/Admitting on the 1st floor. Bring your insurance card, photo identification, and any copayment (if applicable). 2. If you are going home the same day of your surgery, you must have a responsible adult to drive you home. You need to have a responsible adult to stay with you the first 24 hours after surgery and you should not drive a car for 24 hours following your surgery. 3. Do NOT eat any solid foods after midnight the night before surgery including candy, mints or gum. You may drink clear liquids from midnight until 1 hour prior to arrival time. You may drink up to 12 ounces at one time every 4 hours. 4. Do NOT drink alcohol or smoke 24 hours before surgery. STOP smoking for 14 days prior as it helps with breathing and healing after surgery. 5. If your arrival time is 3pm or later, you may eat a light breakfast before 8am (toast, bagel-no butter, black coffee, plain tea, fruit juice-no pulp) Please note special instructions, if applicable, below for medications. 6. If you are being admitted to the hospital,please leave personal belongings/luggage in your car until you have an assigned hospital room number. 7. Please wear comfortable clothes. Wear your glasses instead of contacts. We ask that all money, jewelry and valuables be left at home. Wear no make up, particularly mascara, the day of surgery. 8.  All body piercings, rings, and jewelry need to be removed and left at home. Please remove any nail polish or artificial nails from your fingernails. Please wear your hair loose or down.  Please no pony-tails, buns, or any metal hair accessories. If you shower the morning of surgery, please do not apply any lotions or powders afterwards. You may wear deodorant. Do not shave any body area within 24 hours of your surgery. 9. Please follow all instructions to avoid any potential surgical cancellation. 10. Should your physical condition change, (i.e. fever, cold, flu, etc.) please notify your surgeon as soon as possible. 11. It is important to be on time. If a situation occurs where you may be delayed, please call:  [phone] / [phone] on the day of surgery. 12. The Preadmission Testing staff can be reached at [phone]. 13. Special instructions: BRING YOUR CPAP WITH YOU TO THE HOSPITAL    Current Outpatient Medications   Medication Sig    ibuprofen (Motrin IB) 200 mg tablet Take 200 mg by mouth every eight (8) hours as needed for Pain.  fish oil-omega-3 fatty acids 340-1,000 mg capsule Take 1 Capsule by mouth daily.  docusate sodium (COLACE) 100 mg capsule Take 100 mg by mouth as needed for Constipation.  zinc 50 mg tab tablet Take 50 mg by mouth daily.  famotidine (PEPCID PO) Take  by mouth as needed.  meclizine (ANTIVERT) 25 mg tablet Take 1 Tablet by mouth three (3) times daily as needed (vertigo).  metoprolol succinate (TOPROL-XL) 25 mg XL tablet TAKE 1/2 TABLET BY MOUTH EVERY DAY    tamsulosin (FLOMAX) 0.4 mg capsule Take 1 Capsule by mouth daily.  montelukast (SINGULAIR) 10 mg tablet TAKE 1 TABLET BY MOUTH AT BEDTIME FOR BREATHING    Omeprazole delayed release (PRILOSEC D/R) 20 mg tablet Take 1 Tablet by mouth daily.  furosemide (LASIX) 40 mg tablet Take 1 tablet by mouth daily.  rosuvastatin (CRESTOR) 10 mg tablet rosuvastatin 10 mg tablet   TAKE 1 TABLET BY MOUTH AT BEDTIME    nitroglycerin (NITROSTAT) 0.4 mg SL tablet nitroglycerin 0.4 mg sublingual tablet    triamcinolone (NASACORT AQ) 55 mcg nasal inhaler 2 Sprays by Both Nostrils route daily.     aspirin 81 mg chewable tablet Take 81 mg by mouth daily.  acetaminophen (TYLENOL EXTRA STRENGTH) 500 mg tablet Take 1,000 mg by mouth every six (6) hours as needed for Pain.  calcium-vitamin D (OYSTER SHELL) 500 mg(1,250mg) -200 unit per tablet Take 1 Tablet by mouth daily.  mometasone-formoterol (DULERA) 100-5 mcg/actuation HFA inhaler Take 1 Puff by inhalation two (2) times a day.  dutasteride (AVODART) 0.5 mg capsule Take 0.5 mg by mouth daily.  levocetirizine (XYZAL) 5 mg tablet Take 5 mg by mouth nightly.  coenzyme Q-10 (CO Q-10) 200 mg capsule Take 200 mg by mouth daily.  cranberry fruit extract (CRANBERRY CONCENTRATE PO) Take 360 mg by mouth nightly.  cpap machine kit by Does Not Apply route.  mecobal/levomefolat Ca/B6 phos (FOLTANX PO) Take  by mouth daily.  albuterol-ipratropium (DUO-NEB) 2.5 mg-0.5 mg/3 ml nebu 3 mL by Nebulization route every six (6) hours as needed. No current facility-administered medications for this encounter. 1. YOU MUST ONLY TAKE THESE MEDICATIONS THE MORNING OF SURGERY WITH A SIP OF WATER: METOPROLOL, OMEPRAZOLE  2. MEDICATIONS TO TAKE THE MORNING OF SURGERY ONLY IF NEEDED: TYLENOL, FAMOTIDINE, NITROGLYCERIN, DULERA  3. HOLD these prescription medications BEFORE Surgery: NONE  4. Ask your surgeon/prescribing physician about when/if to STOP taking these medications: ASPIRIN  5. Stop any non-steroidal anti-inflammatory drugs (i.e. Ibuprofen, Naproxen, Advil, Aleve) 7 days before surgery. You may take Tylenol. STOP all vitamins and herbal supplements 1 week prior to  surgery. 6. If you are currently taking Plavix, Coumadin, or any other blood-thinning/anticoagulant medication contact your prescribing physician for instructions. Preventing Infections Before and After  Your Surgery    IMPORTANT INSTRUCTIONS    You play an important role in your health and preparation for surgery.  To reduce the germs on your skin you will need to shower with CHG soap (Chorhexidine gluconate 4%) two times before surgery. CHG soap (Hibiclens, Hex-A-Clens or store brand)   CHG soap will be provided at your Preadmission Testing (PAT) appointment.  If you do not have a PAT appointment before surgery, you may arrange to  CHG soap from our office or purchase CHG soap at a pharmacy, grocery or department store.  You need to purchase TWO 4 ounce bottles to use for your 2 showers. Steps to follow:  1. Wash your hair with your normal shampoo and your body with regular soap and rinse well to remove shampoo and soap from your skin. 2. Wet a clean washcloth and turn off the shower. 3. Put CHG soap on washcloth and apply to your entire body from the neck down. Do not use on your head, face or private parts(genitals). Do not use CHG soap on open sores, wounds or areas of skin irritation. 4. Wash you body gently for 5 minutes. Do not wash your skin too hard. This soap does not create lather. Pay special attention to your underarms and from your belly button to your feet. 5. Turn the shower back on and rinse well to get CHG soap off your body. 6. Pat your skin dry with a clean, dry towel. Do not apply lotions or moisturizer. 7. Put on clean clothes and sleep on fresh bed sheets and do not allow pets to sleep with you. Shower with CHG soap 2 times before your surgery   The evening before your surgery   The morning of your surgery      Tips to help prevent infections after your surgery:  1. Protect your surgical wound from germs:  ? Hand washing is the most important thing you and your caregivers can do to prevent infections. ? Keep your bandage clean and dry! ? Do not touch your surgical wound. 2. Use clean, freshly washed towels and washcloths every time you shower; do not share bath linens with others. 3. Until your surgical wound is healed, wear clothing and sleep on bed linens each day that are clean and freshly washed.   4. Do not allow pets to sleep in your bed with you or touch your surgical wound. 5. Do not smoke  smoking delays wound healing. This may be a good time to stop smoking. 6. If you have diabetes, it is important for you to manage your blood sugar levels properly before your surgery as well as after your surgery. Poorly managed blood sugar levels slow down wound healing and prevent you from healing completely. Prevention of Infection  Testing for Staphylococcus aureus on your skin before surgery    Staphylococcus aureus (staph) is a common bacteria that is found on the body. It normally does not cause infection on healthy skin. Before surgery, you will be tested to see if you have staph by swabbing the inside of your nose. When you have an incision with surgery, the goal is to protect that incision from infection. Removal of the staph bacteria before surgery can decrease the risk of a surgical site infection. If your nose swab is positive for staph you will be called. Your treatment will include 2 steps:   Prescription for Mupirocin ointment to be used in each nostril twice a day for 5 days.  Showering with Chlorhexidine (CHG) liquid soap for 5 days prior to surgery. How to use Mupirocin ointment in your nose  1.  the prescription from your pharmacy. You will receive a large tube of ointment which will be big enough for all of your treatments. You will apply this ointment to each nostril 2 times a day for 5 days. 2. Wash your hands with  gel or soap and water for 20 seconds before using ointment. 3. Place a pea-sized amount of ointment on a cotton Q-tip. 4. Apply ointment just inside of each nostril with the Q-tip. Do not push Q-tip or ointment deep inside you nose. 5. Press your nostrils together and massage for a few seconds. 6. Wash your hands with  gel or soap and water after you are finished. 7. Do not get ointment near your eyes. If it gets into your eyes, rinse them with cool water.   8. If you need to use nasal spray, clean the tip of the bottle with alcohol before use and do not use both at the same time. 9. If you are scheduled for COVID testing during the 5 days, do NOT apply morning dose until after the COVID test has been performed. How to use Chlorhexidine (CHG) 4% liquid soap  1. Purchase an 8 ounce bottle of CHG liquid soap (Chlorhexidine 4%, Hibiclens, Hex-A-Clens or store brand) at a pharmacy or grocery store. 2. Wash your hair with your normal shampoo and your body with regular soap and rinse well to remove shampoo and soap from your skin. 3. Wet a clean washcloth and turn off the shower. 4. Put CHG soap on washcloth and apply to your entire body from the neck down. Do not use on your head, face or private parts(genitals). Do not use CHG soap on open sores, wounds or areas of skin irritation. 5. Wash your body gently for 5 minutes. Do not wash your skin too hard. This soap does not create lather. Pay special attention to your underarms and from your belly button to your feet. 6. Turn the shower back on and rinse well to get CHG soap off your body. 7. Pat your skin dry with a clean, dry towel. Do not apply lotions or moisturizer. 8. Put on clean clothes and sleep on fresh bed sheets the night before surgery. Do not allow pets to sleep with you. Eating and Drinking Before Surgery     You may eat a regular dinner at the usual time on the day before your surgery.  Do NOT eat any solid foods after midnight unless your arrival time at the hospital is 3pm or later.  You may drink clear liquids only from 12 midnight until 1 hours prior to your arrival time at the hospital on the day of your surgery. Do NOT drink alcohol.    Clear liquids include:  o Water  o Fruit juices without pulp( i.e. apple juice)  o Carbonated beverages  o Black coffee (no cream/milk)  o Tea (no cream/milk)  o Gatorade   You may drink up to 12-16 ounces at one time every 4 hours between the hours of midnight and 1 hour before your arrival time at the hospital. Example- if your arrival time at the hospital is 6am, you may drink 12-16 ounces of clear liquids no later than 5am.   If your arrival time at the hospital is 3pm or later, you may eat a light breakfast before 8am.   A light breakfast includes:  o Toast or bagel (no butter)  o Black coffee (no cream/milk)  o Tea (no cream/milk)  o Fruit juices without pulp ( i.e. apple juice)  o Do NOT eat meat, eggs, vegetables or fruit   If you have any questions, please contact your surgeon's office. Patient Information Regarding COVID Restrictions    Day of Procedure     Please park in the parking deck or any designated visitor parking lot.  Enter the facility through the [facility] [place].   On the day of surgery, please provide the cell phone number of the person who will be waiting for you to the Patient Access representative at the time of registration.  Please wear a mask on the day of your procedure.  We are now allowing two designated visitors per stay. Pediatric patients may have 2 designated visitors. These two people may come in with you on the day of your procedure.  No visitors under the age of 13.  The designated visitor must also wear a mask.  Once your procedure and the immediate recovery period is completed, a nurse in the recovery area will contact your designated visitor to inform them of your room number or to otherwise review other pertinent information regarding your care.  Social distancing practices are to be adhered to in waiting areas and the cafeteria. The patient was contacted in person. He verbalized understanding of all instructions and does not  need reinforcement.

## 2022-04-13 LAB
BACTERIA SPEC CULT: NORMAL
BACTERIA SPEC CULT: NORMAL
SERVICE CMNT-IMP: NORMAL

## 2022-04-13 NOTE — PERIOP NOTES
MESSAGE SENT VIA [employer] TO [facility] AT DR. Marcio Royal OFFICE RE:  Do you have a cardiac clearance note on this patient from his cardiologist? If so can you please fax to us here in PAT.

## 2022-04-13 NOTE — PERIOP NOTES
PAT Nurse Practitioner   Pre-Operative Chart Review/Assessment:-ORTHOPEDIC                Patient Name:  Ankur Aguirre                                                           Age:   68 y.o.    :  1945     Today's Date:  2022     Date of PAT:   2022      Date of Surgery:    2022      Procedure(s):  Right Total Knee Arthroplasty REVISION     Surgeon:   Dr. Miguelito Cherry                       PLAN:      1)  Medical Clearance:  Dr. Zahida Hou      2)  Cardiac Clearance:  Pt followed by Dr. Ector Vela. LOV 22. Clearance obtained. OV note and EKG on chart. PCP EKG: normal sinus rhythm, no acute changes. 3)  Diabetic Treatment Consult:  Not indicated. A1c-5.7      4)  Sleep Apnea evaluation:   +SANTA dx. Pt uses CPAP. 5) Treatment for MRSA/Staph Aureus:  +MSSA. Tx w/ mupirocin.       6) Additional Concerns:  Former smoker, PONV, HTN, GERD, CAD/MI s/p stents x 3, asthma              Vital Signs:         Vitals:    22 1454   BP: 132/79   Pulse: 73   Temp: 97.8 °F (36.6 °C)   SpO2: 97%   Weight: 120 kg (264 lb 8.8 oz)   Height: 6' 1\" (1.854 m)            ____________________________________________  PAST MEDICAL HISTORY  Past Medical History:   Diagnosis Date    Arthritis     Asthma     R/T ENVIRONMENTAL ALLERGIES; INHALER USE DAILY    CAD (coronary artery disease)     MI    GERD (gastroesophageal reflux disease)     History of BPH     History of kidney stones     Hx: UTI (urinary tract infection)     Hypertension     Lymphedema     Nausea & vomiting     SANTA on CPAP     Sepsis ([address] 2018      ____________________________________________  PAST SURGICAL HISTORY  Past Surgical History:   Procedure Laterality Date    HX HEART CATHETERIZATION  , 2016    STENTS X3  (INGRID--DR NUNEZ)    HX KNEE REPLACEMENT Right 2018    HX LITHOTRIPSY      2-3X    HX ORTHOPAEDIC Right 1964    FEMUR ORIF  (DR PRESCOTT)    HX TONSILLECTOMY      HX UROLOGICAL      CYSTO  MI BREAST SURGERY PROCEDURE UNLISTED Right     EXCISION OF BREAST LUMP (BENIGN)    VASCULAR SURGERY PROCEDURE UNLIST Right     LEG VEIN BYPASS ([place])      ____________________________________________  HOME MEDICATIONS  Current Outpatient Medications   Medication Sig    ibuprofen (Motrin IB) 200 mg tablet Take 200 mg by mouth every eight (8) hours as needed for Pain.  fish oil-omega-3 fatty acids 340-1,000 mg capsule Take 1 Capsule by mouth daily.  docusate sodium (COLACE) 100 mg capsule Take 100 mg by mouth as needed for Constipation.  zinc 50 mg tab tablet Take 50 mg by mouth daily.  famotidine (PEPCID PO) Take  by mouth as needed.  meclizine (ANTIVERT) 25 mg tablet Take 1 Tablet by mouth three (3) times daily as needed (vertigo).  metoprolol succinate (TOPROL-XL) 25 mg XL tablet TAKE 1/2 TABLET BY MOUTH EVERY DAY    tamsulosin (FLOMAX) 0.4 mg capsule Take 1 Capsule by mouth daily.  montelukast (SINGULAIR) 10 mg tablet TAKE 1 TABLET BY MOUTH AT BEDTIME FOR BREATHING    Omeprazole delayed release (PRILOSEC D/R) 20 mg tablet Take 1 Tablet by mouth daily.  furosemide (LASIX) 40 mg tablet Take 1 tablet by mouth daily.  rosuvastatin (CRESTOR) 10 mg tablet rosuvastatin 10 mg tablet   TAKE 1 TABLET BY MOUTH AT BEDTIME    nitroglycerin (NITROSTAT) 0.4 mg SL tablet nitroglycerin 0.4 mg sublingual tablet    triamcinolone (NASACORT AQ) 55 mcg nasal inhaler 2 Sprays by Both Nostrils route daily.  aspirin 81 mg chewable tablet Take 81 mg by mouth daily.  acetaminophen (TYLENOL EXTRA STRENGTH) 500 mg tablet Take 1,000 mg by mouth every six (6) hours as needed for Pain.  calcium-vitamin D (OYSTER SHELL) 500 mg(1,250mg) -200 unit per tablet Take 1 Tablet by mouth daily.  mometasone-formoterol (DULERA) 100-5 mcg/actuation HFA inhaler Take 1 Puff by inhalation two (2) times a day.  dutasteride (AVODART) 0.5 mg capsule Take 0.5 mg by mouth daily.     levocetirizine (XYZAL) 5 mg tablet Take 5 mg by mouth nightly.  coenzyme Q-10 (CO Q-10) 200 mg capsule Take 200 mg by mouth daily.  cranberry fruit extract (CRANBERRY CONCENTRATE PO) Take 360 mg by mouth nightly.  cpap machine kit by Does Not Apply route.  mecobal/levomefolat Ca/B6 phos (FOLTANX PO) Take  by mouth daily.  albuterol-ipratropium (DUO-NEB) 2.5 mg-0.5 mg/3 ml nebu 3 mL by Nebulization route every six (6) hours as needed.      No current facility-administered medications for this encounter.      ____________________________________________  ALLERGIES  Allergies   Allergen Reactions    Sulfa (Sulfonamide Antibiotics) Rash      ____________________________________________  SOCIAL HISTORY  Social History     Tobacco Use    Smoking status: Former Smoker     Packs/day: 2.00     Years: 20.00     Pack years: 40.00     Quit date:      Years since quittin.3    Smokeless tobacco: Never Used   Substance Use Topics    Alcohol use: No      ____________________________________________   Internal Administration   First Dose      Second Dose        Labs:     Hospital Outpatient Visit on 2022   Component Date Value Ref Range Status    Sodium 2022 140  136 - 145 mmol/L Final    Potassium 2022 4.1  3.5 - 5.1 mmol/L Final    Chloride 2022 110* 97 - 108 mmol/L Final    CO2 2022 21  21 - 32 mmol/L Final    Anion gap 2022 9  5 - 15 mmol/L Final    Glucose 2022 128* 65 - 100 mg/dL Final    BUN 2022 17  6 - 20 MG/DL Final    Creatinine 2022 1.00  0.70 - 1.30 MG/DL Final    BUN/Creatinine ratio 2022 17  12 - 20   Final    GFR est AA 2022 >60  >60 ml/min/1.73m2 Final    GFR est non-AA 2022 >60  >60 ml/min/1.73m2 Final    Estimated GFR is calculated using the IDMS-traceable Modification of Diet in Renal Disease (MDRD) Study equation, reported for both  Americans (GFRAA) and non- Americans (GFRNA), and normalized to 1.73m2 body surface area. The physician must decide which value applies to the patient.  Calcium 04/12/2022 8.4* 8.5 - 10.1 MG/DL Final    WBC 04/12/2022 8.6  4.1 - 11.1 K/uL Final    RBC 04/12/2022 5.02  4.10 - 5.70 M/uL Final    HGB 04/12/2022 13.9  12.1 - 17.0 g/dL Final    HCT 04/12/2022 43.3  36.6 - 50.3 % Final    MCV 04/12/2022 86.3  80.0 - 99.0 FL Final    MCH 04/12/2022 27.7  26.0 - 34.0 PG Final    MCHC 04/12/2022 32.1  30.0 - 36.5 g/dL Final    RDW 04/12/2022 15.8* 11.5 - 14.5 % Final    PLATELET 93/35/0607 953  150 - 400 K/uL Final    MPV 04/12/2022 10.5  8.9 - 12.9 FL Final    NRBC 04/12/2022 0.0  0  WBC Final    ABSOLUTE NRBC 04/12/2022 0.00  0.00 - 0.01 K/uL Final    Crossmatch Expiration 04/12/2022 04/22/2022,2359   Final    ABO/Rh(D) 04/12/2022 A POSITIVE   Final    Antibody screen 04/12/2022 NEG   Final    INR 04/12/2022 1.0  0.9 - 1.1   Final    A single therapeutic range for Vit K antagonists may not be optimal for all indications - see June, 2008 issue of Chest, American College of Chest Physicians Evidence-Based Clinical Practice Guidelines, 8th Edition.     Prothrombin time 04/12/2022 10.6  9.0 - 11.1 sec Final    Color 04/12/2022 YELLOW/STRAW    Final    Color Reference Range: Straw, Yellow or Dark Yellow    Appearance 04/12/2022 CLEAR  CLEAR   Final    Specific gravity 04/12/2022 1.020  1.003 - 1.030   Final    pH (UA) 04/12/2022 6.0  5.0 - 8.0   Final    Protein 04/12/2022 Negative  NEG mg/dL Final    Glucose 04/12/2022 Negative  NEG mg/dL Final    Ketone 04/12/2022 Negative  NEG mg/dL Final    Bilirubin 04/12/2022 Negative  NEG   Final    Blood 04/12/2022 Negative  NEG   Final    Urobilinogen 04/12/2022 1.0  0.2 - 1.0 EU/dL Final    Nitrites 04/12/2022 Negative  NEG   Final    Leukocyte Esterase 04/12/2022 TRACE* NEG   Final    UA:UC IF INDICATED 04/12/2022 CULTURE NOT INDICATED BY UA RESULT    Final    WBC 04/12/2022 0-4  0 - 4 /hpf Final    RBC 04/12/2022 0-5  0 - 5 /hpf Final    Epithelial cells 04/12/2022 FEW  FEW /lpf Final    Epithelial cell category consists of squamous cells and /or transitional urothelial cells. Renal tubular cells, if present, are separately identified as such.  Bacteria 04/12/2022 Negative  NEG /hpf Final    Hyaline cast 04/12/2022 0-2  0 - 5 /lpf Final    Hemoglobin A1c 04/12/2022 5.7* 4.0 - 5.6 % Final    Comment: NEW METHOD  PLEASE NOTE NEW REFERENCE RANGE  (NOTE)  HbA1C Interpretive Ranges  <5.7              Normal  5.7 - 6.4         Consider Prediabetes  >6.5              Consider Diabetes      Est. average glucose 04/12/2022 117  mg/dL Final    Special Requests: 04/12/2022 NO SPECIAL REQUESTS    Final    Culture result: 04/12/2022 MRSA NOT PRESENT. Apparent Staphylococus aureus (not MRSA noted). Final       Skin:     Denies open wounds, cuts, sores, rashes or other areas of concern in PAT assessment.           Lelia Quiroz NP

## 2022-04-14 RX ORDER — MUPIROCIN 20 MG/G
OINTMENT TOPICAL 2 TIMES DAILY
Qty: 22 G | Refills: 0 | Status: SHIPPED | OUTPATIENT
Start: 2022-04-14 | End: 2022-04-22

## 2022-04-14 NOTE — PERIOP NOTES
PC to pt, full name and  verified, regarding positive nasal cx (MSSA) and need to start Mupirocin ointment BID x 5 days to B nostrils starting today and bathe with CHG soap for 5 days prior to surgery. Pt verbalized understanding of instructions and will start today as recommended. Allergies and pharmacy of choice reviewed. Rx escribed to pt's pharmacy of choice. PTA medlist updated. Surgeon and PCP notified of positive culture and treatment. PRESCRIPTION:    MUPIROCIN 2% OINTMENT  QUANTITY:  #22 GRAMS  REFILLS: NONE    Apply 0.25 g (small pea-sized amount) to both nostrils twice a day for five days.     Helga Holliday NP

## 2022-04-19 ENCOUNTER — HOSPITAL ENCOUNTER (INPATIENT)
Age: 77
LOS: 3 days | Discharge: NURSING, REHAB OR LONG-TERM CARE | DRG: 467 | End: 2022-04-22
Attending: ORTHOPAEDIC SURGERY | Admitting: ORTHOPAEDIC SURGERY
Payer: MEDICARE

## 2022-04-19 ENCOUNTER — ANESTHESIA EVENT (OUTPATIENT)
Dept: SURGERY | Age: 77
DRG: 467 | End: 2022-04-19
Payer: MEDICARE

## 2022-04-19 ENCOUNTER — ANESTHESIA (OUTPATIENT)
Dept: SURGERY | Age: 77
DRG: 467 | End: 2022-04-19
Payer: MEDICARE

## 2022-04-19 DIAGNOSIS — M17.11 PRIMARY OSTEOARTHRITIS OF RIGHT KNEE: ICD-10-CM

## 2022-04-19 DIAGNOSIS — Z96.659 FAILURE OF TOTAL KNEE REPLACEMENT, INITIAL ENCOUNTER (HCC): ICD-10-CM

## 2022-04-19 DIAGNOSIS — Z96.651 S/P REVISION OF TOTAL KNEE, RIGHT: Primary | ICD-10-CM

## 2022-04-19 DIAGNOSIS — M00.9 PYOGENIC ARTHRITIS OF RIGHT KNEE JOINT, DUE TO UNSPECIFIED ORGANISM (HCC): ICD-10-CM

## 2022-04-19 DIAGNOSIS — T84.018A FAILURE OF TOTAL KNEE REPLACEMENT, INITIAL ENCOUNTER (HCC): ICD-10-CM

## 2022-04-19 DIAGNOSIS — R60.0 LOWER EXTREMITY EDEMA: ICD-10-CM

## 2022-04-19 LAB
GLUCOSE BLD STRIP.AUTO-MCNC: 97 MG/DL (ref 65–117)
SERVICE CMNT-IMP: NORMAL

## 2022-04-19 PROCEDURE — 87075 CULTR BACTERIA EXCEPT BLOOD: CPT

## 2022-04-19 PROCEDURE — 77030008462 HC STPLR SKN PROX J&J -A: Performed by: ORTHOPAEDIC SURGERY

## 2022-04-19 PROCEDURE — C1713 ANCHOR/SCREW BN/BN,TIS/BN: HCPCS | Performed by: ORTHOPAEDIC SURGERY

## 2022-04-19 PROCEDURE — 74011250636 HC RX REV CODE- 250/636: Performed by: PHYSICIAN ASSISTANT

## 2022-04-19 PROCEDURE — 97161 PT EVAL LOW COMPLEX 20 MIN: CPT

## 2022-04-19 PROCEDURE — 77030012935 HC DRSG AQUACEL BMS -B: Performed by: ORTHOPAEDIC SURGERY

## 2022-04-19 PROCEDURE — 0SRC0J9 REPLACEMENT OF RIGHT KNEE JOINT WITH SYNTHETIC SUBSTITUTE, CEMENTED, OPEN APPROACH: ICD-10-PCS | Performed by: ORTHOPAEDIC SURGERY

## 2022-04-19 PROCEDURE — 3E0V329 INTRODUCTION OF OTHER ANTI-INFECTIVE INTO BONES, PERCUTANEOUS APPROACH: ICD-10-PCS | Performed by: ORTHOPAEDIC SURGERY

## 2022-04-19 PROCEDURE — 27487 REVISE/REPLACE KNEE JOINT: CPT | Performed by: ORTHOPAEDIC SURGERY

## 2022-04-19 PROCEDURE — 76060000037 HC ANESTHESIA 3 TO 3.5 HR: Performed by: ORTHOPAEDIC SURGERY

## 2022-04-19 PROCEDURE — 77030031139 HC SUT VCRL2 J&J -A: Performed by: ORTHOPAEDIC SURGERY

## 2022-04-19 PROCEDURE — 65270000029 HC RM PRIVATE

## 2022-04-19 PROCEDURE — 74011250637 HC RX REV CODE- 250/637: Performed by: ANESTHESIOLOGY

## 2022-04-19 PROCEDURE — 77030007866 HC KT SPN ANES BBMI -B: Performed by: ANESTHESIOLOGY

## 2022-04-19 PROCEDURE — 76010000172 HC OR TIME 2.5 TO 3 HR INTENSV-TIER 1: Performed by: ORTHOPAEDIC SURGERY

## 2022-04-19 PROCEDURE — 77030005513 HC CATH URETH FOL11 MDII -B: Performed by: ORTHOPAEDIC SURGERY

## 2022-04-19 PROCEDURE — 87205 SMEAR GRAM STAIN: CPT

## 2022-04-19 PROCEDURE — 74011000250 HC RX REV CODE- 250: Performed by: ANESTHESIOLOGY

## 2022-04-19 PROCEDURE — 87070 CULTURE OTHR SPECIMN AEROBIC: CPT

## 2022-04-19 PROCEDURE — 87077 CULTURE AEROBIC IDENTIFY: CPT

## 2022-04-19 PROCEDURE — 74011000250 HC RX REV CODE- 250: Performed by: PHYSICIAN ASSISTANT

## 2022-04-19 PROCEDURE — 27487 REVISE/REPLACE KNEE JOINT: CPT | Performed by: PHYSICIAN ASSISTANT

## 2022-04-19 PROCEDURE — L1830 KO IMMOB CANVAS LONG PRE OTS: HCPCS | Performed by: ORTHOPAEDIC SURGERY

## 2022-04-19 PROCEDURE — 74011250636 HC RX REV CODE- 250/636: Performed by: NURSE ANESTHETIST, CERTIFIED REGISTERED

## 2022-04-19 PROCEDURE — 74011000250 HC RX REV CODE- 250: Performed by: NURSE ANESTHETIST, CERTIFIED REGISTERED

## 2022-04-19 PROCEDURE — 77030003601 HC NDL NRV BLK BBMI -A

## 2022-04-19 PROCEDURE — 87186 SC STD MICRODIL/AGAR DIL: CPT

## 2022-04-19 PROCEDURE — C1776 JOINT DEVICE (IMPLANTABLE): HCPCS | Performed by: ORTHOPAEDIC SURGERY

## 2022-04-19 PROCEDURE — 0SPC0JZ REMOVAL OF SYNTHETIC SUBSTITUTE FROM RIGHT KNEE JOINT, OPEN APPROACH: ICD-10-PCS | Performed by: ORTHOPAEDIC SURGERY

## 2022-04-19 PROCEDURE — 2709999900 HC NON-CHARGEABLE SUPPLY: Performed by: ORTHOPAEDIC SURGERY

## 2022-04-19 PROCEDURE — 77030010788: Performed by: ORTHOPAEDIC SURGERY

## 2022-04-19 PROCEDURE — 77030006835 HC BLD SAW SAG STRY -B: Performed by: ORTHOPAEDIC SURGERY

## 2022-04-19 PROCEDURE — 74011250636 HC RX REV CODE- 250/636: Performed by: ANESTHESIOLOGY

## 2022-04-19 PROCEDURE — 82962 GLUCOSE BLOOD TEST: CPT

## 2022-04-19 PROCEDURE — 74011000258 HC RX REV CODE- 258: Performed by: NURSE ANESTHETIST, CERTIFIED REGISTERED

## 2022-04-19 PROCEDURE — 77030040922 HC BLNKT HYPOTHRM STRY -A

## 2022-04-19 PROCEDURE — 97530 THERAPEUTIC ACTIVITIES: CPT

## 2022-04-19 PROCEDURE — 74011250637 HC RX REV CODE- 250/637: Performed by: PHYSICIAN ASSISTANT

## 2022-04-19 PROCEDURE — 76210000006 HC OR PH I REC 0.5 TO 1 HR: Performed by: ORTHOPAEDIC SURGERY

## 2022-04-19 DEVICE — SIGMA FEMORAL POSTERIOR STABILIZED CEMENTED SIZE 4 RIGHT
Type: IMPLANTABLE DEVICE | Site: KNEE | Status: FUNCTIONAL
Brand: SIGMA

## 2022-04-19 DEVICE — STIMULAN® RAPID CURE PROVIDED STERILE FOR SINGLE PATIENT USE. STIMULAN® RAPID CURE CONTAINS CALCIUM SULFATE POWDER AND MIXING SOLUTION IN PRE-MEASURED QUANTITIES SO THAT WHEN MIXED TOGETHER IN A STERILE MIXING BOWL, THE RESULTANT PASTE IS TO BE DIGITALLY PACKED INTO OPEN BONE VOID/GAP TO SET INSITU OR PLACED INTO THE MOULD PROVIDED, THE MIXTURE SETS TO FORM BEADS. THE BIODEGRADABLE, RADIOPAQUE BEADS ARE RESORBED IN APPROXIMATELY 30 – 60 DAYS WHEN USED IN ACCORDANCE WITH THE DEVICE LABELLING. STIMULAN® RAPID CURE IS MANUFACTURED FROM SYNTHETIC IMPLANT GRADE CALCIUM SULFATE DIHYDRATE(CASO4.2H2O) THAT RESORBS AND IS REPLACED WITH BONE DURING THE HEALING PROCESS. ALSO, AS THE BONE VOID FILLER BEADS ARE BIODEGRADABLE AND BIOCOMPATIBLE, THEY MAY BE USED AT AN INFECTED SITE.
Type: IMPLANTABLE DEVICE | Site: KNEE | Status: FUNCTIONAL
Brand: STIMULAN® RAPID CURE

## 2022-04-19 DEVICE — IMPLANTABLE DEVICE: Type: IMPLANTABLE DEVICE | Site: KNEE | Status: FUNCTIONAL

## 2022-04-19 DEVICE — CEMENT BNE 40GM FULL DOSE PMMA W/ GENT HI VISC RADPQ LNG: Type: IMPLANTABLE DEVICE | Site: KNEE | Status: FUNCTIONAL

## 2022-04-19 RX ORDER — DUTASTERIDE 0.5 MG/1
0.5 CAPSULE, LIQUID FILLED ORAL DAILY
Status: DISCONTINUED | OUTPATIENT
Start: 2022-04-20 | End: 2022-04-22 | Stop reason: HOSPADM

## 2022-04-19 RX ORDER — MULTIVIT WITH MINERALS/HERBS
1 TABLET ORAL DAILY
Status: DISCONTINUED | OUTPATIENT
Start: 2022-04-20 | End: 2022-04-22 | Stop reason: HOSPADM

## 2022-04-19 RX ORDER — MORPHINE SULFATE 2 MG/ML
2 INJECTION, SOLUTION INTRAMUSCULAR; INTRAVENOUS
Status: DISCONTINUED | OUTPATIENT
Start: 2022-04-19 | End: 2022-04-19 | Stop reason: HOSPADM

## 2022-04-19 RX ORDER — ONDANSETRON 2 MG/ML
4 INJECTION INTRAMUSCULAR; INTRAVENOUS
Status: ACTIVE | OUTPATIENT
Start: 2022-04-19 | End: 2022-04-21

## 2022-04-19 RX ORDER — TAMSULOSIN HYDROCHLORIDE 0.4 MG/1
0.4 CAPSULE ORAL DAILY
Status: DISCONTINUED | OUTPATIENT
Start: 2022-04-20 | End: 2022-04-22 | Stop reason: HOSPADM

## 2022-04-19 RX ORDER — SODIUM CHLORIDE 0.9 % (FLUSH) 0.9 %
5-40 SYRINGE (ML) INJECTION EVERY 8 HOURS
Status: DISCONTINUED | OUTPATIENT
Start: 2022-04-19 | End: 2022-04-19 | Stop reason: HOSPADM

## 2022-04-19 RX ORDER — OXYCODONE AND ACETAMINOPHEN 5; 325 MG/1; MG/1
1 TABLET ORAL AS NEEDED
Status: DISCONTINUED | OUTPATIENT
Start: 2022-04-19 | End: 2022-04-19 | Stop reason: HOSPADM

## 2022-04-19 RX ORDER — ACETAMINOPHEN 325 MG/1
650 TABLET ORAL ONCE
Status: COMPLETED | OUTPATIENT
Start: 2022-04-19 | End: 2022-04-19

## 2022-04-19 RX ORDER — AMOXICILLIN 250 MG
1 CAPSULE ORAL 2 TIMES DAILY
Status: DISCONTINUED | OUTPATIENT
Start: 2022-04-19 | End: 2022-04-22 | Stop reason: HOSPADM

## 2022-04-19 RX ORDER — NALOXONE HYDROCHLORIDE 0.4 MG/ML
0.4 INJECTION, SOLUTION INTRAMUSCULAR; INTRAVENOUS; SUBCUTANEOUS AS NEEDED
Status: DISCONTINUED | OUTPATIENT
Start: 2022-04-19 | End: 2022-04-22 | Stop reason: HOSPADM

## 2022-04-19 RX ORDER — SODIUM CHLORIDE 9 MG/ML
50 INJECTION, SOLUTION INTRAVENOUS CONTINUOUS
Status: DISCONTINUED | OUTPATIENT
Start: 2022-04-19 | End: 2022-04-19 | Stop reason: HOSPADM

## 2022-04-19 RX ORDER — ROSUVASTATIN CALCIUM 10 MG/1
10 TABLET, COATED ORAL
Status: DISCONTINUED | OUTPATIENT
Start: 2022-04-19 | End: 2022-04-22 | Stop reason: HOSPADM

## 2022-04-19 RX ORDER — HYDROXYZINE HYDROCHLORIDE 10 MG/1
10 TABLET, FILM COATED ORAL
Status: DISCONTINUED | OUTPATIENT
Start: 2022-04-19 | End: 2022-04-22 | Stop reason: HOSPADM

## 2022-04-19 RX ORDER — SODIUM CHLORIDE 9 MG/ML
125 INJECTION, SOLUTION INTRAVENOUS CONTINUOUS
Status: DISPENSED | OUTPATIENT
Start: 2022-04-19 | End: 2022-04-20

## 2022-04-19 RX ORDER — MIDAZOLAM HYDROCHLORIDE 1 MG/ML
1 INJECTION, SOLUTION INTRAMUSCULAR; INTRAVENOUS AS NEEDED
Status: DISCONTINUED | OUTPATIENT
Start: 2022-04-19 | End: 2022-04-19 | Stop reason: HOSPADM

## 2022-04-19 RX ORDER — PANTOPRAZOLE SODIUM 40 MG/1
40 TABLET, DELAYED RELEASE ORAL
Status: DISCONTINUED | OUTPATIENT
Start: 2022-04-20 | End: 2022-04-22 | Stop reason: HOSPADM

## 2022-04-19 RX ORDER — FENTANYL CITRATE 50 UG/ML
INJECTION, SOLUTION INTRAMUSCULAR; INTRAVENOUS AS NEEDED
Status: DISCONTINUED | OUTPATIENT
Start: 2022-04-19 | End: 2022-04-19 | Stop reason: HOSPADM

## 2022-04-19 RX ORDER — FACIAL-BODY WIPES
10 EACH TOPICAL DAILY PRN
Status: DISCONTINUED | OUTPATIENT
Start: 2022-04-21 | End: 2022-04-22 | Stop reason: HOSPADM

## 2022-04-19 RX ORDER — LIDOCAINE HYDROCHLORIDE 10 MG/ML
0.1 INJECTION, SOLUTION EPIDURAL; INFILTRATION; INTRACAUDAL; PERINEURAL AS NEEDED
Status: DISCONTINUED | OUTPATIENT
Start: 2022-04-19 | End: 2022-04-19 | Stop reason: HOSPADM

## 2022-04-19 RX ORDER — SODIUM CHLORIDE 0.9 % (FLUSH) 0.9 %
5-40 SYRINGE (ML) INJECTION AS NEEDED
Status: DISCONTINUED | OUTPATIENT
Start: 2022-04-19 | End: 2022-04-19 | Stop reason: HOSPADM

## 2022-04-19 RX ORDER — MUPIROCIN 20 MG/G
OINTMENT TOPICAL 2 TIMES DAILY
Status: DISCONTINUED | OUTPATIENT
Start: 2022-04-19 | End: 2022-04-22 | Stop reason: HOSPADM

## 2022-04-19 RX ORDER — SODIUM CHLORIDE, SODIUM LACTATE, POTASSIUM CHLORIDE, CALCIUM CHLORIDE 600; 310; 30; 20 MG/100ML; MG/100ML; MG/100ML; MG/100ML
125 INJECTION, SOLUTION INTRAVENOUS CONTINUOUS
Status: DISCONTINUED | OUTPATIENT
Start: 2022-04-19 | End: 2022-04-19 | Stop reason: HOSPADM

## 2022-04-19 RX ORDER — FAMOTIDINE 20 MG/1
20 TABLET, FILM COATED ORAL
Status: DISCONTINUED | OUTPATIENT
Start: 2022-04-19 | End: 2022-04-22 | Stop reason: HOSPADM

## 2022-04-19 RX ORDER — METOPROLOL SUCCINATE 25 MG/1
12.5 TABLET, EXTENDED RELEASE ORAL DAILY
Status: DISCONTINUED | OUTPATIENT
Start: 2022-04-20 | End: 2022-04-22 | Stop reason: HOSPADM

## 2022-04-19 RX ORDER — ONDANSETRON 2 MG/ML
4 INJECTION INTRAMUSCULAR; INTRAVENOUS AS NEEDED
Status: DISCONTINUED | OUTPATIENT
Start: 2022-04-19 | End: 2022-04-19 | Stop reason: HOSPADM

## 2022-04-19 RX ORDER — PROPOFOL 10 MG/ML
INJECTION, EMULSION INTRAVENOUS
Status: DISCONTINUED | OUTPATIENT
Start: 2022-04-19 | End: 2022-04-19 | Stop reason: HOSPADM

## 2022-04-19 RX ORDER — MIDAZOLAM HYDROCHLORIDE 1 MG/ML
0.5 INJECTION, SOLUTION INTRAMUSCULAR; INTRAVENOUS
Status: DISCONTINUED | OUTPATIENT
Start: 2022-04-19 | End: 2022-04-19 | Stop reason: HOSPADM

## 2022-04-19 RX ORDER — ROPIVACAINE HYDROCHLORIDE 5 MG/ML
INJECTION, SOLUTION EPIDURAL; INFILTRATION; PERINEURAL
Status: COMPLETED | OUTPATIENT
Start: 2022-04-19 | End: 2022-04-19

## 2022-04-19 RX ORDER — OXYCODONE HYDROCHLORIDE 5 MG/1
10 TABLET ORAL
Status: DISCONTINUED | OUTPATIENT
Start: 2022-04-19 | End: 2022-04-20

## 2022-04-19 RX ORDER — FENTANYL CITRATE 50 UG/ML
25 INJECTION, SOLUTION INTRAMUSCULAR; INTRAVENOUS
Status: DISCONTINUED | OUTPATIENT
Start: 2022-04-19 | End: 2022-04-19 | Stop reason: HOSPADM

## 2022-04-19 RX ORDER — ACETAMINOPHEN 500 MG
1000 TABLET ORAL EVERY 6 HOURS
Status: DISCONTINUED | OUTPATIENT
Start: 2022-04-19 | End: 2022-04-22 | Stop reason: HOSPADM

## 2022-04-19 RX ORDER — KETAMINE HYDROCHLORIDE 10 MG/ML
INJECTION, SOLUTION INTRAMUSCULAR; INTRAVENOUS AS NEEDED
Status: DISCONTINUED | OUTPATIENT
Start: 2022-04-19 | End: 2022-04-19 | Stop reason: HOSPADM

## 2022-04-19 RX ORDER — EPHEDRINE SULFATE/0.9% NACL/PF 50 MG/5 ML
SYRINGE (ML) INTRAVENOUS AS NEEDED
Status: DISCONTINUED | OUTPATIENT
Start: 2022-04-19 | End: 2022-04-19 | Stop reason: HOSPADM

## 2022-04-19 RX ORDER — FUROSEMIDE 20 MG/1
20 TABLET ORAL DAILY
Status: DISCONTINUED | OUTPATIENT
Start: 2022-04-20 | End: 2022-04-22 | Stop reason: HOSPADM

## 2022-04-19 RX ORDER — SODIUM CHLORIDE 9 MG/ML
1000 INJECTION, SOLUTION INTRAVENOUS CONTINUOUS
Status: DISCONTINUED | OUTPATIENT
Start: 2022-04-19 | End: 2022-04-19 | Stop reason: HOSPADM

## 2022-04-19 RX ORDER — MECLIZINE HCL 12.5 MG 12.5 MG/1
25 TABLET ORAL
Status: DISCONTINUED | OUTPATIENT
Start: 2022-04-19 | End: 2022-04-22 | Stop reason: HOSPADM

## 2022-04-19 RX ORDER — SODIUM CHLORIDE 0.9 % (FLUSH) 0.9 %
5-40 SYRINGE (ML) INJECTION EVERY 8 HOURS
Status: DISCONTINUED | OUTPATIENT
Start: 2022-04-19 | End: 2022-04-22 | Stop reason: HOSPADM

## 2022-04-19 RX ORDER — HYDROMORPHONE HYDROCHLORIDE 1 MG/ML
0.2 INJECTION, SOLUTION INTRAMUSCULAR; INTRAVENOUS; SUBCUTANEOUS
Status: DISCONTINUED | OUTPATIENT
Start: 2022-04-19 | End: 2022-04-19 | Stop reason: HOSPADM

## 2022-04-19 RX ORDER — POLYETHYLENE GLYCOL 3350 17 G/17G
17 POWDER, FOR SOLUTION ORAL DAILY
Status: DISCONTINUED | OUTPATIENT
Start: 2022-04-20 | End: 2022-04-22 | Stop reason: HOSPADM

## 2022-04-19 RX ORDER — NORETHINDRONE AND ETHINYL ESTRADIOL 0.5-0.035
5 KIT ORAL AS NEEDED
Status: DISCONTINUED | OUTPATIENT
Start: 2022-04-19 | End: 2022-04-19 | Stop reason: HOSPADM

## 2022-04-19 RX ORDER — ASPIRIN 325 MG
325 TABLET, DELAYED RELEASE (ENTERIC COATED) ORAL 2 TIMES DAILY
Status: DISCONTINUED | OUTPATIENT
Start: 2022-04-19 | End: 2022-04-22 | Stop reason: HOSPADM

## 2022-04-19 RX ORDER — FENTANYL CITRATE 50 UG/ML
50 INJECTION, SOLUTION INTRAMUSCULAR; INTRAVENOUS AS NEEDED
Status: DISCONTINUED | OUTPATIENT
Start: 2022-04-19 | End: 2022-04-19 | Stop reason: HOSPADM

## 2022-04-19 RX ORDER — MIDAZOLAM HYDROCHLORIDE 1 MG/ML
INJECTION, SOLUTION INTRAMUSCULAR; INTRAVENOUS
Status: COMPLETED | OUTPATIENT
Start: 2022-04-19 | End: 2022-04-19

## 2022-04-19 RX ORDER — LIDOCAINE HYDROCHLORIDE 10 MG/ML
INJECTION, SOLUTION EPIDURAL; INFILTRATION; INTRACAUDAL; PERINEURAL
Status: COMPLETED | OUTPATIENT
Start: 2022-04-19 | End: 2022-04-19

## 2022-04-19 RX ORDER — MONTELUKAST SODIUM 10 MG/1
10 TABLET ORAL
Status: DISCONTINUED | OUTPATIENT
Start: 2022-04-19 | End: 2022-04-22 | Stop reason: HOSPADM

## 2022-04-19 RX ORDER — BUPIVACAINE HYDROCHLORIDE 5 MG/ML
INJECTION, SOLUTION EPIDURAL; INTRACAUDAL
Status: COMPLETED | OUTPATIENT
Start: 2022-04-19 | End: 2022-04-19

## 2022-04-19 RX ORDER — DIPHENHYDRAMINE HYDROCHLORIDE 50 MG/ML
12.5 INJECTION, SOLUTION INTRAMUSCULAR; INTRAVENOUS AS NEEDED
Status: DISCONTINUED | OUTPATIENT
Start: 2022-04-19 | End: 2022-04-19 | Stop reason: HOSPADM

## 2022-04-19 RX ORDER — SODIUM CHLORIDE 0.9 % (FLUSH) 0.9 %
5-40 SYRINGE (ML) INJECTION AS NEEDED
Status: DISCONTINUED | OUTPATIENT
Start: 2022-04-19 | End: 2022-04-22 | Stop reason: HOSPADM

## 2022-04-19 RX ORDER — SODIUM CHLORIDE, SODIUM LACTATE, POTASSIUM CHLORIDE, CALCIUM CHLORIDE 600; 310; 30; 20 MG/100ML; MG/100ML; MG/100ML; MG/100ML
INJECTION, SOLUTION INTRAVENOUS
Status: DISCONTINUED | OUTPATIENT
Start: 2022-04-19 | End: 2022-04-19 | Stop reason: HOSPADM

## 2022-04-19 RX ORDER — VANCOMYCIN/0.9 % SOD CHLORIDE 1.5G/250ML
1500 PLASTIC BAG, INJECTION (ML) INTRAVENOUS
Status: DISCONTINUED | OUTPATIENT
Start: 2022-04-20 | End: 2022-04-20

## 2022-04-19 RX ORDER — OXYCODONE HYDROCHLORIDE 5 MG/1
5 TABLET ORAL
Status: DISCONTINUED | OUTPATIENT
Start: 2022-04-19 | End: 2022-04-20

## 2022-04-19 RX ORDER — HYDROMORPHONE HYDROCHLORIDE 1 MG/ML
1 INJECTION, SOLUTION INTRAMUSCULAR; INTRAVENOUS; SUBCUTANEOUS
Status: DISPENSED | OUTPATIENT
Start: 2022-04-19 | End: 2022-04-20

## 2022-04-19 RX ADMIN — MUPIROCIN: 20 OINTMENT TOPICAL at 18:00

## 2022-04-19 RX ADMIN — SODIUM CHLORIDE, PRESERVATIVE FREE 10 ML: 5 INJECTION INTRAVENOUS at 14:00

## 2022-04-19 RX ADMIN — TRANEXAMIC ACID 1 G: 100 INJECTION, SOLUTION INTRAVENOUS at 10:50

## 2022-04-19 RX ADMIN — MIDAZOLAM 1 MG: 1 INJECTION INTRAMUSCULAR; INTRAVENOUS at 10:37

## 2022-04-19 RX ADMIN — SODIUM CHLORIDE 125 ML/HR: 9 INJECTION, SOLUTION INTRAVENOUS at 14:26

## 2022-04-19 RX ADMIN — ROPIVACAINE HYDROCHLORIDE 30 ML: 5 INJECTION, SOLUTION EPIDURAL; INFILTRATION; PERINEURAL at 09:10

## 2022-04-19 RX ADMIN — OXYCODONE 5 MG: 5 TABLET ORAL at 21:21

## 2022-04-19 RX ADMIN — Medication 10 MG: at 10:41

## 2022-04-19 RX ADMIN — ROSUVASTATIN 10 MG: 10 TABLET, FILM COATED ORAL at 21:21

## 2022-04-19 RX ADMIN — Medication 3 G: at 10:50

## 2022-04-19 RX ADMIN — LIDOCAINE HYDROCHLORIDE 3 MG: 10 INJECTION, SOLUTION EPIDURAL; INFILTRATION; INTRACAUDAL; PERINEURAL at 10:45

## 2022-04-19 RX ADMIN — PROPOFOL 75 MCG/KG/MIN: 10 INJECTION, EMULSION INTRAVENOUS at 10:42

## 2022-04-19 RX ADMIN — OXYCODONE 10 MG: 5 TABLET ORAL at 17:40

## 2022-04-19 RX ADMIN — PHENYLEPHRINE HYDROCHLORIDE 20 MCG/MIN: 10 INJECTION INTRAVENOUS at 11:05

## 2022-04-19 RX ADMIN — ACETAMINOPHEN 650 MG: 325 TABLET ORAL at 08:31

## 2022-04-19 RX ADMIN — ACETAMINOPHEN 1000 MG: 325 TABLET ORAL at 17:40

## 2022-04-19 RX ADMIN — Medication 1200 MCG: at 11:24

## 2022-04-19 RX ADMIN — MIDAZOLAM 1 MG: 1 INJECTION INTRAMUSCULAR; INTRAVENOUS at 12:36

## 2022-04-19 RX ADMIN — SODIUM CHLORIDE, PRESERVATIVE FREE 10 ML: 5 INJECTION INTRAVENOUS at 21:22

## 2022-04-19 RX ADMIN — BUPIVACAINE HYDROCHLORIDE 12 MG: 5 INJECTION, SOLUTION EPIDURAL; INTRACAUDAL; PERINEURAL at 10:45

## 2022-04-19 RX ADMIN — SODIUM CHLORIDE, POTASSIUM CHLORIDE, SODIUM LACTATE AND CALCIUM CHLORIDE 125 ML/HR: 600; 310; 30; 20 INJECTION, SOLUTION INTRAVENOUS at 08:45

## 2022-04-19 RX ADMIN — HYDROMORPHONE HYDROCHLORIDE 1 MG: 1 INJECTION, SOLUTION INTRAMUSCULAR; INTRAVENOUS; SUBCUTANEOUS at 15:59

## 2022-04-19 RX ADMIN — VANCOMYCIN HYDROCHLORIDE 2500 MG: 10 INJECTION, POWDER, LYOPHILIZED, FOR SOLUTION INTRAVENOUS at 15:00

## 2022-04-19 RX ADMIN — SODIUM CHLORIDE, POTASSIUM CHLORIDE, SODIUM LACTATE AND CALCIUM CHLORIDE: 600; 310; 30; 20 INJECTION, SOLUTION INTRAVENOUS at 10:14

## 2022-04-19 RX ADMIN — ASPIRIN 325 MG: 325 TABLET, COATED ORAL at 19:00

## 2022-04-19 RX ADMIN — MONTELUKAST 10 MG: 10 TABLET, FILM COATED ORAL at 21:21

## 2022-04-19 RX ADMIN — Medication 10 MG: at 12:35

## 2022-04-19 RX ADMIN — MIDAZOLAM 2 MG: 1 INJECTION INTRAMUSCULAR; INTRAVENOUS at 10:45

## 2022-04-19 RX ADMIN — SODIUM CHLORIDE, PRESERVATIVE FREE 10 ML: 5 INJECTION INTRAVENOUS at 16:00

## 2022-04-19 RX ADMIN — FENTANYL CITRATE 50 MCG: 50 INJECTION, SOLUTION INTRAMUSCULAR; INTRAVENOUS at 09:08

## 2022-04-19 RX ADMIN — MIDAZOLAM 2 MG: 1 INJECTION INTRAMUSCULAR; INTRAVENOUS at 09:08

## 2022-04-19 RX ADMIN — FENTANYL CITRATE 25 MCG: 50 INJECTION, SOLUTION INTRAMUSCULAR; INTRAVENOUS at 12:36

## 2022-04-19 RX ADMIN — SENNOSIDES AND DOCUSATE SODIUM 1 TABLET: 50; 8.6 TABLET ORAL at 18:00

## 2022-04-19 NOTE — PROGRESS NOTES
Problem: Mobility Impaired (Adult and Pediatric)  Goal: *Acute Goals and Plan of Care (Insert Text)  Description: FUNCTIONAL STATUS PRIOR TO ADMISSION: Patient was modified independent using a single point cane for functional mobility. HOME SUPPORT PRIOR TO ADMISSION: The patient lived alone with no local support. Physical Therapy Goals  Initiated 4/19/2022  1. Patient will move from supine to sit and sit to supine  and roll side to side in bed with modified independence within 7 day(s). 2.  Patient will transfer from bed to chair and chair to bed with modified independence using the least restrictive device within 7 day(s). 3.  Patient will perform sit to stand with modified independence within 7 day(s). 4.  Patient will ambulate with modified independence for 150 feet with the least restrictive device within 7 day(s). 5.  Patient will ascend/descend 3 stairs with 1 handrail(s) with supervision/set-up within 7 day(s). Outcome: Progressing Towards Goal   PHYSICAL THERAPY EVALUATION  Patient: Varinder Steward (36 y.o. male)  Date: 4/19/2022  Primary Diagnosis: Failed total knee arthroplasty ([facility] [address] [T84.018A, Z96.659]  Procedure(s) (LRB):  RIGHT TOTAL KNEE REVISION (Right) Day of Surgery   Precautions: knee immobilizer RLE         ASSESSMENT  Based on the objective data described below, the patient presents with poor mobility and activity tolerance after R TKA revision due to infection, POD0. His PMHx is significant for bilateral LE lymphedema and he has knee immobilizer in place on the RLE. He was agreeable to sitting EOB in an effort to alleviate some back pain, and initially it did. However, as he sat, he became \"fatigued\" and needed to return to supine with max assist of 2. He reported feeling better once supine, but he still needed max assist of 2 to roll side to side in bed. He did have some drop in BP with sitting.     Vitals:    04/19/22 1454 04/19/22 1541 04/19/22 1608 04/19/22 1614   BP: (!) 163/96 (!) 171/71 117/84 (!) 141/80   BP 1 Location: Right arm Right upper arm     BP Patient Position: At rest At rest Sitting Lying   Pulse: 67  82 76   Temp:  97.7 °F (36.5 °C)     Resp: 18 18     Height:       Weight:       SpO2: 97% 98%        Expect that he will be slow to progress given the issues with lymphedema and the knee immobilizer on the R.  He does have OT consult in place. Note that he lives alone with no overnight support available at home. Reviewed plan of care and safety considerations for hospital stay. We will follow up to progress his activity as he is able to tolerate. Current Level of Function Impacting Discharge (mobility/balance): max assist of 2 for bed mobility    Functional Outcome Measure: The patient scored Total: 45/100 on the Barthel Index outcome measure which is indicative of being severely impaired in basic self-care. Other factors to consider for discharge: no home support     Patient will benefit from skilled therapy intervention to address the above noted impairments. PLAN :  Recommendations and Planned Interventions: bed mobility training, transfer training, gait training, therapeutic exercises, patient and family training/education, and therapeutic activities      Frequency/Duration: Patient will be followed by physical therapy:  twice daily to address goals. Recommendation for discharge: (in order for the patient to meet his/her long term goals)  To be determined: pending progression with activity    This discharge recommendation:  Has not yet been discussed the attending provider and/or case management    IF patient discharges home will need the following DME: to be determined (TBD)         SUBJECTIVE:   Patient stated I have my dogs at home.     OBJECTIVE DATA SUMMARY:   HISTORY:    Past Medical History:   Diagnosis Date    Arthritis     Asthma     R/T ENVIRONMENTAL ALLERGIES; INHALER USE DAILY    CAD (coronary artery disease) 2007    MI    GERD (gastroesophageal reflux disease)     History of BPH     History of kidney stones     Hx: UTI (urinary tract infection)     Hypertension     Lymphedema     Nausea & vomiting     SANTA on CPAP     Sepsis ([facility] [address] 8/16/2018     Past Surgical History:   Procedure Laterality Date    HX HEART CATHETERIZATION  2007, 2016    STENTS X3  (INGRID--DR NUNEZ)    HX KNEE REPLACEMENT Right 2018    HX LITHOTRIPSY      2-3X    HX ORTHOPAEDIC Right 1964    FEMUR ORIF  (DR PRESCOTT)    HX TONSILLECTOMY      HX UROLOGICAL      CYSTO    DC BREAST SURGERY PROCEDURE UNLISTED Right     EXCISION OF BREAST LUMP (BENIGN)    VASCULAR SURGERY PROCEDURE UNLIST Right     LEG VEIN BYPASS (INGRID)       Personal factors and/or comorbidities impacting plan of care: as noted above    Home Situation  One/Two Story Residence: One story  Living Alone: Yes  Support Systems: Religious/[place] [employer],Friend/Neighbor  Patient Expects to be Discharged to[de-identified] Home  Current DME Used/Available at Home: Raised toilet seat,Shower chair,Walker, rolling,Cane, straight    EXAMINATION/PRESENTATION/DECISION MAKING:   Critical Behavior:  Neurologic State: Alert           Hearing:     Skin:  post op ace in place with knee immobilizer RLE  Edema: bilateral LE edema  Range Of Motion:  AROM: Generally decreased, functional (RLE immobilizer, LLE limited by lymphedema)                       Strength:    Strength: Generally decreased, functional (RLE 2/5 due to pain, brace)                    Tone & Sensation:   Tone: Normal              Sensation: Intact               Coordination:  Coordination: Within functional limits  Vision:      Functional Mobility:  Bed Mobility:  Rolling: Maximum assistance;Assist x2  Supine to Sit: Maximum assistance;Assist x2  Sit to Supine: Maximum assistance;Assist x2  Scooting: Maximum assistance;Assist x2  Transfers:                             Balance:   Sitting: Impaired; Without support  Sitting - Static: Fair (occasional)  Sitting - Dynamic: Poor (constant support) (especially as he fatigued)  Standing:  (unable to attempt due to pain, lightheadedness)  Ambulation/Gait Training:                                                         Stairs: Therapeutic Exercises: Ankle pumps    Functional Measure:  Barthel Index:    Bathin  Bladder: 10  Bowels: 10  Groomin  Dressin  Feeding: 10  Mobility: 0  Stairs: 0  Toilet Use: 5  Transfer (Bed to Chair and Back): 0  Total: 45/100       The Barthel ADL Index: Guidelines  1. The index should be used as a record of what a patient does, not as a record of what a patient could do. 2. The main aim is to establish degree of independence from any help, physical or verbal, however minor and for whatever reason. 3. The need for supervision renders the patient not independent. 4. A patient's performance should be established using the best available evidence. Asking the patient, friends/relatives and nurses are the usual sources, but direct observation and common sense are also important. However direct testing is not needed. 5. Usually the patient's performance over the preceding 24-48 hours is important, but occasionally longer periods will be relevant. 6. Middle categories imply that the patient supplies over 50 per cent of the effort. 7. Use of aids to be independent is allowed. Score Interpretation (from [address] [address])    Independent   60-79 Minimally independent   40-59 Partially dependent   20-39 Very dependent   <20 Totally dependent     -Mian Sims., Barthel, D.W. (1965). Functional evaluation: the Barthel Index. [address] [facility] ([address] (). The Barthel activities of daily living index: self-reporting versus actual performance in the old (> or = 75 years). Journal of [address] [place] [address] 45(7), [facility], .ADRIF, Roly Aguiar., Tamir Causey. (1999).  Measuring the change in disability after inpatient rehabilitation; comparison of the responsiveness of the Barthel Index and Functional [place] Measure. Journal of Neurology, Neurosurgery, and Psychiatry, 664), 477-430. MACIEJ Snyder.A, KAREEM Kelly, & Erlin Kovacs M.A. (2004) Assessment of post-stroke quality of life in cost-effectiveness studies: The usefulness of the Barthel Index and the EuroQoL-5D. Quality of Life Research, 15, 187-79        Physical Therapy Evaluation Charge Determination   History Examination Presentation Decision-Making   HIGH Complexity :3+ comorbidities / personal factors will impact the outcome/ POC  MEDIUM Complexity : 3 Standardized tests and measures addressing body structure, function, activity limitation and / or participation in recreation  HIGH Complexity : Unstable and unpredictable characteristics  LOW Complexity : FOTO score of       Based on the above components, the patient evaluation is determined to be of the following complexity level: LOW     Pain Rating:  Pain improved in sitting and after pain medication    Activity Tolerance:   Poor and signs and symptoms of orthostatic hypotension    After treatment patient left in no apparent distress:   Supine in bed, Call bell within reach, and Side rails x 3    COMMUNICATION/EDUCATION:   The patients plan of care was discussed with: Registered nurse. Fall prevention education was provided and the patient/caregiver indicated understanding., Patient/family have participated as able in goal setting and plan of care. , and Patient/family agree to work toward stated goals and plan of care.     Thank you for this referral.  Joan Freeman, PT   Time Calculation: 33 mins

## 2022-04-19 NOTE — ANESTHESIA PROCEDURE NOTES
Peripheral Block    Start time: 4/19/2022 9:03 AM  End time: 4/19/2022 9:13 AM  Performed by: Addy Pollack MD  Authorized by: Addy Pollack MD       Pre-procedure: Indications: at surgeon's request and post-op pain management    Preanesthetic Checklist: patient identified, risks and benefits discussed, site marked, timeout performed, anesthesia consent given and patient being monitored    Timeout Time: 09:01 EDT          Block Type:   Block Type:   Adductor canal and adductor canal block  Laterality:  Right  Monitoring:  Standard ASA monitoring, continuous pulse ox, frequent vital sign checks, heart rate, responsive to questions and oxygen  Injection Technique:  Single shot  Procedures: ultrasound guided    Patient Position: supine  Prep: DuraPrep    Needle Type:  Stimuplex  Needle Gauge:  22 G  Needle Localization:  Ultrasound guidance  Medication Injected:  Ropivacaine (PF) (NAROPIN)(0.5%) 5 mg/mL injection, 30 mL  Med Admin Time: 4/19/2022 9:10 AM    Assessment:  Number of attempts:  1  Injection Assessment:  Incremental injection every 5 mL, local visualized surrounding nerve on ultrasound, negative aspiration for blood, no paresthesia and no intravascular symptoms  Patient tolerance:  Patient tolerated the procedure well with no immediate complications

## 2022-04-19 NOTE — BRIEF OP NOTE
Brief Postoperative Note    Patient: Luz Tellez  YOB: 1945  MRN: [id number]    Date of Procedure: 4/19/2022     Pre-Op Diagnosis: CHRONIC PAIN RIGHT KNEE,  Failed total knee,  Septic total knee chronic. Post-Op Diagnosis: Same as preoperative diagnosis. Procedure(s):  RIGHT TOTAL KNEE REVISION,  All components    Surgeon(s):  Vinicius Marie MD    Surgical Assistant: Physician Assistant: Kirit Tamez PA-C  Surg Asst-1: Cam Slain    Anesthesia: Spinal     Estimated Blood Loss (mL): less than 812     Complications: None    Specimens:   ID Type Source Tests Collected by Time Destination   1 : Right knee joint fluid Joint Fluid Joint, Knee CULTURE, ANAEROBIC, CULTURE, BODY FLUID, Fidelia Patterson MD 4/19/2022 1128 Microbiology   2 : Right femoral interface Joint Fluid Joint, Knee CULTURE, ANAEROBIC, CULTURE, BODY FLUID, Fidelia Patterson MD 4/19/2022 1129 Microbiology   3 : Right patella Joint Fluid Joint, Knee CULTURE, ANAEROBIC, CULTURE, BODY FLUID, Fidelia Patterson MD 4/19/2022 1206 Microbiology        Implants:   Implant Name Type Inv.  Item Serial No.  Lot No. LRB No. Used Action   CEMENT BNE 40GM FULL DOSE PMMA W/ GENT HI VISC RADPQ LNG - SNA  CEMENT BNE 40GM FULL DOSE PMMA W/ GENT HI VISC RADPQ LNG NA [facility] DEPUY [employer]_ [id number] Right 3 Implanted   GRAFT BNE SUB 10CC BEAD 25CC CA SULPHATE RAP SET W/ INDIV - SNA  GRAFT BNE SUB 10CC BEAD 25CC CA SULPHATE RAP SET W/ INDIV NA [employer]_ [id number] Right 1 Implanted   COMPONENT FEM SZ 4 R KNEE POST STBL VANIA SIG - SNA  COMPONENT FEM SZ 4 R KNEE POST STBL VANIA SIG NA [facility] [employer]_ [id number] Right 1 Implanted   INSERT TIB SZ 4 [id number] KNEE POLYETH ANT POST STBL VANIA CELIA - SNA  INSERT TIB SZ 4 [id number] KNEE POLYETH ANT POST STBL VANIA CELIA NA [employer] DE[employer]_ [id number] Right 1 Implanted       Drains: * No LDAs found *    Findings: Purulent knee. All implants were stable, pus at bone cement interface.       Electronically Signed by Tyesha Serrano MD on 4/19/2022 at 12:46 PM

## 2022-04-19 NOTE — PROGRESS NOTES
Problem: Falls - Risk of  Goal: *Absence of Falls  Description: Document Ashley Green Fall Risk and appropriate interventions in the flowsheet.   Outcome: Progressing Towards Goal  Note: Fall Risk Interventions:  Mobility Interventions: Patient to call before getting OOB         Medication Interventions: Assess postural VS orthostatic hypotension    Elimination Interventions: Bed/chair exit alarm,Call light in reach              Problem: Patient Education: Go to Patient Education Activity  Goal: Patient/Family Education  Outcome: Progressing Towards Goal     Problem: Knee Replacement: Day of Surgery/Unit  Goal: Activity/Safety  Outcome: Progressing Towards Goal  Note: Fall precautions  Goal: Medications  Outcome: Progressing Towards Goal  Note: Hypertensive and pain medication administered to pt  Goal: Respiratory  Outcome: Progressing Towards Goal  Note: Instruced on incentive spirometer 10x/hour

## 2022-04-19 NOTE — ANESTHESIA PREPROCEDURE EVALUATION
Relevant Problems   RESPIRATORY SYSTEM   (+) Asthma   (+) SANTA on CPAP   (+) Obstructive sleep apnea syndrome      CARDIOVASCULAR   (+) Arteriosclerosis of coronary artery   (+) Hypertension      GASTROINTESTINAL   (+) GERD (gastroesophageal reflux disease)      ENDOCRINE   (+) Arthritis   (+) Septic arthritis (HCC)       Anesthetic History   No history of anesthetic complications  PONV          Review of Systems / Medical History  Patient summary reviewed, nursing notes reviewed and pertinent labs reviewed    Pulmonary  Within defined limits      Sleep apnea    Asthma        Neuro/Psych   Within defined limits           Cardiovascular  Within defined limits  Hypertension          CAD         GI/Hepatic/Renal  Within defined limits   GERD           Endo/Other  Within defined limits      Arthritis     Other Findings              Physical Exam    Airway  Mallampati: II  TM Distance: > 6 cm  Neck ROM: normal range of motion   Mouth opening: Normal     Cardiovascular  Regular rate and rhythm,  S1 and S2 normal,  no murmur, click, rub, or gallop             Dental  No notable dental hx       Pulmonary  Breath sounds clear to auscultation               Abdominal  GI exam deferred       Other Findings            Anesthetic Plan    ASA: 3  Anesthesia type: spinal      Post-op pain plan if not by surgeon: peripheral nerve block single      Anesthetic plan and risks discussed with: Patient

## 2022-04-19 NOTE — ANESTHESIA PROCEDURE NOTES
Spinal Block    Start time: 4/19/2022 10:44 AM  End time: 4/19/2022 10:46 AM  Performed by: Priscilla Amaya CRNA  Authorized by: Imelda Tineo MD     Pre-procedure:   Indications: primary anesthetic  Preanesthetic Checklist: patient identified, risks and benefits discussed, anesthesia consent, site marked, patient being monitored and timeout performed    Timeout Time: 10:43 EDT          Spinal Block:   Patient Position:  Seated  Prep Region:  Lumbar  Prep: Betadine and patient draped      Location:  L3-4  Technique:  Single shot    Local Dose (mL):  3    Needle:   Needle Type:  Quincke  Needle Gauge:  25 G  Attempts:  1      Events: CSF confirmed, no blood with aspiration and no paresthesia        Assessment:  Insertion:  Uncomplicated  Patient tolerance:  Patient tolerated the procedure well with no immediate complications

## 2022-04-19 NOTE — PROGRESS NOTES
Primary Nurse Lindsey Zamarripa RN and Lionel Mckoy RN performed a dual skin assessment on this patient No impairment noted

## 2022-04-19 NOTE — PROGRESS NOTES
Bedside shift change report given to Victorina (oncoming nurse) by Pili Schulz (offgoing nurse). Report included the following information SBAR, Kardex, OR Summary, Procedure Summary, Intake/Output and MAR.

## 2022-04-19 NOTE — PROGRESS NOTES
1425: TRANSFER - OUT REPORT:    Verbal report given to Mayda Ruffin RN(name) on Kendal Guallpa  being transferred to 568(unit) for routine post - op       Report consisted of patients Situation, Background, Assessment and   Recommendations(SBAR). Time Pre op antibiotic given:1050  Anesthesia Stop time: 1457  Argueta Present on Transfer to floor:no  Order for Argueta on Chart:no  Discharge Prescriptions with Chart:no    Information from the following report(s) SBAR, Kardex, OR Summary, Intake/Output, MAR and Cardiac Rhythm SR was reviewed with the receiving nurse. Opportunity for questions and clarification was provided. Is the patient on 02? NO         Is the patient on a monitor? NO    Is the nurse transporting with the patient? NO    Surgical Waiting Area notified of patient's transfer from PACU? YES      The following personal items collected during your admission accompanied patient upon transfer:   Dental Appliance: Dental Appliances: 1 Rah Marvell patient  Vision:    Hearing Aid:    Jewelry: Jewelry: None  Clothing: Clothing: With patient  Other Valuables:  Other Valuables: Cane,With patient  Valuables sent to safe:

## 2022-04-19 NOTE — ANESTHESIA POSTPROCEDURE EVALUATION
Post-Anesthesia Evaluation and Assessment    Patient: Conrad De Jesus MRN: [id number]  SSN: [id number]    YOB: 1945  Age: 68 y.o. Sex: male      I have evaluated the patient and they are stable and ready for discharge from the PACU. Cardiovascular Function/Vital Signs  Visit Vitals  /73   Pulse 67   Temp 36.4 °C (97.6 °F)   Resp 17   Ht 6' 1\" (1.854 m)   Wt 120 kg (264 lb 8.8 oz)   SpO2 100%   BMI 34.90 kg/m²       Patient is status post Spinal anesthesia for Procedure(s):  RIGHT TOTAL KNEE REVISION. Nausea/Vomiting: None    Postoperative hydration reviewed and adequate. Pain:  Pain Scale 1: Numeric (0 - 10) (04/19/22 1336)  Pain Intensity 1: 0 (04/19/22 1336)   Managed    Neurological Status:   Neuro (WDL): Within Defined Limits (04/19/22 1336)   At baseline    Mental Status, Level of Consciousness: Alert and  oriented to person, place, and time    Pulmonary Status:   O2 Device: Nasal cannula (04/19/22 1336)   Adequate oxygenation and airway patent    Complications related to anesthesia: None    Post-anesthesia assessment completed. No concerns    Signed By: Sesar Villar MD     April 19, 2022              Procedure(s):  RIGHT TOTAL KNEE REVISION. spinal    <BSHSIANPOST>    INITIAL Post-op Vital signs:   Vitals Value Taken Time   /73 04/19/22 1400   Temp     Pulse 67 04/19/22 1414   Resp 20 04/19/22 1414   SpO2 100 % 04/19/22 1414   Vitals shown include unvalidated device data.

## 2022-04-19 NOTE — PROGRESS NOTES
Pharmacist Note - Vancomycin Dosing    Consult provided for this 68 y.o. male for indication of infected R knee implant, chronic septic knee. - S/p R total knee revision   Antibiotic regimen(s): Vancomycin monotherapy  Patient on vancomycin PTA? NO     No results for input(s): WBC, CREA, BUN in the last 72 hours. Frequency of BMP: Tomorrow AM  Height: 185.4 cm  Weight: 120 kg  Est CrCl: 85.2 ml/min (based on pre-admission labs on ); UO: -- ml/kg/hr  Temp (24hrs), Av.6 °F (36.4 °C), Min:97 °F (36.1 °C), Max:98.1 °F (36.7 °C)    Cultures:   Body fluid x 3: pending    MRSA Swab ordered (if applicable)? N/A    The plan below is expected to result in a target range of AUC/RAGHAV 400-600    Therapy will be initiated with a loading dose of 2500 mg IV x 1 to be followed by a maintenance dose of 1500 mg IV every 18 hours for predicted AUC of 486 mg/L*hr. Pharmacy to follow patient daily and order levels / make dose adjustments as appropriate. *Vancomycin has been dosed used Bayesian kinetics software to target an AUC/RAGHAV of 400-600, which provides adequate exposure for an assumed infection due to MRSA with an RAGHAV of 1 or less while reducing the risk of nephrotoxicity as seen with traditional trough based dosing goals.

## 2022-04-20 LAB
ANION GAP SERPL CALC-SCNC: 7 MMOL/L (ref 5–15)
BASOPHILS # BLD: 0.1 K/UL (ref 0–0.1)
BASOPHILS NFR BLD: 1 % (ref 0–1)
BUN SERPL-MCNC: 10 MG/DL (ref 6–20)
BUN/CREAT SERPL: 14 (ref 12–20)
CALCIUM SERPL-MCNC: 8.1 MG/DL (ref 8.5–10.1)
CHLORIDE SERPL-SCNC: 109 MMOL/L (ref 97–108)
CO2 SERPL-SCNC: 21 MMOL/L (ref 21–32)
CREAT SERPL-MCNC: 0.72 MG/DL (ref 0.7–1.3)
DIFFERENTIAL METHOD BLD: ABNORMAL
EOSINOPHIL # BLD: 0.1 K/UL (ref 0–0.4)
EOSINOPHIL NFR BLD: 1 % (ref 0–7)
ERYTHROCYTE [DISTWIDTH] IN BLOOD BY AUTOMATED COUNT: 15.2 % (ref 11.5–14.5)
GLUCOSE SERPL-MCNC: 130 MG/DL (ref 65–100)
HCT VFR BLD AUTO: 37.3 % (ref 36.6–50.3)
HGB BLD-MCNC: 11.9 G/DL (ref 12.1–17)
HGB BLD-MCNC: 12.1 G/DL (ref 12.1–17)
IMM GRANULOCYTES # BLD AUTO: 0 K/UL (ref 0–0.04)
IMM GRANULOCYTES NFR BLD AUTO: 0 % (ref 0–0.5)
LYMPHOCYTES # BLD: 1.5 K/UL (ref 0.8–3.5)
LYMPHOCYTES NFR BLD: 18 % (ref 12–49)
MCH RBC QN AUTO: 27.6 PG (ref 26–34)
MCHC RBC AUTO-ENTMCNC: 32.4 G/DL (ref 30–36.5)
MCV RBC AUTO: 85.2 FL (ref 80–99)
MONOCYTES # BLD: 1.1 K/UL (ref 0–1)
MONOCYTES NFR BLD: 14 % (ref 5–13)
NEUTS SEG # BLD: 5.5 K/UL (ref 1.8–8)
NEUTS SEG NFR BLD: 66 % (ref 32–75)
NRBC # BLD: 0 K/UL (ref 0–0.01)
NRBC BLD-RTO: 0 PER 100 WBC
PLATELET # BLD AUTO: 217 K/UL (ref 150–400)
PMV BLD AUTO: 9.9 FL (ref 8.9–12.9)
POTASSIUM SERPL-SCNC: 4 MMOL/L (ref 3.5–5.1)
RBC # BLD AUTO: 4.38 M/UL (ref 4.1–5.7)
SODIUM SERPL-SCNC: 137 MMOL/L (ref 136–145)
WBC # BLD AUTO: 8.3 K/UL (ref 4.1–11.1)

## 2022-04-20 PROCEDURE — 36415 COLL VENOUS BLD VENIPUNCTURE: CPT

## 2022-04-20 PROCEDURE — 97530 THERAPEUTIC ACTIVITIES: CPT

## 2022-04-20 PROCEDURE — 74011000250 HC RX REV CODE- 250: Performed by: ORTHOPAEDIC SURGERY

## 2022-04-20 PROCEDURE — 74011250637 HC RX REV CODE- 250/637: Performed by: PHYSICIAN ASSISTANT

## 2022-04-20 PROCEDURE — 97165 OT EVAL LOW COMPLEX 30 MIN: CPT

## 2022-04-20 PROCEDURE — 85018 HEMOGLOBIN: CPT

## 2022-04-20 PROCEDURE — 97116 GAIT TRAINING THERAPY: CPT

## 2022-04-20 PROCEDURE — 97535 SELF CARE MNGMENT TRAINING: CPT

## 2022-04-20 PROCEDURE — 80048 BASIC METABOLIC PNL TOTAL CA: CPT

## 2022-04-20 PROCEDURE — 85025 COMPLETE CBC W/AUTO DIFF WBC: CPT

## 2022-04-20 PROCEDURE — 65270000029 HC RM PRIVATE

## 2022-04-20 PROCEDURE — 74011000250 HC RX REV CODE- 250: Performed by: PHYSICIAN ASSISTANT

## 2022-04-20 PROCEDURE — 74011250636 HC RX REV CODE- 250/636: Performed by: PHYSICIAN ASSISTANT

## 2022-04-20 PROCEDURE — 74011250636 HC RX REV CODE- 250/636: Performed by: ORTHOPAEDIC SURGERY

## 2022-04-20 PROCEDURE — 74011250637 HC RX REV CODE- 250/637

## 2022-04-20 PROCEDURE — 74011000258 HC RX REV CODE- 258: Performed by: NURSE PRACTITIONER

## 2022-04-20 PROCEDURE — 74011250636 HC RX REV CODE- 250/636: Performed by: NURSE PRACTITIONER

## 2022-04-20 RX ORDER — OXYCODONE HYDROCHLORIDE 5 MG/1
2.5 TABLET ORAL
Status: DISCONTINUED | OUTPATIENT
Start: 2022-04-20 | End: 2022-04-22 | Stop reason: HOSPADM

## 2022-04-20 RX ORDER — OXYCODONE HYDROCHLORIDE 5 MG/1
5 TABLET ORAL
Status: DISCONTINUED | OUTPATIENT
Start: 2022-04-20 | End: 2022-04-22 | Stop reason: HOSPADM

## 2022-04-20 RX ORDER — VANCOMYCIN HYDROCHLORIDE
1250 EVERY 12 HOURS
Status: DISCONTINUED | OUTPATIENT
Start: 2022-04-20 | End: 2022-04-22

## 2022-04-20 RX ORDER — FERROUS SULFATE, DRIED 160(50) MG
1 TABLET, EXTENDED RELEASE ORAL
Status: DISCONTINUED | OUTPATIENT
Start: 2022-04-20 | End: 2022-04-22 | Stop reason: HOSPADM

## 2022-04-20 RX ADMIN — SODIUM CHLORIDE, PRESERVATIVE FREE 10 ML: 5 INJECTION INTRAVENOUS at 05:57

## 2022-04-20 RX ADMIN — DUTASTERIDE 0.5 MG: 0.5 CAPSULE, LIQUID FILLED ORAL at 10:18

## 2022-04-20 RX ADMIN — VANCOMYCIN HYDROCHLORIDE 1250 MG: 10 INJECTION, POWDER, LYOPHILIZED, FOR SOLUTION INTRAVENOUS at 22:21

## 2022-04-20 RX ADMIN — SODIUM CHLORIDE, PRESERVATIVE FREE 10 ML: 5 INJECTION INTRAVENOUS at 22:16

## 2022-04-20 RX ADMIN — OXYCODONE 5 MG: 5 TABLET ORAL at 18:46

## 2022-04-20 RX ADMIN — SENNOSIDES AND DOCUSATE SODIUM 1 TABLET: 50; 8.6 TABLET ORAL at 10:18

## 2022-04-20 RX ADMIN — OXYCODONE 10 MG: 5 TABLET ORAL at 10:22

## 2022-04-20 RX ADMIN — SODIUM CHLORIDE 125 ML/HR: 9 INJECTION, SOLUTION INTRAVENOUS at 10:16

## 2022-04-20 RX ADMIN — OXYCODONE 10 MG: 5 TABLET ORAL at 01:38

## 2022-04-20 RX ADMIN — VANCOMYCIN HYDROCHLORIDE 1250 MG: 10 INJECTION, POWDER, LYOPHILIZED, FOR SOLUTION INTRAVENOUS at 10:26

## 2022-04-20 RX ADMIN — POLYETHYLENE GLYCOL 3350 17 G: 17 POWDER, FOR SOLUTION ORAL at 10:18

## 2022-04-20 RX ADMIN — ASPIRIN 325 MG: 325 TABLET, COATED ORAL at 10:17

## 2022-04-20 RX ADMIN — CEFEPIME 2 G: 2 INJECTION, POWDER, FOR SOLUTION INTRAVENOUS at 18:46

## 2022-04-20 RX ADMIN — MUPIROCIN: 20 OINTMENT TOPICAL at 18:47

## 2022-04-20 RX ADMIN — CALCIUM CARBONATE-VITAMIN D TAB 500 MG-200 UNIT 1 TABLET: 500-200 TAB at 10:18

## 2022-04-20 RX ADMIN — CEFEPIME 2 G: 2 INJECTION, POWDER, FOR SOLUTION INTRAVENOUS at 10:18

## 2022-04-20 RX ADMIN — TAMSULOSIN HYDROCHLORIDE 0.4 MG: 0.4 CAPSULE ORAL at 10:18

## 2022-04-20 RX ADMIN — Medication 1 TABLET: at 10:31

## 2022-04-20 RX ADMIN — ACETAMINOPHEN 1000 MG: 325 TABLET ORAL at 05:56

## 2022-04-20 RX ADMIN — OXYCODONE 5 MG: 5 TABLET ORAL at 05:56

## 2022-04-20 RX ADMIN — SODIUM CHLORIDE 125 ML/HR: 9 INJECTION, SOLUTION INTRAVENOUS at 01:30

## 2022-04-20 RX ADMIN — ACETAMINOPHEN 1000 MG: 325 TABLET ORAL at 18:47

## 2022-04-20 RX ADMIN — ACETAMINOPHEN 1000 MG: 325 TABLET ORAL at 12:00

## 2022-04-20 RX ADMIN — PANTOPRAZOLE SODIUM 40 MG: 40 TABLET, DELAYED RELEASE ORAL at 05:58

## 2022-04-20 RX ADMIN — MONTELUKAST 10 MG: 10 TABLET, FILM COATED ORAL at 22:16

## 2022-04-20 RX ADMIN — SENNOSIDES AND DOCUSATE SODIUM 1 TABLET: 50; 8.6 TABLET ORAL at 18:47

## 2022-04-20 RX ADMIN — ROSUVASTATIN 10 MG: 10 TABLET, FILM COATED ORAL at 22:16

## 2022-04-20 RX ADMIN — ASPIRIN 325 MG: 325 TABLET, COATED ORAL at 18:47

## 2022-04-20 RX ADMIN — ACETAMINOPHEN 1000 MG: 325 TABLET ORAL at 00:00

## 2022-04-20 RX ADMIN — METOPROLOL SUCCINATE 12.5 MG: 25 TABLET, FILM COATED, EXTENDED RELEASE ORAL at 10:17

## 2022-04-20 RX ADMIN — MUPIROCIN: 20 OINTMENT TOPICAL at 10:32

## 2022-04-20 RX ADMIN — OXYCODONE 5 MG: 5 TABLET ORAL at 22:16

## 2022-04-20 NOTE — PROGRESS NOTES
Problem: Falls - Risk of  Goal: *Absence of Falls  Description: Document [place] Fall Risk and appropriate interventions in the flowsheet.   Outcome: Progressing Towards Goal  Note: Fall Risk Interventions:  Mobility Interventions: Bed/chair exit alarm,Patient to call before getting OOB         Medication Interventions: Bed/chair exit alarm,Patient to call before getting OOB    Elimination Interventions: Bed/chair exit alarm,Call light in reach

## 2022-04-20 NOTE — PROGRESS NOTES
Ortho NP Note    POD# 1  s/p RIGHT TOTAL KNEE REVISION   Pt seen with Dr Lionel Pink    Pt resting in bed. Reports frustration with having R knee in knee immobilizer--is concerned about mobility given limited ROM. At present reports moderate pain, overall well controlled using oxycodone and scheduled tylenol. Notable history prior to admit: R TKA in 8/2018. Returned in 7/2019 arthrocentesis c/f septic arthritis with vanc/cefepime initiated in hospital.  Refused surgery at that time and declined ABX; discharged to home with plan for follow up in outpatient setting. Seen by Dr Lionel Pink on March 24, 2022: \"Painful total knee. With the lymphedema and erythema as well as effusion aspiration of his knee was performed today. I did an alpha defense and work-up via a Synovasure. Patient's fluid aspiration was infected and very opaque consistent with a chronic infection. He was scheduled for resection of his total knee. He understands treatment course which will be a two-stage with temporary knee 6 weeks IV antibiotics via PICC line. \"    VSS Afebrile.     Visit Vitals  BP (!) 148/99 (BP 1 Location: Right upper arm, BP Patient Position: At rest;Lying)   Pulse 86   Temp 98.2 °F (36.8 °C)   Resp 18   Ht 6' 1\" (1.854 m)   Wt 120 kg (264 lb 8.8 oz)   SpO2 95%   BMI 34.90 kg/m²       Voiding status: spontaneous void   Output (mL)  Urine Voided: 450 ml (04/20/22 0608)  Last Bowel Movement Date: 04/18/22 (04/19/22 1454)  Unmeasurable Output  Urine Occurrence(s): 1 (200) (04/19/22 1454)      Labs    Lab Results   Component Value Date/Time    HGB 12.1 04/20/2022 08:15 AM      Lab Results   Component Value Date/Time    INR 1.0 04/12/2022 02:52 PM      Lab Results   Component Value Date/Time    Sodium 137 04/20/2022 06:06 AM    Potassium 4.0 04/20/2022 06:06 AM    Chloride 109 (H) 04/20/2022 06:06 AM    CO2 21 04/20/2022 06:06 AM    Glucose 130 (H) 04/20/2022 06:06 AM    BUN 10 04/20/2022 06:06 AM    Creatinine 0.72 04/20/2022 06:06 AM    Calcium 8.1 (L) 04/20/2022 06:06 AM     Recent Glucose Results:   Lab Results   Component Value Date/Time     (H) 04/20/2022 06:06 AM           Body mass index is 34.9 kg/m². : A BMI > 30 is classified as obesity and > 40 is classified as morbid obesity. ACE wrap with knee immobilizer to R knee  Cryotherapy in place over incision  Calves soft and supple; No pain with passive stretch  Bilateral LEs warm, dry. 1+ DP pulses. Sensation and motor intact - PF/DF/EHL intact 5/5  Foot Pumps for mechanical DVT proph while in bed     PLAN:  1) PT: BID WBAT with knee immobilizer in place through follow up appointment   2) Anticoagulation:  Aspirin 325 mg PO BID for DVT Prophylaxis. Encouraged early mobilization, bed exercises, and SCD use. 3)  Pain - Multimodal approach including cryotherapy, scheduled Tylenol with  PRN oxycodone & IV dilaudid  4) Septic R Total Knee - ID consult--appreciate recommendations. Cultures obtained intraoperatively--results pending. Periop ABX: Ancef with vanco post operatively. Current ABX: Cefepime and vancomycin. Will likely require PICC and long term IV ABX. 5) Hx of HTN: Current BP: 148/99. Continue home toprol XL, routine VS.    6) Hx of BPH: Spontaneous void, no evidence of POUR. Continue Flomax, dutasteride. 7) Hx of LE edema: Continue home lasix. 8) Readniess for discharge:     [x] Vital Signs stable    [x] Hgb stable: 12.1    [x] + Voiding    [x] Wound intact, drainage minimal    [x] Tolerating PO intake     [] Cleared by PT (OT if applicable)     [] Stair training completed (if applicable)    [] Independent / Contact Guard Assist (household distance)     [] Bed mobility     [] Car transfers     [] ADLs    [x] Adequate pain control on oral medication alone     Not yet medically ready: ID consult--likely requiring PICC/IV ABX.   PT clearance also pending    Shahnaz Arteaga NP  Available via [employer]    Addendum: 1430: Patient with repeat evaluation by PT today with improved mobility. However, due to patient living alone at home with limited support for mobility and IV ABX, recommending placement following discharge from [facility]. Patient hesitant regarding this but ultimately agreeable to placement. CM involved for determining location.

## 2022-04-20 NOTE — PROGRESS NOTES
Problem: Self Care Deficits Care Plan (Adult)  Goal: *Acute Goals and Plan of Care (Insert Text)  Description: FUNCTIONAL STATUS PRIOR TO ADMISSION: Patient was independent and active without use of DME.      HOME SUPPORT: The patient lived alone with [facility] friends to provide assistance. Occupational Therapy Goals  Initiated 4/20/2022  1. Patient will perform RW grooming with contact guard assist within 7 day(s). 2.  Patient will perform EOB lower body dressing with minimal assistance within 7 day(s). 3.  Patient will perform EOB bathing with moderate assistance within 7 day(s). 4.  Patient will perform toilet transfers with minimal assistance within 7 day(s). 5.  Patient will perform all aspects of RW toileting with minimal assistance within 7 day(s). Outcome: Not Met    OCCUPATIONAL THERAPY EVALUATION  Patient: Tarun Nye (59 y.o. male)  Date: 4/20/2022  Primary Diagnosis: Failed total knee arthroplasty ([facility] [address] [T84.018A, Z96.659]  Procedure(s) (LRB):  RIGHT TOTAL KNEE REVISION (Right) 1 Day Post-Op   Precautions: WBAT, knee immobilizer        ASSESSMENT  Based on the objective data described below, the patient presents with 6/10 c/o R knee pain, decreased functional mobility/balance, decreased strength/endurance, decreased activity tolerance, fear of falling, fear of pain, BLE lymphedema and decreased full body reaching, all of which limit pt's ability to complete self-care routine at level congruent with PLOF. Currently, pt is Independent with self-feeding, S/U for seated grooming, Min A for UB dressing, Max A for toileting/bathing and Total A for LE dressing. Pt required Max verbal/tactile cues for hand placement pre/post transfer this date, as well as, Mod Ax2 for initial stand; however, once standing, pt demonstrates ability to transfer to chair with Min Ax2.   Pt benefits from skilled OT to address functional deficits during acute hospitalization, with reporting therapist believing pt would benefit from SNF rehab upon discharge. Current Level of Function Impacting Discharge (ADLs/self-care): Independent with self-feeding, S/U for seated grooming, Min A for UB dressing, Max A for toileting/bathing and Total A for LE dressing. Functional Outcome Measure: The patient scored 50/100 on the Barthel Index outcome measure. Other factors to consider for discharge: lives alone, knee immobilizer     Patient will benefit from skilled therapy intervention to address the above noted impairments. PLAN :  Recommendations and Planned Interventions: self care training, functional mobility training, therapeutic exercise, balance training, therapeutic activities, endurance activities, and patient education    Frequency/Duration: Patient will be followed by occupational therapy 5 times a week to address goals. Recommendation for discharge: (in order for the patient to meet his/her long term goals)  Therapy up to 5 days/week in SNF setting    This discharge recommendation:  Has been made in collaboration with the attending provider and/or case management    IF patient discharges home will need the following DME: TBD       SUBJECTIVE:   Patient stated Hannah Jennifer I take this off.   re: immobilizer    OBJECTIVE DATA SUMMARY:   HISTORY:   Past Medical History:   Diagnosis Date    Arthritis     Asthma     R/T ENVIRONMENTAL ALLERGIES; INHALER USE DAILY    CAD (coronary artery disease) 2007    MI    GERD (gastroesophageal reflux disease)     History of BPH     History of kidney stones     Hx: UTI (urinary tract infection)     Hypertension     Lymphedema     Nausea & vomiting     SANTA on CPAP     Sepsis ([facility] [address] 8/16/2018     Past Surgical History:   Procedure Laterality Date    HX HEART CATHETERIZATION  2007, 2016    STENTS X3  (INGRID--DR NUNEZ)    HX KNEE REPLACEMENT Right 2018    HX LITHOTRIPSY      2-3X    HX ORTHOPAEDIC Right 1964    FEMUR ORIF  (DR PRESCOTT)    HX TONSILLECTOMY      HX UROLOGICAL      CYSTO VT BREAST SURGERY PROCEDURE UNLISTED Right     EXCISION OF BREAST LUMP (BENIGN)    VASCULAR SURGERY PROCEDURE UNLIST Right     LEG VEIN BYPASS ([place])       Expanded or extensive additional review of patient history:     Home Situation  One/Two Story Residence: One story  Living Alone: Yes  Support Systems: [employer]onden[employer]ational/[employer],Friend/Neighbor  Patient Expects to be Discharged to[de-identified] Home  Current DME Used/Available at Home: Raised toilet seat,Shower chair,Walker, rolling,Cane, straight  Tub or Shower Type: Tub/Shower combination    Hand dominance: Right    EXAMINATION OF PERFORMANCE DEFICITS:  Cognitive/Behavioral Status:  Neurologic State: Alert  Orientation Level: Oriented X4  Cognition: Follows commands  Perception: Cues to maintain midline in standing (secondary to forward flexion)  Perseveration: No perseveration noted  Safety/Judgement: Awareness of environment      Hearing: Auditory  Auditory Impairment: None    Vision/Perceptual:                                Corrective Lenses: Reading glasses    Range of Motion:    AROM: Generally decreased, functional                         Strength:    Strength: Generally decreased, functional                Coordination:  Coordination: Within functional limits  Fine Motor Skills-Upper: Left Intact; Right Intact    Gross Motor Skills-Upper: Left Intact; Right Intact    Tone & Sensation:    Tone: Normal  Sensation: Intact                      Balance:  Sitting: Impaired; With support  Sitting - Static: Fair (occasional); Good (unsupported)  Sitting - Dynamic: Fair (occasional)  Standing: Impaired; With support  Standing - Static: Fair;Constant support  Standing - Dynamic : Fair;Poor;Constant support    Functional Mobility and Transfers for ADLs:  Bed Mobility:  Supine to Sit: Maximum assistance;Assist x2    Transfers:  Sit to Stand:  Moderate assistance;Assist x2  Stand to Sit: Minimum assistance;Assist x2  Bed to Chair: Minimum assistance;Assist x2    ADL Assessment:  Feeding: Independent    Oral Facial Hygiene/Grooming: Setup (seated)    Bathing: Maximum assistance    Upper Body Dressing: Minimum assistance    Lower Body Dressing: Total assistance    Toileting: Maximum assistance                ADL Intervention and task modifications:  Patient instructed and indicated understanding the benefits of maintaining activity tolerance, functional mobility, and independence with self care tasks during acute stay  to ensure safe return home and to baseline. Encouraged patient to increase frequency and duration OOB, be out of bed for all meals, perform daily ADLs (as approved by RN/MD regarding bathing etc), and performing functional mobility to/from bathroom. Pt educated on safe transfer techniques, with specific emphasis on proper hand placement to push up from seated surface rather than attempt to pull self up, fully positioning self in-front of desired seated location, feeling chair on back of legs and reaching back with 1-2 UE to slowly lower self to seated position. Cognitive Retraining  Safety/Judgement: Awareness of environment    Bathing: Patient instructed and indicated understanding when bathing to not submerge wound in water, stand to shower or sponge bathe, cover wound with plastic and tape to ensure no water reaches bandage/wound without cues. Dressing joint: Patient instructed and demonstrated understanding to don/doff Right LE first/last with Max cues. Patient instructed and demonstrated to don all clothing while sitting prior to standing, doff all clothing to knees while standing, then sit to doff clothing off from knees to feet in order to facilitate fall prevention, pain management, and energy conservation with Total assistance.    Home safety: Patient instructed and indicated understanding on home modifications and safety (raise height of ADL objects, appropriate height of chair surfaces, recliner safety, change of floor surfaces, clear pathways) to increase independence and fall prevention. Standing: Patient instructed and demonstrated during ADLs to walk up to sink/counter top/surfaces, step into walker to increase safety of joint and fall prevention with Minimum assistance and Assist x2. Patient educated about knee anatomy and educated to avoid rotation of Right LE. Instructed to apply concept to ADLs within the home (no twisting of knee during reaching across body, square off while using objects, slide objects along surfaces). Patient instructed and indicated understanding to increase amount of time standing, observe standing position during ADLs in order to increase even weight bearing through bilateral LEs in order to increase independence with ADLs. Goal to be reached 30 days post - op, per orthopedic surgeon or per PT. Tub/shower transfer: Patient instructed and indicated understanding regarding when it is safe to begin transfer into tub/shower (complete stairs with PT, advance exercises with PT high enough to clear tub/shower height). Patient instructed to use the same technique as used with stairs when entering and exiting tub/shower (\"up with the non-surgical, down with the surgical leg\"). Functional Measure:    Barthel Index:  Bathin  Bladder: 10  Bowels: 10  Groomin  Dressin  Feeding: 10  Mobility: 0  Stairs: 0  Toilet Use: 5  Transfer (Bed to Chair and Back): 5  Total: 50/100      The Barthel ADL Index: Guidelines  1. The index should be used as a record of what a patient does, not as a record of what a patient could do. 2. The main aim is to establish degree of independence from any help, physical or verbal, however minor and for whatever reason. 3. The need for supervision renders the patient not independent. 4. A patient's performance should be established using the best available evidence.  Asking the patient, friends/relatives and nurses are the usual sources, but direct observation and common sense are also important. However direct testing is not needed. 5. Usually the patient's performance over the preceding 24-48 hours is important, but occasionally longer periods will be relevant. 6. Middle categories imply that the patient supplies over 50 per cent of the effort. 7. Use of aids to be independent is allowed. Score Interpretation (from [facility] ([place], [place]) 83)    Independent   60-79 Minimally independent   40-59 Partially dependent   20-39 Very dependent   <20 Totally dependent     -Mian Sims., Barthel, DDannaW. (1965). Functional evaluation: the Barthel Index. [address] ([address] (). The Barthel activities of daily living index: self-reporting versus actual performance in the old (> or = 75 years). Journal [address] 45(7), [facility], ALISON, Star Luna., Savanah Fowler. (). Measuring the change in disability after inpatient rehabilitation; comparison of the responsiveness of the Barthel Index and Functional [place] Measure. Journal of Neurology, Neurosurgery, and Psychiatry, 66(4), 695-377. Rocio Hill, N.J.A, KAREEM Kelly, & Edie Robbins, M.A. (2004) Assessment of post-stroke quality of life in cost-effectiveness studies: The usefulness of the Barthel Index and the EuroQoL-5D.  Quality of Life Research, 15, 167-77        Occupational Therapy Evaluation Charge Determination   History Examination Decision-Making   LOW Complexity : Brief history review  HIGH Complexity : 5 or more performance deficits relating to physical, cognitive , or psychosocial skils that result in activity limitations and / or participation restrictions LOW Complexity : No comorbidities that affect functional and no verbal or physical assistance needed to complete eval tasks       Based on the above components, the patient evaluation is determined to be of the following complexity level: LOW   Pain Ratin/10 R knee; RN aware and following    Activity Tolerance:   Fair, Poor, and requires frequent rest breaks    After treatment patient left in no apparent distress:    Sitting in chair and Call bell within reach    COMMUNICATION/EDUCATION:   The patients plan of care was discussed with: Physical therapist, Registered nurse, and Case management. Home safety education was provided and the patient/caregiver indicated understanding., Patient/family have participated as able in goal setting and plan of care. , and Patient/family agree to work toward stated goals and plan of care. This patients plan of care is appropriate for delegation to [facility].     Thank you for this referral.  Declan Khalil OT  Time Calculation: 38 mins

## 2022-04-20 NOTE — CONSULTS
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 Citlali Francois  S/p right total knee revision (4/19)  - afebrile, wbc 8.3    Intra-op cx (4/19) pending  Plan:   ·  continue with IV cefepime and vancomycin; close monitoring renal function. Pt has been informed that he will need a long term IV ABX therapy which he agreed for the therapy at this time. ·  adjust ABX prn; follow outstanding cx  ·  fever work up if temp >= 100.4  ·  post op care per primary team    Above plan of care discussed and agreed with Dr. Alonso Silence:   · IV ancef pre/post op  · IV vancomycin 4/19-    Subjective:   Date of Consultation:  April 20, 2022  Referring Physician: Roberta Ray, [id number]    Patient is a 68 y.o. male with medical hx of GERD, hypertension, SANTA on CPAP, hyperlipidemia who had original right TKR on 8/2018 by Dr. Citlali Francios, returned to hospital on 7/2019 with increase of swelling and pain. Arthrocentesis from 7/29/2019 revealed 40K WBC, cx grew staphylococcus capitis. Pt received IV vancomycin and cefepime. Pt was recommended for long term IV ABX therapy with I & D of right knee by Crista Robles, but pt declined. Pt informed me today that his right knee has been bothering him ever since the discharge. He started to following up with orthopedic team due to graduation of worsening right knee pain and constant swelling. Pt was admitted to the hospital on 4/19, underwent for revision of right knee by Dr. Vaishali Centeno. Intra-op cx result is pending. Pt was started on IV cefepime and vancomycin. During visit, pt c/o right knee pain. No fever, chills, nausea, vomiting, mariee, sob, chest pain, or abdominal pain. S/p COVID 19 vaccines  Quit smoking a long time ago  No hx of alcohol intake.   Allergic to sulfa which causes rash    ID team was consulted for infected right TKR evaluation and treatment recommendation.      Patient Active Problem List   Diagnosis Code    Primary osteoarthritis of right knee M17.11    Arthritis M19.90    Obstructive sleep apnea syndrome G47.33    Arteriosclerosis of coronary artery I25.10    GERD (gastroesophageal reflux disease) K21.9    Hypertension I10    Lymphedema I89.0    SANTA on CPAP G47.33, Z99.89    Septic arthritis ([facility] [address] M00.9    Hyperlipidemia E78.5    Asthma J45.909    At moderate risk for fall Z91.81    Failed total knee arthroplasty ([facility] [address] T84.018A, Z96.659     Past Medical History:   Diagnosis Date    Arthritis     Asthma     R/T ENVIRONMENTAL ALLERGIES; INHALER USE DAILY    CAD (coronary artery disease) 2007    MI    GERD (gastroesophageal reflux disease)     History of BPH     History of kidney stones     Hx: UTI (urinary tract infection)     Hypertension     Lymphedema     Nausea & vomiting     SANTA on CPAP     Sepsis ([facility] [address] 2018      Family History   Problem Relation Age of Onset    Lung Disease Mother     Asthma Mother     Cancer Father         LUNG    Cancer Brother         BRAIN    Heart Disease Brother     Heart Disease Brother     Heart Disease Brother     Asthma Daughter     Anesth Problems Neg Hx       Social History     Tobacco Use    Smoking status: Former Smoker     Packs/day: 2.00     Years: 20.00     Pack years: 40.00     Quit date:      Years since quittin.3    Smokeless tobacco: Never Used   Substance Use Topics    Alcohol use: No     Past Surgical History:   Procedure Laterality Date    HX HEART CATHETERIZATION  , 2016    STENTS X3  (INGRID--DR NUNEZ)    HX KNEE REPLACEMENT Right 2018    HX LITHOTRIPSY      2-3X    HX ORTHOPAEDIC Right 1964    FEMUR ORIF  (DR PRESCOTT)    HX TONSILLECTOMY      HX UROLOGICAL      CYSTO    CA BREAST SURGERY PROCEDURE UNLISTED Right     EXCISION OF BREAST LUMP (BENIGN)    VASCULAR SURGERY PROCEDURE UNLIST Right     LEG VEIN BYPASS (INGRID) Prior to Admission medications    Medication Sig Start Date End Date Taking? Authorizing Provider   mupirocin (BACTROBAN) 2 % ointment by Both Nostrils route two (2) times a day for 5 days. Apply 0.25 g (small pea-sized amount) to both nostrils twice a day for five days. 4/14/22 4/19/22 Yes Paulette Chu NP   docusate sodium (COLACE) 100 mg capsule Take 100 mg by mouth as needed for Constipation. Yes Provider, Historical   famotidine (PEPCID PO) Take  by mouth as needed. Yes Provider, Historical   meclizine (ANTIVERT) 25 mg tablet Take 1 Tablet by mouth three (3) times daily as needed (vertigo). 4/5/22  Yes Maggie Carrera MD   metoprolol succinate (TOPROL-XL) 25 mg XL tablet TAKE 1/2 TABLET BY MOUTH EVERY DAY 3/7/22  Yes Maggie Carrera MD   tamsulosin (FLOMAX) 0.4 mg capsule Take 1 Capsule by mouth daily. 2/8/22  Yes Jc Lucas MD   montelukast (SINGULAIR) 10 mg tablet TAKE 1 TABLET BY MOUTH AT BEDTIME FOR BREATHING 2/8/22  Yes Jc Lucas MD   Omeprazole delayed release (PRILOSEC D/R) 20 mg tablet Take 1 Tablet by mouth daily. 2/8/22  Yes Jc Lucas MD   furosemide (LASIX) 40 mg tablet Take 1 tablet by mouth daily. Patient taking differently: Take 20 mg by mouth daily. Take 1 tablet by mouth daily. 1/11/22  Yes Tylor Ruano MD   rosuvastatin (CRESTOR) 10 mg tablet rosuvastatin 10 mg tablet   TAKE 1 TABLET BY MOUTH AT BEDTIME   Yes Provider, Historical   acetaminophen (TYLENOL EXTRA STRENGTH) 500 mg tablet Take 1,000 mg by mouth every six (6) hours as needed for Pain. Yes Provider, Historical   mometasone-formoterol (DULERA) 100-5 mcg/actuation HFA inhaler Take 1 Puff by inhalation two (2) times a day. Yes Provider, Historical   dutasteride (AVODART) 0.5 mg capsule Take 0.5 mg by mouth daily. Yes Provider, Historical   levocetirizine (XYZAL) 5 mg tablet Take 5 mg by mouth nightly.    Yes Provider, Historical   ibuprofen (Motrin IB) 200 mg tablet Take 200 mg by mouth every eight (8) hours as needed for Pain. Provider, Historical   fish oil-omega-3 fatty acids 340-1,000 mg capsule Take 1 Capsule by mouth daily. Provider, Historical   zinc 50 mg tab tablet Take 50 mg by mouth daily. Provider, Historical   cpap machine kit by Does Not Apply route. Provider, Historical   nitroglycerin (NITROSTAT) 0.4 mg SL tablet nitroglycerin 0.4 mg sublingual tablet  Patient not taking: Reported on 4/19/2022    Provider, Historical   triamcinolone (NASACORT AQ) 55 mcg nasal inhaler 2 Sprays by Both Nostrils route daily. Patient not taking: Reported on 4/19/2022    Provider, Historical   aspirin 81 mg chewable tablet Take 81 mg by mouth daily. Provider, Historical   calcium-vitamin D (OYSTER SHELL) 500 mg(1,250mg) -200 unit per tablet Take 1 Tablet by mouth daily. Provider, Historical   mecobal/levomefolat Ca/B6 phos (FOLTANX PO) Take  by mouth daily. Provider, Historical   albuterol-ipratropium (DUO-NEB) 2.5 mg-0.5 mg/3 ml nebu 3 mL by Nebulization route every six (6) hours as needed. Patient not taking: Reported on 4/19/2022    Provider, Historical   coenzyme Q-10 (CO Q-10) 200 mg capsule Take 200 mg by mouth daily. Provider, Historical   cranberry fruit extract (CRANBERRY CONCENTRATE PO) Take 360 mg by mouth nightly. Provider, Historical     a  Allergies   Allergen Reactions    Sulfa (Sulfonamide Antibiotics) Rash        REVIEW OF SYSTEMS:     Total of 12 systems reviewed as follows:   I am not able to complete the review of systems because:    The patient is intubated and sedated    The patient has altered mental status due to his acute medical problems    The patient has baseline aphasia from prior stroke(s)    The patient has baseline dementia and is not reliable historian                 POSITIVE= underlined text  Negative = text not underlined  General:  fever, chills, sweats, generalized weakness, weight loss/gain,      loss of appetite   Eyes:    blurred vision, eye pain, loss of vision, double vision  ENT:    rhinorrhea, pharyngitis   Respiratory:   cough, sputum production, SOB, wheezing, DIAZ, pleuritic pain   Cardiology:   chest pain, palpitations, orthopnea, PND, edema, syncope   Gastrointestinal:  abdominal pain , N/V, dysphagia, diarrhea, constipation, bleeding   Genitourinary:  frequency, urgency, dysuria, hematuria, incontinence   Muskuloskeletal :  arthralgia, myalgia RLE pain  Hematology:  easy bruising, nose or gum bleeding, lymphadenopathy   Dermatological: rash, ulceration, pruritis   Endocrine:   hot flashes or polydipsia   Neurological:  headache, dizziness, confusion, focal weakness, paresthesia,     Speech difficulties, memory loss, gait disturbance  Psychological: Feelings of anxiety, depression, agitation    Objective:     Visit Vitals  BP (!) 148/99 (BP 1 Location: Right upper arm, BP Patient Position: At rest;Lying)   Pulse 86   Temp 98.2 °F (36.8 °C)   Resp 18   Ht 6' 1\" (1.854 m)   Wt 120 kg (264 lb 8.8 oz)   SpO2 95%   BMI 34.90 kg/m²     Temp (24hrs), Av.9 °F (36.6 °C), Min:97 °F (36.1 °C), Max:98.8 °F (37.1 °C)       Lines:  Peripheral line    PHYSICAL EXAM:   General:    chronically ill appearing. Alert, cooperative, no distress, appears stated age. HEENT: Atraumatic, anicteric sclerae, pink conjunctivae     No oral ulcers, mucosa moist, throat clear  Neck:  Supple  Lungs:   Clear to auscultation bilaterally. No Wheezing or Rhonchi. No rales. Chest wall:  No tenderness  No Accessory muscle use. Heart:   Regular  rhythm,  No  murmur   No edema  Abdomen:   Soft, non-tender. Not distended. Bowel sounds normal  Extremities: No cyanosis. No clubbing  Skin:     Not pale. Not Jaundiced  No rashes. RLE; dressing dry and intact, was wearing knee mobilizer  Psych:  Good insight. Not depressed. Not anxious or agitated. Neurologic: EOMs intact. No facial asymmetry. No aphasia or slurred speech, Alert and oriented X 4.        Data Review:     CBC:  Recent Labs 04/20/22  0815 04/20/22  0606   WBC 8.3  --    GRANS 66  --    MONOS 14*  --    EOS 1  --    ANEU 5.5  --    ABL 1.5  --    HGB 12.1 11.9*   HCT 37.3  --      --        BMP:  Recent Labs     04/20/22  0606   CREA 0.72   BUN 10      K 4.0   *   CO2 21   AGAP 7   *       LFTS:  No results for input(s): TBILI, ALT, AP, TP, ALB in the last 72 hours.     No lab exists for component: SGOT    Microbiology:     All Micro Results     Procedure Component Value Units Date/Time    CULTURE, BODY FLUID Jluis Living STAIN [[id number]] Collected: 04/19/22 1129    Order Status: Completed Specimen: Knee  Updated: 04/19/22 1708     Special Requests: RIGHT FEMORAL INTERFACE     GRAM STAIN OCCASIONAL WBCS SEEN         NO ORGANISMS SEEN        Culture result: PENDING    CULTURE, BODY FLUID W Sinai Subha [[id number]] Collected: 04/19/22 1128    Order Status: Completed Specimen: Knee  Updated: 04/19/22 1648     Special Requests: RIGHT KNEE JOINT FLUID     GRAM STAIN OCCASIONAL WBCS SEEN         NO ORGANISMS SEEN        Culture result: PENDING    CULTURE, BODY FLUID Maureen April [[id number]] Collected: 04/19/22 1206    Order Status: Completed Specimen: Knee  Updated: 04/19/22 1646     Special Requests: RIGHT PATELLA     GRAM STAIN OCCASIONAL WBCS SEEN         NO ORGANISMS SEEN        Culture result: PENDING    CULTURE, ANAEROBIC [[id number]] Collected: 04/19/22 1129    Order Status: Completed Updated: 04/19/22 1622    CULTURE, ANAEROBIC [[id number]] Collected: 04/19/22 1206    Order Status: Completed Updated: 04/19/22 1622    CULTURE, ANAEROBIC [[id number]] Collected: 04/19/22 1128    Order Status: Completed Updated: 04/19/22 1621            Signed By: Dominick Klinefelter, NP     April 20, 2022

## 2022-04-20 NOTE — PROGRESS NOTES
Day #2 of Vancomycin  Indication:  Infected R knee implant, chronic septic knee  Current regimen:  1500 mg IV every 18 hours  Abx regimen:  Vancomycin + cefepime  ID Following ?: YES- consulted  Concomitant nephrotoxic drugs (requires more frequent monitoring): Loop diuretics  Frequency of BMP?: Daily through     Recent Labs     22  0815 22  0606   WBC 8.3  --    CREA  --  0.72   BUN  --  10     Est CrCl: >100 ml/min; UO: 0.5 ml/kg/hr + x1 unmeasured occurrence  Temp (24hrs), Av.9 °F (36.6 °C), Min:97 °F (36.1 °C), Max:98.8 °F (37.1 °C)    Cultures:    Body fluid, R knee joint fluid: pending   Body fluid, R femoral interface: pending   Body fluid, R patella: pending    MRSA Swab ordered (if applicable)? N/A    Goal target range AUC/RAGHAV 400-600    Recent level history:  Date/Time Dose & Interval Measured Level (mcg/mL) Associated AUC/RAGHAV Dose Adjustment                                                   Plan: Improvement in renal function. Change to vancomycin 1250 mg IV every 12 hours  empirically for predicted AUC of 470 mg/L*hr. Plan for random level tomorrow with AM labs. Pharmacy to follow patient daily and order levels / make dose adjustments as appropriate. *Vancomycin has been dosed used Bayesian kinetics software to target an AUC/RAGHAV of 400-600, which provides adequate exposure for an assumed infection due to MRSA with an RAGHAV of 1 or less while reducing the risk of nephrotoxicity as seen with traditional trough based dosing goals.

## 2022-04-20 NOTE — PROGRESS NOTES
Problem: Mobility Impaired (Adult and Pediatric)  Goal: *Acute Goals and Plan of Care (Insert Text)  Description: FUNCTIONAL STATUS PRIOR TO ADMISSION: Patient was modified independent using a single point cane for functional mobility. HOME SUPPORT PRIOR TO ADMISSION: The patient lived alone with no local support. Physical Therapy Goals  Initiated 4/19/2022  1. Patient will move from supine to sit and sit to supine  and roll side to side in bed with modified independence within 7 day(s). 2.  Patient will transfer from bed to chair and chair to bed with modified independence using the least restrictive device within 7 day(s). 3.  Patient will perform sit to stand with modified independence within 7 day(s). 4.  Patient will ambulate with modified independence for 150 feet with the least restrictive device within 7 day(s). 5.  Patient will ascend/descend 3 stairs with 1 handrail(s) with supervision/set-up within 7 day(s). Outcome: Progressing Towards Goal     PHYSICAL THERAPY TREATMENT  Patient: Omaira Wong (89 y.o. male)  Date: 4/20/2022  Diagnosis: Failed total knee arthroplasty ([facility] [address] [T84.018A, Z96.659] <principal problem not specified>  Procedure(s) (LRB):  RIGHT TOTAL KNEE REVISION (Right) 1 Day Post-Op  Precautions:  Right knee immobilizer, WBAT  Chart, physical therapy assessment, plan of care and goals were reviewed. ASSESSMENT  Patient continues with skilled PT services and is progressing towards goals. Pt continues to require max assist to come to sitting, but was able to progress to standing and pivot to bedside chair. Pt reports good pain control 3 - 4/10 - however pt fearful and anxious regarding falling. Recommend inpatient rehab or SNF - awaiting recommendations for antibiotic coverage at discharge. Current Level of Function Impacting Discharge (mobility/balance): Max assist x 2 supine to sit;  Mod assist x 2 to come to standing; CGA to min assist to maintain standing with RW    Other factors to consider for discharge: PLOF lived independently          PLAN :  Patient continues to benefit from skilled intervention to address the above impairments. Continue treatment per established plan of care. to address goals. Recommendation for discharge: (in order for the patient to meet his/her long term goals)  Therapy up to 5 days/week in SNF setting    This discharge recommendation:  Has been made in collaboration with the attending provider and/or case management    IF patient discharges home will need the following DME: n/a       SUBJECTIVE:   Patient stated it's not the pain - I just don't trust my right leg.     OBJECTIVE DATA SUMMARY:   Critical Behavior:  Neurologic State: Alert  Orientation Level: Oriented X4  Cognition: Follows commands  Safety/Judgement: Awareness of environment  Functional Mobility Training:  Bed Mobility:     Supine to Sit: Maximum assistance;Assist x2              Transfers:  Sit to Stand: Moderate assistance;Assist x2  Stand to Sit: Minimum assistance;Assist x2        Bed to Chair: Minimum assistance;Assist x2                    Balance:  Sitting: Impaired; With support  Sitting - Static: Fair (occasional); Good (unsupported)  Sitting - Dynamic: Fair (occasional)  Standing: Impaired; With support  Standing - Static: Fair;Constant support  Standing - Dynamic : Fair;Poor;Constant support  Ambulation/Gait Training:      Pt able to advance right foot but difficulty un weighting left leg - mostly pivoting on feet to move to bedside chair. Therapeutic Exercises:   Pt instructed in ankle pumps and quad sets - to be performed once hr when awake as tolerated. Pain Rating:  Right knee pain 3-4/10    Activity Tolerance:   Improving - able to tolerate transfer to bedside chair with assist x 2.     After treatment patient left in no apparent distress:   Call bell within reach and Recliner with legs elevated    COMMUNICATION/COLLABORATION:   The patients plan of care was discussed with: Occupational therapist and NP - Melody Amaya .      Jerald More, PT   Time Calculation: 41 mins

## 2022-04-20 NOTE — OP NOTES
[address] [facility]  OPERATIVE REPORT    Name:  Eber Porter  MR#:  [id number]  :  1945  ACCOUNT #:  [de-identified]  DATE OF SERVICE:  2022    PREOPERATIVE DIAGNOSIS:  Failed total knee septic arthritis, right total knee. POSTOPERATIVE DIAGNOSIS:  Failed total knee septic arthritis, right total knee. PROCEDURES PERFORMED:  Removal of infected total knee, revision of femoral and tibial component. SURGEON:  Tereza Hendrix MD    ASSISTANT:  Kulwant Valladares PA-C    ANESTHESIA:  Spinal.    COMPLICATIONS:  None. SPECIMENS REMOVED:  Multiple cultures were obtained. IMPLANTS:  Implant reimplanted, size 4 DePuy PS sigma femoral component and size 4 tibial component. ESTIMATED BLOOD LOSS:  Less than 100 mL. DRAINS:  None. COUNTS:  Correct at the end of the procedure. INDICATIONS:  A 80-year-old man with total knee done elsewhere, chronic pain, presented to my office, elevated sed rate, CRP prompted aspiration. Aspiration showed gross pus in the office, positive cultures and the patient agreed to a resection and revision of his infected right total knee. PROCEDURE:  Anesthetic was initiated. Preoperative dose of antibiotic was given. The right side was confirmed as the operative site, prepped and draped in the usual sterile fashion. Limb was exsanguinated. Tourniquet was inflated. I used his old incision which was extended a bit proximally and a medial parapatellar arthrotomy was made. The knee was exposed. Synovectomy was performed after adequate exposure and reconstitution of relatively normal anatomy. Knee was flexed. Poly was removed, femoral bone cement interface was obtained and identified. Saws and osteotomes were used to remove the femoral component, really no bone loss removing the femoral component. The tibia was subluxed and osteotomes and saw were used to remove the tibia, very little bone loss removing the tibia.   All cement was removed from the tibia, irrigated the tibial canal and tibial bony surface. After complete debridement, a lap sponge was placed in the tibia. All cement was then removed from the femur, irrigated until I was certain that all cement had been removed and I used a lamina  to hold open the flexion space and performed a posterior synovectomy with the electrocautery. The patella was then excised with osteotomes and all cement was removed from the patella. Irrisept irrigation was placed on the bones and allowed to sit for over a minute and the knee joint was copiously irrigated, clean-up cuts were made for the femoral component and tibial component, size 4, 15-mm tibial component and a size 4 PS femur. Antibiotic beads were placed in the canals after I ensured that all devitalized tissue had been removed and I back scratched the canal.  After final irrigation and drying, I cemented a new tibial component and a new femoral component into place. Cement was allowed to fully cure in about 5 degrees of flexion. The knee joint was then copiously irrigated one last time, all debris was removed. The arthrotomy was closed with monofilament suture interrupted. Skin and subcutaneous were irrigated and closed in standard fashion. Sterile dressing was applied. There were no complications. Multiple cultures were obtained. There was pus at the interface between the cement and the bone indicative of chronic infection. The patient tolerated the procedure well, was placed in a knee immobilizer and awakened from his anesthetic. Appature  assisted for the procedure with exposure, retraction, placement of the implant, irrigation of the wound and wound closure. Allen's assistance was necessary as no other qualified surgical resident or assistant were available to help with the procedure.       Tarun Clifford MD [email or web address]  D:  04/20/2022 17:09  T:  04/20/2022 19:22  JOB #:  [id number]

## 2022-04-20 NOTE — PROGRESS NOTES
Problem: Mobility Impaired (Adult and Pediatric)  Goal: *Acute Goals and Plan of Care (Insert Text)  Description: FUNCTIONAL STATUS PRIOR TO ADMISSION: Patient was modified independent using a single point cane for functional mobility. HOME SUPPORT PRIOR TO ADMISSION: The patient lived alone with no local support. Physical Therapy Goals  Initiated 4/19/2022  1. Patient will move from supine to sit and sit to supine  and roll side to side in bed with modified independence within 7 day(s). 2.  Patient will transfer from bed to chair and chair to bed with modified independence using the least restrictive device within 7 day(s). 3.  Patient will perform sit to stand with modified independence within 7 day(s). 4.  Patient will ambulate with modified independence for 150 feet with the least restrictive device within 7 day(s). 5.  Patient will ascend/descend 3 stairs with 1 handrail(s) with supervision/set-up within 7 day(s). 4/20/2022 1419 by Ridge Martin PT  Outcome: Progressing Towards Goal   PHYSICAL THERAPY TREATMENT  Patient: Rita Rosas (78 y.o. male)  Date: 4/20/2022  Diagnosis: Failed total knee [facility] ([facility] [T84.018A, Z96.659] <principal problem not specified>  Procedure(s) (LRB):  RIGHT TOTAL KNEE REVISION (Right) 1 Day Post-Op  Precautions:    Chart, physical therapy assessment, plan of care and goals were reviewed. ASSESSMENT  Patient continues with skilled PT services and is progressing towards goals. Pt with increasing confidence this pm - after sitting up in chair for 2 hrs with legs elevated. Patient able to take steps - amb 10 feet forward with RW and CGA - chair following. Pt needs to practice sit to stand - increasing weight shift forward making turns or stepping backwards. Current Level of Function Impacting Discharge (mobility/balance):  Mod assist x 2 for sit to stand; CGA for amb with RW - 10 feet (chair following)     Other factors to consider for discharge: lives alone         PLAN :  Patient continues to benefit from skilled intervention to address the above impairments. Continue treatment per established plan of care. to address goals. Recommendation for discharge: (in order for the patient to meet his/her long term goals)  Therapy up to 5 days/week in SNF setting    This discharge recommendation:  Has been made in collaboration with the attending provider and/or case management    IF patient discharges home will need the following DME: n/a       SUBJECTIVE:   Patient stated it's not the pain - I just don't trust my leg.     OBJECTIVE DATA SUMMARY:   Critical Behavior:  Neurologic State: Alert  Orientation Level: Oriented X4  Cognition: Follows commands  Safety/Judgement: Awareness of environment    Range of Motion:  AROM: Generally decreased, functional                         Functional Mobility Training:  Bed Mobility:     Supine to Sit: Maximum assistance;Assist x2  Sit to Supine: Moderate assistance;Maximum assistance;Assist x2; Additional time  Scooting: Minimum assistance;Assist x2; Additional time (with bed in Trendelenberg - using head board)        Transfers:  Sit to Stand: Moderate assistance;Assist x2; Additional time (verbal cues to shift weight forward)  Stand to Sit: Minimum assistance;Assist x2        Bed to Chair: Minimum assistance;Assist x2                    Balance:  Sitting: Impaired; Without support  Sitting - Static: Good (unsupported)  Sitting - Dynamic: Fair (occasional) (limited by right knee immobilizer )  Standing: Impaired; With support  Standing - Static: Good;Constant support  Standing - Dynamic : Fair;Constant support  Ambulation/Gait Training:  Distance (ft): 10 Feet (ft)  Assistive Device: Walker, rolling;Gait belt  Ambulation - Level of Assistance: Contact guard assistance; Additional time; Adaptive equipment;Assist x1 (chair following )        Gait Abnormalities: Antalgic;Decreased step clearance; Step to gait        Base of Support: Center of gravity altered;Shift to left  Stance: Right decreased  Speed/Sandra: Slow  Step Length: Left shortened  Swing Pattern: Right asymmetrical                 Therapeutic Exercises:     EXERCISE   Sets   Reps   Active Active Assist   Passive Self ROM   Comments   Ankle Pumps  5 [x]                                        []                                        []                                        []                                           Quad Sets  5 [x]                                        []                                        []                                        []                                               Pain Rating:  Right knee 3-4/10    Activity Tolerance:   Improving - increasing confidence for mobility     After treatment patient left in no apparent distress:   Supine in bed, Call bell within reach, and ice applied over ace wrap under knee immobilizer. COMMUNICATION/COLLABORATION:   The patients plan of care was discussed with: Registered nurse and Case Management.      Roxana Saint, PT   Time Calculation: 25 mins

## 2022-04-20 NOTE — PROGRESS NOTES
Transition Of Care: SNF choices pending. The patient plans to go to SNF. BLS vs family to transport. RUR: 10%    1. The patient is from home and lives alone. 2. Orthopedics, PT/OT following. 3. The patient plans to discharge to a SNF. CM offered screening for Medicaid Long-Term Services & Supports. Patient Declined screening. The patient plans to have rehab and return to independent living. Care Management Interventions  PCP Verified by CM: Yes  Palliative Care Criteria Met (RRAT>21 & CHF Dx)?: No  Mode of Transport at Discharge: Other (see comment)  Transition of Care Consult (CM Consult): Discharge Planning,SNF  [address]: No  MyChart Signup: Yes  Discharge Durable Medical Equipment: No  Health Maintenance Reviewed: Yes  Physical Therapy Consult: Yes  Occupational Therapy Consult: Yes  Speech Therapy Consult: No  Support Systems: Spouse/Significant Other  Confirm Follow Up Transport: Family  The Plan for Transition of Care is Related to the Following Treatment Goals : The patient plans to discharge to SNF.   The Patient and/or Patient Representative was Provided with a Choice of Provider and Agrees with the Discharge Plan?: Yes  Freedom of Choice List was Provided with Basic Dialogue that Supports the Patient's Individualized Plan of Care/Goals, Treatment Preferences and Shares the Quality Data Associated with the Providers?: Yes  [place] Resource Information Provided?: No  Discharge Location  Patient Expects to be Discharged to[de-identified] Skilled nursing facility     Reason for Admission:  Right Total Knee                     RUR Score:  10%                 Plan for utilizing home health: SNF    PCP: First and Last name:  Hernando Gorss MD     Name of Practice:    Are you a current patient: Yes/No: Y   Approximate date of last visit:    Can you participate in a virtual visit with your PCP: Y                    Current Advanced Directive/Advance Care Plan: Full Code      Healthcare Decision Maker:   Click here to complete PresentationTube including selection of the Healthcare Decision Maker Relationship (ie \"Primary\")             Primary Decision MakerDuyen Current - [phone]    Secondary Decision Maker: Kendra Linares - Daughter - [phone]                  Transition of Care Plan:      CM met with the patient in room 568. The patient is alert and oriented x4. Confirmed demographics. The patient is independent with ADL's/IADL's, drives a vehicle, owns a rolling walker, cane, bedside commode and uses [employer] in [place]. The patient lives alone in a trailor with no steps to enter. The patient is  but his wife, Georgia Negrete is still his emergency contact and are still  but no longer live together. The patient states he has never been to rehab and never had any home health. CM following for discharge needs. The patient plans to discharge to SNF with BLS transport when stable for discharge.     Yuki Pathak RN/CRM

## 2022-04-21 LAB
ANION GAP SERPL CALC-SCNC: 8 MMOL/L (ref 5–15)
BACTERIA SPEC CULT: NORMAL
BASOPHILS # BLD: 0.1 K/UL (ref 0–0.1)
BASOPHILS NFR BLD: 1 % (ref 0–1)
BUN SERPL-MCNC: 11 MG/DL (ref 6–20)
BUN/CREAT SERPL: 15 (ref 12–20)
CALCIUM SERPL-MCNC: 8.2 MG/DL (ref 8.5–10.1)
CHLORIDE SERPL-SCNC: 106 MMOL/L (ref 97–108)
CO2 SERPL-SCNC: 22 MMOL/L (ref 21–32)
CREAT SERPL-MCNC: 0.71 MG/DL (ref 0.7–1.3)
DIFFERENTIAL METHOD BLD: ABNORMAL
EOSINOPHIL # BLD: 0.4 K/UL (ref 0–0.4)
EOSINOPHIL NFR BLD: 4 % (ref 0–7)
ERYTHROCYTE [DISTWIDTH] IN BLOOD BY AUTOMATED COUNT: 15 % (ref 11.5–14.5)
GLUCOSE SERPL-MCNC: 122 MG/DL (ref 65–100)
HCT VFR BLD AUTO: 34.2 % (ref 36.6–50.3)
HGB BLD-MCNC: 10.8 G/DL (ref 12.1–17)
IMM GRANULOCYTES # BLD AUTO: 0.1 K/UL (ref 0–0.04)
IMM GRANULOCYTES NFR BLD AUTO: 1 % (ref 0–0.5)
LYMPHOCYTES # BLD: 2.3 K/UL (ref 0.8–3.5)
LYMPHOCYTES NFR BLD: 23 % (ref 12–49)
MCH RBC QN AUTO: 27.2 PG (ref 26–34)
MCHC RBC AUTO-ENTMCNC: 31.6 G/DL (ref 30–36.5)
MCV RBC AUTO: 86.1 FL (ref 80–99)
MONOCYTES # BLD: 1.3 K/UL (ref 0–1)
MONOCYTES NFR BLD: 14 % (ref 5–13)
NEUTS SEG # BLD: 5.6 K/UL (ref 1.8–8)
NEUTS SEG NFR BLD: 57 % (ref 32–75)
NRBC # BLD: 0 K/UL (ref 0–0.01)
NRBC BLD-RTO: 0 PER 100 WBC
PLATELET # BLD AUTO: 195 K/UL (ref 150–400)
PMV BLD AUTO: 9.7 FL (ref 8.9–12.9)
POTASSIUM SERPL-SCNC: 3.8 MMOL/L (ref 3.5–5.1)
RBC # BLD AUTO: 3.97 M/UL (ref 4.1–5.7)
SERVICE CMNT-IMP: NORMAL
SODIUM SERPL-SCNC: 136 MMOL/L (ref 136–145)
VANCOMYCIN SERPL-MCNC: 12.2 UG/ML
WBC # BLD AUTO: 9.7 K/UL (ref 4.1–11.1)

## 2022-04-21 PROCEDURE — 97530 THERAPEUTIC ACTIVITIES: CPT

## 2022-04-21 PROCEDURE — C1751 CATH, INF, PER/CENT/MIDLINE: HCPCS

## 2022-04-21 PROCEDURE — 02HV33Z INSERTION OF INFUSION DEVICE INTO SUPERIOR VENA CAVA, PERCUTANEOUS APPROACH: ICD-10-PCS | Performed by: INTERNAL MEDICINE

## 2022-04-21 PROCEDURE — 80048 BASIC METABOLIC PNL TOTAL CA: CPT

## 2022-04-21 PROCEDURE — 74011000250 HC RX REV CODE- 250: Performed by: PHYSICIAN ASSISTANT

## 2022-04-21 PROCEDURE — 85025 COMPLETE CBC W/AUTO DIFF WBC: CPT

## 2022-04-21 PROCEDURE — 97116 GAIT TRAINING THERAPY: CPT

## 2022-04-21 PROCEDURE — 74011250636 HC RX REV CODE- 250/636: Performed by: NURSE PRACTITIONER

## 2022-04-21 PROCEDURE — 36573 INSJ PICC RS&I 5 YR+: CPT | Performed by: NURSE PRACTITIONER

## 2022-04-21 PROCEDURE — 65270000029 HC RM PRIVATE

## 2022-04-21 PROCEDURE — 76937 US GUIDE VASCULAR ACCESS: CPT

## 2022-04-21 PROCEDURE — 77030020365 HC SOL INJ SOD CL 0.9% 50ML

## 2022-04-21 PROCEDURE — 80202 ASSAY OF VANCOMYCIN: CPT

## 2022-04-21 PROCEDURE — 74011000258 HC RX REV CODE- 258: Performed by: NURSE PRACTITIONER

## 2022-04-21 PROCEDURE — 36415 COLL VENOUS BLD VENIPUNCTURE: CPT

## 2022-04-21 PROCEDURE — 74011250636 HC RX REV CODE- 250/636: Performed by: PHYSICIAN ASSISTANT

## 2022-04-21 PROCEDURE — 74011250637 HC RX REV CODE- 250/637: Performed by: PHYSICIAN ASSISTANT

## 2022-04-21 PROCEDURE — 74011250637 HC RX REV CODE- 250/637

## 2022-04-21 RX ORDER — FACIAL-BODY WIPES
10 EACH TOPICAL ONCE
Status: ACTIVE | OUTPATIENT
Start: 2022-04-21 | End: 2022-04-22

## 2022-04-21 RX ORDER — ADHESIVE BANDAGE
30 BANDAGE TOPICAL ONCE
Status: COMPLETED | OUTPATIENT
Start: 2022-04-21 | End: 2022-04-21

## 2022-04-21 RX ADMIN — ACETAMINOPHEN 1000 MG: 325 TABLET ORAL at 18:42

## 2022-04-21 RX ADMIN — TAMSULOSIN HYDROCHLORIDE 0.4 MG: 0.4 CAPSULE ORAL at 09:12

## 2022-04-21 RX ADMIN — OXYCODONE 5 MG: 5 TABLET ORAL at 01:20

## 2022-04-21 RX ADMIN — ASPIRIN 325 MG: 325 TABLET, COATED ORAL at 09:00

## 2022-04-21 RX ADMIN — CALCIUM CARBONATE-VITAMIN D TAB 500 MG-200 UNIT 1 TABLET: 500-200 TAB at 07:00

## 2022-04-21 RX ADMIN — SODIUM CHLORIDE, PRESERVATIVE FREE 10 ML: 5 INJECTION INTRAVENOUS at 21:42

## 2022-04-21 RX ADMIN — FUROSEMIDE 20 MG: 20 TABLET ORAL at 09:12

## 2022-04-21 RX ADMIN — CEFEPIME 2 G: 2 INJECTION, POWDER, FOR SOLUTION INTRAVENOUS at 09:11

## 2022-04-21 RX ADMIN — METOPROLOL SUCCINATE 12.5 MG: 25 TABLET, FILM COATED, EXTENDED RELEASE ORAL at 09:11

## 2022-04-21 RX ADMIN — SODIUM CHLORIDE, PRESERVATIVE FREE 10 ML: 5 INJECTION INTRAVENOUS at 14:00

## 2022-04-21 RX ADMIN — MAGNESIUM HYDROXIDE 30 ML: 400 SUSPENSION ORAL at 18:50

## 2022-04-21 RX ADMIN — CEFEPIME 2 G: 2 INJECTION, POWDER, FOR SOLUTION INTRAVENOUS at 01:20

## 2022-04-21 RX ADMIN — OXYCODONE 2.5 MG: 5 TABLET ORAL at 21:42

## 2022-04-21 RX ADMIN — ASPIRIN 325 MG: 325 TABLET, COATED ORAL at 18:46

## 2022-04-21 RX ADMIN — MONTELUKAST 10 MG: 10 TABLET, FILM COATED ORAL at 21:42

## 2022-04-21 RX ADMIN — PANTOPRAZOLE SODIUM 40 MG: 40 TABLET, DELAYED RELEASE ORAL at 06:57

## 2022-04-21 RX ADMIN — MUPIROCIN: 20 OINTMENT TOPICAL at 19:02

## 2022-04-21 RX ADMIN — CEFEPIME 2 G: 2 INJECTION, POWDER, FOR SOLUTION INTRAVENOUS at 18:40

## 2022-04-21 RX ADMIN — ACETAMINOPHEN 1000 MG: 325 TABLET ORAL at 13:52

## 2022-04-21 RX ADMIN — SENNOSIDES AND DOCUSATE SODIUM 1 TABLET: 50; 8.6 TABLET ORAL at 09:12

## 2022-04-21 RX ADMIN — ACETAMINOPHEN 1000 MG: 325 TABLET ORAL at 06:00

## 2022-04-21 RX ADMIN — SENNOSIDES AND DOCUSATE SODIUM 1 TABLET: 50; 8.6 TABLET ORAL at 18:42

## 2022-04-21 RX ADMIN — VANCOMYCIN HYDROCHLORIDE 1250 MG: 10 INJECTION, POWDER, LYOPHILIZED, FOR SOLUTION INTRAVENOUS at 13:46

## 2022-04-21 RX ADMIN — Medication 1 TABLET: at 13:51

## 2022-04-21 RX ADMIN — OXYCODONE 2.5 MG: 5 TABLET ORAL at 06:57

## 2022-04-21 RX ADMIN — MUPIROCIN: 20 OINTMENT TOPICAL at 09:15

## 2022-04-21 RX ADMIN — DUTASTERIDE 0.5 MG: 0.5 CAPSULE, LIQUID FILLED ORAL at 09:12

## 2022-04-21 RX ADMIN — ROSUVASTATIN 10 MG: 10 TABLET, FILM COATED ORAL at 21:42

## 2022-04-21 RX ADMIN — POLYETHYLENE GLYCOL 3350 17 G: 17 POWDER, FOR SOLUTION ORAL at 09:14

## 2022-04-21 RX ADMIN — SODIUM CHLORIDE, PRESERVATIVE FREE 10 ML: 5 INJECTION INTRAVENOUS at 06:00

## 2022-04-21 NOTE — PROGRESS NOTES
Problem: Mobility Impaired (Adult and Pediatric)  Goal: *Acute Goals and Plan of Care (Insert Text)  Description: FUNCTIONAL STATUS PRIOR TO ADMISSION: Patient was modified independent using a single point cane for functional mobility. HOME SUPPORT PRIOR TO ADMISSION: The patient lived alone with no local support. Physical Therapy Goals  Initiated 4/19/2022  1. Patient will move from supine to sit and sit to supine  and roll side to side in bed with modified independence within 7 day(s). 2.  Patient will transfer from bed to chair and chair to bed with modified independence using the least restrictive device within 7 day(s). 3.  Patient will perform sit to stand with modified independence within 7 day(s). 4.  Patient will ambulate with modified independence for 150 feet with the least restrictive device within 7 day(s). 5.  Patient will ascend/descend 3 stairs with 1 handrail(s) with supervision/set-up within 7 day(s). Outcome: Progressing Towards Goal     PHYSICAL THERAPY TREATMENT  Patient: Mega Wall (72 y.o. male)  Date: 4/21/2022  Diagnosis: Failed total knee arthroplasty ([facility] [T84.018A, Z96.659] <principal problem not specified>  Procedure(s) (LRB):  RIGHT TOTAL KNEE REVISION (Right) 2 Days Post-Op  Precautions:    Chart, physical therapy assessment, plan of care and goals were reviewed. ASSESSMENT  Patient continues with skilled PT services and is progressing towards goals. Today pt much improved with initiating bed mobility/transfers and amb. Pt requiring only min assist x 1 to come to standing and increased amb distance. Current Level of Function Impacting Discharge (mobility/balance): Mod assist supine to sit; sitting edge of bed indep; sit to stand min assist x 1; CGA for amb with RW    Other factors to consider for discharge: will need PICC line for long time antibiotics         PLAN :  Patient continues to benefit from skilled intervention to address the above impairments. Continue treatment per established plan of care. to address goals. Recommendation for discharge: (in order for the patient to meet his/her long term goals)  Therapy up to 5 days/week in SNF setting    This discharge recommendation:  Has been made in collaboration with the attending provider and/or case management    IF patient discharges home will need the following DME: patient owns DME required for discharge       SUBJECTIVE:   Patient stated Natalia Buttner me try to move that leg.     OBJECTIVE DATA SUMMARY:   Critical Behavior:  Neurologic State: Alert  Orientation Level: Oriented X4  Cognition: Appropriate decision making,Appropriate for age attention/concentration,Appropriate safety awareness  Safety/Judgement: Awareness of environment  Functional Mobility Training:  Bed Mobility:     Supine to Sit: Moderate assistance;Assist x1;Additional time; Adaptive equipment (using gait belt to assist with right leg)  Sit to Supine: Other (comment) (pt remained in chair)           Transfers:  Sit to Stand: Minimum assistance;Assist x1;Additional time; Adaptive equipment (from elevated bed surface)  Stand to Sit: Contact guard assistance; Total assistance; Additional time (cues to extend right leg prior to sitting)        Bed to Chair: Minimum assistance;Assist x1;Adaptive equipment                    Balance:  Sitting: Impaired; With support  Sitting - Static: Good (unsupported)  Sitting - Dynamic: Fair (occasional) (pain and knee immobilizer limit sitting balance)  Standing: Impaired; With support  Standing - Static: Good;Constant support  Standing - Dynamic : Fair;Constant support  Ambulation/Gait Training:  Distance (ft): 18 Feet (ft)  Assistive Device: Walker, rolling;Gait belt  Ambulation - Level of Assistance: Contact guard assistance        Gait Abnormalities: Antalgic;Decreased step clearance; Step to gait        Base of Support: Center of gravity altered;Shift to left  Stance: Right decreased  Speed/Sandra: Slow  Step Length: Right shortened;Left shortened  Swing Pattern: Right asymmetrical        Pain Rating:  Right knee at rest 3/10 - during transfers stand to sit -  10/10 - after treatment in recliner legs elevated ice applied under knee immobilizer with pain 6/10    Activity Tolerance:   Improving daily - with OOB tolerance and mobility     After treatment patient left in no apparent distress:   Sitting in chair, Call bell within reach, and ice applied to right knee - legs elevated    COMMUNICATION/COLLABORATION:   The patients plan of care was discussed with: Registered nurse.      Ana Laura Alvarado, PT   Time Calculation: 25 mins

## 2022-04-21 NOTE — PROGRESS NOTES
PICC Placement Note        PRE-PROCEDURE VERIFICATION  Correct Procedure: yes  Correct Site:  yes  Temperature: Temp: 98.5 °F (36.9 °C), Temperature Source: Temp Source: Oral  Recent Labs     04/21/22  0451   BUN 11   CREA 0.71      WBC 9.7     Allergies: Sulfa (sulfonamide antibiotics)  Education materials, including PICC Booklet, for PICC Care given to patient: yes. See Patient Education activity for further details. PROCEDURE DETAIL  PICC placed using modified Seldinger method. A double lumen PICC line was started for antibiotic therapy. The following documentation is in addition to the PICC properties in the lines/airways flowsheet :  Lot #: [id number]  Was xylocaine 1% used intradermally:  yes  Catheter Length: 41 (cm)  Vein Selection for PICC:right basilic  Central Line Bundle followed yes  Complication Related to Insertion: none    The placement was verified by ECG  technology: The  tip location is on the right side and the tip is in the superior vena cava. See ECG results for PICC tip placement. Report given to Roberto Hartley RN. Line is okay to use.     Chemo Nix RN

## 2022-04-21 NOTE — PROGRESS NOTES
Problem: Mobility Impaired (Adult and Pediatric)  Goal: *Acute Goals and Plan of Care (Insert Text)  Description: FUNCTIONAL STATUS PRIOR TO ADMISSION: Patient was modified independent using a single point cane for functional mobility. HOME SUPPORT PRIOR TO ADMISSION: The patient lived alone with no local support. Physical Therapy Goals  Initiated 4/19/2022  1. Patient will move from supine to sit and sit to supine  and roll side to side in bed with modified independence within 7 day(s). 2.  Patient will transfer from bed to chair and chair to bed with modified independence using the least restrictive device within 7 day(s). 3.  Patient will perform sit to stand with modified independence within 7 day(s). 4.  Patient will ambulate with modified independence for 150 feet with the least restrictive device within 7 day(s). 5.  Patient will ascend/descend 3 stairs with 1 handrail(s) with supervision/set-up within 7 day(s).   4/21/2022 1745 by Sherice Hirsch, PT  Outcome: Progressing Towards Goal     PHYSICAL THERAPY TREATMENT  Patient: Keerthi Ta (68 y.o. male)  Date: 4/21/2022  Diagnosis: Failed total knee arthroplasty ([facility] [T84.018A, Z96.659] <principal problem not specified>  Procedure(s) (LRB):  RIGHT TOTAL KNEE REVISION (Right) 2 Days Post-Op  Precautions:    Chart, physical therapy assessment, plan of care and goals were reviewed. ASSESSMENT  Patient continues with skilled PT services and is progressing towards goals. Pt amb with RW 20 feet to return to bed - pt safe for short distances without wheelchair following - although pt lacks confidence. Pt showing steady progress. Current Level of Function Impacting Discharge (mobility/balance): Mod assist x 1 to assist right leg in/out of bed; min assist for transfers and CGA for amb with RW    Other factors to consider for discharge: none         PLAN :  Patient continues to benefit from skilled intervention to address the above impairments. Continue treatment per established plan of care. to address goals. Recommendation for discharge: (in order for the patient to meet his/her long term goals)  Therapy up to 5 days/week in SNF setting    This discharge recommendation:  Has been made in collaboration with the attending provider and/or case management    IF patient discharges home will need the following DME: patient owns DME required for discharge       SUBJECTIVE:   Patient stated I need to get back to bed so they can put that IV line in me.     OBJECTIVE DATA SUMMARY:   Critical Behavior:  Neurologic State: Alert  Orientation Level: Oriented X4  Cognition: Appropriate decision making,Appropriate for age attention/concentration,Appropriate safety awareness  Safety/Judgement: Awareness of environment  Functional Mobility Training:  Bed Mobility:     Supine to Sit: Moderate assistance;Assist x1;Additional time; Adaptive equipment (using gait belt to assist with right leg)  Sit to Supine: Moderate assistance;Assist x1;Additional time (to move right leg into bed)  Scooting: Stand-by assistance        Transfers:  Sit to Stand: Minimum assistance;Assist x1;Additional time; Adaptive equipment (from low recliner surface)  Stand to Sit: Contact guard assistance        Bed to Chair: Minimum assistance;Assist x1;Adaptive equipment                    Balance:  Sitting: Impaired; Without support  Sitting - Static: Good (unsupported)  Sitting - Dynamic: Fair (occasional) (limited secondary to right knee pain and knee immobilizer)  Standing: Impaired; With support  Standing - Static: Good;Constant support  Standing - Dynamic : Constant support; Fair  Ambulation/Gait Training:  Distance (ft): 18 Feet (ft)  Assistive Device: Walker, rolling;Gait belt  Ambulation - Level of Assistance: Contact guard assistance        Gait Abnormalities: Antalgic;Decreased step clearance; Step to gait        Base of Support: Center of gravity altered;Shift to left  Stance: Right decreased  Speed/Sandra: Slow  Step Length: Right shortened;Left shortened  Swing Pattern: Right asymmetrical        Pain Rating:  Right knee 5/10 - ice applied under knee immobilizer after treatment    Activity Tolerance:   Good for OOB - up in chair; amb endurance limited to 20 feet    After treatment patient left in no apparent distress:   Sitting in chair and Call bell within reach    COMMUNICATION/COLLABORATION:   The patients plan of care was discussed with: Registered nurse.      Lucho May, PT   Time Calculation: 20 mins

## 2022-04-21 NOTE — PROGRESS NOTES
Problem: Self Care Deficits Care Plan (Adult)  Goal: *Acute Goals and Plan of Care (Insert Text)  Description: FUNCTIONAL STATUS PRIOR TO ADMISSION: Patient was independent and active without use of DME.      HOME SUPPORT: The patient lived alone with [employer] friends to provide assistance. Occupational Therapy Goals  Initiated 4/20/2022  1. Patient will perform RW grooming with contact guard assist within 7 day(s). 2.  Patient will perform EOB lower body dressing with minimal assistance within 7 day(s). 3.  Patient will perform EOB bathing with moderate assistance within 7 day(s). 4.  Patient will perform toilet transfers with minimal assistance within 7 day(s). 5.  Patient will perform all aspects of RW toileting with minimal assistance within 7 day(s). Outcome: Progressing Towards Goal    OCCUPATIONAL THERAPY TREATMENT  Patient: Arleen Cortes (5 y.o. male)  Date: 4/21/2022  Diagnosis: Failed total knee arthroplasty ([facility] [T84.018A, Z96.659] <principal problem not specified>  Procedure(s) (LRB):  RIGHT TOTAL KNEE REVISION (Right) 2 Days Post-Op  Precautions:  R knee immobilizer  Chart, occupational therapy assessment, plan of care, and goals were reviewed. ASSESSMENT  Patient continues with skilled OT services and is progressing towards goals. Pt progressing with mobility/balance and activity tolerance, with Mod A for initial and and CGA for RW level item gathering. Pt noted with high fatigue s/p transfer training and thera ac and required extended seated rest break. Pt noted with increased breathing technique to assist with anticipated pain during sit to stand/stand to sit transfer. Pt continues to benefit from skilled OT to address functional deficits during acute hospitalization, with reporting therapist believing pt would benefit from SNF rehab upon discharge. Current Level of Function Impacting Discharge (ADLs): Max A LE ADLs    Other factors to consider for discharge:  Max A LE ADLs, R knee immobilizer         PLAN :  Patient continues to benefit from skilled intervention to address the above impairments. Continue treatment per established plan of care to address goals. Recommend with staff: OOB meals, Active ADL engagement    Recommend next OT session: POC progression    Recommendation for discharge: (in order for the patient to meet his/her long term goals)  Therapy up to 5 days/week in SNF setting    This discharge recommendation:  Has been made in collaboration with the attending provider and/or case management    IF patient discharges home will need the following DME: TBD       SUBJECTIVE:   Patient stated i'm tired, I need to sit down.     OBJECTIVE DATA SUMMARY:   Cognitive/Behavioral Status:  Neurologic State: Alert  Orientation Level: Oriented X4  Cognition: Appropriate decision making; Appropriate for age attention/concentration; Appropriate safety awareness  Perception: Appears intact  Perseveration: No perseveration noted  Safety/Judgement: Awareness of environment    Functional Mobility and Transfers for ADLs:  Bed Mobility:  Up in chair upon Ot's arrival    Transfers:  Sit to Stand: Moderate assistance;Assist x1 (Mod A>Min A)  Stand to Sit: Minimal assistance    Balance:  Sitting: Impaired; With support  Sitting - Static: Good (unsupported)  Sitting - Dynamic: Fair (occasional) (pain limits dynamic seated balance)  Standing: Impaired; With support  Standing - Static: Good;Constant support  Standing - Dynamic : Fair;Constant support    ADL Intervention:  Cognitive Retraining  Safety/Judgement: Awareness of environment    Therapeutic Activities:   Completed RW level item gathering, challenging functional mobility/balance with multi-directional changes and activity tolerance, with CGA (once standing) and Mod A to stand, with no LOB and extended rest break s/p completion of thera ac.     Pain:  Pt with c/o RLE pain during sit to stand, did not quantify; RN aware and following    Activity Tolerance:   Fair and requires frequent rest breaks    After treatment patient left in no apparent distress:   Sitting in chair and Call bell within reach    COMMUNICATION/COLLABORATION:   The patients plan of care was discussed with: Physical therapist, Registered nurse, and Case management.      Mari Joiner OT  Time Calculation: 26 mins

## 2022-04-21 NOTE — PROGRESS NOTES
Transition of Care: Update 3;15pm: [facility] has accepted and will have a bed tomorrow pending bed availability for unvaccinated covid patient. Liaison, [facility] ([phone]). 1012: SNF referrals pending. The patient plans to discharge to SNF with BLS to transport. RUR: 8%    Referrals: [facility] and [facility]. CM following for discharge needs.     Anais Bob RN/CRM

## 2022-04-21 NOTE — PROGRESS NOTES
Ortho NP Note    POD# 2  s/p RIGHT TOTAL KNEE REVISION   Pt seen in room    Pt resting in bed. Reports intermittent pain to R LE and a feeling of heaviness. Last received oxycodone at 6 AM.  Denies N/V. No CP, SOB. Endorses swelling to LEs bilaterally, R slightly worse than left but feels L is at baseline. VSS Afebrile. Visit Vitals  /87 (BP 1 Location: Right upper arm, BP Patient Position: At rest)   Pulse 90   Temp 98.5 °F (36.9 °C)   Resp 18   Ht 6' 1\" (1.854 m)   Wt 120 kg (264 lb 8.8 oz)   SpO2 94%   BMI 34.90 kg/m²       Voiding status: spontaneous void, urinal with clear yellow urine at bedside. Output (mL)  Urine Voided: 225 ml (04/20/22 1125)  Last Bowel Movement Date: 04/18/22 (04/19/22 1454)  Unmeasurable Output  Urine Occurrence(s): 1 (200) (04/19/22 1454)      Labs    Lab Results   Component Value Date/Time    HGB 10.8 (L) 04/21/2022 04:51 AM      Lab Results   Component Value Date/Time    INR 1.0 04/12/2022 02:52 PM      Lab Results   Component Value Date/Time    Sodium 136 04/21/2022 04:51 AM    Potassium 3.8 04/21/2022 04:51 AM    Chloride 106 04/21/2022 04:51 AM    CO2 22 04/21/2022 04:51 AM    Glucose 122 (H) 04/21/2022 04:51 AM    BUN 11 04/21/2022 04:51 AM    Creatinine 0.71 04/21/2022 04:51 AM    Calcium 8.2 (L) 04/21/2022 04:51 AM       Results     Procedure Component Value Units Date/Time    CULTURE, ANAEROBIC [[id number]] Collected: 04/19/22 1206    Order Status: Completed Updated: 04/19/22 1622    CULTURE, BODY FLUID Consuello Revering STAIN [[id number]]  (Abnormal) Collected: 04/19/22 1206    Order Status: Completed Specimen: Knee  Updated: 04/20/22 1350     Special Requests: RIGHT PATELLA     GRAM STAIN OCCASIONAL WBCS SEEN         NO ORGANISMS SEEN        Culture result:       LIGHT POSSIBLE STAPHYLOCOCCUS SPECIES, COAGULASE NEGATIVE CHECKING FOR POSSIBLE MIXED COLONY TYPES/STRAINS            (NOTE) COAG NEG STAPH CALLED TO AND READ BACK BY Naomy Patel RN ON 4/20/22  8565. AOW CULTURE, ANAEROBIC [[id number]] Collected: 04/19/22 1129    Order Status: Completed Updated: 04/19/22 1622    CULTURE, BODY FLUID RICHARD Hannah [[id number]]  (Abnormal) Collected: 04/19/22 1129    Order Status: Completed Specimen: Knee  Updated: 04/20/22 1354     Special Requests: RIGHT FEMORAL INTERFACE     GRAM STAIN OCCASIONAL WBCS SEEN         NO ORGANISMS SEEN        Culture result:       FEW POSSIBLE STAPHYLOCOCCUS SPECIES, COAGULASE NEGATIVE            (NOTE) COAG NEG STAPH CALLED TO AND READ BACK BY Rivera Cain RN ON 4/20/22 [id number]. AOW    CULTURE, ANAEROBIC [[id number]] Collected: 04/19/22 1128    Order Status: Completed Specimen: Knee  Updated: 04/20/22 1339     Special Requests: RIGHT KNEE JOINT FLUID     Culture result:       No growth thus far, holding 14 days. CULTURE, BODY FLUID Ivelisse Emery [[id number]] Collected: 04/19/22 1128    Order Status: Completed Specimen: Knee  Updated: 04/20/22 1339     Special Requests: RIGHT KNEE JOINT FLUID     GRAM STAIN OCCASIONAL WBCS SEEN         NO ORGANISMS SEEN        Culture result:       No growth thus far, holding 14 days. CULTURE, MRSA [[id number]] Collected: 04/12/22 1452    Order Status: Completed Specimen: Nasal from Nares Updated: 04/13/22 2100     Special Requests: NO SPECIAL REQUESTS        Culture result:       MRSA NOT PRESENT. Apparent Staphylococus aureus (not MRSA noted). Screening of patient nares for MRSA is for surveillance purposes and, if positive, to facilitate isolation considerations in high risk settings. It is not intended for automatic decolonization interventions per se as regimens are not sufficiently effective to warrant routine use. Body mass index is 34.9 kg/m². : A BMI > 30 is classified as obesity and > 40 is classified as morbid obesity. Aquacel dressing to R knee with moderate midline bloody drainage noted, seal intact.     Cryotherapy in place over incision  Calves soft and supple; No pain with passive stretch  Bilateral LEs warm, dry. 1+ DP pulses. Sensation and motor intact - PF/DF/EHL intact 4+/5  Foot Pumps for mechanical DVT proph while in bed     PLAN:  1) PT: BID WBAT with knee immobilizer in place through follow up appointment   2) Anticoagulation:  Aspirin 325 mg PO BID for DVT Prophylaxis. Encouraged early mobilization, bed exercises, and SCD use. 3)  Pain - Multimodal approach including cryotherapy, scheduled Tylenol with  PRN oxycodone   4) Infected R Total Knee - Cultures obtained intraoperatively--fluid cultures from patella and femoral interface with light possible staph species, coag neg. Periop ABX: Ancef with vanco post operatively. Current ABX: Cefepime and vancomycin. Will likely require PICC and long term IV ABX as per ID, appreciate input. 5) Post operative anemia: Hgb at PAT: 13.9.  mL. Hgb on POD 2: 10.8. Some blood to Aquacel but does not appear active, Expected Acute blood loss post-op anemia. Continue to monitor. 6) Hx of HTN: Current BP: 136/87. Continue home toprol XL, routine VS.    7) Hx of BPH: Spontaneous void, no evidence of POUR. Continue Flomax, dutasteride. 8) Hx of LE edema: Continue home lasix. 9) Readniess for discharge: per therapy recommendations and in light of likely need for long term IV ABX, plan for placement. CM involved for choices--patient preferences is something in the [place] area. [x] Vital Signs stable    [x] + Voiding    [x] Wound intact, drainage minimal    [x] Tolerating PO intake     [] Cleared by PT (OT if applicable) : SNF placement    [] Stair training completed (if applicable)    [] Independent / Contact Guard Assist (household distance)     [] Bed mobility     [] Car transfers     [] ADLs    [x] Adequate pain control on oral medication alone     Likely requiring placement for therapy + long term IV ABX. Not yet medically ready for d/c.       Princess Fairchild NP  Available via [employer]

## 2022-04-21 NOTE — PROGRESS NOTES
Pharmacist Note - Vancomycin Dosing  Therapy day 3  Indication: Infected R knee implant, chronic septic knee  Current regimen: 1250 mg IV every 12 hours    Recent Labs     04/21/22  0451 04/20/22  0815 04/20/22  0606   WBC 9.7 8.3  --    CREA 0.71  --  0.72   BUN 11  --  10       A random vancomycin level of 12.2 mcg/mL was obtained and from this level, the patient's AUC24 is calculated to be 401 with the current regimen. Goal target range AUC/RAGHAV 400-600      Plan: Continue current regimen. Pharmacy will continue to monitor this patient daily for changes in clinical status and renal function. *Random vancomycin levels are used to calculate AUC/RAGHAV, this level should not be interpreted as a trough. Vancomycin has been dosed using Bayesian kinetics software to target an AUC24:RAGHAV of 400-600, which provides adequate exposure for as assumed infection due to MRSA with an RAGHAV of 1 or less while reducing the risk of nephrotoxicity as seen with traditional trough based dosing goals.

## 2022-04-21 NOTE — PROGRESS NOTES
Problem: Patient Education: Go to Patient Education Activity  Goal: Patient/Family Education  Outcome: Progressing Towards Goal       Problem: Patient Education: Go to Patient Education Activity  Goal: Patient/Family Education  Outcome: Progressing Towards Goal

## 2022-04-21 NOTE — PROGRESS NOTES
ID Progress Note  2022    Subjective:     C/o right leg pain   express his understanding on need of long term IV ABX therapy and planning to go to rehab    Review of Systems:            Symptom Y/N Comments   Symptom Y/N Comments   Fever/Chills n      Chest Pain  n      Poor Appetite       Edema        Cough       Abdominal Pain n       Sputum       Joint Pain y       SOB/DIAZ n      Pruritis/Rash        Nausea/vomit  n     Tolerating PT/OT        Diarrhea n      Tolerating Diet        Constipation  n     Other           Could NOT obtain due to:       Objective:     Vitals:   Visit Vitals  /87 (BP 1 Location: Right upper arm, BP Patient Position: At rest)   Pulse 90   Temp 98.5 °F (36.9 °C)   Resp 18   Ht 6' 1\" (1.854 m)   Wt 120 kg (264 lb 8.8 oz)   SpO2 94%   BMI 34.90 kg/m²        Tmax:  Temp (24hrs), Av.2 °F (36.8 °C), Min:97.8 °F (36.6 °C), Max:98.5 °F (36.9 °C)      PHYSICAL EXAM:  General: Chronically ill appearing. WD, WN. Alert, cooperative, no acute distress    EENT:  EOMI. Anicteric sclerae. MMM  Resp:  CTA bilaterally, no wheezing or rales. No accessory muscle use  CV:  Regular  rhythm,  No edema  GI:  Soft, Non distended, Non tender. +Bowel sounds  Neurologic:  Alert and oriented X 3, normal speech,   Psych:   Good insight. Not anxious nor agitated  Skin:  No rashes.   No jaundice, RLE: dressing dry and intact, in knee immobilizer    Labs:   Lab Results   Component Value Date/Time    WBC 9.7 2022 04:51 AM    HGB 10.8 (L) 2022 04:51 AM    HCT 34.2 (L) 2022 04:51 AM    PLATELET 342  04:51 AM    MCV 86.1 2022 04:51 AM     Lab Results   Component Value Date/Time    Sodium 136 2022 04:51 AM    Potassium 3.8 2022 04:51 AM    Chloride 106 2022 04:51 AM    CO2 22 2022 04:51 AM    Anion gap 8 2022 04:51 AM    Glucose 122 (H) 2022 04:51 AM    BUN 11 2022 04:51 AM    Creatinine 0.71 2022 04:51 AM    BUN/Creatinine ratio 15 04/21/2022 04:51 AM    GFR est AA >60 04/21/2022 04:51 AM    GFR est non-AA >60 04/21/2022 04:51 AM    Calcium 8.2 (L) 04/21/2022 04:51 AM    Bilirubin, total 0.4 05/14/2021 09:55 AM    Alk. phosphatase 122 (H) 05/14/2021 09:55 AM    Protein, total 6.8 05/14/2021 09:55 AM    Albumin 3.6 05/14/2021 09:55 AM    Globulin 3.2 05/14/2021 09:55 AM    A-G Ratio 1.1 05/14/2021 09:55 AM    ALT (SGPT) 24 05/14/2021 09:55 AM         Cultures:   Results     Procedure Component Value Units Date/Time    CULTURE, ANAEROBIC [[id number]] Collected: 04/19/22 1206    Order Status: Completed Updated: 04/19/22 1622    CULTURE, BODY FLUID [address] [[id number]]  (Abnormal) Collected: 04/19/22 1206    Order Status: Completed Specimen: Knee  Updated: 04/20/22 1350     Special Requests: RIGHT PATELLA     GRAM STAIN OCCASIONAL WBCS SEEN         NO ORGANISMS SEEN        Culture result:       LIGHT POSSIBLE STAPHYLOCOCCUS SPECIES, COAGULASE NEGATIVE CHECKING FOR POSSIBLE MIXED COLONY TYPES/STRAINS            (NOTE) COAG NEG STAPH CALLED TO AND READ BACK BY Anne Garcia RN ON 4/20/22 AT 1349. AOW    CULTURE, ANAEROBIC [[id number]] Collected: 04/19/22 1129    Order Status: Completed Updated: 04/19/22 1622    CULTURE, BODY FLUID [address] [[id number]]  (Abnormal) Collected: 04/19/22 1129    Order Status: Completed Specimen: Knee  Updated: 04/20/22 1354     Special Requests: RIGHT FEMORAL INTERFACE     GRAM STAIN OCCASIONAL WBCS SEEN         NO ORGANISMS SEEN        Culture result:       FEW POSSIBLE STAPHYLOCOCCUS SPECIES, COAGULASE NEGATIVE            (NOTE) COAG NEG STAPH CALLED TO AND READ BACK BY Anne Garcia RN ON 4/20/22 [id number]. AOW    CULTURE, ANAEROBIC [[id number]] Collected: 04/19/22 1128    Order Status: Completed Specimen: Knee  Updated: 04/20/22 1339     Special Requests: RIGHT KNEE JOINT FLUID     Culture result:       No growth thus far, holding 14 days.           CULTURE, BODY FLUID Giovana Pachecos [[id number]] Collected: 04/19/22 1128    Order Status: Completed Specimen: Knee  Updated: 04/20/22 1339     Special Requests: RIGHT KNEE JOINT FLUID     GRAM STAIN OCCASIONAL WBCS SEEN         NO ORGANISMS SEEN        Culture result:       No growth thus far, holding 14 days. CULTURE, MRSA [[id number]] Collected: 04/12/22 1452    Order Status: Completed Specimen: Nasal from Nares Updated: 04/13/22 2100     Special Requests: NO SPECIAL REQUESTS        Culture result:       MRSA NOT PRESENT. Apparent Staphylococus aureus (not MRSA noted). Screening of patient nares for MRSA is for surveillance purposes and, if positive, to facilitate isolation considerations in high risk settings. It is not intended for automatic decolonization interventions per se as regimens are not sufficiently effective to warrant routine use. 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 Ashleigh Logan  S/p right total knee revision (4/19)  - afebrile, wbc 8.3    Intra-op cx (4/19) CoNS, staphylococcus species    post op care per primary team      continue with IV cefepime and vancomycin; close monitoring renal function. Pt has been informed that he will need a long term IV ABX therapy which he agreed for the therapy at this time.      PICC line placement requested 4/21    Discharge IV ABX therapy will be provided after review the final culture result      adjust ABX prn     fever work up if temp >= 100.4         Above plan of care discussed and agreed with Dr. Adrienne Kayser, NP

## 2022-04-22 VITALS
HEART RATE: 91 BPM | TEMPERATURE: 97.9 F | WEIGHT: 264.55 LBS | RESPIRATION RATE: 17 BRPM | OXYGEN SATURATION: 96 % | BODY MASS INDEX: 35.06 KG/M2 | HEIGHT: 73 IN | DIASTOLIC BLOOD PRESSURE: 77 MMHG | SYSTOLIC BLOOD PRESSURE: 117 MMHG

## 2022-04-22 LAB
ANION GAP SERPL CALC-SCNC: 4 MMOL/L (ref 5–15)
BACTERIA SPEC CULT: ABNORMAL
BASOPHILS # BLD: 0.1 K/UL (ref 0–0.1)
BASOPHILS NFR BLD: 1 % (ref 0–1)
BUN SERPL-MCNC: 14 MG/DL (ref 6–20)
BUN/CREAT SERPL: 18 (ref 12–20)
CALCIUM SERPL-MCNC: 8.1 MG/DL (ref 8.5–10.1)
CHLORIDE SERPL-SCNC: 109 MMOL/L (ref 97–108)
CO2 SERPL-SCNC: 25 MMOL/L (ref 21–32)
CREAT SERPL-MCNC: 0.8 MG/DL (ref 0.7–1.3)
DIFFERENTIAL METHOD BLD: ABNORMAL
EOSINOPHIL # BLD: 0.4 K/UL (ref 0–0.4)
EOSINOPHIL NFR BLD: 4 % (ref 0–7)
ERYTHROCYTE [DISTWIDTH] IN BLOOD BY AUTOMATED COUNT: 15.1 % (ref 11.5–14.5)
GLUCOSE SERPL-MCNC: 111 MG/DL (ref 65–100)
GRAM STN SPEC: ABNORMAL
HCT VFR BLD AUTO: 31.7 % (ref 36.6–50.3)
HGB BLD-MCNC: 10 G/DL (ref 12.1–17)
IMM GRANULOCYTES # BLD AUTO: 0.1 K/UL (ref 0–0.04)
IMM GRANULOCYTES NFR BLD AUTO: 1 % (ref 0–0.5)
LYMPHOCYTES # BLD: 2.1 K/UL (ref 0.8–3.5)
LYMPHOCYTES NFR BLD: 22 % (ref 12–49)
MCH RBC QN AUTO: 27.5 PG (ref 26–34)
MCHC RBC AUTO-ENTMCNC: 31.5 G/DL (ref 30–36.5)
MCV RBC AUTO: 87.1 FL (ref 80–99)
MONOCYTES # BLD: 1 K/UL (ref 0–1)
MONOCYTES NFR BLD: 10 % (ref 5–13)
NEUTS SEG # BLD: 6 K/UL (ref 1.8–8)
NEUTS SEG NFR BLD: 62 % (ref 32–75)
NRBC # BLD: 0 K/UL (ref 0–0.01)
NRBC BLD-RTO: 0 PER 100 WBC
PLATELET # BLD AUTO: 204 K/UL (ref 150–400)
PMV BLD AUTO: 10 FL (ref 8.9–12.9)
POTASSIUM SERPL-SCNC: 3.8 MMOL/L (ref 3.5–5.1)
RBC # BLD AUTO: 3.64 M/UL (ref 4.1–5.7)
SERVICE CMNT-IMP: ABNORMAL
SODIUM SERPL-SCNC: 138 MMOL/L (ref 136–145)
WBC # BLD AUTO: 9.6 K/UL (ref 4.1–11.1)

## 2022-04-22 PROCEDURE — 74011250636 HC RX REV CODE- 250/636: Performed by: NURSE PRACTITIONER

## 2022-04-22 PROCEDURE — 85025 COMPLETE CBC W/AUTO DIFF WBC: CPT

## 2022-04-22 PROCEDURE — 94640 AIRWAY INHALATION TREATMENT: CPT

## 2022-04-22 PROCEDURE — 74011000258 HC RX REV CODE- 258: Performed by: NURSE PRACTITIONER

## 2022-04-22 PROCEDURE — 80048 BASIC METABOLIC PNL TOTAL CA: CPT

## 2022-04-22 PROCEDURE — 97530 THERAPEUTIC ACTIVITIES: CPT

## 2022-04-22 PROCEDURE — 74011000250 HC RX REV CODE- 250: Performed by: NURSE PRACTITIONER

## 2022-04-22 PROCEDURE — 74011250637 HC RX REV CODE- 250/637: Performed by: PHYSICIAN ASSISTANT

## 2022-04-22 PROCEDURE — 97116 GAIT TRAINING THERAPY: CPT

## 2022-04-22 PROCEDURE — 36415 COLL VENOUS BLD VENIPUNCTURE: CPT

## 2022-04-22 PROCEDURE — 74011250637 HC RX REV CODE- 250/637

## 2022-04-22 PROCEDURE — 74011250636 HC RX REV CODE- 250/636: Performed by: PHYSICIAN ASSISTANT

## 2022-04-22 PROCEDURE — 74011000250 HC RX REV CODE- 250: Performed by: PHYSICIAN ASSISTANT

## 2022-04-22 RX ORDER — FACIAL-BODY WIPES
10 EACH TOPICAL ONCE
Status: DISCONTINUED | OUTPATIENT
Start: 2022-04-22 | End: 2022-04-22 | Stop reason: HOSPADM

## 2022-04-22 RX ORDER — OXYCODONE HYDROCHLORIDE 5 MG/1
2.5-5 TABLET ORAL
Qty: 35 TABLET | Refills: 0 | Status: SHIPPED | OUTPATIENT
Start: 2022-04-22 | End: 2022-04-29

## 2022-04-22 RX ORDER — ASPIRIN 325 MG
325 TABLET, DELAYED RELEASE (ENTERIC COATED) ORAL 2 TIMES DAILY
Qty: 60 TABLET | Refills: 0 | Status: SHIPPED | OUTPATIENT
Start: 2022-04-22 | End: 2022-05-22

## 2022-04-22 RX ORDER — FUROSEMIDE 40 MG/1
20 TABLET ORAL DAILY
Qty: 1 TABLET | Refills: 0 | Status: SHIPPED
Start: 2022-04-22

## 2022-04-22 RX ORDER — AMOXICILLIN 250 MG
1 CAPSULE ORAL 2 TIMES DAILY
Qty: 28 TABLET | Refills: 0 | Status: SHIPPED
Start: 2022-04-22 | End: 2022-05-06

## 2022-04-22 RX ORDER — POLYETHYLENE GLYCOL 3350 17 G/17G
17 POWDER, FOR SOLUTION ORAL DAILY
Qty: 14 PACKET | Refills: 0 | Status: SHIPPED
Start: 2022-04-23 | End: 2022-05-07

## 2022-04-22 RX ADMIN — SENNOSIDES AND DOCUSATE SODIUM 1 TABLET: 50; 8.6 TABLET ORAL at 09:48

## 2022-04-22 RX ADMIN — ASPIRIN 325 MG: 325 TABLET, COATED ORAL at 09:48

## 2022-04-22 RX ADMIN — CALCIUM CARBONATE-VITAMIN D TAB 500 MG-200 UNIT 1 TABLET: 500-200 TAB at 09:49

## 2022-04-22 RX ADMIN — CEFEPIME 2 G: 2 INJECTION, POWDER, FOR SOLUTION INTRAVENOUS at 01:09

## 2022-04-22 RX ADMIN — SODIUM CHLORIDE, PRESERVATIVE FREE 10 ML: 5 INJECTION INTRAVENOUS at 06:44

## 2022-04-22 RX ADMIN — OXYCODONE 5 MG: 5 TABLET ORAL at 06:44

## 2022-04-22 RX ADMIN — VANCOMYCIN HYDROCHLORIDE 1250 MG: 10 INJECTION, POWDER, LYOPHILIZED, FOR SOLUTION INTRAVENOUS at 01:09

## 2022-04-22 RX ADMIN — DUTASTERIDE 0.5 MG: 0.5 CAPSULE, LIQUID FILLED ORAL at 09:54

## 2022-04-22 RX ADMIN — FUROSEMIDE 20 MG: 20 TABLET ORAL at 09:48

## 2022-04-22 RX ADMIN — ARFORMOTEROL TARTRATE: 15 SOLUTION RESPIRATORY (INHALATION) at 10:50

## 2022-04-22 RX ADMIN — PANTOPRAZOLE SODIUM 40 MG: 40 TABLET, DELAYED RELEASE ORAL at 06:44

## 2022-04-22 RX ADMIN — OXYCODONE 5 MG: 5 TABLET ORAL at 10:19

## 2022-04-22 RX ADMIN — CEFEPIME 2 G: 2 INJECTION, POWDER, FOR SOLUTION INTRAVENOUS at 09:49

## 2022-04-22 RX ADMIN — TAMSULOSIN HYDROCHLORIDE 0.4 MG: 0.4 CAPSULE ORAL at 09:54

## 2022-04-22 RX ADMIN — ACETAMINOPHEN 1000 MG: 325 TABLET ORAL at 01:09

## 2022-04-22 RX ADMIN — Medication 1 TABLET: at 09:54

## 2022-04-22 RX ADMIN — CEFAZOLIN SODIUM 2 G: 1 INJECTION, POWDER, FOR SOLUTION INTRAMUSCULAR; INTRAVENOUS at 14:43

## 2022-04-22 RX ADMIN — METOPROLOL SUCCINATE 12.5 MG: 25 TABLET, FILM COATED, EXTENDED RELEASE ORAL at 09:48

## 2022-04-22 RX ADMIN — ACETAMINOPHEN 1000 MG: 325 TABLET ORAL at 06:44

## 2022-04-22 RX ADMIN — POLYETHYLENE GLYCOL 3350 17 G: 17 POWDER, FOR SOLUTION ORAL at 09:49

## 2022-04-22 RX ADMIN — ACETAMINOPHEN 1000 MG: 325 TABLET ORAL at 14:43

## 2022-04-22 NOTE — PROGRESS NOTES
Problem: Patient Education: Go to Patient Education Activity  Goal: Patient/Family Education  4/22/2022 0742 by Katia Singleton RN  Outcome: Progressing Towards Goal  4/22/2022 0741 by Katia Singleton RN  Outcome: Progressing Towards Goal     Problem: Falls - Risk of  Goal: *Absence of Falls  Description: Document Aly Deluca Fall Risk and appropriate interventions in the flowsheet.   4/22/2022 0742 by Katia Singleton RN  Outcome: Progressing Towards Goal  Note: Fall Risk Interventions:  Mobility Interventions: Bed/chair exit alarm         Medication Interventions: Bed/chair exit alarm,Patient to call before getting OOB    Elimination Interventions: Bed/chair exit alarm,Call light in reach,Urinal in reach           4/22/2022 0741 by Katia Singleton RN  Outcome: Progressing Towards Goal  Note: Fall Risk Interventions:  Mobility Interventions: Bed/chair exit alarm         Medication Interventions: Bed/chair exit alarm,Patient to call before getting OOB    Elimination Interventions: Bed/chair exit alarm,Call light in reach,Urinal in reach

## 2022-04-22 NOTE — PROGRESS NOTES
Home IV Antibiotic Orders     1. Diagnosis:  Infected right TKR (staphyloccus capitis)    2. Routine PICC care per protocol     3. Antibiotic:   IV ancef 2 gm every 8 hours    4. Lab each Monday & Thursdays             CBC/diff/platelets             BMP             CRP (every Mondays)     5. Fax lab to Dr. Leidy Kent @ [phone]. 6.  Call Dr. Leidy Kent @ [phone] for WBC <4, PLT <100, and/or Creat > 1.5    7. Duration of therapy: 6 weeks or longer        Please call Dr. Leidy Kent @ [phone] before stopping therapy. 8.  Antibiotic Allergies: sulfa    9.   Case management has been instructed to Call [phone] with name of [address] [place] [address] Way       Follow up with Dr. Leidy Kent in 2-3 weeks     Mary Lou Cardona NP

## 2022-04-22 NOTE — PROGRESS NOTES
Problem: Self Care Deficits Care Plan (Adult)  Goal: *Acute Goals and Plan of Care (Insert Text)  Description: FUNCTIONAL STATUS PRIOR TO ADMISSION: Patient was independent and active without use of DME.      HOME SUPPORT: The patient lived alone with Yarsani friends to provide assistance. Occupational Therapy Goals  Initiated 4/20/2022  1. Patient will perform RW grooming with contact guard assist within 7 day(s). 2.  Patient will perform EOB lower body dressing with minimal assistance within 7 day(s). 3.  Patient will perform EOB bathing with moderate assistance within 7 day(s). 4.  Patient will perform toilet transfers with minimal assistance within 7 day(s). 5.  Patient will perform all aspects of RW toileting with minimal assistance within 7 day(s). Outcome: Progressing Towards Goal     OCCUPATIONAL THERAPY TREATMENT  Patient: Kanwal Herrera (89 y.o. male)  Date: 4/22/2022  Diagnosis: Failed total knee arthroplasty ([facility] [address] [T84.018A, Z96.659] <principal problem not specified>  Procedure(s) (LRB):  RIGHT TOTAL KNEE REVISION (Right) 3 Days Post-Op  Precautions: Fall,WBAT,Other (comment) (immobilizer x 4 weeks)  Chart, occupational therapy assessment, plan of care, and goals were reviewed. ASSESSMENT  Patient continues with skilled OT services and is progressing towards goals. Pt was able to progress OOB with additional time and cues. He progressed mobilizing the distance to and from the bathroom and did well with tasks. He declined to sit on commode reporting he did not fully have the need to go to the bathroom. Returned to sitting in the chair following. Repositioned with ice wrap for knee in the immobilize. Continue to recommend discharge to rehab setting due to continued need for IV abx. And knee immobilizer for 4 weeks.      Current Level of Function Impacting Discharge (ADLs): min A to mod A for basic mobility and ADL activities    Other factors to consider for discharge: debility, knee immobilizer x 4 weeks         PLAN :  Patient continues to benefit from skilled intervention to address the above impairments. Continue treatment per established plan of care to address goals. Recommend with staff: OOB to chair TID for meals. Recommend progression to and from bathroom with use of walker and gait belt for toileting. Recommend next OT session: continue with established plan of care    Recommendation for discharge: (in order for the patient to meet his/her long term goals)  Therapy up to 5 days/week in SNF setting    This discharge recommendation:  Has been made in collaboration with the attending provider and/or case management    IF patient discharges home will need the following DME: none       SUBJECTIVE:   Patient stated I need to charge my phone.   Attempted to help with charging his phone but unable to establish a charge. OBJECTIVE DATA SUMMARY:   Cognitive/Behavioral Status:  Neurologic State: Alert  Orientation Level: Oriented X4  Cognition: Appropriate for age attention/concentration  Perception: Appears intact  Perseveration: No perseveration noted  Safety/Judgement: Good awareness of safety precautions    Functional Mobility and Transfers for ADLs:  Bed Mobility:  Supine to Sit: Additional time;Minimum assistance  Sit to Supine:  (remained OOB in chair)  Scooting: Contact guard assistance    Transfers:  Sit to Stand: Contact guard assistance  Functional Transfers  Toilet Transfer :  (declined toileting activities but offered)  Bed to Chair: Contact guard assistance    Balance:  Sitting: Intact  Sitting - Static: Good (unsupported)  Sitting - Dynamic: Good (unsupported)  Standing: Intact; With support  Standing - Static: Good;Constant support  Standing - Dynamic : Good;Constant support    ADL Intervention:     Attempted to progress with toileting because he reports nursing completed a scooting transfer [facility] the [facility] to the [facility] without standing.   Provided education to progress to the commode which he is able to complete tasks. Provided education to nursing patient can mobilize to and from the bathroom with min A using RW and knee immobilizer in place. Cognitive Retraining  Safety/Judgement: Good awareness of safety precautions    Pain:  Minimal pain in the knee    Activity Tolerance:   Good    After treatment patient left in no apparent distress:   Sitting in chair and Call bell within reach    COMMUNICATION/COLLABORATION:   The patients plan of care was discussed with: Physical therapist and Registered nurse.      Fausto Melendez OT  Time Calculation: 35 mins

## 2022-04-22 NOTE — DISCHARGE INSTRUCTIONS
Home IV Antibiotic Orders     1.  Diagnosis:  Infected right TKR (staphyloccus capitis)     2.  Routine PICC care per protocol     3.  Antibiotic:   IV ancef 2 gm every 8 hours     4.  Lab each Monday & Thursdays             CBC/diff/platelets             BMP             CRP (every Mondays)     5.  Fax lab to Dr. Kristi Laureano @ [phone].  Fredrica Heimlich Dr. Elsie Plana @ [phone] for WBC <4, PLT <100, and/or Creat > 1.5     7.  Duration of therapy: 6 weeks or longer        Please call Dr. Kristi Laureano @ [phone] before stopping therapy.     8.  Antibiotic Allergies: sulfa     9.  Case management has been instructed to Call [phone] with name of [address]      Follow up with Dr. Kristi Laureano in 2-3 weeks     Mary Lou Prado NP            KEEP KNEE IMMOBILIZER IN PLACE FOR AMBULATION/ANY MOVEMENT AT [address] [place] [address] UP VISIT. WILL LIKELY REQUIRE FOR 4-6 WEEKS OR UNTIL CLEARED BY DR Neyda Collier. Discharge Instructions Knee Replacement  Dr. Della Berry      Patient Name  Cathleen Sidhu  Date of procedure  4/19/2022    Procedure  Procedure(s):  RIGHT TOTAL KNEE REVISION  Surgeon  Surgeon(s) and Role:     * Mitra Boyd MD - Primary  Date of discharge: No discharge date for patient encounter. PCP: Hernando Gross MD    Follow up care   Follow up visit with Dr. Della Berry in 3 weeks. Call [phone]  to make an appointment   You do staples in your knee incision. They will be taken out in 10-14 days by [facility] staff. Activity at home   Take a short walk every hour; except at night when sleeping   Do your Home Exercise Program 3 times every day    After exercising lie down and elevate your leg on pillows for 15-30 minutes to decrease swelling   Refer to your patient notebook for more information    Bathing and caring for your incision   You may take a shower with your waterproof dressing on your knee.  The waterproof dressing is to stay on your knee for 7 days.    On the 7th day have someone gently peel the dressing off by lifting the edge and stretching it to break the seal.   You may then leave your incision open to air unless you see drainage from your knee. Preventing blood clots   Take Aspirin every day as prescribed.  Call Dr. Harman Acuna if you have side effects of blood thinning medication: bleeding, bruising, upset stomach, or diarrhea.  Call Dr. Harman Acuna for signs of a blood clot in your leg: calf pain, tenderness, redness, swelling of lower leg    Preventing lung congestion   Use your incentive spirometer 4 times a day; do 10 repetitions each time   Remember to keep the small blue ball between the two arrows when taking a slow, deep breath     Pain Management   Get up and walk a short distance to relieve pain and stiffness.  Place ice wrap on your knee except when you are walking. The gel ice packs should be changed about every 4 hours   Elevate your leg on pillows for 15-30 minutes    Take Tylenol 650mg (take two 325mg tablets) every 6 hours for pain.  If needed, take a narcotic pain pill every 4-6 hours as prescribed.  Take all medications with a small amount of food.  As your pain decreases, take the narcotics less often or take ½ of a pill   Call Dr. Harman Acuna if you have side effects from your narcotic pain medication: itching, drowsiness, dizziness, upset stomach, dry mouth, constipation or if you medication is not relieving your pain. Diet after surgery   You may resume your normal diet. Include vegetables, fruit, whole grains, lean meats, and low-fat dairy products. Eat food high in fiber    Drink plenty of fluids, including 8 cups of water daily   Take Senokot-s twice a day to prevent constipation    Avoid after surgery   Do not take any over-the-counter medication for pain except Tylenol   Do not take more than 3000mg (3 grams) of Tylenol in 24 hours.    Do not drink alcoholic beverages   Do not smoke   Do not drive until seen for follow up appointment   Do not place frozen gel pack directly on your skin. It can cause frostbite.  Do not take a tub bath, swim or get in a hot tub for 6 weeks  Prevention of falls and safety at home   Set up an area where you can rest comfortably leaving space around furniture to allow you to walk with your walker   Keep stairs, hallways and bathrooms well lit; especially at night   Arrange for care for your pets   Keep your home free of clutter      Call Dr. Agnes Espinal at [phone] for:   Pain that is not relieved by pain medication, ice and activity   Side effects of medications   Increased/spread of bruising   Warning signs of infection:  ? persistent fever greater than 100 degrees  ? shaking or chills  ? increased redness, tenderness, swelling or drainage from incision  ? increased pain during activity or rest   Warning signs of a blood clot in your leg:  ? increased pain in your calf  ? tenderness or redness  ? increased swelling or knee, calf, ankle or foot    Call [phone] after 5pm or on a weekend.  The on call physician will return your phone call   Call your Primary Care Doctor for:    Concerns about your medical conditions such as diabetes, high blood pressure, asthma, congestive heart failure   Blood sugars greater than 180   Persistent headache or dizziness   Coughing or congestion   Constipation or diarrhea   Burning when you go to the bathroom   Abnormal heart rate (fast or slow)     Call 911 and go to the nearest hospital for:    Sudden increased shortness of breath   Sudden onset of chest pain   Difficulty breathing   Localized chest pain with coughing or taking a deep breath

## 2022-04-22 NOTE — PROGRESS NOTES
P  Problem: Knee Replacement: Post-Op Day 2  Goal: Activity/Safety  Outcome: Progressing Towards Goal  Goal: Medications  Outcome: Progressing Towards Goal   Goal: Medications  Outcome: Progressing Towards Goal  Note: Pt receiving a PICC line today for extended antibiotic therapy for knee infection  Goal: *Optimal pain control at patient's stated goal  Outcome: Progressing Towards Goal

## 2022-04-22 NOTE — PROGRESS NOTES
Spiritual Care Partner Volunteer visited patient in [facility] on 4.22.22.     Documented by Harmony Davey, Staff , on 4.22.22  Please call LUCA ([phone]) to page  if needed

## 2022-04-22 NOTE — PROGRESS NOTES
Ortho Daily Progress Note    Date of Surgery:  2022      Patient: Sulema Lang   YOB: 1945  Age: 68 y.o. SUBJECTIVE:   3 Days Post-Op following RIGHT TOTAL KNEE REVISION    The patient states mild/moderate pain this morning, otherwise without complaint. The patient denies CP, SOB, N/V, F/C. OBJECTIVE:     Vital Signs:    Temp (24hrs), Av.3 °F (36.8 °C), Min:98 °F (36.7 °C), Max:98.5 °F (36.9 °C)    Patient Vitals for the past 24 hrs:   BP Temp Pulse Resp SpO2   22 0241 132/83 98 °F (36.7 °C) 82 16 95 %   22 2143 103/67 98.4 °F (36.9 °C) 91 18 94 %   22 1646 115/75 98.1 °F (36.7 °C) 93 17 --   22 0727 136/87 98.5 °F (36.9 °C) 90 18 94 %           Physical Exam:  General: A&Ox3, NAD. Respiratory: Respirations are unlabored.   Abdomen: S/NT  Surgical site: C,D and I.  Musculoskeletal: Knee immobilizer remains in place, Calves are S/NT,+ Van LE edema consistent with pre op -  h/o lymphedema,  Van dorsi/plantar flexion/EHL intact, distal pulses intact  Neurological:  Van LE's NVI    Laboratory Values:             Recent Labs     22  0513   HGB 10.0*      CREA 0.80   *     Lab Results   Component Value Date/Time    Sodium 138 2022 05:13 AM    Potassium 3.8 2022 05:13 AM    Chloride 109 (H) 2022 05:13 AM    CO2 25 2022 05:13 AM    Glucose 111 (H) 2022 05:13 AM    BUN 14 2022 05:13 AM    Creatinine 0.80 2022 05:13 AM    Calcium 8.1 (L) 2022 05:13 AM     CULTURE, BODY FLUID Leydi Quince STAIN [[id number]] (Order [id number])  Microbiology  Date: 2022 Department: [facility] Released By:  (auto-released) Authorizing: Irvin Cross MD        Contains abnormal data CULTURE, BODY FLUID [facility]  Order: [id number]   Collected 2022 12:06     Status: Preliminary result    Specimen Information: Knee          0 Result Notes     Ref Range & Units 22 1206   Special Requests:  RIGHT PATELLA P    GRAM STAIN   OCCASIONAL WBCS SEEN P    GRAM STAIN   NO ORGANISMS SEEN P    Culture result:    Abnormal   LIGHT POSSIBLE STAPHYLOCOCCUS SPECIES, COAGULASE NEGATIVE CHECKING FOR POSSIBLE MIXED COLONY TYPES/STRAINS P      Culture result:  (NOTE) COAG NEG STAPH CALLED TO AND READ BACK BY YELENA JACOBO RN ON 4/20/22 AT 2488. AOW P    Resulting Agency          CULTURE, BODY FLUID Jaida Cronin  Order: [id number]   Collected 4/19/2022 11:29     Status: Preliminary result    Specimen Information: Knee          0 Result Notes     Ref Range & Units 4/19/22 1129   Special Requests:  RIGHT FEMORAL INTERFACE P    GRAM STAIN   OCCASIONAL WBCS SEEN P    GRAM STAIN   NO ORGANISMS SEEN P    Culture result:   FEW POSSIBLE STAPHYLOCOCCUS SPECIES, COAGULASE NEGATIVE Abnormal  P    Culture result:  (NOTE) COAG NEG STAPH CALLED TO AND READ BACK BY Abraham Bnyum RN ON 4/20/22 [id number]. [facility]              PLAN:     S/P RIGHT TOTAL KNEE REVISION FOR INFECTED TOTAL KNEE REPLACEMENT - COAG NEGATIVE STAPH WBAT with knee immobilizer in place x 4 weeks. Mobilize with PT/nursing until discharged. Hemodynamics Stable, anticipated post op blood loss anemia. Wound Dressing changes PRN. Post Operative Pain Cryotherapy, Oxycodone, Tylenol. DVT Prophylaxis  mg BID, SCD's, encourage bed exercises, mobilize. Discharge Disposition Plan on transfer to SNF when case management can finalize arrangements. Medically stable for discharge. PICC line placed, Abx orders per ID. Follow up in Dr Benja March office in 4 weeks for post op care.        Signed By:     Amira March PA-C  Physician Assistant  [address]  Mobile [phone]  Office [phone]     April 22, 2022 7:20 AM yes

## 2022-04-22 NOTE — DISCHARGE SUMMARY
Ortho Discharge Summary    Patient ID: Park Auguste  [id number]  male  68 y.o.  1945    Admit date: 4/19/2022    Discharge date: 4/22/2022    Admitting Physician: Brandt Cherry MD     Consulting Physician(s):   Treatment Team: Attending Provider: Aurora Garza MD; Consulting Provider: Tyna Homans, MD; Care Manager: Wenceslao Leyden, RN; Primary Nurse: Ruslan Lacy, Ruth Ann Guerrero RN; Primary Nurse: Errol Do RN; Occupational Therapist: An Wilde OT; Physical Therapy Assistant: Louis Albert; Physical Therapist: Meena Santiago    Date of Surgery:   4/19/2022     Preoperative Diagnosis:  CHRONIC PAIN RIGHT KNEE    Postoperative Diagnosis:   CHRONIC PAIN RIGHT KNEE    Procedure(s):   RIGHT TOTAL KNEE REVISION     Anesthesia Type:   Spinal     Surgeon: Aurora Garza MD                            HPI:  \"Patient is a 68 y.o. male with medical hx of GERD, hypertension, SANTA on CPAP, hyperlipidemia who had original right TKR on 8/2018 by Dr. Julien Gagnon, returned to hospital on 7/2019 with increase of swelling and pain. Arthrocentesis from 7/29/2019 revealed 40K WBC, cx grew staphylococcus capitis. Pt received IV vancomycin and cefepime. Pt was recommended for long term IV ABX therapy with I & D of right knee by Crista Gagnon and Estuardo Padgett, but pt declined.     Pt with ongoing pain to right knee that has been bothering him ever since the discharge. He started to following up with orthopedic team due to graduation of worsening right knee pain and constant swelling. \"    PMH:   Past Medical History:   Diagnosis Date    Arthritis     Asthma     R/T ENVIRONMENTAL ALLERGIES; INHALER USE DAILY    CAD (coronary artery disease) 2007    MI    GERD (gastroesophageal reflux disease)     History of BPH     History of kidney stones     Hx: UTI (urinary tract infection)     Hypertension     Lymphedema     Nausea & vomiting     SANTA on CPAP     Sepsis ([address] 8/16/2018       Body mass index is 34.9 kg/m². Kacey العراقي A BMI > 30 is classified as obesity and > 40 is classified as morbid obesity. Medications upon admission :   Prior to Admission Medications   Prescriptions Last Dose Informant Patient Reported? Taking? Omeprazole delayed release (PRILOSEC D/R) 20 mg tablet 4/18/2022 at Unknown time  No Yes   Sig: Take 1 Tablet by mouth daily. acetaminophen (TYLENOL EXTRA STRENGTH) 500 mg tablet 4/18/2022 at 8pm Self Yes Yes   Sig: Take 1,000 mg by mouth every six (6) hours as needed for Pain. albuterol-ipratropium (DUO-NEB) 2.5 mg-0.5 mg/3 ml nebu Not Taking at Unknown time Self Yes No   Sig: 3 mL by Nebulization route every six (6) hours as needed. Patient not taking: Reported on 4/19/2022   aspirin 81 mg chewable tablet 4/12/2022  Yes No   Sig: Take 81 mg by mouth daily. calcium-vitamin D (OYSTER SHELL) 500 mg(1,250mg) -200 unit per tablet 4/12/2022 Self Yes No   Sig: Take 1 Tablet by mouth daily. coenzyme Q-10 (CO Q-10) 200 mg capsule 4/12/2022 Self Yes No   Sig: Take 200 mg by mouth daily. cpap machine kit   Yes No   Sig: by Does Not Apply route. cranberry fruit extract (CRANBERRY CONCENTRATE PO) 4/12/2022 Self Yes No   Sig: Take 360 mg by mouth nightly. docusate sodium (COLACE) 100 mg capsule 4/18/2022 at Unknown time  Yes Yes   Sig: Take 100 mg by mouth as needed for Constipation. dutasteride (AVODART) 0.5 mg capsule 4/18/2022 at Unknown time Self Yes Yes   Sig: Take 0.5 mg by mouth daily. famotidine (PEPCID PO) 4/18/2022 at Unknown time  Yes Yes   Sig: Take  by mouth as needed. fish oil-omega-3 fatty acids 340-1,000 mg capsule 4/12/2022  Yes No   Sig: Take 1 Capsule by mouth daily. furosemide (LASIX) 40 mg tablet 4/18/2022 at Unknown time  No No   Sig: Take 1 tablet by mouth daily. Patient taking differently: Take 20 mg by mouth daily. Take 1 tablet by mouth daily. ibuprofen (Motrin IB) 200 mg tablet 4/12/2022  Yes No   Sig: Take 200 mg by mouth every eight (8) hours as needed for Pain. levocetirizine (XYZAL) 5 mg tablet 2022 at Unknown time Self Yes Yes   Sig: Take 5 mg by mouth nightly. meclizine (ANTIVERT) 25 mg tablet 2022 at Unknown time  No Yes   Sig: Take 1 Tablet by mouth three (3) times daily as needed (vertigo). mecobal/levomefolat Ca/B6 phos (FOLTANX PO) 2022 Self Yes No   Sig: Take  by mouth daily. metoprolol succinate (TOPROL-XL) 25 mg XL tablet 2022 at 8pm  No Yes   Sig: TAKE 1/2 TABLET BY MOUTH EVERY DAY   mometasone-formoterol (DULERA) 100-5 mcg/actuation HFA inhaler 2022 at Unknown time Self Yes Yes   Sig: Take 1 Puff by inhalation two (2) times a day. montelukast (SINGULAIR) 10 mg tablet 2022 at Unknown time  No Yes   Sig: TAKE 1 TABLET BY MOUTH AT BEDTIME FOR BREATHING   mupirocin (BACTROBAN) 2 % ointment 2022 at Unknown time  No Yes   Sig: by Both Nostrils route two (2) times a day for 5 days. Apply 0.25 g (small pea-sized amount) to both nostrils twice a day for five days. nitroglycerin (NITROSTAT) 0.4 mg SL tablet Not Taking at Unknown time  Yes No   Sig: nitroglycerin 0.4 mg sublingual tablet   Patient not taking: Reported on 2022   rosuvastatin (CRESTOR) 10 mg tablet 2022 at Unknown time  Yes Yes   Sig: rosuvastatin 10 mg tablet   TAKE 1 TABLET BY MOUTH AT BEDTIME   tamsulosin (FLOMAX) 0.4 mg capsule 2022 at Unknown time  No Yes   Sig: Take 1 Capsule by mouth daily. triamcinolone (NASACORT AQ) 55 mcg nasal inhaler Not Taking at Unknown time  Yes No   Si Sprays by Both Nostrils route daily. Patient not taking: Reported on 2022   zinc 50 mg tab tablet 2022  Yes No   Sig: Take 50 mg by mouth daily. Facility-Administered Medications: None        Allergies: Allergies   Allergen Reactions    Sulfa (Sulfonamide Antibiotics) Rash        Hospital Course:      Pt was admitted to the hospital on , underwent for revision of right knee by Dr. Paiz Diss with perioperative Ancef.   Intraoperative cultures obtained. Complications:  None; patient tolerated the procedure well. Was taken to the PACU in stable condition and then transferred to the ortho floor. Infectious disease consulted. Pt was started on IV cefepime and vancomycin in post operative setting. Followed cultures throughout hospitalization ultimately with staphyloccus capitis. PICC line placed on POD 2. Plan for long term IV ancef 2 grams q 8 hours for minimum six weeks possibly longer. In light of need for knee immobilizer for 4 weeks minimum, long term IV antibiotics and limited support in home, recommendations made for discharge to SNF. Placement [facility] at [facility] for discharge on POD 3. Perioperative Antibiotics:  Ancef     Postoperative Pain Management:  Oxycodone & Tylenol     DVT Prophylaxis: Aspirin 325 mg PO BID    Postoperative transfusions:    Number of units banked PRBCs =   none     Post Op complications: none    Hemoglobin at discharge:    Lab Results   Component Value Date/Time    HGB 10.0 (L) 04/22/2022 05:13 AM    INR 1.0 04/12/2022 02:52 PM       Aquacel dressing remained in place -- small amount of midline drainage, seal intact at time of discharge. No significant erythema or swelling. Lower extremity edema at baseline per patient. Wound appears to be healing without any evidence of infection. Neurovascular exam found to be within normal limits. Physical Therapy started following surgery and participated in bed mobility, transfers and ambulation. Gait:  Gait  Base of Support: Center of gravity altered,Shift to left  Speed/Sandra: Slow  Step Length: Right shortened,Left shortened  Swing Pattern: Right asymmetrical  Stance: Right decreased  Gait Abnormalities: Antalgic,Decreased step clearance,Step to gait  Ambulation - Level of Assistance: Contact guard assistance  Distance (ft): 18 Feet (ft)  Assistive Device: Walker, rolling,Gait belt                   Discharged to: SNF.     Condition on Discharge:   stable    Discharge instructions:  - Anticoagulate with Aspirin 325 mg PO BID   - Take pain medications as prescribed  - Resume pre hospital diet      - Discharge activity: activity as tolerated  - Ambulate with assistive device as needed. - Weight bearing status WBAT with knee immobilizer in place x 4 weeks, until follow up  - Wound Care Keep wound clean and dry. See discharge instruction sheet. - PICC care per protocol    Home IV Antibiotic Orders     1.  Diagnosis:  Infected right TKR (staphyloccus capitis)     2.  Routine PICC care per protocol     3.  Antibiotic:   IV ancef 2 gm every 8 hours     4.  Lab each Monday & Thursdays             CBC/diff/platelets             BMP             CRP (every Mondays)     5.  Fax lab to Dr. Josh Molina @ [phone].  Theresa Hazard Dr. Josh Molina @ [phone] for WBC <4, PLT <100, and/or Creat > 1.5     7.  Duration of therapy: 6 weeks or longer        Please call Dr. Josh Molina @ [phone] before stopping therapy.     8.  Antibiotic Allergies: sulfa     9.  Case management has been instructed to Call [phone] with name of [address]      Follow up with Dr. Josh Molina in 2-3       -DISCHARGE MEDICATION LIST     Current Discharge Medication List      START taking these medications    Details   aspirin delayed-release 325 mg tablet Take 1 Tablet by mouth two (2) times a day for 30 days. Indications: post op DVT prevention  Qty: 60 Tablet, Refills: 0  Start date: 4/22/2022, End date: 5/22/2022      senna-docusate (PERICOLACE) 8.6-50 mg per tablet Take 1 Tablet by mouth two (2) times a day for 14 days. Qty: 28 Tablet, Refills: 0  Start date: 4/22/2022, End date: 5/6/2022      polyethylene glycol (MIRALAX) 17 gram packet Take 1 Packet by mouth daily for 14 days. Qty: 14 Packet, Refills: 0  Start date: 4/23/2022, End date: 5/7/2022      ceFAZolin 2 g IV syringe 2 g by IntraVENous route every eight (8) hours for 42 days.   Qty: 126 Dose, Refills: 0  Start date: 4/22/2022, End date: 6/3/2022      oxyCODONE IR (ROXICODONE) 5 mg immediate release tablet Take 0.5-1 Tablets by mouth every four (4) hours as needed for Pain for up to 7 days. Max Daily Amount: 30 mg.  Qty: 35 Tablet, Refills: 0  Start date: 4/22/2022, End date: 4/29/2022    Associated Diagnoses: S/P revision of total knee, right         CONTINUE these medications which have CHANGED    Details   furosemide (LASIX) 40 mg tablet Take 0.5 Tablets by mouth daily. Take 1 tablet by mouth daily. Qty: 1 Tablet, Refills: 0  Start date: 4/22/2022    Associated Diagnoses: Lower extremity edema         CONTINUE these medications which have NOT CHANGED    Details   famotidine (PEPCID PO) Take  by mouth as needed. meclizine (ANTIVERT) 25 mg tablet Take 1 Tablet by mouth three (3) times daily as needed (vertigo). Qty: 30 Tablet, Refills: 1    Associated Diagnoses: Vertigo      metoprolol succinate (TOPROL-XL) 25 mg XL tablet TAKE 1/2 TABLET BY MOUTH EVERY DAY  Qty: 15 Tablet, Refills: 2      tamsulosin (FLOMAX) 0.4 mg capsule Take 1 Capsule by mouth daily. Qty: 90 Capsule, Refills: 3    Associated Diagnoses: Urinary frequency      montelukast (SINGULAIR) 10 mg tablet TAKE 1 TABLET BY MOUTH AT BEDTIME FOR BREATHING  Qty: 90 Tablet, Refills: 3    Associated Diagnoses: Mild intermittent asthma without complication      Omeprazole delayed release (PRILOSEC D/R) 20 mg tablet Take 1 Tablet by mouth daily. Qty: 90 Tablet, Refills: 2    Associated Diagnoses: Gastroesophageal reflux disease without esophagitis      rosuvastatin (CRESTOR) 10 mg tablet rosuvastatin 10 mg tablet   TAKE 1 TABLET BY MOUTH AT BEDTIME      acetaminophen (TYLENOL EXTRA STRENGTH) 500 mg tablet Take 1,000 mg by mouth every six (6) hours as needed for Pain.      mometasone-formoterol (DULERA) 100-5 mcg/actuation HFA inhaler Take 1 Puff by inhalation two (2) times a day. dutasteride (AVODART) 0.5 mg capsule Take 0.5 mg by mouth daily. levocetirizine (XYZAL) 5 mg tablet Take 5 mg by mouth nightly. zinc 50 mg tab tablet Take 50 mg by mouth daily. cpap machine kit by Does Not Apply route. nitroglycerin (NITROSTAT) 0.4 mg SL tablet nitroglycerin 0.4 mg sublingual tablet      triamcinolone (NASACORT AQ) 55 mcg nasal inhaler 2 Sprays by Both Nostrils route daily. calcium-vitamin D (OYSTER SHELL) 500 mg(1,250mg) -200 unit per tablet Take 1 Tablet by mouth daily. mecobal/levomefolat Ca/B6 phos (FOLTANX PO) Take  by mouth daily. albuterol-ipratropium (DUO-NEB) 2.5 mg-0.5 mg/3 ml nebu 3 mL by Nebulization route every six (6) hours as needed. cranberry fruit extract (CRANBERRY CONCENTRATE PO) Take 360 mg by mouth nightly.          STOP taking these medications       mupirocin (BACTROBAN) 2 % ointment Comments:   Reason for Stopping:         docusate sodium (COLACE) 100 mg capsule Comments:   Reason for Stopping:         ibuprofen (Motrin IB) 200 mg tablet Comments:   Reason for Stopping:         fish oil-omega-3 fatty acids 340-1,000 mg capsule Comments:   Reason for Stopping:         aspirin 81 mg chewable tablet Comments:   Reason for Stopping:         coenzyme Q-10 (CO Q-10) 200 mg capsule Comments:   Reason for Stopping:            per medical continuation form      -Follow up in office in 3-4 weeks with Dr. Marlin Hernandez  - Follow up with Dr. Ernie Moya (ID) in 2-3 weeks      Signed:  Marcos Tena NP  Orthopaedic Nurse Practitioner    4/22/2022  2:06 PM

## 2022-04-22 NOTE — PROGRESS NOTES
Problem: Mobility Impaired (Adult and Pediatric)  Goal: *Acute Goals and Plan of Care (Insert Text)  Description: FUNCTIONAL STATUS PRIOR TO ADMISSION: Patient was modified independent using a single point cane for functional mobility. HOME SUPPORT PRIOR TO ADMISSION: The patient lived alone with no local support. Physical Therapy Goals  Initiated 4/19/2022  1. Patient will move from supine to sit and sit to supine  and roll side to side in bed with modified independence within 7 day(s). 2.  Patient will transfer from bed to chair and chair to bed with modified independence using the least restrictive device within 7 day(s). 3.  Patient will perform sit to stand with modified independence within 7 day(s). 4.  Patient will ambulate with modified independence for 150 feet with the least restrictive device within 7 day(s). 5.  Patient will ascend/descend 3 stairs with 1 handrail(s) with supervision/set-up within 7 day(s). Outcome: Progressing Towards Goal   PHYSICAL THERAPY TREATMENT  Patient: Ann Marie Kenny (45 y.o. male)  Date: 4/22/2022  Diagnosis: Failed total knee arthroplasty ([facility] [address] [T84.018A, Z96.659] <principal problem not specified>  Procedure(s) (LRB):  RIGHT TOTAL KNEE REVISION (Right) 3 Days Post-Op  Precautions: Fall,WBAT,Other (comment) (immobilizer x 4 weeks)  Chart, physical therapy assessment, plan of care and goals were reviewed. ASSESSMENT  Patient continues with skilled PT services and is progressing towards goals. Patient presents in bed, ready to get up. Progressed further today, required less assistance and walked much farther. .     Current Level of Function Impacting Discharge (mobility/balance): Bed Mobility: minimal assistance, HOB elevated, use of bed rail, assist to manage RLE immobilizer/contact guard assistance for transfers and gait. Ambulated 72 ft with RW, immobilizer and gait belt, steady.     Other factors to consider for discharge: Lives alone/far from [facility] PLAN :  Patient continues to benefit from skilled intervention to address the above impairments. Continue treatment per established plan of care. to address goals. Recommendation for discharge: (in order for the patient to meet his/her long term goals)  Therapy up to 5 days/week in SNF setting    This discharge recommendation:  Has been made in collaboration with the attending provider and/or case management    IF patient discharges home will need the following DME: patient owns DME required for discharge       SUBJECTIVE:   Patient stated I want to walk.     OBJECTIVE DATA SUMMARY:   Critical Behavior:  Neurologic State: Alert  Orientation Level: Oriented X4  Cognition: Follows commands  Safety/Judgement: Awareness of environment  Functional Mobility Training:  Bed Mobility:     Supine to Sit: Minimum assistance; Additional time;Bed Modified; Adaptive equipment (HOB elevated/use of bed rail/PT to manage RLE (immobilizer))  Sit to Supine:  (Left up in recliner)           Transfers:  Sit to Stand: Contact guard assistance  Stand to Sit: Contact guard assistance        Bed to Chair: Contact guard assistance                    Balance:  Sitting: Intact  Sitting - Static: Good (unsupported)  Sitting - Dynamic: Good (unsupported)  Standing: Intact; With support  Standing - Static: Good;Constant support  Standing - Dynamic : Good;Constant support  Ambulation/Gait Training:  Distance (ft): 65 Feet (ft)  Assistive Device: Walker, rolling;Gait belt  Ambulation - Level of Assistance: Contact guard assistance        Gait Abnormalities: Antalgic        Base of Support: Widened;Shift to left  Stance: Right decreased  Speed/Sandra: Slow  Step Length: Left shortened  Swing Pattern: Right asymmetrical          Pain Ratin/10    Activity Tolerance:   Good    After treatment patient left in no apparent distress:   Sitting in chair, Call bell within reach, and nurse notified.     COMMUNICATION/COLLABORATION:   The patients plan of care was discussed with: Occupational therapist, Registered nurse, and Case management and Nurse Practioner for orthopedics.     Radha Walden   Time Calculation: 35 mins

## 2022-04-22 NOTE — PROGRESS NOTES
PHYSICAL THERAPY    Refused am PT visit. PT offered to mobilize to chair for lunch, but patient refused, saying \"I'm eating\".  Will attempt to see this pm.    Yamini Colon. Adam/PT

## 2022-04-22 NOTE — PROGRESS NOTES
Transition Of Care: The patient plans to go to [facility] with BLS transport. [facility] on will call. Awaiting final I/D orders. RUR: 8%    CM following for discharge needs.

## 2022-04-22 NOTE — PROGRESS NOTES
ID Progress Note  2022    Subjective:     C/o right leg pain    Review of Systems:            Symptom Y/N Comments   Symptom Y/N Comments   Fever/Chills n      Chest Pain  n      Poor Appetite       Edema        Cough       Abdominal Pain n       Sputum       Joint Pain y       SOB/DIAZ n      Pruritis/Rash        Nausea/vomit  n     Tolerating PT/OT        Diarrhea n      Tolerating Diet        Constipation  n     Other           Could NOT obtain due to:       Objective:     Vitals:   Visit Vitals  /83   Pulse 82   Temp 98 °F (36.7 °C)   Resp 16   Ht 6' 1\" (1.854 m)   Wt 120 kg (264 lb 8.8 oz)   SpO2 95%   BMI 34.90 kg/m²        Tmax:  Temp (24hrs), Av.2 °F (36.8 °C), Min:98 °F (36.7 °C), Max:98.4 °F (36.9 °C)      PHYSICAL EXAM:  General: Chronically ill appearing. WD, WN. Alert, cooperative, no acute distress    EENT:  EOMI. Anicteric sclerae. MMM  Resp:  CTA bilaterally, no wheezing or rales. No accessory muscle use  CV:  Regular  rhythm,  + edema  GI:  Soft, Non distended, Non tender. +Bowel sounds  Neurologic:  Alert and oriented X 3, normal speech,   Psych:   Good insight. Not anxious nor agitated  Skin:  No rashes.   No jaundice, RLE: dressing dry and intact, in knee immobilizer    Labs:   Lab Results   Component Value Date/Time    WBC 9.6 2022 05:13 AM    HGB 10.0 (L) 2022 05:13 AM    HCT 31.7 (L) 2022 05:13 AM    PLATELET 936  05:13 AM    MCV 87.1 2022 05:13 AM     Lab Results   Component Value Date/Time    Sodium 138 2022 05:13 AM    Potassium 3.8 2022 05:13 AM    Chloride 109 (H) 2022 05:13 AM    CO2 25 2022 05:13 AM    Anion gap 4 (L) 2022 05:13 AM    Glucose 111 (H) 2022 05:13 AM    BUN 14 2022 05:13 AM    Creatinine 0.80 2022 05:13 AM    BUN/Creatinine ratio 18 2022 05:13 AM    GFR est AA >60 2022 05:13 AM    GFR est non-AA >60 2022 05:13 AM    Calcium 8.1 (L) 2022 05:13 AM Bilirubin, total 0.4 05/14/2021 09:55 AM    Alk. phosphatase 122 (H) 05/14/2021 09:55 AM    Protein, total 6.8 05/14/2021 09:55 AM    Albumin 3.6 05/14/2021 09:55 AM    Globulin 3.2 05/14/2021 09:55 AM    A-G Ratio 1.1 05/14/2021 09:55 AM    ALT (SGPT) 24 05/14/2021 09:55 AM         Cultures:   Results     Procedure Component Value Units Date/Time    CULTURE, ANAEROBIC [[id number]] Collected: 04/19/22 1206    Order Status: Completed Specimen: Knee  Updated: 04/21/22 1549     Special Requests: RIGHT PATELLA     Culture result: NO ANAEROBES ISOLATED       CULTURE, BODY FLUID Patsy Jose STAIN [[id number]]  (Abnormal) Collected: 04/19/22 1206    Order Status: Completed Specimen: Knee  Updated: 04/20/22 1350     Special Requests: RIGHT PATELLA     GRAM STAIN OCCASIONAL WBCS SEEN         NO ORGANISMS SEEN        Culture result:       LIGHT POSSIBLE STAPHYLOCOCCUS SPECIES, COAGULASE NEGATIVE CHECKING FOR POSSIBLE MIXED COLONY TYPES/STRAINS            (NOTE) COAG NEG STAPH CALLED TO AND READ BACK BY Suri Ching RN ON 4/20/22  3803. AOW    CULTURE, ANAEROBIC [[id number]] Collected: 04/19/22 1129    Order Status: Completed Specimen: Knee  Updated: 04/21/22 1552     Special Requests: RIGHT FEMORAL INTERFACE     Culture result: NO ANAEROBES ISOLATED       CULTURE, BODY FLUID W Nestor Juarez [[id number]]  (Abnormal) Collected: 04/19/22 1129    Order Status: Completed Specimen: Knee  Updated: 04/20/22 1354     Special Requests: RIGHT FEMORAL INTERFACE     GRAM STAIN OCCASIONAL WBCS SEEN         NO ORGANISMS SEEN        Culture result:       FEW POSSIBLE STAPHYLOCOCCUS SPECIES, COAGULASE NEGATIVE            (NOTE) COAG NEG STAPH CALLED TO AND READ BACK BY Suri Ching RN ON 4/20/22 [id number]. AOW    CULTURE, ANAEROBIC [[id number]] Collected: 04/19/22 1128    Order Status: Completed Specimen: Knee  Updated: 04/21/22 1558     Special Requests: RIGHT KNEE JOINT FLUID     Culture result: NO ANAEROBES ISOLATED       CULTURE, BODY FLUID W GRAM STAIN [[id number]]  (Abnormal) Collected: 04/19/22 1128    Order Status: Completed Specimen: Knee  Updated: 04/21/22 1603     Special Requests: RIGHT KNEE JOINT FLUID     GRAM STAIN OCCASIONAL WBCS SEEN         NO ORGANISMS SEEN        Culture result:       SCANT STAPHYLOCOCCUS SPECIES CULTURE IN PROGRESS,FURTHER UPDATES TO FOLLOW          CULTURE, MRSA [[id number]] Collected: 04/12/22 1452    Order Status: Completed Specimen: Nasal from Nares Updated: 04/13/22 2100     Special Requests: NO SPECIAL REQUESTS        Culture result:       MRSA NOT PRESENT. Apparent Staphylococus aureus (not MRSA noted). Screening of patient nares for MRSA is for surveillance purposes and, if positive, to facilitate isolation considerations in high risk settings. It is not intended for automatic decolonization interventions per se as regimens are not sufficiently effective to warrant routine use. 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 Rashmi Elder  S/p right total knee revision (4/19)  - afebrile, wbc 8.3    Intra-op cx (4/19) staphylococcus capitis    post op care per primary team      continue with IV ancef, recommend to complete total 6 weeks or longer depends on prognosis    Pt has been informed that he will need a long term IV ABX therapy which he agreed for the therapy at this time. PICC line placement requested 4/21    Discharge IV ABX therapy in place 4/22    Follow up with Dr. Efrain Morrow in 2-3 weeks       fever work up if temp >= 100.4         Above plan of care discussed and agreed with Dr. Kojo Menendez team signing off. Please contact us with any questions.     Mary Lou Carr NP

## 2022-04-27 ENCOUNTER — TELEPHONE (OUTPATIENT)
Dept: ORTHOPEDIC SURGERY | Age: 77
End: 2022-04-27

## 2022-04-27 NOTE — TELEPHONE ENCOUNTER
Patient is currently at [facility] rehab, nurse called to report patient had drainage at his incision site, his dressing was removed and replaced.      Attempted to return the call, no answer, LVM

## 2022-05-13 ENCOUNTER — OFFICE VISIT (OUTPATIENT)
Dept: ORTHOPEDIC SURGERY | Age: 77
End: 2022-05-13
Payer: MEDICARE

## 2022-05-13 VITALS — WEIGHT: 264 LBS | HEIGHT: 73 IN | BODY MASS INDEX: 34.99 KG/M2

## 2022-05-13 DIAGNOSIS — Z96.651 STATUS POST REVISION OF TOTAL REPLACEMENT OF RIGHT KNEE: Primary | ICD-10-CM

## 2022-05-13 PROCEDURE — 99024 POSTOP FOLLOW-UP VISIT: CPT | Performed by: PHYSICIAN ASSISTANT

## 2022-05-13 NOTE — PROGRESS NOTES
Luz Tellez (: 1945) is a 68 y.o. male, established patient, here for evaluation of the following chief complaint(s):  Surgical Follow-up         SUBJECTIVE/OBJECTIVE:    Luz Tellez (: 1945) is a 68 y.o. male. Right Total Knee Revision - Right 2022     The patient underwent right total knee revision for infected knee replacement 2022. The patient had previous total knee replacement with Dr. Jarret Santo 2018. The patient is now 3 weeks postsurgery and has developed wound drainage over the last 48 hours. The patient remains in a knee immobilizer. Patient continues to receive cefazolin 2 g every 8 hours via PICC line. Patient is being followed by Dr. He Valadez of infectious disease specialist.  Patient states that he is doing well at this time and is not having any significant knee discomfort.,  But he would like to get out of the knee immobilizer. The patient denies fevers or chills. Allergies   Allergen Reactions    Sulfa (Sulfonamide Antibiotics) Rash       Current Outpatient Medications   Medication Sig    aspirin delayed-release 325 mg tablet Take 1 Tablet by mouth two (2) times a day for 30 days. Indications: post op DVT prevention    furosemide (LASIX) 40 mg tablet Take 0.5 Tablets by mouth daily. Take 1 tablet by mouth daily.  ceFAZolin 2 g IV syringe 2 g by IntraVENous route every eight (8) hours for 42 days.  zinc 50 mg tab tablet Take 50 mg by mouth daily.  famotidine (PEPCID PO) Take  by mouth as needed.  meclizine (ANTIVERT) 25 mg tablet Take 1 Tablet by mouth three (3) times daily as needed (vertigo).  metoprolol succinate (TOPROL-XL) 25 mg XL tablet TAKE 1/2 TABLET BY MOUTH EVERY DAY    tamsulosin (FLOMAX) 0.4 mg capsule Take 1 Capsule by mouth daily.  montelukast (SINGULAIR) 10 mg tablet TAKE 1 TABLET BY MOUTH AT BEDTIME FOR BREATHING    Omeprazole delayed release (PRILOSEC D/R) 20 mg tablet Take 1 Tablet by mouth daily.     cpap machine kit by Does Not Apply route.  rosuvastatin (CRESTOR) 10 mg tablet rosuvastatin 10 mg tablet   TAKE 1 TABLET BY MOUTH AT BEDTIME    nitroglycerin (NITROSTAT) 0.4 mg SL tablet nitroglycerin 0.4 mg sublingual tablet (Patient not taking: Reported on 2022)    triamcinolone (NASACORT AQ) 55 mcg nasal inhaler 2 Sprays by Both Nostrils route daily. (Patient not taking: Reported on 2022)    acetaminophen (TYLENOL EXTRA STRENGTH) 500 mg tablet Take 1,000 mg by mouth every six (6) hours as needed for Pain.  calcium-vitamin D (OYSTER SHELL) 500 mg(1,250mg) -200 unit per tablet Take 1 Tablet by mouth daily.  mometasone-formoterol (DULERA) 100-5 mcg/actuation HFA inhaler Take 1 Puff by inhalation two (2) times a day.  dutasteride (AVODART) 0.5 mg capsule Take 0.5 mg by mouth daily.  mecobal/levomefolat Ca/B6 phos (FOLTANX PO) Take  by mouth daily.  albuterol-ipratropium (DUO-NEB) 2.5 mg-0.5 mg/3 ml nebu 3 mL by Nebulization route every six (6) hours as needed. (Patient not taking: Reported on 2022)    levocetirizine (XYZAL) 5 mg tablet Take 5 mg by mouth nightly.  cranberry fruit extract (CRANBERRY CONCENTRATE PO) Take 360 mg by mouth nightly. No current facility-administered medications for this visit.        Social History     Socioeconomic History    Marital status:      Spouse name: Not on file    Number of children: Not on file    Years of education: Not on file    Highest education level: Not on file   Occupational History    Not on file   Tobacco Use    Smoking status: Former Smoker     Packs/day: 2.00     Years: 20.00     Pack years: 40.00     Quit date: 5     Years since quittin.3    Smokeless tobacco: Never Used   Vaping Use    Vaping Use: Never used   Substance and Sexual Activity    Alcohol use: No    Drug use: No    Sexual activity: Not on file   Other Topics Concern    Not on file   Social History Narrative    Not on file     Social Determinants of Health     Financial Resource Strain:     Difficulty of Paying Living Expenses: Not on file   Food Insecurity:     Worried About Running Out of Food in the Last Year: Not on file    Marvin of Food in the Last Year: Not on file   Transportation Needs:     Lack of Transportation (Medical): Not on file    Lack of Transportation (Non-Medical):  Not on file   Physical Activity:     Days of Exercise per Week: Not on file    Minutes of Exercise per Session: Not on file   Stress:     Feeling of Stress : Not on file   Social Connections:     Frequency of Communication with Friends and Family: Not on file    Frequency of Social Gatherings with Friends and Family: Not on file    Attends Church Services: Not on file    Active Member of Clubs or Organizations: Not on file    Attends Club or Organization Meetings: Not on file    Marital Status: Not on file   Intimate Partner Violence:     Fear of Current or Ex-Partner: Not on file    Emotionally Abused: Not on file    Physically Abused: Not on file    Sexually Abused: Not on file   Housing Stability:     Unable to Pay for Housing in the Last Year: Not on file    Number of [place] in the Last Year: Not on file    Unstable Housing in the Last Year: Not on file       Past Surgical History:   Procedure Laterality Date    HX HEART CATHETERIZATION  2007, 2016    STENTS X3  (INGRID--DR NUNEZ)    HX KNEE REPLACEMENT Right 2018    HX LITHOTRIPSY      2-3X    HX ORTHOPAEDIC Right 1964    FEMUR ORIF  (DR PRESCOTT)    HX TONSILLECTOMY      HX UROLOGICAL      CYSTO    NE BREAST SURGERY PROCEDURE UNLISTED Right     EXCISION OF BREAST LUMP (BENIGN)    VASCULAR SURGERY PROCEDURE UNLIST Right     LEG VEIN BYPASS ([address])       Family History   Problem Relation Age of Onset    Lung Disease Mother     Asthma Mother     Cancer Father         LUNG    Cancer Brother         BRAIN    Heart Disease Brother     Heart Disease Brother     Heart Disease Brother     Asthma Daughter     Anesth Problems Neg Hx                REVIEW OF SYSTEMS:    Patient denies any recent fever, chills, nausea, vomiting, chest pain, abdominal pain, blurred vision, dizziness or shortness of breath. Vitals:    Ht 6' 1\" (1.854 m)   Wt 264 lb (119.7 kg)   BMI 34.83 kg/m²    Body mass index is 34.83 kg/m². PHYSICAL EXAM:    The patient is alert and oriented x 3 and in no acute distress. The postoperative wound is well healed without erythema or drainage. Range of motion of the operative knee  Mild swelling of the operative knee is noted. There is no calf tenderness to palpation. Distal motor and sensation is intact. IMAGING:    Results from Appointment encounter on 05/13/22    XR KNEE RT 3 V    Narrative  AP standing, lateral and merchant's view digital radiograph of the right knee obtained in the office today were reviewed and demonstrate anatomic alignment of components. 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 There are numerous antibiotic beads in the distal third of the femur and midshaft of the tibia. There iswith no evidence of hardware loosening or subsidence        Orders Placed This Encounter    XR KNEE RT 3 V     Standing Status:   Future     Number of Occurrences:   1     Standing Expiration Date:   5/14/2023          ASSESSMENT/PLAN:      1. Status post total right knee replacement  -     XR KNEE RT 3 V; Future        Below is the assessment and plan developed based on review of pertinent history, physical exam, labs, studies, and medications. Have discussed radiographic findings and have answered all patient questions to his satisfaction. The patient was given a note to continue physical therapy. Patient does not have any obvious wound drainage at this time. Steri-Strips were placed over the wound and the wound was painted with Betadine. A clean dry sterile dressing was placed over the wound.   The patient was instructed to remain in the knee immobilizer until follow-up with Dr. Sandy Avelar in 4 weeks time. The patient was instructed to follow-up with Dr. Elizabeth Cam office regarding his antibiotics. The patient was asked to contact the office with any questions or concerns. The patient understands and agrees to the treatment plan as outlined above. Return in about 4 weeks (around 6/10/2022) for post op follow up. Dr. Sandy Avelar was available for immediate consultation as needed. An electronic signature was used to authenticate this note.   -- Justin Mathur PA-C

## 2022-06-15 ENCOUNTER — TELEPHONE (OUTPATIENT)
Dept: FAMILY MEDICINE CLINIC | Age: 77
End: 2022-06-15

## 2022-06-15 ENCOUNTER — NURSE TRIAGE (OUTPATIENT)
Dept: OTHER | Facility: CLINIC | Age: 77
End: 2022-06-15

## 2022-06-15 ENCOUNTER — TELEPHONE (OUTPATIENT)
Dept: ORTHOPEDIC SURGERY | Age: 77
End: 2022-06-15

## 2022-06-15 NOTE — TELEPHONE ENCOUNTER
Som contacted the surgery center and was informed that since Pt has been discharged there is nothing they can do about the Rx. She was instructed to contact Pt's PCP for an Rx. Please advise.

## 2022-06-15 NOTE — TELEPHONE ENCOUNTER
Pt's drt states that pt has surgery recently and was given an antibiotic by Dr Jen Cosby with infectious disease. Pt lost the Rx and has tried to reach the other Dr to no avail. ( is a traveling Dr dtr said) The Rx that was given was hydroxile 5 mg caps 2 qty 2 times a day. 14 day supply Pt's dtr would like to know if Dr Diana Basilio can call in a Rx for this medication, Please advise.

## 2022-06-15 NOTE — TELEPHONE ENCOUNTER
I relayed the msg to Pt's helper. She states she has looked all over the house and can not find the Rx. She will try again to contact the other Dr for a new Rx.

## 2022-06-15 NOTE — TELEPHONE ENCOUNTER
Spoke with pt   He will have his \"helper\" call the office back to give more information  Name of doctor who prescribed medication  - pt should contact that office for replacement prescription   - need to help pt locate medication in home, he reports bringing it to the home and then misplaced it

## 2022-06-15 NOTE — TELEPHONE ENCOUNTER
Attempted to call ortho  Message left to return call to PCP  Need clarification on ABX? IV  Information Given hydroxile 5 mg caps 2 qty 2 times a day.  14 day supply   This is unclear of what pt is requesting  Will await return call from ortho

## 2022-06-15 NOTE — TELEPHONE ENCOUNTER
Received call from David  at [facility] with Red Flag Complaint. Pt's daughter called in about a ABX script that was written for pt after knee replacement surgery. Pt lost the script. Latest note in chart states that his surgeon will be writing a new script for pt. Daughter was unaware. Writer was speaking to Constantino Bynum who had writen the note giving her a hand off and daughter hung up. Writer was attempting to get her to the office so we could ensure that the script was all handled. No hand off due to pt hanging up but Constantino Bynum aware that daughter was made aware of her note. NO triage needed. Care advice provided, patient verbalizes understanding; denies any other questions or concerns; instructed to call back for any new or worsening symptoms. Attention Provider: Thank you for allowing me to participate in the care of your patient. The patient was connected to triage in response to information provided to the E[facility]. Please do not respond through this encounter as the response is not directed to a shared pool.

## 2022-06-15 NOTE — TELEPHONE ENCOUNTER
Spoke with pharmacy  Cefadroxil 500 mg PO 2 caps BID x 7 days  Picked up on 6-7-22  This medication should have been completed 6-14-22  Will notify MD

## 2022-06-15 NOTE — TELEPHONE ENCOUNTER
Adina with [facility] called indicating patient had reached out to them regarding antibiotics that were supposed to be taken after surgery. Patient has missed placed them and was looking for it to be refilled. Looks like ceFAZolin 2 g IV syringe was last prescribed at the hospital.       Reached out to the patient to confirm the call above, he stated he did in fact misplaced his antibiotic and would like it filled again. I've notified the PCP office we'd be taking care of this matter.

## 2022-06-24 ENCOUNTER — OFFICE VISIT (OUTPATIENT)
Dept: FAMILY MEDICINE CLINIC | Age: 77
End: 2022-06-24
Payer: MEDICARE

## 2022-06-24 VITALS
HEART RATE: 86 BPM | DIASTOLIC BLOOD PRESSURE: 74 MMHG | OXYGEN SATURATION: 96 % | SYSTOLIC BLOOD PRESSURE: 114 MMHG | RESPIRATION RATE: 20 BRPM | BODY MASS INDEX: 34.14 KG/M2 | HEIGHT: 73 IN | TEMPERATURE: 97.2 F | WEIGHT: 257.6 LBS

## 2022-06-24 DIAGNOSIS — J45.20 MILD INTERMITTENT ASTHMA WITHOUT COMPLICATION: ICD-10-CM

## 2022-06-24 DIAGNOSIS — E27.8 ADRENAL INCIDENTALOMA (HCC): ICD-10-CM

## 2022-06-24 DIAGNOSIS — I25.10 ARTERIOSCLEROSIS OF CORONARY ARTERY: ICD-10-CM

## 2022-06-24 DIAGNOSIS — M00.9 PYOGENIC ARTHRITIS OF RIGHT KNEE JOINT, DUE TO UNSPECIFIED ORGANISM (HCC): ICD-10-CM

## 2022-06-24 DIAGNOSIS — Z96.651 STATUS POST REVISION OF TOTAL REPLACEMENT OF RIGHT KNEE: Primary | ICD-10-CM

## 2022-06-24 DIAGNOSIS — G47.33 OBSTRUCTIVE SLEEP APNEA SYNDROME: ICD-10-CM

## 2022-06-24 DIAGNOSIS — E78.2 MIXED HYPERLIPIDEMIA: ICD-10-CM

## 2022-06-24 DIAGNOSIS — I10 PRIMARY HYPERTENSION: ICD-10-CM

## 2022-06-24 DIAGNOSIS — R35.0 URINARY FREQUENCY: Primary | ICD-10-CM

## 2022-06-24 LAB
APPEARANCE UR: CLEAR
BACTERIA URNS QL MICRO: ABNORMAL /HPF
BILIRUB UR QL STRIP: NEGATIVE
BILIRUB UR QL: NEGATIVE
COLOR UR: ABNORMAL
EPITH CASTS URNS QL MICRO: ABNORMAL /LPF
GLUCOSE UR STRIP.AUTO-MCNC: NEGATIVE MG/DL
GLUCOSE UR-MCNC: NEGATIVE MG/DL
HGB UR QL STRIP: NEGATIVE
HYALINE CASTS URNS QL MICRO: ABNORMAL /LPF (ref 0–5)
KETONES P FAST UR STRIP-MCNC: NEGATIVE MG/DL
KETONES UR QL STRIP.AUTO: NEGATIVE MG/DL
LEUKOCYTE ESTERASE UR QL STRIP.AUTO: ABNORMAL
NITRITE UR QL STRIP.AUTO: NEGATIVE
PH UR STRIP: 5.5 [PH] (ref 4.6–8)
PH UR STRIP: 5.5 [PH] (ref 5–8)
PROT UR QL STRIP: NEGATIVE
PROT UR STRIP-MCNC: NEGATIVE MG/DL
RBC #/AREA URNS HPF: ABNORMAL /HPF (ref 0–5)
SP GR UR REFRACTOMETRY: 1.02 (ref 1–1.03)
SP GR UR STRIP: 1.02 (ref 1–1.03)
UA UROBILINOGEN AMB POC: NORMAL (ref 0.2–1)
URINALYSIS CLARITY POC: CLEAR
URINALYSIS COLOR POC: YELLOW
URINE BLOOD POC: NEGATIVE
URINE LEUKOCYTES POC: NORMAL
URINE NITRITES POC: NEGATIVE
UROBILINOGEN UR QL STRIP.AUTO: 0.2 EU/DL (ref 0.2–1)
WBC URNS QL MICRO: ABNORMAL /HPF (ref 0–4)

## 2022-06-24 PROCEDURE — G8510 SCR DEP NEG, NO PLAN REQD: HCPCS | Performed by: FAMILY MEDICINE

## 2022-06-24 PROCEDURE — 99214 OFFICE O/P EST MOD 30 MIN: CPT | Performed by: FAMILY MEDICINE

## 2022-06-24 PROCEDURE — 1101F PT FALLS ASSESS-DOCD LE1/YR: CPT | Performed by: FAMILY MEDICINE

## 2022-06-24 PROCEDURE — G8417 CALC BMI ABV UP PARAM F/U: HCPCS | Performed by: FAMILY MEDICINE

## 2022-06-24 PROCEDURE — G8536 NO DOC ELDER MAL SCRN: HCPCS | Performed by: FAMILY MEDICINE

## 2022-06-24 PROCEDURE — 1123F ACP DISCUSS/DSCN MKR DOCD: CPT | Performed by: FAMILY MEDICINE

## 2022-06-24 PROCEDURE — G8754 DIAS BP LESS 90: HCPCS | Performed by: FAMILY MEDICINE

## 2022-06-24 PROCEDURE — G8752 SYS BP LESS 140: HCPCS | Performed by: FAMILY MEDICINE

## 2022-06-24 PROCEDURE — G8427 DOCREV CUR MEDS BY ELIG CLIN: HCPCS | Performed by: FAMILY MEDICINE

## 2022-06-24 PROCEDURE — 81003 URINALYSIS AUTO W/O SCOPE: CPT | Performed by: FAMILY MEDICINE

## 2022-06-24 RX ORDER — FLUTICASONE FUROATE AND VILANTEROL TRIFENATATE 100; 25 UG/1; UG/1
POWDER RESPIRATORY (INHALATION)
COMMUNITY
Start: 2022-06-08

## 2022-06-24 RX ORDER — CEFADROXIL 500 MG/1
CAPSULE ORAL
COMMUNITY
Start: 2022-06-17

## 2022-06-24 NOTE — PROGRESS NOTES
[facility]    History of Present Illness: Judith Pablo is a 68 y.o. male with history of HLD, CAD, Adrenal incidentaloma, GERD, SANTA, Asthma, Septic arthritis. CC: Follow up  History provided by patient and Records    HPI:    UTI Symptoms: Reports 2 weeks of dysuria with frequency/urgency. Coronary Artery Disease Follow up: Today patient reports he is feeling well and overall his symptoms are controlled on his current medications. he  has not had a history of acute myocardial infarction in the past.  Prior management has included:   - Coronary stenting: YES  - Coronary bypass: NO    he has not had CHF      Key CAD CHF Meds             furosemide (LASIX) 40 mg tablet (Taking) Take 0.5 Tablets by mouth daily. Take 1 tablet by mouth daily. metoprolol succinate (TOPROL-XL) 25 mg XL tablet (Taking) TAKE 1/2 TABLET BY MOUTH EVERY DAY    rosuvastatin (CRESTOR) 10 mg tablet (Taking) rosuvastatin 10 mg tablet   TAKE 1 TABLET BY MOUTH AT BEDTIME    nitroglycerin (NITROSTAT) 0.4 mg SL tablet (Taking) nitroglycerin 0.4 mg sublingual tablet         he is on a statin. he is not on antiplatelet therapy. he is on a beta blocker. he is not on a regular Nitrate therapy. he does use Nitroglycerin as needed for chest pain. Residual symptoms of CAD include none. Patient denies any current chest pain, exertional chest pressure/discomfort, fatigue. Risk factors are controlled or at goal.  Primary risk factors include elevated cholesterol and hypertension. SANTA: Is wearing C-pap     Hypertriglyceridemia Follow up:   Cardiovascular risks for him are: hypertension  hyperlipidemia.    Current Medications:  Key Antihyperlipidemia Meds             rosuvastatin (CRESTOR) 10 mg tablet (Taking) rosuvastatin 10 mg tablet   TAKE 1 TABLET BY MOUTH AT BEDTIME          Compliance: YES   Myalgias: NO   Fatigue: NO   Other side effects: NO     Wt Readings from Last 3 Encounters:   06/24/22 257 lb 9.6 oz (116.8 kg)   05/13/22 264 lb (119.7 kg)   04/19/22 264 lb 8.8 oz (120 kg)       Lab Results   Component Value Date/Time    Cholesterol, total 108 05/14/2021 09:55 AM    HDL Cholesterol 41 05/14/2021 09:55 AM    LDL, calculated 27 05/14/2021 09:55 AM    VLDL, calculated 40 05/14/2021 09:55 AM    Triglyceride 200 (H) 05/14/2021 09:55 AM    CHOL/HDL Ratio 2.6 05/14/2021 09:55 AM      Lab Results   Component Value Date/Time    ALT (SGPT) 24 05/14/2021 09:55 AM    AST (SGOT) 13 (L) 05/14/2021 09:55 AM    Alk. phosphatase 122 (H) 05/14/2021 09:55 AM    Bilirubin, total 0.4 05/14/2021 09:55 AM           Health Maintenance  Health Maintenance Due   Topic Date Due    COVID-19 Vaccine (1) Never done    Pneumococcal 65+ years (1 - PCV) Never done    DTaP/Tdap/Td series (1 - Tdap) Never done    Shingrix Vaccine Age 50> (1 of 2) Never done    Lipid Screen  05/14/2022       Past Medical, Family, and Social History:     Current Outpatient Medications on File Prior to Visit   Medication Sig Dispense Refill    cefadroxil (DURICEF) 500 mg capsule TAKE 2 CAPLETS BY MOUTH TWICE DAILY      Breo Ellipta 100-25 mcg/dose inhaler INHALE 1 PUFF BY MOUTH EVERY MORNING      furosemide (LASIX) 40 mg tablet Take 0.5 Tablets by mouth daily. Take 1 tablet by mouth daily. 1 Tablet 0    zinc 50 mg tab tablet Take 50 mg by mouth daily.  famotidine (PEPCID PO) Take  by mouth as needed.  meclizine (ANTIVERT) 25 mg tablet Take 1 Tablet by mouth three (3) times daily as needed (vertigo). 30 Tablet 1    metoprolol succinate (TOPROL-XL) 25 mg XL tablet TAKE 1/2 TABLET BY MOUTH EVERY DAY 15 Tablet 2    tamsulosin (FLOMAX) 0.4 mg capsule Take 1 Capsule by mouth daily. 90 Capsule 3    montelukast (SINGULAIR) 10 mg tablet TAKE 1 TABLET BY MOUTH AT BEDTIME FOR BREATHING 90 Tablet 3    Omeprazole delayed release (PRILOSEC D/R) 20 mg tablet Take 1 Tablet by mouth daily. 90 Tablet 2    cpap machine kit by Does Not Apply route.       rosuvastatin (CRESTOR) 10 mg tablet rosuvastatin 10 mg tablet   TAKE 1 TABLET BY MOUTH AT BEDTIME      nitroglycerin (NITROSTAT) 0.4 mg SL tablet nitroglycerin 0.4 mg sublingual tablet      acetaminophen (TYLENOL EXTRA STRENGTH) 500 mg tablet Take 1,000 mg by mouth every six (6) hours as needed for Pain.  calcium-vitamin D (OYSTER SHELL) 500 mg(1,250mg) -200 unit per tablet Take 1 Tablet by mouth daily.  mometasone-formoterol (DULERA) 100-5 mcg/actuation HFA inhaler Take 1 Puff by inhalation two (2) times a day.  dutasteride (AVODART) 0.5 mg capsule Take 0.5 mg by mouth daily.  mecobal/levomefolat Ca/B6 phos (FOLTANX PO) Take  by mouth daily.  levocetirizine (XYZAL) 5 mg tablet Take 5 mg by mouth nightly.  cranberry fruit extract (CRANBERRY CONCENTRATE PO) Take 360 mg by mouth nightly.  triamcinolone (NASACORT AQ) 55 mcg nasal inhaler 2 Sprays by Both Nostrils route daily. (Patient not taking: Reported on 4/19/2022)      albuterol-ipratropium (DUO-NEB) 2.5 mg-0.5 mg/3 ml nebu 3 mL by Nebulization route every six (6) hours as needed. (Patient not taking: Reported on 4/19/2022)       No current facility-administered medications on file prior to visit.        Patient Active Problem List   Diagnosis Code    Primary osteoarthritis of right knee M17.11    Arthritis M19.90    Obstructive sleep apnea syndrome G47.33    Arteriosclerosis of coronary artery I25.10    GERD (gastroesophageal reflux disease) K21.9    Hypertension I10    Lymphedema I89.0    SANTA on CPAP G47.33, Z99.89    Septic arthritis ([facility] [address] M00.9    Hyperlipidemia E78.5    Asthma J45.909    At moderate risk for fall Z91.81    Failed total knee arthroplasty ([facility] [address] T84.018A, Z96.659    [facility] ([facility] E27.8       Social History     Socioeconomic History    Marital status:    Tobacco Use    Smoking status: Former Smoker     Packs/day: 2.00     Years: 20.00     Pack years: 40.00     Quit date: 1985     Years since quittin.5    Smokeless tobacco: Never Used   Vaping Use    Vaping Use: Never used   Substance and Sexual Activity    Alcohol use: No    Drug use: No        Review of Systems   Review of Systems   Constitutional: Negative for chills and fever. Gastrointestinal: Negative for abdominal pain, nausea and vomiting. Genitourinary: Negative for dysuria and urgency. Objective:     Visit Vitals  /74 (BP 1 Location: Right upper arm, BP Patient Position: Sitting, BP Cuff Size: Large adult)   Pulse 86   Temp 97.2 °F (36.2 °C) (Oral)   Resp 20   Ht 6' 1\" (1.854 m)   Wt 257 lb 9.6 oz (116.8 kg)   SpO2 96%   BMI 33.99 kg/m²        Physical Exam  Vitals and nursing note reviewed. Constitutional:       Appearance: Normal appearance. HENT:      Head: Normocephalic and atraumatic. Cardiovascular:      Rate and Rhythm: Normal rate and regular rhythm. Pulses: Normal pulses. Heart sounds: Normal heart sounds. Pulmonary:      Effort: Pulmonary effort is normal.      Breath sounds: Normal breath sounds. Abdominal:      General: Abdomen is flat. Bowel sounds are normal.      Palpations: Abdomen is soft. Musculoskeletal:      Cervical back: Normal range of motion and neck supple. Comments: Severely swollen right knee   Neurological:      Mental Status: He is alert. Pertinent Labs/Studies:      Assessment and orders:       ICD-10-CM ICD-9-CM    1. Urinary frequency  R35.0 788.41 AMB POC URINALYSIS DIP STICK AUTO W/O MICRO      URINALYSIS W/ RFLX MICROSCOPIC      CULTURE, URINE   2. Mixed hyperlipidemia  E78.2 272.2 LIPID PANEL   3. Adrenal incidentaloma ([facility] [address]  E27.8 255.8    4. Arteriosclerosis of coronary artery  I25.10 414.00    5. Primary hypertension  I10 401.9 CBC W/O DIFF      METABOLIC PANEL, COMPREHENSIVE   6. Pyogenic arthritis of right knee joint, due to unspecified organism ([facility] [address]  M00.9 711.06    7. Obstructive sleep apnea syndrome  G47.33 327.23    8. Mild intermittent asthma without complication  Q44.79 442.03      Diagnoses and all orders for this visit:    1. Urinary frequency: Checking urine  -     AMB POC URINALYSIS DIP STICK AUTO W/O MICRO  -     URINALYSIS W/ RFLX MICROSCOPIC; Future  -     CULTURE, URINE; Future    2. Mixed hyperlipidemia: The patient is aware of our goal to reduce or eliminate the long term problems (such as strokes and heart attacks) related to poorly controlled Triglycerides, LDL, Cholesterol.   -     LIPID PANEL; Future    3. Adrenal incidentaloma ([facility] [address]    4. Arteriosclerosis of coronary artery    5. Primary hypertension: Our goal is to normalize the blood pressure to decrease the risks of strokes and heart attacks. The patient is in agreement with the plan. -     CBC W/O DIFF; Future  -     METABOLIC PANEL, COMPREHENSIVE; Future    6. Pyogenic arthritis of right knee joint, due to unspecified organism ([facility]    7. Obstructive sleep apnea syndrome    8. Mild intermittent asthma without complication      Follow-up and Dispositions    · Return in about 3 months (around 9/24/2022). I have discussed the diagnosis with the patient and the intended plan as seen in the above orders. Social history, medical history, and labs were reviewed. The patient has received an after-visit summary and questions were answered concerning future plans. I have discussed medication side effects and warnings with the patient as well.     Suzzane Duane, MD [facility]AN [facility]  06/24/22

## 2022-06-24 NOTE — PROGRESS NOTES
1. \"Have you been to the ER, urgent care clinic since your last visit? Hospitalized since your last visit? \" No    2. \"Have you seen or consulted any other health care providers outside of the [address] [place] [address] since your last visit? \" No     3. For patients aged 39-70: Has the patient had a colonoscopy / FIT/ Cologuard? NA - based on age      If the patient is female:    4. For patients aged 41-77: Has the patient had a mammogram within the past 2 years? NA - based on age or sex      11. For patients aged 21-65: Has the patient had a pap smear?  NA - based on age or sex    Health Maintenance Due   Topic Date Due    COVID-19 Vaccine (1) Never done    Pneumococcal 65+ years (1 - PCV) Never done    DTaP/Tdap/Td series (1 - Tdap) Never done    Shingrix Vaccine Age 50> (1 of 2) Never done    Lipid Screen  05/14/2022

## 2022-06-25 LAB
BACTERIA SPEC CULT: ABNORMAL
CC UR VC: ABNORMAL
SERVICE CMNT-IMP: ABNORMAL

## 2022-06-27 ENCOUNTER — TELEPHONE (OUTPATIENT)
Dept: FAMILY MEDICINE CLINIC | Age: 77
End: 2022-06-27

## 2022-06-27 RX ORDER — NITROFURANTOIN 25; 75 MG/1; MG/1
100 CAPSULE ORAL 2 TIMES DAILY
Qty: 10 CAPSULE | Refills: 0 | Status: SHIPPED | OUTPATIENT
Start: 2022-06-27 | End: 2022-07-02

## 2022-06-27 NOTE — TELEPHONE ENCOUNTER
Spoke to patient about Urine culture. Starting Macrobid as broad spectrum.     MD LESTER Saavedra [facility]DEL[facility]  06/27/22

## 2022-07-07 ENCOUNTER — OFFICE VISIT (OUTPATIENT)
Dept: ORTHOPEDIC SURGERY | Age: 77
End: 2022-07-07
Payer: MEDICARE

## 2022-07-07 VITALS — BODY MASS INDEX: 34.06 KG/M2 | HEIGHT: 73 IN | WEIGHT: 257 LBS

## 2022-07-07 DIAGNOSIS — Z96.651 STATUS POST REVISION OF TOTAL REPLACEMENT OF RIGHT KNEE: Primary | ICD-10-CM

## 2022-07-07 PROCEDURE — 99024 POSTOP FOLLOW-UP VISIT: CPT | Performed by: ORTHOPAEDIC SURGERY

## 2022-07-07 NOTE — PROGRESS NOTES
Silver Wilburn (: 1945) is a 68 y.o. male, patient, here for evaluation of the following chief complaint(s):  Surgical Follow-up ([facility] 22)       HPI:    S/post resection of infected total knee. Allergies   Allergen Reactions    Sulfa (Sulfonamide Antibiotics) Rash       Current Outpatient Medications   Medication Sig    cefadroxil (DURICEF) 500 mg capsule TAKE 2 CAPLETS BY MOUTH TWICE DAILY    Breo Ellipta 100-25 mcg/dose inhaler INHALE 1 PUFF BY MOUTH EVERY MORNING    furosemide (LASIX) 40 mg tablet Take 0.5 Tablets by mouth daily. Take 1 tablet by mouth daily.  zinc 50 mg tab tablet Take 50 mg by mouth daily.  famotidine (PEPCID PO) Take  by mouth as needed.  meclizine (ANTIVERT) 25 mg tablet Take 1 Tablet by mouth three (3) times daily as needed (vertigo).  metoprolol succinate (TOPROL-XL) 25 mg XL tablet TAKE 1/2 TABLET BY MOUTH EVERY DAY    tamsulosin (FLOMAX) 0.4 mg capsule Take 1 Capsule by mouth daily.  montelukast (SINGULAIR) 10 mg tablet TAKE 1 TABLET BY MOUTH AT BEDTIME FOR BREATHING    Omeprazole delayed release (PRILOSEC D/R) 20 mg tablet Take 1 Tablet by mouth daily.  cpap machine kit by Does Not Apply route.  rosuvastatin (CRESTOR) 10 mg tablet rosuvastatin 10 mg tablet   TAKE 1 TABLET BY MOUTH AT BEDTIME    nitroglycerin (NITROSTAT) 0.4 mg SL tablet nitroglycerin 0.4 mg sublingual tablet    triamcinolone (NASACORT AQ) 55 mcg nasal inhaler 2 Sprays by Both Nostrils route daily. (Patient not taking: Reported on 2022)    acetaminophen (TYLENOL EXTRA STRENGTH) 500 mg tablet Take 1,000 mg by mouth every six (6) hours as needed for Pain.  calcium-vitamin D (OYSTER SHELL) 500 mg(1,250mg) -200 unit per tablet Take 1 Tablet by mouth daily.  mometasone-formoterol (DULERA) 100-5 mcg/actuation HFA inhaler Take 1 Puff by inhalation two (2) times a day.  dutasteride (AVODART) 0.5 mg capsule Take 0.5 mg by mouth daily.     mecobal/levomefolat Ca/B6 phos (FOLTANX PO) Take  by mouth daily.  albuterol-ipratropium (DUO-NEB) 2.5 mg-0.5 mg/3 ml nebu 3 mL by Nebulization route every six (6) hours as needed. (Patient not taking: Reported on 4/19/2022)    levocetirizine (XYZAL) 5 mg tablet Take 5 mg by mouth nightly.  cranberry fruit extract (CRANBERRY CONCENTRATE PO) Take 360 mg by mouth nightly. No current facility-administered medications for this visit.        Past Medical History:   Diagnosis Date    Arthritis     Asthma     R/T ENVIRONMENTAL ALLERGIES; INHALER USE DAILY    CAD (coronary artery disease) 2007    MI    GERD (gastroesophageal reflux disease)     History of BPH     History of kidney stones     Hx: UTI (urinary tract infection)     Hypertension     Lymphedema     Nausea & vomiting     SANTA on CPAP     Sepsis ([facility] [address] 8/16/2018        Past Surgical History:   Procedure Laterality Date    HX HEART CATHETERIZATION  2007, 2016    STENTS X3  (INGRID--DR NUNEZ)    HX KNEE REPLACEMENT Right 2018    HX KNEE REPLACEMENT Right 2022    HX LITHOTRIPSY      2-3X    HX ORTHOPAEDIC Right 1964    FEMUR ORIF  (DR PRESCOTT)    HX TONSILLECTOMY      HX UROLOGICAL      CYSTO    KS BREAST SURGERY PROCEDURE UNLISTED Right     EXCISION OF BREAST LUMP (BENIGN)    VASCULAR SURGERY PROCEDURE UNLIST Right     LEG VEIN BYPASS (INGRID)       Family History   Problem Relation Age of Onset    Lung Disease Mother     Asthma Mother     Cancer Father         LUNG    Cancer Brother         BRAIN    Heart Disease Brother     Heart Disease Brother     Heart Disease Brother     Asthma Daughter     Anesth Problems Neg Hx         Social History     Socioeconomic History    Marital status:      Spouse name: Not on file    Number of children: Not on file    Years of education: Not on file    Highest education level: Not on file   Occupational History    Not on file   Tobacco Use    Smoking status: Former Smoker     Packs/day: 2.00 Years: 20.00     Pack years: 40.00     Quit date: 5     Years since quittin.5    Smokeless tobacco: Never Used   Vaping Use    Vaping Use: Never used   Substance and Sexual Activity    Alcohol use: No    Drug use: No    Sexual activity: Not on file   Other Topics Concern    Not on file   Social History Narrative    Not on file     Social Determinants of Health     Financial Resource Strain:     Difficulty of Paying Living Expenses: Not on file   Food Insecurity:     Worried About Running Out of Food in the Last Year: Not on file    Marvin of Food in the Last Year: Not on file   Transportation Needs:     Lack of Transportation (Medical): Not on file    Lack of Transportation (Non-Medical): Not on file   Physical Activity:     Days of Exercise per Week: Not on file    Minutes of Exercise per Session: Not on file   Stress:     Feeling of Stress : Not on file   Social Connections:     Frequency of Communication with Friends and Family: Not on file    Frequency of Social Gatherings with Friends and Family: Not on file    Attends [employer] Services: Not on file    Active Member of [address] [place] [address] or Organizations: Not on file    Attends Club or Organization Meetings: Not on file    Marital Status: Not on file   Intimate Partner Violence:     Fear of Current or Ex-Partner: Not on file    Emotionally Abused: Not on file    Physically Abused: Not on file    Sexually Abused: Not on file   Housing Stability:     Unable to Pay for Housing in the Last Year: Not on file    Number of [place] in the Last Year: Not on file    Unstable Housing in the Last Year: Not on file       ROS     Positive for: Musculoskeletal    Last edited by Leola Thorpe on 2022  3:03 PM. (History)            Vitals:  Ht 6' 1\" (1.854 m)   Wt 257 lb (116.6 kg)   BMI 33.91 kg/m²    Body mass index is 33.91 kg/m². PHYSICAL EXAM:  Knee looks pretty good. No pain small effusion. Incision is well-healed.   He has horrible lymphedema/venous insufficiency in both legs. IMAGING:  None    ASSESSMENT/PLAN:  1. Status post revision of total replacement of right knee    I referred him to the lymphedema clinic for the swelling in both legs. He has primary care doctor but has really not seen him much of late. May benefit from an increased dose of diuretic. Not something that I would be willing to do for him. Follow-up with me 3 months. An electronic signature was used to authenticate this note.   --Jeremy Barraza MD

## 2022-07-07 NOTE — LETTER
7/7/2022    Patient: Da Wilson   YOB: 1945   Date of Visit: 7/7/2022     Eligio Pollock, [address] [place] [address]  [facility] [facility] [address]    Dear Eligio Pollock MD,      Thank you for referring Mr. Alaina Lopez to [facility] for evaluation. My notes for this consultation are attached. If you have questions, please do not hesitate to call me. I look forward to following your patient along with you.       Sincerely,    Laurel Patten MD

## 2022-07-12 ENCOUNTER — TELEPHONE (OUTPATIENT)
Dept: FAMILY MEDICINE CLINIC | Age: 77
End: 2022-07-12

## 2022-07-12 NOTE — TELEPHONE ENCOUNTER
Pt daughter states that pt is very confused and having memory issues. States that he will not make appt on own and will not come if she makes appt for him. Would like to know if you can please call him and make appt for pt.

## 2022-07-13 NOTE — TELEPHONE ENCOUNTER
Patient has an appt. VM left informing Mrs. Yanez that patient has an appt. on next Thursday with provider.

## 2022-07-21 ENCOUNTER — OFFICE VISIT (OUTPATIENT)
Dept: FAMILY MEDICINE CLINIC | Age: 77
End: 2022-07-21
Payer: MEDICARE

## 2022-07-21 VITALS
OXYGEN SATURATION: 95 % | BODY MASS INDEX: 33.86 KG/M2 | TEMPERATURE: 98.3 F | WEIGHT: 255.5 LBS | HEIGHT: 73 IN | SYSTOLIC BLOOD PRESSURE: 140 MMHG | DIASTOLIC BLOOD PRESSURE: 77 MMHG | RESPIRATION RATE: 16 BRPM | HEART RATE: 81 BPM

## 2022-07-21 DIAGNOSIS — G31.84 MCI (MILD COGNITIVE IMPAIRMENT): Primary | ICD-10-CM

## 2022-07-21 PROCEDURE — G8753 SYS BP > OR = 140: HCPCS | Performed by: FAMILY MEDICINE

## 2022-07-21 PROCEDURE — 99213 OFFICE O/P EST LOW 20 MIN: CPT | Performed by: FAMILY MEDICINE

## 2022-07-21 PROCEDURE — G8754 DIAS BP LESS 90: HCPCS | Performed by: FAMILY MEDICINE

## 2022-07-21 PROCEDURE — G8536 NO DOC ELDER MAL SCRN: HCPCS | Performed by: FAMILY MEDICINE

## 2022-07-21 PROCEDURE — G8510 SCR DEP NEG, NO PLAN REQD: HCPCS | Performed by: FAMILY MEDICINE

## 2022-07-21 PROCEDURE — G8427 DOCREV CUR MEDS BY ELIG CLIN: HCPCS | Performed by: FAMILY MEDICINE

## 2022-07-21 PROCEDURE — 1101F PT FALLS ASSESS-DOCD LE1/YR: CPT | Performed by: FAMILY MEDICINE

## 2022-07-21 PROCEDURE — 1123F ACP DISCUSS/DSCN MKR DOCD: CPT | Performed by: FAMILY MEDICINE

## 2022-07-21 PROCEDURE — G8417 CALC BMI ABV UP PARAM F/U: HCPCS | Performed by: FAMILY MEDICINE

## 2022-07-21 RX ORDER — ASPIRIN 81 MG/1
TABLET ORAL DAILY
COMMUNITY

## 2022-07-21 RX ORDER — NITROFURANTOIN (MACROCRYSTALS) 100 MG/1
CAPSULE ORAL
COMMUNITY

## 2022-07-21 RX ORDER — ACETAMINOPHEN 160 MG/5ML
SUSPENSION, ORAL (FINAL DOSE FORM) ORAL DAILY
COMMUNITY

## 2022-07-21 RX ORDER — LANOLIN ALCOHOL/MO/W.PET/CERES
400 CREAM (GRAM) TOPICAL DAILY
COMMUNITY

## 2022-07-21 NOTE — PROGRESS NOTES
[facility]    History of Present Illness: Maurilio Raman is a 68 y.o. male with history of HLD, CAD, Adrenal incidentaloma, GERD, SANTA, Asthma, Septic arthritis. CC: Memory concerns  History provided by daughter of patient and Records    HPI:  Patient has had issues with some confusion and memory concerns. Daughter provides history. Has had an episode where he accused an employee of theft when issue was employees had been paid and did not remember. Forgot he took 700$ of money and forgot. Unable to add up money from recent day of work. Noting also had issue with owed money to supplier and does not believe he does. Also ended up cutting a hole in a doorway to deal with water issue but created another hazard. Multiple smaller issues that are being overlooked. Also has tangential reasoning and connections. MOCA Test: 19/30 deficits in recall    Health Maintenance  Health Maintenance Due   Topic Date Due    COVID-19 Vaccine (1) Never done    Pneumococcal 65+ years (1 - PCV) Never done    DTaP/Tdap/Td series (1 - Tdap) Never done    Shingrix Vaccine Age 50> (1 of 2) Never done    Lipid Screen  05/14/2022       Past Medical, Family, and Social History:     Current Outpatient Medications on File Prior to Visit   Medication Sig Dispense Refill    aspirin delayed-release 81 mg tablet Take  by mouth daily. coenzyme Q-10 (Co Q-10) 200 mg capsule Take  by mouth daily. magnesium oxide (MAG-OX) 400 mg tablet Take 400 mg by mouth in the morning. nitrofurantoin (MACRODANTIN) 100 mg capsule Take  by mouth nightly. Nitrofurantoin mono      cefadroxil (DURICEF) 500 mg capsule TAKE 2 CAPLETS BY MOUTH TWICE DAILY      Breo Ellipta 100-25 mcg/dose inhaler INHALE 1 PUFF BY MOUTH EVERY MORNING      furosemide (LASIX) 40 mg tablet Take 0.5 Tablets by mouth daily. Take 1 tablet by mouth daily. 1 Tablet 0    zinc 50 mg tab tablet Take 50 mg by mouth daily.       famotidine (PEPCID PO) Take by mouth as needed. meclizine (ANTIVERT) 25 mg tablet Take 1 Tablet by mouth three (3) times daily as needed (vertigo). 30 Tablet 1    metoprolol succinate (TOPROL-XL) 25 mg XL tablet TAKE 1/2 TABLET BY MOUTH EVERY DAY 15 Tablet 2    tamsulosin (FLOMAX) 0.4 mg capsule Take 1 Capsule by mouth daily. 90 Capsule 3    montelukast (SINGULAIR) 10 mg tablet TAKE 1 TABLET BY MOUTH AT BEDTIME FOR BREATHING 90 Tablet 3    Omeprazole delayed release (PRILOSEC D/R) 20 mg tablet Take 1 Tablet by mouth daily. 90 Tablet 2    cpap machine kit by Does Not Apply route. rosuvastatin (CRESTOR) 10 mg tablet rosuvastatin 10 mg tablet   TAKE 1 TABLET BY MOUTH AT BEDTIME      nitroglycerin (NITROSTAT) 0.4 mg SL tablet nitroglycerin 0.4 mg sublingual tablet      acetaminophen (TYLENOL) 500 mg tablet Take 1,000 mg by mouth every six (6) hours as needed for Pain. calcium-vitamin D (OYSTER SHELL) 500 mg(1,250mg) -200 unit per tablet Take 1 Tablet by mouth daily. mometasone-formoterol (DULERA) 100-5 mcg/actuation HFA inhaler Take 1 Puff by inhalation two (2) times a day. dutasteride (AVODART) 0.5 mg capsule Take 0.5 mg by mouth daily. mecobal/levomefolat Ca/B6 phos (FOLTANX PO) Take  by mouth daily. levocetirizine (XYZAL) 5 mg tablet Take 5 mg by mouth nightly. cranberry fruit extract (CRANBERRY CONCENTRATE PO) Take 360 mg by mouth nightly. triamcinolone (NASACORT AQ) 55 mcg nasal inhaler 2 Sprays by Both Nostrils route daily. (Patient not taking: No sig reported)      albuterol-ipratropium (DUO-NEB) 2.5 mg-0.5 mg/3 ml nebu 3 mL by Nebulization route every six (6) hours as needed. (Patient not taking: No sig reported)       No current facility-administered medications on file prior to visit.        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 Cardiovascular:  Negative for chest pain and palpitations. Gastrointestinal:  Negative for abdominal pain, nausea and vomiting. Neurological:  Negative for dizziness and headaches. Objective:   Visit Vitals  BP (!) 140/77 (BP 1 Location: Left upper arm, BP Patient Position: Sitting, BP Cuff Size: Large adult)   Pulse 81   Temp 98.3 °F (36.8 °C) (Oral)   Resp 16   Ht 6' 1\" (1.854 m)   Wt 255 lb 8 oz (115.9 kg)   SpO2 95%   BMI 33.71 kg/m²        Physical Exam  Vitals and nursing note reviewed. Constitutional:       Appearance: Normal appearance. HENT:      Head: Normocephalic and atraumatic. Cardiovascular:      Rate and Rhythm: Normal rate and regular rhythm. Pulses: Normal pulses. Heart sounds: Normal heart sounds. Pulmonary:      Effort: Pulmonary effort is normal.      Breath sounds: Normal breath sounds. Abdominal:      General: Abdomen is flat. Bowel sounds are normal.      Palpations: Abdomen is soft. Musculoskeletal:         General: Normal range of motion. Cervical back: Normal range of motion and neck supple. Skin:     General: Skin is warm and dry.    Neurological:      Mental Status: He is alert. Pertinent Labs/Studies:      Assessment and orders:       ICD-10-CM ICD-9-CM    1. MCI (mild cognitive impairment)  G31.84 331.83         Diagnoses and all orders for this visit:    1. MCI (mild cognitive impairment): Patient with [address] test 19/30. Based on history showing MCI, discussed options, for now will monitor. Follow-up and Dispositions    Return in about 3 months (around 10/21/2022). I spent 21 minutes with the patient personally. Over 50% of the 21 minutes face to face with Dorothy Steward consisted of counseling and/or discussing treatment plans in reference to his MCI. I have discussed the diagnosis with the patient and the intended plan as seen in the above orders. Social history, medical history, and labs were reviewed. The patient has received an after-visit summary and questions were answered concerning future plans. I have discussed medication side effects and warnings with the patient as well.     MD LESTER Reece & CANDELARIO AMBRIZ [facility]  07/23/22

## 2022-07-21 NOTE — PROGRESS NOTES
1. Have you been to the ER, urgent care clinic since your last visit? Hospitalized since your last visit? No    2. Have you seen or consulted any other health care providers outside of the [address] [place] [address] since your last visit? Include any pap smears or colon screening. Dr Lyssa Garner     3. For patients aged 39-70: Has the patient had a colonoscopy / FIT/ Cologuard? NA - based on age    If the patient is female:    4. For patients aged 41-77: Has the patient had a mammogram within the past 2 years? NA - based on age or sex      11. For patients aged 21-65: Has the patient had a pap smear? NA - based on age or sex     Reviewed record in preparation for visit and have necessary documentation  Pt did not bring medication to office visit for review  Patient is accompanied by self I have received verbal consent from Master Espino to discuss any/all medical information while they are present in the room.     Goals that were addressed and/or need to be completed during or after this appointment include     Health Maintenance Due   Topic Date Due    COVID-19 Vaccine (1) Never done    Pneumococcal 65+ years (1 - PCV) Never done    DTaP/Tdap/Td series (1 - Tdap) Never done    Shingrix Vaccine Age 50> (1 of 2) Never done    Lipid Screen  05/14/2022

## 2022-09-26 ENCOUNTER — OFFICE VISIT (OUTPATIENT)
Dept: FAMILY MEDICINE CLINIC | Age: 77
End: 2022-09-26
Payer: MEDICARE

## 2022-09-26 VITALS
TEMPERATURE: 97 F | BODY MASS INDEX: 34.54 KG/M2 | HEIGHT: 73 IN | RESPIRATION RATE: 18 BRPM | SYSTOLIC BLOOD PRESSURE: 123 MMHG | OXYGEN SATURATION: 96 % | DIASTOLIC BLOOD PRESSURE: 73 MMHG | WEIGHT: 260.6 LBS | HEART RATE: 63 BPM

## 2022-09-26 DIAGNOSIS — M00.9 PYOGENIC ARTHRITIS OF RIGHT KNEE JOINT, DUE TO UNSPECIFIED ORGANISM (HCC): ICD-10-CM

## 2022-09-26 DIAGNOSIS — I25.10 ARTERIOSCLEROSIS OF CORONARY ARTERY: ICD-10-CM

## 2022-09-26 DIAGNOSIS — K21.9 GASTROESOPHAGEAL REFLUX DISEASE WITHOUT ESOPHAGITIS: ICD-10-CM

## 2022-09-26 DIAGNOSIS — G47.33 OBSTRUCTIVE SLEEP APNEA SYNDROME: ICD-10-CM

## 2022-09-26 DIAGNOSIS — E78.2 MIXED HYPERLIPIDEMIA: Primary | ICD-10-CM

## 2022-09-26 DIAGNOSIS — I10 PRIMARY HYPERTENSION: ICD-10-CM

## 2022-09-26 DIAGNOSIS — R21 RASH: ICD-10-CM

## 2022-09-26 PROCEDURE — 1101F PT FALLS ASSESS-DOCD LE1/YR: CPT | Performed by: FAMILY MEDICINE

## 2022-09-26 PROCEDURE — 1123F ACP DISCUSS/DSCN MKR DOCD: CPT | Performed by: FAMILY MEDICINE

## 2022-09-26 PROCEDURE — G8754 DIAS BP LESS 90: HCPCS | Performed by: FAMILY MEDICINE

## 2022-09-26 PROCEDURE — G8417 CALC BMI ABV UP PARAM F/U: HCPCS | Performed by: FAMILY MEDICINE

## 2022-09-26 PROCEDURE — G8427 DOCREV CUR MEDS BY ELIG CLIN: HCPCS | Performed by: FAMILY MEDICINE

## 2022-09-26 PROCEDURE — G8752 SYS BP LESS 140: HCPCS | Performed by: FAMILY MEDICINE

## 2022-09-26 PROCEDURE — 99214 OFFICE O/P EST MOD 30 MIN: CPT | Performed by: FAMILY MEDICINE

## 2022-09-26 PROCEDURE — G8536 NO DOC ELDER MAL SCRN: HCPCS | Performed by: FAMILY MEDICINE

## 2022-09-26 PROCEDURE — G8510 SCR DEP NEG, NO PLAN REQD: HCPCS | Performed by: FAMILY MEDICINE

## 2022-09-26 RX ORDER — CHLORPHENIRAMINE MALEATE 4 MG
TABLET ORAL 2 TIMES DAILY
Qty: 15 G | Refills: 1 | Status: SHIPPED | OUTPATIENT
Start: 2022-09-26 | End: 2022-10-21

## 2022-09-26 NOTE — PROGRESS NOTES
[facility]    History of Present Illness: Ana Murillo is a 68 y.o. male with history of HLD, CAD, Adrenal incidentaloma, GERD, SANTA, Asthma, Septic arthritis. CC: Follow up  History provided by patient and Records    HPI:    CAD: Overall stable. Hypertension Follow up:  Currently Taking:   Key CAD CHF Meds               aspirin delayed-release 81 mg tablet (Taking) Take  by mouth daily. furosemide (LASIX) 40 mg tablet (Taking) Take 0.5 Tablets by mouth daily. Take 1 tablet by mouth daily. metoprolol succinate (TOPROL-XL) 25 mg XL tablet (Taking) TAKE 1/2 TABLET BY MOUTH EVERY DAY    rosuvastatin (CRESTOR) 10 mg tablet (Taking) rosuvastatin 10 mg tablet   TAKE 1 TABLET BY MOUTH AT BEDTIME    nitroglycerin (NITROSTAT) 0.4 mg SL tablet (Taking) nitroglycerin 0.4 mg sublingual tablet           The patient reports:  taking medications as instructed, no medication side effects noted, no TIA's, no chest pain on exertion, no dyspnea on exertion, no swelling of ankles, no orthostatic dizziness or lightheadedness, no orthopnea or paroxysmal nocturnal dyspnea. BP Readings from Last 3 Encounters:   09/26/22 123/73   07/21/22 (!) 140/77   06/24/22 114/74        Hypertriglyceridemia Follow up:   Cardiovascular risks for him are: hypertension  hyperlipidemia.    Current Medications:  Key Antihyperlipidemia Meds               rosuvastatin (CRESTOR) 10 mg tablet (Taking) rosuvastatin 10 mg tablet   TAKE 1 TABLET BY MOUTH AT BEDTIME            Compliance: YES   Myalgias: NO   Fatigue: NO   Other side effects: NO     Wt Readings from Last 3 Encounters:   09/26/22 260 lb 9.6 oz (118.2 kg)   07/21/22 255 lb 8 oz (115.9 kg)   07/07/22 257 lb (116.6 kg)       Lab Results   Component Value Date/Time    Cholesterol, total 108 05/14/2021 09:55 AM    HDL Cholesterol 41 05/14/2021 09:55 AM    LDL, calculated 27 05/14/2021 09:55 AM    VLDL, calculated 40 05/14/2021 09:55 AM    Triglyceride 200 (H) 05/14/2021 09:55 AM    CHOL/HDL Ratio 2.6 05/14/2021 09:55 AM      Lab Results   Component Value Date/Time    ALT (SGPT) 24 05/14/2021 09:55 AM    AST (SGOT) 13 (L) 05/14/2021 09:55 AM    Alk. phosphatase 122 (H) 05/14/2021 09:55 AM    Bilirubin, total 0.4 05/14/2021 09:55 AM      SANTA: Stable at this time on the Cpap    Health Maintenance  Health Maintenance Due   Topic Date Due    COVID-19 Vaccine (1) Never done    Pneumococcal 65+ years (1 - PCV) Never done    DTaP/Tdap/Td series (1 - Tdap) Never done    Shingrix Vaccine Age 50> (1 of 2) Never done    Lipid Screen  05/14/2022    Flu Vaccine (1) Never done       Past Medical, Family, and Social History:     Current Outpatient Medications on File Prior to Visit   Medication Sig Dispense Refill    aspirin delayed-release 81 mg tablet Take  by mouth daily. coenzyme Q-10 (CO Q-10) 200 mg capsule Take  by mouth daily. magnesium oxide (MAG-OX) 400 mg tablet Take 400 mg by mouth in the morning. nitrofurantoin (MACRODANTIN) 100 mg capsule Take  by mouth nightly. Nitrofurantoin mono      cefadroxil (DURICEF) 500 mg capsule TAKE 2 CAPLETS BY MOUTH TWICE DAILY      Breo Ellipta 100-25 mcg/dose inhaler INHALE 1 PUFF BY MOUTH EVERY MORNING      furosemide (LASIX) 40 mg tablet Take 0.5 Tablets by mouth daily. Take 1 tablet by mouth daily. 1 Tablet 0    zinc 50 mg tab tablet Take 50 mg by mouth daily. famotidine (PEPCID PO) Take  by mouth as needed. metoprolol succinate (TOPROL-XL) 25 mg XL tablet TAKE 1/2 TABLET BY MOUTH EVERY DAY 15 Tablet 2    tamsulosin (FLOMAX) 0.4 mg capsule Take 1 Capsule by mouth daily. 90 Capsule 3    montelukast (SINGULAIR) 10 mg tablet TAKE 1 TABLET BY MOUTH AT BEDTIME FOR BREATHING 90 Tablet 3    Omeprazole delayed release (PRILOSEC D/R) 20 mg tablet Take 1 Tablet by mouth daily. 90 Tablet 2    cpap machine kit by Does Not Apply route.       rosuvastatin (CRESTOR) 10 mg tablet rosuvastatin 10 mg tablet   TAKE 1 TABLET BY MOUTH AT BEDTIME      nitroglycerin (NITROSTAT) 0.4 mg SL tablet nitroglycerin 0.4 mg sublingual tablet      acetaminophen (TYLENOL) 500 mg tablet Take 1,000 mg by mouth every six (6) hours as needed for Pain. calcium-vitamin D (OYSTER SHELL) 500 mg(1,250mg) -200 unit per tablet Take 1 Tablet by mouth daily. mometasone-formoterol (DULERA) 100-5 mcg/actuation HFA inhaler Take 1 Puff by inhalation two (2) times a day. dutasteride (AVODART) 0.5 mg capsule Take 0.5 mg by mouth daily. mecobal/levomefolat Ca/B6 phos (FOLTANX PO) Take  by mouth daily. albuterol-ipratropium (DUO-NEB) 2.5 mg-0.5 mg/3 ml nebu 3 mL by Nebulization route every six (6) hours as needed. levocetirizine (XYZAL) 5 mg tablet Take 5 mg by mouth nightly. cranberry fruit extract (CRANBERRY CONCENTRATE PO) Take 360 mg by mouth nightly. [DISCONTINUED] meclizine (ANTIVERT) 25 mg tablet Take 1 Tablet by mouth three (3) times daily as needed (vertigo). 30 Tablet 1    [DISCONTINUED] triamcinolone (NASACORT AQ) 55 mcg nasal inhaler 2 Sprays by Both Nostrils route daily. (Patient not taking: No sig reported)       No current facility-administered medications on file prior to visit.        Patient Active Problem List   Diagnosis Code    Primary osteoarthritis of right knee M17.11    Arthritis M19.90    Obstructive sleep apnea syndrome G47.33    Arteriosclerosis of coronary artery I25.10    GERD (gastroesophageal reflux disease) K21.9    Hypertension I10    Lymphedema I89.0    SANTA on CPAP G47.33, Z99.89    Septic arthritis (HCC) M00.9    Hyperlipidemia E78.5    Asthma J45.909    At moderate risk for fall Z91.81    Failed total knee arthroplasty ([facility] T84.018A, Z96.659    Adrenal incidentaloma ([facility] E27.8       Social History     Socioeconomic History    Marital status:    Tobacco Use    Smoking status: Former     Packs/day: 2.00     Years: 20.00     Pack years: 40.00     Types: Cigarettes     Quit date: 1985     Years since quittin.7    Smokeless tobacco: Never   Vaping Use    Vaping Use: Never used   Substance and Sexual Activity    Alcohol use: No    Drug use: No        Review of Systems   Review of Systems   Constitutional:  Negative for chills and fever. Cardiovascular:  Negative for chest pain and palpitations. Gastrointestinal:  Negative for abdominal pain, heartburn, nausea and vomiting. Neurological:  Negative for dizziness and headaches. Objective:   Visit Vitals  /73 (BP 1 Location: Right arm, BP Patient Position: Sitting, BP Cuff Size: Large adult)   Pulse 63   Temp 97 °F (36.1 °C) (Oral)   Resp 18   Ht 6' 1\" (1.854 m)   Wt 260 lb 9.6 oz (118.2 kg)   SpO2 96%   BMI 34.38 kg/m²        Physical Exam  Vitals and nursing note reviewed. Constitutional:       Appearance: Normal appearance. HENT:      Head: Normocephalic and atraumatic. Cardiovascular:      Rate and Rhythm: Normal rate and regular rhythm. Pulses: Normal pulses. Heart sounds: Normal heart sounds. No murmur heard. No friction rub. No gallop. Pulmonary:      Effort: Pulmonary effort is normal.      Breath sounds: Normal breath sounds. Abdominal:      General: Abdomen is flat. Bowel sounds are normal.      Palpations: Abdomen is soft. Musculoskeletal:         General: Normal range of motion. Cervical back: Normal range of motion and neck supple. Skin:     General: Skin is warm and dry. Neurological:      General: No focal deficit present. Mental Status: He is alert and oriented to person, place, and time. Psychiatric:         Mood and Affect: Mood normal.         Behavior: Behavior normal.       Pertinent Labs/Studies:      Assessment and orders:       ICD-10-CM ICD-9-CM    1. Mixed hyperlipidemia  E78.2 272.2 LIPID PANEL      2. Gastroesophageal reflux disease without esophagitis  K21.9 530.81       3. Primary hypertension  I10 401.9 CBC W/O DIFF      METABOLIC PANEL, COMPREHENSIVE      4.  Arteriosclerosis of coronary artery  I25.10 414.00       5. Obstructive sleep apnea syndrome  G47.33 327.23       6. Pyogenic arthritis of right knee joint, due to unspecified organism ([facility] [address]  M00.9 711.06 SED RATE (ESR)      C REACTIVE PROTEIN, QT      7. Rash  R21 782.1 clotrimazole (LOTRIMIN) 1 % topical cream        Diagnoses and all orders for this visit:    1. Mixed hyperlipidemia: The patient is aware of our goal to reduce or eliminate the long term problems (such as strokes and heart attacks) related to poorly controlled Triglycerides, LDL, Cholesterol.   -     LIPID PANEL; Future    2. Gastroesophageal reflux disease without esophagitis    3. Primary hypertension: Our goal is to normalize the blood pressure to decrease the risks of strokes and heart attacks. The patient is in agreement with the plan. -     CBC W/O DIFF; Future  -     METABOLIC PANEL, COMPREHENSIVE; Future    4. Arteriosclerosis of coronary artery    5. Obstructive sleep apnea syndrome    6. Pyogenic arthritis of right knee joint, due to unspecified organism ([facility] [address]  -     SED RATE (ESR); Future  -     C REACTIVE PROTEIN, QT; Future    7. Rash  -     clotrimazole (LOTRIMIN) 1 % topical cream; Apply  to affected area two (2) times a day. Follow-up and Dispositions    Return in about 3 months (around 12/26/2022) for Regular Follow up. I have discussed the diagnosis with the patient and the intended plan as seen in the above orders. Social history, medical history, and labs were reviewed. The patient has received an after-visit summary and questions were answered concerning future plans. I have discussed medication side effects and warnings with the patient as well.     MD LESTER Garg & CANDELARIO AMBRIZ [facility]  09/26/22

## 2022-09-26 NOTE — PROGRESS NOTES
1. Have you been to the ER, urgent care clinic since your last visit? Hospitalized since your last visit? No    2. Have you seen or consulted any other health care providers outside of the [address] [place] [address] since your last visit? Including any pap smears or colon screening.  No      Health Maintenance Due   Topic Date Due    COVID-19 Vaccine (1) Never done    Pneumococcal 65+ years (1 - PCV) Never done    DTaP/Tdap/Td series (1 - Tdap) Never done    Shingrix Vaccine Age 50> (1 of 2) Never done    Lipid Screen  05/14/2022    Flu Vaccine (1) Never done    Pt declines flu shots

## 2022-09-27 LAB
ALBUMIN SERPL-MCNC: 3.6 G/DL (ref 3.5–5)
ALBUMIN/GLOB SERPL: 1.2 {RATIO} (ref 1.1–2.2)
ALP SERPL-CCNC: 129 U/L (ref 45–117)
ALT SERPL-CCNC: 21 U/L (ref 12–78)
ANION GAP SERPL CALC-SCNC: 7 MMOL/L (ref 5–15)
AST SERPL-CCNC: 12 U/L (ref 15–37)
BILIRUB SERPL-MCNC: 0.6 MG/DL (ref 0.2–1)
BUN SERPL-MCNC: 18 MG/DL (ref 6–20)
BUN/CREAT SERPL: 20 (ref 12–20)
CALCIUM SERPL-MCNC: 9 MG/DL (ref 8.5–10.1)
CHLORIDE SERPL-SCNC: 110 MMOL/L (ref 97–108)
CHOLEST SERPL-MCNC: 106 MG/DL
CO2 SERPL-SCNC: 22 MMOL/L (ref 21–32)
CREAT SERPL-MCNC: 0.89 MG/DL (ref 0.7–1.3)
CRP SERPL-MCNC: 0.38 MG/DL (ref 0–0.6)
ERYTHROCYTE [DISTWIDTH] IN BLOOD BY AUTOMATED COUNT: 17.1 % (ref 11.5–14.5)
ERYTHROCYTE [SEDIMENTATION RATE] IN BLOOD: 3 MM/HR (ref 0–20)
GLOBULIN SER CALC-MCNC: 3.1 G/DL (ref 2–4)
GLUCOSE SERPL-MCNC: 91 MG/DL (ref 65–100)
HCT VFR BLD AUTO: 47.7 % (ref 36.6–50.3)
HDLC SERPL-MCNC: 37 MG/DL
HDLC SERPL: 2.9 {RATIO} (ref 0–5)
HGB BLD-MCNC: 14.7 G/DL (ref 12.1–17)
LDLC SERPL CALC-MCNC: 42.4 MG/DL (ref 0–100)
MCH RBC QN AUTO: 27.2 PG (ref 26–34)
MCHC RBC AUTO-ENTMCNC: 30.8 G/DL (ref 30–36.5)
MCV RBC AUTO: 88.3 FL (ref 80–99)
NRBC # BLD: 0.02 K/UL (ref 0–0.01)
NRBC BLD-RTO: 0.2 PER 100 WBC
PLATELET # BLD AUTO: 177 K/UL (ref 150–400)
PMV BLD AUTO: 11.6 FL (ref 8.9–12.9)
POTASSIUM SERPL-SCNC: 4.4 MMOL/L (ref 3.5–5.1)
PROT SERPL-MCNC: 6.7 G/DL (ref 6.4–8.2)
RBC # BLD AUTO: 5.4 M/UL (ref 4.1–5.7)
SODIUM SERPL-SCNC: 139 MMOL/L (ref 136–145)
TRIGL SERPL-MCNC: 133 MG/DL (ref ?–150)
VLDLC SERPL CALC-MCNC: 26.6 MG/DL
WBC # BLD AUTO: 8.6 K/UL (ref 4.1–11.1)

## 2022-10-06 ENCOUNTER — OFFICE VISIT (OUTPATIENT)
Dept: ORTHOPEDIC SURGERY | Age: 77
End: 2022-10-06
Payer: MEDICARE

## 2022-10-06 VITALS — HEIGHT: 73 IN | WEIGHT: 257 LBS | BODY MASS INDEX: 34.06 KG/M2

## 2022-10-06 DIAGNOSIS — Z96.651 STATUS POST REVISION OF TOTAL REPLACEMENT OF RIGHT KNEE: Primary | ICD-10-CM

## 2022-10-06 PROCEDURE — 1101F PT FALLS ASSESS-DOCD LE1/YR: CPT | Performed by: ORTHOPAEDIC SURGERY

## 2022-10-06 PROCEDURE — 20610 DRAIN/INJ JOINT/BURSA W/O US: CPT | Performed by: ORTHOPAEDIC SURGERY

## 2022-10-06 PROCEDURE — G8427 DOCREV CUR MEDS BY ELIG CLIN: HCPCS | Performed by: ORTHOPAEDIC SURGERY

## 2022-10-06 PROCEDURE — G8432 DEP SCR NOT DOC, RNG: HCPCS | Performed by: ORTHOPAEDIC SURGERY

## 2022-10-06 PROCEDURE — G8417 CALC BMI ABV UP PARAM F/U: HCPCS | Performed by: ORTHOPAEDIC SURGERY

## 2022-10-06 PROCEDURE — G8536 NO DOC ELDER MAL SCRN: HCPCS | Performed by: ORTHOPAEDIC SURGERY

## 2022-10-06 PROCEDURE — 99213 OFFICE O/P EST LOW 20 MIN: CPT | Performed by: ORTHOPAEDIC SURGERY

## 2022-10-06 PROCEDURE — 1123F ACP DISCUSS/DSCN MKR DOCD: CPT | Performed by: ORTHOPAEDIC SURGERY

## 2022-10-06 PROCEDURE — G8756 NO BP MEASURE DOC: HCPCS | Performed by: ORTHOPAEDIC SURGERY

## 2022-10-06 NOTE — PROGRESS NOTES
Raj Powers (: 1945) is a 68 y.o. male, patient, here for evaluation of the following chief complaint(s):  Surgical Follow-up (Post op follow up - right total knee revision 2022)       HPI:    Follow-up after resection for infection. Doing well. States that he really does not have any pain. Allergies   Allergen Reactions    Sulfa (Sulfonamide Antibiotics) Rash       Current Outpatient Medications   Medication Sig    clotrimazole (LOTRIMIN) 1 % topical cream Apply  to affected area two (2) times a day. aspirin delayed-release 81 mg tablet Take  by mouth daily. coenzyme Q-10 (CO Q-10) 200 mg capsule Take  by mouth daily. magnesium oxide (MAG-OX) 400 mg tablet Take 400 mg by mouth in the morning. nitrofurantoin (MACRODANTIN) 100 mg capsule Take  by mouth nightly. Nitrofurantoin mono    cefadroxil (DURICEF) 500 mg capsule TAKE 2 CAPLETS BY MOUTH TWICE DAILY    Breo Ellipta 100-25 mcg/dose inhaler INHALE 1 PUFF BY MOUTH EVERY MORNING    furosemide (LASIX) 40 mg tablet Take 0.5 Tablets by mouth daily. Take 1 tablet by mouth daily. zinc 50 mg tab tablet Take 50 mg by mouth daily. famotidine (PEPCID PO) Take  by mouth as needed. metoprolol succinate (TOPROL-XL) 25 mg XL tablet TAKE 1/2 TABLET BY MOUTH EVERY DAY    tamsulosin (FLOMAX) 0.4 mg capsule Take 1 Capsule by mouth daily. montelukast (SINGULAIR) 10 mg tablet TAKE 1 TABLET BY MOUTH AT BEDTIME FOR BREATHING    Omeprazole delayed release (PRILOSEC D/R) 20 mg tablet Take 1 Tablet by mouth daily. cpap machine kit by Does Not Apply route. rosuvastatin (CRESTOR) 10 mg tablet rosuvastatin 10 mg tablet   TAKE 1 TABLET BY MOUTH AT BEDTIME    nitroglycerin (NITROSTAT) 0.4 mg SL tablet nitroglycerin 0.4 mg sublingual tablet    acetaminophen (TYLENOL) 500 mg tablet Take 1,000 mg by mouth every six (6) hours as needed for Pain. calcium-vitamin D (OYSTER SHELL) 500 mg(1,250mg) -200 unit per tablet Take 1 Tablet by mouth daily. mometasone-formoterol (DULERA) 100-5 mcg/actuation HFA inhaler Take 1 Puff by inhalation two (2) times a day. dutasteride (AVODART) 0.5 mg capsule Take 0.5 mg by mouth daily. mecobal/levomefolat Ca/B6 phos (FOLTANX PO) Take  by mouth daily. albuterol-ipratropium (DUO-NEB) 2.5 mg-0.5 mg/3 ml nebu 3 mL by Nebulization route every six (6) hours as needed. levocetirizine (XYZAL) 5 mg tablet Take 5 mg by mouth nightly. cranberry fruit extract (CRANBERRY CONCENTRATE PO) Take 360 mg by mouth nightly. No current facility-administered medications for this visit.        Past Medical History:   Diagnosis Date    Arthritis     Asthma     R/T ENVIRONMENTAL ALLERGIES; INHALER USE DAILY    CAD (coronary artery disease) 2007    MI    GERD (gastroesophageal reflux disease)     History of BPH     History of kidney stones     Hx: UTI (urinary tract infection)     Hypertension     Lymphedema     Nausea & vomiting     SANTA on CPAP     Sepsis ([address] 8/16/2018        Past Surgical History:   Procedure Laterality Date    HX HEART CATHETERIZATION  2007, 2016    STENTS X3  (INGRID--DR NUNEZ)    HX KNEE REPLACEMENT Right 2018    HX KNEE REPLACEMENT Right 2022    HX LITHOTRIPSY      2-3X    HX ORTHOPAEDIC Right 1964    FEMUR ORIF  (DR PERSCOTT)    HX TONSILLECTOMY      HX UROLOGICAL      CYSTO    KY BREAST SURGERY PROCEDURE UNLISTED Right     EXCISION OF BREAST LUMP (BENIGN)    VASCULAR SURGERY PROCEDURE UNLIST Right     LEG VEIN BYPASS (INGRID)       Family History   Problem Relation Age of Onset    Lung Disease Mother     Asthma Mother     Cancer Father         LUNG    Cancer Brother         BRAIN    Heart Disease Brother     Heart Disease Brother     Heart Disease Brother     Asthma Daughter     Anesth Problems Neg Hx         Social History     Socioeconomic History    Marital status:      Spouse name: Not on file    Number of children: Not on file    Years of education: Not on file    Highest education level: Not on file   Occupational History    Not on file   Tobacco Use    Smoking status: Former     Packs/day: 2.00     Years: 20.00     Pack years: 40.00     Types: Cigarettes     Quit date: 5     Years since quittin.7    Smokeless tobacco: Never   Vaping Use    Vaping Use: Never used   Substance and Sexual Activity    Alcohol use: No    Drug use: No    Sexual activity: Not on file   Other Topics Concern    Not on file   Social History Narrative    Not on file     Social Determinants of Health     Financial Resource Strain: Not on file   Food Insecurity: Not on file   Transportation Needs: Not on file   Physical Activity: Not on file   Stress: Not on file   Social Connections: Not on file   Intimate Partner Violence: Not on file   Housing Stability: Not on file       ROS    Positive for: Musculoskeletal  Last edited by Aimee Wolf on 10/6/2022  1:24 PM.            Vitals:  Ht 6' 1\" (1.854 m)   Wt 257 lb (116.6 kg)   BMI 33.91 kg/m²    Body mass index is 33.91 kg/m². PHYSICAL EXAM:  Exam shows small effusion. Extensor mechanism is intact with range of motion 0 to 100 degrees. IMAGING:  XR Results (most recent):  Results from Appointment encounter on 10/06/22    XR KNEE RT 3 V    Narrative  3 views of his right total knee show a total knee in place no evidence of implant loosening or malposition. Patella tracks slightly lateral with some erosion of the lateral facet of patella. ASSESSMENT/PLAN:  1. Status post revision of total replacement of right knee  -     XR KNEE RT 3 V; Future    Doing well. Did aspirate the knee today and sent for [facility] analysis for alpha defense and. Did not appear infected at all. I think that he can follow-up with us on an as-needed basis. Patient was explained the procedure. Verbal consent was given. He agreed and I aspirated after Betadine prep his right knee. 5 cc of blood-tinged synovial fluid were obtained without sign of purulence.   This was sent for infection analysis. An electronic signature was used to authenticate this note.   --Meri Chaudhry MD

## 2022-10-21 DIAGNOSIS — R21 RASH: ICD-10-CM

## 2022-10-21 RX ORDER — CHLORPHENIRAMINE MALEATE 4 MG
TABLET ORAL
Qty: 15 G | Refills: 1 | Status: SHIPPED | OUTPATIENT
Start: 2022-10-21

## 2022-11-09 ENCOUNTER — TELEPHONE (OUTPATIENT)
Dept: FAMILY MEDICINE CLINIC | Age: 77
End: 2022-11-09

## 2022-11-09 ENCOUNTER — NURSE TRIAGE (OUTPATIENT)
Dept: OTHER | Facility: CLINIC | Age: 77
End: 2022-11-09

## 2022-11-09 NOTE — TELEPHONE ENCOUNTER
Pt has bilateral leg swelling. Pt now as leakage on is left leg. Pt is taking his fluid medication daily. Pt made an appt for 11-11, but would like the nurse, or Dr to contact him about what he should do. Please advise.

## 2022-11-09 NOTE — TELEPHONE ENCOUNTER
Patient called and notified per Dr. Tom Freitas, \"Pass along that I want him to double his Lasix dosage. \"    Verbalizes understanding. Reminded of appt on 11/11/22 at [facility]. 49 understanding.

## 2022-11-09 NOTE — TELEPHONE ENCOUNTER
Location of patient: [address] [place] from [facility] [address] at [facility] with Mobile Authentication. Subjective: Caller states \"Last night my leg was draining and wet\"     Current Symptoms: weeping edema to left leg, bilateral swelling to legs ankles and feet - swelling extends half way up lower legs    Onset: States - \"for 25 years\"      Pain Severity: 0/10; Denies - red streaking / discoloration / chest pain / shortness of breath / swelling of face arms or hands    Temperature: denies       Recommended disposition: See in Office Today    Care advice provided, patient verbalizes understanding; denies any other questions or concerns; instructed to call back for any new or worsening symptoms. Patient/Caller agrees with recommended disposition; writer provided warm transfer to LTAC, located within [facility] at [facility] for appointment scheduling    Attention Provider: Thank you for allowing me to participate in the care of your patient. The patient was connected to triage in response to information provided to the ECC. Please do not respond through this encounter as the response is not directed to a shared pool.         Reason for Disposition   Looks like a boil, infected sore, deep ulcer, or other infected rash (spreading redness, pus)    Protocols used: Leg Swelling and Edema-ADULT-[place]

## 2022-11-11 ENCOUNTER — OFFICE VISIT (OUTPATIENT)
Dept: FAMILY MEDICINE CLINIC | Age: 77
End: 2022-11-11
Payer: MEDICARE

## 2022-11-11 VITALS
BODY MASS INDEX: 34.3 KG/M2 | DIASTOLIC BLOOD PRESSURE: 77 MMHG | WEIGHT: 258.8 LBS | TEMPERATURE: 96.9 F | HEART RATE: 98 BPM | SYSTOLIC BLOOD PRESSURE: 121 MMHG | HEIGHT: 73 IN | OXYGEN SATURATION: 95 % | RESPIRATION RATE: 18 BRPM

## 2022-11-11 DIAGNOSIS — R60.0 LOWER EXTREMITY EDEMA: ICD-10-CM

## 2022-11-11 DIAGNOSIS — I89.0 LYMPHEDEMA: Primary | ICD-10-CM

## 2022-11-11 PROCEDURE — 3074F SYST BP LT 130 MM HG: CPT | Performed by: STUDENT IN AN ORGANIZED HEALTH CARE EDUCATION/TRAINING PROGRAM

## 2022-11-11 PROCEDURE — 3078F DIAST BP <80 MM HG: CPT | Performed by: STUDENT IN AN ORGANIZED HEALTH CARE EDUCATION/TRAINING PROGRAM

## 2022-11-11 PROCEDURE — 99214 OFFICE O/P EST MOD 30 MIN: CPT | Performed by: STUDENT IN AN ORGANIZED HEALTH CARE EDUCATION/TRAINING PROGRAM

## 2022-11-11 PROCEDURE — 1123F ACP DISCUSS/DSCN MKR DOCD: CPT | Performed by: STUDENT IN AN ORGANIZED HEALTH CARE EDUCATION/TRAINING PROGRAM

## 2022-11-11 RX ORDER — BUMETANIDE 1 MG/1
1 TABLET ORAL DAILY
Qty: 90 TABLET | Refills: 0 | Status: SHIPPED | OUTPATIENT
Start: 2022-11-11

## 2022-11-11 NOTE — PROGRESS NOTES
Subjective  Deepa Scott is an 68 y.o. male who presents for weeping left leg. Patient has chronic bilateral leg swelling. Chronic for years. Previously diagnosed as lymphedema. Used to follow with lymphedema clinic. Edema is refractory to wrapping, compressions stockings, lasix and physical activity. Swelling today is at baseline but he reports the left leg started weeping a few days ago. Nontender. Not red or warm. No fevers or chills. No orthopnea. Uses 2 pillows and always has. No SOB, CP. No urinary symptoms. Compliant with daily lasix. Allergies - reviewed: Allergies   Allergen Reactions    Sulfa (Sulfonamide Antibiotics) Rash         Medications - reviewed:   Current Outpatient Medications   Medication Sig    bumetanide (BUMEX) 1 mg tablet Take 1 Tablet by mouth daily. clotrimazole (LOTRIMIN) 1 % topical cream APPLY TOPICALLY TO THE AFFECTED AREA TWICE DAILY    aspirin delayed-release 81 mg tablet Take  by mouth daily. coenzyme Q-10 (CO Q-10) 200 mg capsule Take  by mouth daily. magnesium oxide (MAG-OX) 400 mg tablet Take 400 mg by mouth in the morning. nitrofurantoin (MACRODANTIN) 100 mg capsule Take  by mouth nightly. Nitrofurantoin mono    cefadroxil (DURICEF) 500 mg capsule TAKE 2 CAPLETS BY MOUTH TWICE DAILY    Breo Ellipta 100-25 mcg/dose inhaler INHALE 1 PUFF BY MOUTH EVERY MORNING    zinc 50 mg tab tablet Take 50 mg by mouth daily. famotidine (PEPCID PO) Take  by mouth as needed. metoprolol succinate (TOPROL-XL) 25 mg XL tablet TAKE 1/2 TABLET BY MOUTH EVERY DAY    tamsulosin (FLOMAX) 0.4 mg capsule Take 1 Capsule by mouth daily. montelukast (SINGULAIR) 10 mg tablet TAKE 1 TABLET BY MOUTH AT BEDTIME FOR BREATHING    Omeprazole delayed release (PRILOSEC D/R) 20 mg tablet Take 1 Tablet by mouth daily. cpap machine kit by Does Not Apply route.     rosuvastatin (CRESTOR) 10 mg tablet rosuvastatin 10 mg tablet   TAKE 1 TABLET BY MOUTH AT BEDTIME    nitroglycerin (NITROSTAT) 0.4 mg SL tablet nitroglycerin 0.4 mg sublingual tablet    acetaminophen (TYLENOL) 500 mg tablet Take 1,000 mg by mouth every six (6) hours as needed for Pain. calcium-vitamin D (OYSTER SHELL) 500 mg(1,250mg) -200 unit per tablet Take 1 Tablet by mouth daily. mometasone-formoterol (DULERA) 100-5 mcg/actuation HFA inhaler Take 1 Puff by inhalation two (2) times a day. dutasteride (AVODART) 0.5 mg capsule Take 0.5 mg by mouth daily. mecobal/levomefolat Ca/B6 phos (FOLTANX PO) Take  by mouth daily. albuterol-ipratropium (DUO-NEB) 2.5 mg-0.5 mg/3 ml nebu 3 mL by Nebulization route every six (6) hours as needed. levocetirizine (XYZAL) 5 mg tablet Take 5 mg by mouth nightly. cranberry fruit extract (CRANBERRY CONCENTRATE PO) Take 360 mg by mouth nightly. No current facility-administered medications for this visit.          Past Medical History - reviewed:  Past Medical History:   Diagnosis Date    Arthritis     Asthma     R/T ENVIRONMENTAL ALLERGIES; INHALER USE DAILY    CAD (coronary artery disease) 2007    MI    GERD (gastroesophageal reflux disease)     History of BPH     History of kidney stones     Hx: UTI (urinary tract infection)     Hypertension     Lymphedema     Nausea & vomiting     SANTA on CPAP     Sepsis ([address] [facility] [address] 8/16/2018         Past Surgical History - reviewed:   Past Surgical History:   Procedure Laterality Date    HX HEART CATHETERIZATION  2007, 2016    STENTS X3  (INGRID--DR NUNEZ)    HX KNEE REPLACEMENT Right 2018    HX KNEE REPLACEMENT Right 2022    HX LITHOTRIPSY      2-3X    HX ORTHOPAEDIC Right 1964    FEMUR ORIF  (DR PRESCOTT)    HX TONSILLECTOMY      HX UROLOGICAL      CYSTO    ND BREAST SURGERY PROCEDURE UNLISTED Right     EXCISION OF BREAST LUMP (BENIGN)    VASCULAR SURGERY PROCEDURE UNLIST Right     LEG VEIN BYPASS (INGRID)         Social History - reviewed:  Social History     Socioeconomic History    Marital status:      Spouse name: Not on file    Number of children: Not on file    Years of education: Not on file    Highest education level: Not on file   Occupational History    Not on file   Tobacco Use    Smoking status: Former     Packs/day: 2.00     Years: 20.00     Pack years: 40.00     Types: Cigarettes     Quit date: 5     Years since quittin.8    Smokeless tobacco: Never   Vaping Use    Vaping Use: Never used   Substance and Sexual Activity    Alcohol use: No    Drug use: No    Sexual activity: Not on file   Other Topics Concern    Not on file   Social History Narrative    Not on file     Social Determinants of Health     Financial Resource Strain: Not on file   Food Insecurity: Not on file   Transportation Needs: Not on file   Physical Activity: Not on file   Stress: Not on file   Social Connections: Not on file   Intimate Partner Violence: Not on file   Housing Stability: Not on file         Family History - reviewed:  Family History   Problem Relation Age of Onset    Lung Disease Mother     Asthma Mother     Cancer Father         LUNG    Cancer Brother         BRAIN    Heart Disease Brother     Heart Disease Brother     Heart Disease Brother     Asthma Daughter     Anesth Problems Neg Hx          Immunizations - reviewed: There is no immunization history on file for this patient.       ROS  In HPI      Physical Exam  Visit Vitals  /77 (BP 1 Location: Right upper arm, BP Patient Position: Sitting, BP Cuff Size: Large adult)   Pulse 98   Temp 96.9 °F (36.1 °C) (Oral)   Resp 18   Ht 6' 1\" (1.854 m)   Wt 258 lb 12.8 oz (117.4 kg)   SpO2 95%   BMI 34.14 kg/m²       General appearance - alert, well appearing, and in no distress  Chest - clear to auscultation, no wheezes, rales or rhonchi, symmetric air entry  Heart - normal rate, regular rhythm, normal S1, S2, no murmurs, rubs, clicks or gallops  Neurological - alert, oriented, normal speech, no focal findings or movement disorder noted  Extremities - peripheral pulses normal, 4+ LE edema to level of knee with left calf weeping. No erythema or tenderness. No warmth. Homans neg. Assessment/Plan    ICD-10-CM ICD-9-CM    1. Lymphedema  I89.0 457.1 bumetanide (BUMEX) 1 mg tablet      NT-PRO BNP      METABOLIC PANEL, BASIC      2. Lower extremity edema  R60.0 782.3 NT-PRO BNP      METABOLIC PANEL, BASIC        This likely represents worsening of patients lymphedema. Homans neg, nontender and swelling at BL so likely not DVT. No orthopnea, JVD, SOB and lung exam benign so likely not HF though would be prudent to rule out with BNP.  - Change lasix to bumex (1mg every day. Can go to BID if refractory)  - Wear socks  - RTC in 1 week  - Collect BNP to rule out HF (low suspicion) and BMP to check renal xn (no issues previously)  - RTC next week      Follow-up and Dispositions    Return in about 1 week (around 11/18/2022) for Le edema follow up. I have discussed the diagnosis with the patient and the intended plan as seen in the above orders. Patient verbalized understanding of the plan and agrees with the plan. The patient has received an after-visit summary and questions were answered concerning future plans. I have discussed medication side effects and warnings with the patient as well. Informed patient to return to the office if new symptoms arise.         Isabel Andrade MD  Family Medicine Resident

## 2022-11-11 NOTE — PATIENT INSTRUCTIONS
Start bumex 1mg (1 tablet) once daily  You can increase dose to 1mg (1 tablet) twice a day if once a day isnt working  Come back in 1 week

## 2022-11-17 NOTE — PROGRESS NOTES
Subjective  Erlinda Lundberg is an 68 y.o. male who presents for ER visit follow up. Follow up on slipped disk and muscle spasms. Went to ER thinking he had an ascending UTI d/t back pain. Turns out it's MSK. Given steroids and and pain medicine. Finished course. Feeling better now. Reports he's still feeling tired. Weight hasn't changed despite lasix 20 every day. Legs unchanged. Will recheck BMP and go up if kidneys ok. Offered weight loss clinic. Patient declined for now. Will think about it. Has not seen endocrinology for incidentaloma work up. Has not seen gastroenterology. Allergies - reviewed: Allergies   Allergen Reactions    Sulfa (Sulfonamide Antibiotics) Rash         Medications - reviewed:   Current Outpatient Medications   Medication Sig    furosemide (LASIX) 20 mg tablet TAKE 1 TABLET BY MOUTH DAILY    rosuvastatin (CRESTOR) 10 mg tablet rosuvastatin 10 mg tablet   TAKE 1 TABLET BY MOUTH AT BEDTIME    nitroglycerin (NITROSTAT) 0.4 mg SL tablet nitroglycerin 0.4 mg sublingual tablet    triamcinolone (NASACORT AQ) 55 mcg nasal inhaler 2 Sprays by Both Nostrils route daily.  aspirin 81 mg chewable tablet Take 81 mg by mouth daily.  tamsulosin (FLOMAX) 0.4 mg capsule Take 0.4 mg by mouth daily.  acetaminophen (TYLENOL EXTRA STRENGTH) 500 mg tablet Take 1,000 mg by mouth every six (6) hours as needed for Pain.  calcium-vitamin D (OYSTER SHELL) 500 mg(1,250mg) -200 unit per tablet Take 1 Tablet by mouth daily.  mometasone-formoterol (DULERA) 100-5 mcg/actuation HFA inhaler Take 1 Puff by inhalation two (2) times a day.  dutasteride (AVODART) 0.5 mg capsule Take 0.5 mg by mouth daily.  mecobal/levomefolat Ca/B6 phos (FOLTANX PO) Take  by mouth daily.  albuterol-ipratropium (DUO-NEB) 2.5 mg-0.5 mg/3 ml nebu 3 mL by Nebulization route every six (6) hours as needed.  levocetirizine (XYZAL) 5 mg tablet Take 5 mg by mouth nightly.     metoprolol succinate (TOPROL-XL) 25 mg XL tablet Take 25 mg by mouth daily.  montelukast (SINGULAIR) 10 mg tablet Take 10 mg by mouth nightly.  coenzyme Q-10 (CO Q-10) 200 mg capsule Take 200 mg by mouth daily.  Omeprazole delayed release (PRILOSEC D/R) 20 mg tablet Take 20 mg by mouth two (2) times a day.  cranberry fruit extract (CRANBERRY CONCENTRATE PO) Take 360 mg by mouth nightly.  cephALEXin (KEFLEX) 500 mg capsule Take 500 mg by mouth three (3) times daily. (Patient not taking: Reported on 7/27/2021)    ergocalciferol (VITAMIN D2) 50,000 unit capsule Take 50,000 Units by mouth Every Thursday. (Patient not taking: Reported on 5/27/2021)     No current facility-administered medications for this visit.          Past Medical History - reviewed:  Past Medical History:   Diagnosis Date    Arthritis     Asthma     R/T ENVIRONMENTAL ALLERGIES; INHALER USE DAILY    CAD (coronary artery disease) 2007    MI    GERD (gastroesophageal reflux disease)     History of BPH     History of kidney stones     Hx: UTI (urinary tract infection)     Hypertension     Lymphedema     SANTA on CPAP          Past Surgical History - reviewed:   Past Surgical History:   Procedure Laterality Date    HX HEART CATHETERIZATION  2007, 2016    STENTS X3  (INGRID--DR NUNEZ)    HX LITHOTRIPSY      2-3X    HX ORTHOPAEDIC Right 1964    FEMUR ORIF  (DR PRESCOTT)    HX TONSILLECTOMY      HX UROLOGICAL      CYSTO    MS BREAST SURGERY PROCEDURE UNLISTED Right     EXCISION OF BREAST LUMP (BENIGN)    VASCULAR SURGERY PROCEDURE UNLIST Right     LEG VEIN BYPASS (INGRID)         Social History - reviewed:  Social History     Socioeconomic History    Marital status:      Spouse name: Not on file    Number of children: Not on file    Years of education: Not on file    Highest education level: Not on file   Occupational History    Not on file   Tobacco Use    Smoking status: Former Smoker     Packs/day: 2.00     Years: 20.00     Pack years: 40.00     Quit date: 5     Years since quittin.6    Smokeless tobacco: Never Used   Vaping Use    Vaping Use: Never used   Substance and Sexual Activity    Alcohol use: No    Drug use: No    Sexual activity: Not on file   Other Topics Concern    Not on file   Social History Narrative    Not on file     Social Determinants of Health     Financial Resource Strain:     Difficulty of Paying Living Expenses:    Food Insecurity:     Worried About Running Out of Food in the Last Year:     [address] in the Last Year:    Transportation Needs:     Lack of Transportation (Medical):  Lack of Transportation (Non-Medical):    Physical Activity:     Days of Exercise per Week:     Minutes of Exercise per Session:    Stress:     Feeling of Stress :    Social Connections:     Frequency of Communication with Friends and Family:     Frequency of Social Gatherings with Friends and Family:     Attends [employer] Services:     Active Member of Clubs or Organizations:     Attends Club or Organization Meetings:     Marital Status:    Intimate Partner Violence:     Fear of Current or Ex-Partner:     Emotionally Abused:     Physically Abused:     Sexually Abused:          Family History - reviewed:  Family History   Problem Relation Age of Onset    Lung Disease Mother     Asthma Mother     Cancer Father         LUNG    Cancer Brother         BRAIN    Heart Disease Brother     Heart Disease Brother     Heart Disease Brother     Anesth Problems Neg Hx          Immunizations - reviewed: There is no immunization history on file for this patient. ROS  As in HPI      Physical Exam  Visit Vitals  /78   Pulse 86   Temp 98.5 °F (36.9 °C)   Resp 20   Ht 6' 1\" (1.854 m)   Wt 267 lb (121.1 kg)   SpO2 94%   BMI 35.23 kg/m²       General appearance - alert, well appearing, and in no distress  Eyes - EOMI  Chest - RLL crackles otherwise CTAB.   Heart - normal rate, regular rhythm, normal S1, S2, no murmurs, rubs, clicks or gallops  Abdomen - soft, nontender, nondistended, no masses or organomegaly  Neurological - alert, oriented, normal speech, no focal findings or movement disorder noted  Skin - Bilateral pitting LE edema at baseline      Assessment/Plan    ICD-10-CM ICD-9-CM    1. Primary osteoarthritis of right knee  M17.11 715.16    2. Severe obesity (BMI 35.0-35.9 with comorbidity) ([facility])  E66.01 278.01     Z68.35 V85.35    3. Chronic low back pain with sciatica, sciatica laterality unspecified, unspecified back pain laterality  M54.40 724.2     G89.29 724.3      338.29    4. Lower extremity edema  B00.2 932.9 METABOLIC PANEL, BASIC      METABOLIC PANEL, BASIC      NT-PRO BNP   OA: Stable. - Counseled on diet and lifestyle changes to aid in weight loss  - Offered weight loss clinic - patient declined    Obesity: Weight stable  - As above    Chronic low back pain: Much better now. Back to baseline  - Continue current pain management    Lower extremity edema: No weight change on lasix 20mg every day. Questionable compliance  - Obtain BMP - if renal function at baseline will go up to lasix 40mg every day  - Obtain proBNP       Follow-up and Dispositions    · Return in 3 months (on 10/27/2021) for - please schedule a [address] [facility] appointment. I have discussed the diagnosis with the patient and the intended plan as seen in the above orders. Patient verbalized understanding of the plan and agrees with the plan. The patient has received an after-visit summary and questions were answered concerning future plans. I have discussed medication side effects and warnings with the patient as well. Informed patient to return to the office if new symptoms arise.         Prisca Guillen MD  Family Medicine Resident 5

## 2022-11-18 ENCOUNTER — OFFICE VISIT (OUTPATIENT)
Dept: FAMILY MEDICINE CLINIC | Age: 77
End: 2022-11-18
Payer: MEDICARE

## 2022-11-18 VITALS
OXYGEN SATURATION: 94 % | HEART RATE: 83 BPM | SYSTOLIC BLOOD PRESSURE: 130 MMHG | TEMPERATURE: 97.9 F | RESPIRATION RATE: 20 BRPM | DIASTOLIC BLOOD PRESSURE: 85 MMHG | WEIGHT: 259 LBS | BODY MASS INDEX: 34.33 KG/M2 | HEIGHT: 73 IN

## 2022-11-18 DIAGNOSIS — R60.0 LOWER EXTREMITY EDEMA: Primary | ICD-10-CM

## 2022-11-18 LAB
ANION GAP SERPL CALC-SCNC: 7 MMOL/L (ref 5–15)
BNP SERPL-MCNC: 49 PG/ML
BUN SERPL-MCNC: 22 MG/DL (ref 6–20)
BUN/CREAT SERPL: 21 (ref 12–20)
CALCIUM SERPL-MCNC: 8.6 MG/DL (ref 8.5–10.1)
CHLORIDE SERPL-SCNC: 111 MMOL/L (ref 97–108)
CO2 SERPL-SCNC: 23 MMOL/L (ref 21–32)
CREAT SERPL-MCNC: 1.07 MG/DL (ref 0.7–1.3)
GLUCOSE SERPL-MCNC: 117 MG/DL (ref 65–100)
POTASSIUM SERPL-SCNC: 4.1 MMOL/L (ref 3.5–5.1)
SODIUM SERPL-SCNC: 141 MMOL/L (ref 136–145)

## 2022-11-18 PROCEDURE — 99213 OFFICE O/P EST LOW 20 MIN: CPT | Performed by: STUDENT IN AN ORGANIZED HEALTH CARE EDUCATION/TRAINING PROGRAM

## 2022-11-18 PROCEDURE — G8754 DIAS BP LESS 90: HCPCS | Performed by: STUDENT IN AN ORGANIZED HEALTH CARE EDUCATION/TRAINING PROGRAM

## 2022-11-18 PROCEDURE — G8432 DEP SCR NOT DOC, RNG: HCPCS | Performed by: STUDENT IN AN ORGANIZED HEALTH CARE EDUCATION/TRAINING PROGRAM

## 2022-11-18 PROCEDURE — G8752 SYS BP LESS 140: HCPCS | Performed by: STUDENT IN AN ORGANIZED HEALTH CARE EDUCATION/TRAINING PROGRAM

## 2022-11-18 PROCEDURE — G8536 NO DOC ELDER MAL SCRN: HCPCS | Performed by: STUDENT IN AN ORGANIZED HEALTH CARE EDUCATION/TRAINING PROGRAM

## 2022-11-18 PROCEDURE — 1123F ACP DISCUSS/DSCN MKR DOCD: CPT | Performed by: STUDENT IN AN ORGANIZED HEALTH CARE EDUCATION/TRAINING PROGRAM

## 2022-11-18 PROCEDURE — G8417 CALC BMI ABV UP PARAM F/U: HCPCS | Performed by: STUDENT IN AN ORGANIZED HEALTH CARE EDUCATION/TRAINING PROGRAM

## 2022-11-18 PROCEDURE — G8428 CUR MEDS NOT DOCUMENT: HCPCS | Performed by: STUDENT IN AN ORGANIZED HEALTH CARE EDUCATION/TRAINING PROGRAM

## 2022-11-18 PROCEDURE — 3078F DIAST BP <80 MM HG: CPT | Performed by: STUDENT IN AN ORGANIZED HEALTH CARE EDUCATION/TRAINING PROGRAM

## 2022-11-18 PROCEDURE — 3074F SYST BP LT 130 MM HG: CPT | Performed by: STUDENT IN AN ORGANIZED HEALTH CARE EDUCATION/TRAINING PROGRAM

## 2022-11-18 PROCEDURE — 1101F PT FALLS ASSESS-DOCD LE1/YR: CPT | Performed by: STUDENT IN AN ORGANIZED HEALTH CARE EDUCATION/TRAINING PROGRAM

## 2022-11-18 NOTE — PROGRESS NOTES
Subjective  Alejandro Ch is an 68 y.o. male who presents for LE edema follow up. States he's doing much better. He's been using bumex 1 every day. Not using lasix. The leg has stopped weeping. He has been urinating a lot d/t the new diuretic. No dysuria or fevers. Using compression socks. Wants his thyroid tested as he was told that can sometimes cause swelling. No cold intoelreance, unwanted weight gain, feeling sluggish or depressed, constipation. Does not want a higher dose of bumex    Allergies - reviewed: Allergies   Allergen Reactions    Sulfa (Sulfonamide Antibiotics) Rash         Medications - reviewed:   Current Outpatient Medications   Medication Sig    bumetanide (BUMEX) 1 mg tablet Take 1 Tablet by mouth daily. clotrimazole (LOTRIMIN) 1 % topical cream APPLY TOPICALLY TO THE AFFECTED AREA TWICE DAILY    aspirin delayed-release 81 mg tablet Take  by mouth daily. coenzyme Q-10 (CO Q-10) 200 mg capsule Take  by mouth daily. magnesium oxide (MAG-OX) 400 mg tablet Take 400 mg by mouth in the morning. nitrofurantoin (MACRODANTIN) 100 mg capsule Take  by mouth nightly. Nitrofurantoin mono    cefadroxil (DURICEF) 500 mg capsule TAKE 2 CAPLETS BY MOUTH TWICE DAILY    Breo Ellipta 100-25 mcg/dose inhaler INHALE 1 PUFF BY MOUTH EVERY MORNING    zinc 50 mg tab tablet Take 50 mg by mouth daily. famotidine (PEPCID PO) Take  by mouth as needed. metoprolol succinate (TOPROL-XL) 25 mg XL tablet TAKE 1/2 TABLET BY MOUTH EVERY DAY    tamsulosin (FLOMAX) 0.4 mg capsule Take 1 Capsule by mouth daily. montelukast (SINGULAIR) 10 mg tablet TAKE 1 TABLET BY MOUTH AT BEDTIME FOR BREATHING    Omeprazole delayed release (PRILOSEC D/R) 20 mg tablet Take 1 Tablet by mouth daily. cpap machine kit by Does Not Apply route.     rosuvastatin (CRESTOR) 10 mg tablet rosuvastatin 10 mg tablet   TAKE 1 TABLET BY MOUTH AT BEDTIME    nitroglycerin (NITROSTAT) 0.4 mg SL tablet nitroglycerin 0.4 mg sublingual tablet acetaminophen (TYLENOL) 500 mg tablet Take 1,000 mg by mouth every six (6) hours as needed for Pain. calcium-vitamin D (OYSTER SHELL) 500 mg(1,250mg) -200 unit per tablet Take 1 Tablet by mouth daily. mometasone-formoterol (DULERA) 100-5 mcg/actuation HFA inhaler Take 1 Puff by inhalation two (2) times a day. dutasteride (AVODART) 0.5 mg capsule Take 0.5 mg by mouth daily. mecobal/levomefolat Ca/B6 phos (FOLTANX PO) Take  by mouth daily. albuterol-ipratropium (DUO-NEB) 2.5 mg-0.5 mg/3 ml nebu 3 mL by Nebulization route every six (6) hours as needed. levocetirizine (XYZAL) 5 mg tablet Take 5 mg by mouth nightly. cranberry fruit extract (CRANBERRY CONCENTRATE PO) Take 360 mg by mouth nightly. No current facility-administered medications for this visit.          Past Medical History - reviewed:  Past Medical History:   Diagnosis Date    Arthritis     Asthma     R/T ENVIRONMENTAL ALLERGIES; INHALER USE DAILY    CAD (coronary artery disease) 2007    MI    GERD (gastroesophageal reflux disease)     History of BPH     History of kidney stones     Hx: UTI (urinary tract infection)     Hypertension     Lymphedema     Nausea & vomiting     SANTA on CPAP     Sepsis ([facility] [address] 8/16/2018         Past Surgical History - reviewed:   Past Surgical History:   Procedure Laterality Date    HX HEART CATHETERIZATION  2007, 2016    STENTS X3  (INGRID--DR NUNEZ)    HX KNEE REPLACEMENT Right 2018    HX KNEE REPLACEMENT Right 2022    HX LITHOTRIPSY      2-3X    HX ORTHOPAEDIC Right 1964    FEMUR ORIF  (DR PRESCOTT)    HX TONSILLECTOMY      HX UROLOGICAL      CYSTO    RI BREAST SURGERY PROCEDURE UNLISTED Right     EXCISION OF BREAST LUMP (BENIGN)    VASCULAR SURGERY PROCEDURE UNLIST Right     LEG VEIN BYPASS (INGRID)         Social History - reviewed:  Social History     Socioeconomic History    Marital status:      Spouse name: Not on file    Number of children: Not on file    Years of education: Not on file    Highest education level: Not on file   Occupational History    Not on file   Tobacco Use    Smoking status: Former     Packs/day: 2.00     Years: 20.00     Pack years: 40.00     Types: Cigarettes     Quit date: 5     Years since quittin.9    Smokeless tobacco: Never   Vaping Use    Vaping Use: Never used   Substance and Sexual Activity    Alcohol use: No    Drug use: No    Sexual activity: Not on file   Other Topics Concern    Not on file   Social History Narrative    Not on file     Social Determinants of Health     Financial Resource Strain: Not on file   Food Insecurity: Not on file   Transportation Needs: Not on file   Physical Activity: Not on file   Stress: Not on file   Social Connections: Not on file   Intimate Partner Violence: Not on file   Housing Stability: Not on file         Family History - reviewed:  Family History   Problem Relation Age of Onset    Lung Disease Mother     Asthma Mother     Cancer Father         LUNG    Cancer Brother         BRAIN    Heart Disease Brother     Heart Disease Brother     Heart Disease Brother     Asthma Daughter     Anesth Problems Neg Hx          Immunizations - reviewed: There is no immunization history on file for this patient. ROS  In HPI      Physical Exam  Visit Vitals  /85   Pulse 83   Temp 97.9 °F (36.6 °C)   Resp 20   Ht 6' 1\" (1.854 m)   Wt 259 lb (117.5 kg)   SpO2 94%   BMI 34.17 kg/m²       General appearance - alert, well appearing, and in no distress  Chest - clear to auscultation, no wheezes, rales or rhonchi, symmetric air entry  Heart - normal rate, regular rhythm, normal S1, S2, no murmurs, rubs, clicks or gallops  Extremities - 4+ LE (baseline) edema to level of knees. No weeping. No redness, warmth. Assessment/Plan    ICD-10-CM ICD-9-CM    1. Lower extremity edema  R60.0 782.3 TSH 3RD GENERATION      LE edema improved.  Still 4+ but he doesn't desire any more aggressive tx.  - Follow up on BNP and BMP collected yesterday  - Continue comression  - I recommended we increase the dose of bumex to 1 BID but patient declined. He is happy with his current level of LE edema. - Collect TSH    Patient not examined by attending d/t COVID pandemic    Follow-up and Dispositions    Return in about 2 weeks (around 12/2/2022) for Well man visit. I have discussed the diagnosis with the patient and the intended plan as seen in the above orders. Patient verbalized understanding of the plan and agrees with the plan. The patient has received an after-visit summary and questions were answered concerning future plans. I have discussed medication side effects and warnings with the patient as well. Informed patient to return to the office if new symptoms arise.         Jorge Osullivan MD  Family Medicine Resident

## 2022-11-18 NOTE — PROGRESS NOTES
1. Have you been to the ER, urgent care clinic since your last visit? Hospitalized since your last visit? No    2. Have you seen or consulted any other health care providers outside of the [address] [place] [address] since your last visit? Include any pap smears or colon screening. No  Reviewed record in preparation for visit and have necessary documentation  Pt did not bring medication to office visit for review  opportunity was given for questions      3. For patients aged 39-70: Has the patient had a colonoscopy / FIT/ Cologuard? Yes - no Care Gap present      If the patient is female:    4. For patients aged 41-77: Has the patient had a mammogram within the past 2 years? NA - based on age or sex      11. For patients aged 21-65: Has the patient had a pap smear?  NA - based on age or sex      Goals that were addressed and/or need to be completed during or after this appointment include   Health Maintenance Due   Topic Date Due    COVID-19 Vaccine (1) Never done    Pneumococcal 65+ years (1 - PCV) Never done    DTaP/Tdap/Td series (1 - Tdap) Never done    Shingrix Vaccine Age 50> (1 of 2) Never done

## 2022-11-18 NOTE — LETTER
NOTIFICATION RETURN TO WORK    11/18/2022 11:11 AM    Mr. Chase Murillo  [address] [place] [id number]      To Whom It May Concern: Chase Murillo is currently under the care of Renee Mcdaniels. He was previously excused from work through the 15th of November. Please further excuse him from work until the 20th of November (15th-20th). He is cleared to return to work on the 21st of November (Monday). If there are questions or concerns please have the patient contact our office.         Sincerely,      Bess Andre MD

## 2022-11-19 LAB — TSH SERPL DL<=0.05 MIU/L-ACNC: 2.06 UIU/ML (ref 0.36–3.74)

## 2022-12-01 NOTE — TELEPHONE ENCOUNTER
Patient has given consent to record this visit for documentation in their clinical record.     Pt has one pill left

## 2022-12-08 ENCOUNTER — OFFICE VISIT (OUTPATIENT)
Dept: FAMILY MEDICINE CLINIC | Age: 77
End: 2022-12-08
Payer: MEDICARE

## 2022-12-08 VITALS
WEIGHT: 259.4 LBS | TEMPERATURE: 97.5 F | HEART RATE: 77 BPM | HEIGHT: 73 IN | BODY MASS INDEX: 34.38 KG/M2 | RESPIRATION RATE: 16 BRPM | DIASTOLIC BLOOD PRESSURE: 81 MMHG | OXYGEN SATURATION: 95 % | SYSTOLIC BLOOD PRESSURE: 115 MMHG

## 2022-12-08 DIAGNOSIS — K21.9 GASTROESOPHAGEAL REFLUX DISEASE WITHOUT ESOPHAGITIS: ICD-10-CM

## 2022-12-08 DIAGNOSIS — I89.0 LYMPHEDEMA: ICD-10-CM

## 2022-12-08 DIAGNOSIS — I10 PRIMARY HYPERTENSION: ICD-10-CM

## 2022-12-08 DIAGNOSIS — G47.33 OBSTRUCTIVE SLEEP APNEA SYNDROME: ICD-10-CM

## 2022-12-08 DIAGNOSIS — E78.2 MIXED HYPERLIPIDEMIA: ICD-10-CM

## 2022-12-08 DIAGNOSIS — I25.10 ARTERIOSCLEROSIS OF CORONARY ARTERY: Primary | ICD-10-CM

## 2022-12-08 DIAGNOSIS — M17.11 PRIMARY OSTEOARTHRITIS OF RIGHT KNEE: ICD-10-CM

## 2022-12-08 PROCEDURE — G8427 DOCREV CUR MEDS BY ELIG CLIN: HCPCS | Performed by: FAMILY MEDICINE

## 2022-12-08 PROCEDURE — G8417 CALC BMI ABV UP PARAM F/U: HCPCS | Performed by: FAMILY MEDICINE

## 2022-12-08 PROCEDURE — 1101F PT FALLS ASSESS-DOCD LE1/YR: CPT | Performed by: FAMILY MEDICINE

## 2022-12-08 PROCEDURE — 3074F SYST BP LT 130 MM HG: CPT | Performed by: FAMILY MEDICINE

## 2022-12-08 PROCEDURE — G8510 SCR DEP NEG, NO PLAN REQD: HCPCS | Performed by: FAMILY MEDICINE

## 2022-12-08 PROCEDURE — 3078F DIAST BP <80 MM HG: CPT | Performed by: FAMILY MEDICINE

## 2022-12-08 PROCEDURE — G8536 NO DOC ELDER MAL SCRN: HCPCS | Performed by: FAMILY MEDICINE

## 2022-12-08 PROCEDURE — 99214 OFFICE O/P EST MOD 30 MIN: CPT | Performed by: FAMILY MEDICINE

## 2022-12-08 PROCEDURE — G8752 SYS BP LESS 140: HCPCS | Performed by: FAMILY MEDICINE

## 2022-12-08 PROCEDURE — 1123F ACP DISCUSS/DSCN MKR DOCD: CPT | Performed by: FAMILY MEDICINE

## 2022-12-08 PROCEDURE — G8754 DIAS BP LESS 90: HCPCS | Performed by: FAMILY MEDICINE

## 2022-12-08 RX ORDER — METOPROLOL SUCCINATE 25 MG/1
12.5 TABLET, EXTENDED RELEASE ORAL DAILY
Qty: 45 TABLET | Refills: 1 | Status: SHIPPED | OUTPATIENT
Start: 2022-12-08

## 2022-12-08 NOTE — PROGRESS NOTES
[facility]    History of Present Illness: Deepa Scott is a 68 y.o. male with history of HLD, CAD, Adrenal incidentaloma, GERD, SANTA, Asthma, Septic arthritis. CC: Follow up  History provided by patient and Records    HPI:  Lymphedema: Patient noting that the swelling has not improved significantly but he fluid coming out of the leg has improved with the bumex though. CAD: Overall stable at this time, denies chest pains at this time. Hypertension Follow up:  Currently Taking:   Key CAD CHF Meds               metoprolol succinate (TOPROL-XL) 25 mg XL tablet (Taking) Take 0.5 Tablets by mouth daily. bumetanide (BUMEX) 1 mg tablet (Taking) Take 1 Tablet by mouth daily. aspirin delayed-release 81 mg tablet (Taking) Take  by mouth daily. rosuvastatin (CRESTOR) 10 mg tablet (Taking) rosuvastatin 10 mg tablet   TAKE 1 TABLET BY MOUTH AT BEDTIME    nitroglycerin (NITROSTAT) 0.4 mg SL tablet (Taking) nitroglycerin 0.4 mg sublingual tablet           The patient reports:  taking medications as instructed, no medication side effects noted, no TIA's, no chest pain on exertion, no dyspnea on exertion, noting swelling of ankles, no orthostatic dizziness or lightheadedness, no orthopnea or paroxysmal nocturnal dyspnea. BP Readings from Last 3 Encounters:   12/08/22 115/81   11/18/22 130/85   11/11/22 121/77        Health Maintenance  Health Maintenance Due   Topic Date Due    COVID-19 Vaccine (1) Never done    Pneumococcal 65+ years (1 - PCV) Never done    DTaP/Tdap/Td series (1 - Tdap) Never done    Shingrix Vaccine Age 50> (1 of 2) Never done       Past Medical, Family, and Social History:     Current Outpatient Medications on File Prior to Visit   Medication Sig Dispense Refill    bumetanide (BUMEX) 1 mg tablet Take 1 Tablet by mouth daily.  90 Tablet 0    clotrimazole (LOTRIMIN) 1 % topical cream APPLY TOPICALLY TO THE AFFECTED AREA TWICE DAILY 15 g 1    aspirin delayed-release 81 mg tablet Take  by mouth daily. coenzyme Q-10 (CO Q-10) 200 mg capsule Take  by mouth daily. magnesium oxide (MAG-OX) 400 mg tablet Take 400 mg by mouth in the morning. nitrofurantoin (MACRODANTIN) 100 mg capsule Take  by mouth nightly. Nitrofurantoin mono      cefadroxil (DURICEF) 500 mg capsule TAKE 2 CAPLETS BY MOUTH TWICE DAILY      Breo Ellipta 100-25 mcg/dose inhaler INHALE 1 PUFF BY MOUTH EVERY MORNING      zinc 50 mg tab tablet Take 50 mg by mouth daily. famotidine (PEPCID PO) Take  by mouth as needed. tamsulosin (FLOMAX) 0.4 mg capsule Take 1 Capsule by mouth daily. 90 Capsule 3    montelukast (SINGULAIR) 10 mg tablet TAKE 1 TABLET BY MOUTH AT BEDTIME FOR BREATHING 90 Tablet 3    Omeprazole delayed release (PRILOSEC D/R) 20 mg tablet Take 1 Tablet by mouth daily. 90 Tablet 2    cpap machine kit by Does Not Apply route. rosuvastatin (CRESTOR) 10 mg tablet rosuvastatin 10 mg tablet   TAKE 1 TABLET BY MOUTH AT BEDTIME      nitroglycerin (NITROSTAT) 0.4 mg SL tablet nitroglycerin 0.4 mg sublingual tablet      acetaminophen (TYLENOL) 500 mg tablet Take 1,000 mg by mouth every six (6) hours as needed for Pain. calcium-vitamin D (OYSTER SHELL) 500 mg(1,250mg) -200 unit per tablet Take 1 Tablet by mouth daily. mometasone-formoterol (DULERA) 100-5 mcg/actuation HFA inhaler Take 1 Puff by inhalation two (2) times a day. dutasteride (AVODART) 0.5 mg capsule Take 0.5 mg by mouth daily. mecobal/levomefolat Ca/B6 phos (FOLTANX PO) Take  by mouth daily. albuterol-ipratropium (DUO-NEB) 2.5 mg-0.5 mg/3 ml nebu 3 mL by Nebulization route every six (6) hours as needed. levocetirizine (XYZAL) 5 mg tablet Take 5 mg by mouth nightly. cranberry fruit extract (CRANBERRY CONCENTRATE PO) Take 360 mg by mouth nightly.       [DISCONTINUED] metoprolol succinate (TOPROL-XL) 25 mg XL tablet TAKE 1/2 TABLET BY MOUTH EVERY DAY 15 Tablet 2     No current facility-administered medications on file prior to visit. Patient Active Problem List   Diagnosis Code    Primary osteoarthritis of right knee M17.11    Arthritis M19.90    Obstructive sleep apnea syndrome G47.33    Arteriosclerosis of coronary artery I25.10    GERD (gastroesophageal reflux disease) K21.9    Hypertension I10    Lymphedema I89.0    SANTA on CPAP G47.33, Z99.89    Septic arthritis (HCC) M00.9    Hyperlipidemia E78.5    Asthma J45.909    At moderate risk for fall Z91.81    Failed [facility] ([facility] T84.018A, Z96.659    Adrenal incidentaloma ([facility] E27.8       Social History     Socioeconomic History    Marital status:    Tobacco Use    Smoking status: Former     Packs/day: 2.00     Years: 20.00     Pack years: 40.00     Types: Cigarettes     Quit date:      Years since quittin.9    Smokeless tobacco: Never   Vaping Use    Vaping Use: Never used   Substance and Sexual Activity    Alcohol use: No    Drug use: No        Review of Systems   Review of Systems   Constitutional:  Negative for chills and fever. Cardiovascular:  Positive for leg swelling. Negative for chest pain, palpitations and orthopnea. Gastrointestinal:  Negative for abdominal pain, nausea and vomiting. Neurological:  Negative for dizziness, tingling and headaches. Objective:   Visit Vitals  /81 (BP 1 Location: Right arm, BP Patient Position: Sitting, BP Cuff Size: Large adult)   Pulse 77   Temp 97.5 °F (36.4 °C) (Oral)   Resp 16   Ht 6' 1\" (1.854 m)   Wt 259 lb 6.4 oz (117.7 kg)   SpO2 95%   BMI 34.22 kg/m²        Physical Exam  Vitals and nursing note reviewed. Constitutional:       Appearance: Normal appearance. HENT:      Head: Normocephalic and atraumatic. Cardiovascular:      Rate and Rhythm: Normal rate and regular rhythm. Pulses: Normal pulses. Heart sounds: Normal heart sounds. No murmur heard. No friction rub. No gallop.    Pulmonary:      Effort: Pulmonary effort is normal.      Breath sounds: Normal breath sounds. Abdominal:      General: Abdomen is flat. Bowel sounds are normal.      Palpations: Abdomen is soft. Musculoskeletal:      Cervical back: Normal range of motion and neck supple. Right lower leg: Edema present. Left lower leg: Edema present. Neurological:      Mental Status: He is alert. Pertinent Labs/Studies:      Assessment and orders:       ICD-10-CM ICD-9-CM    1. Arteriosclerosis of coronary artery  I25.10 414.00       2. Primary hypertension  I10 401.9 metoprolol succinate (TOPROL-XL) 25 mg XL tablet      CBC W/O DIFF      METABOLIC PANEL, COMPREHENSIVE      3. Obstructive sleep apnea syndrome  G47.33 327.23       4. Primary osteoarthritis of right knee  M17.11 715.16       5. Lymphedema  I89.0 457.1       6. Mixed hyperlipidemia  E78.2 272.2 LIPID PANEL      7. Gastroesophageal reflux disease without esophagitis  K21.9 530.81         Diagnoses and all orders for this visit:    1. Arteriosclerosis of coronary artery: Appears stable. 2. Primary hypertension: Our goal is to normalize the blood pressure to decrease the risks of strokes and heart attacks. The patient is in agreement with the plan. 3. Obstructive sleep apnea syndrome    4. Primary osteoarthritis of right knee    5. Lymphedema Mild Improvement    6. Mixed hyperlipidemia: The patient is aware of our goal to reduce or eliminate the long term problems (such as strokes and heart attacks) related to poorly controlled Triglycerides, LDL, Cholesterol. 7. Gastroesophageal reflux disease without esophagitis:     Follow-up and Dispositions    Return in about 3 months (around 3/8/2023). I have discussed the diagnosis with the patient and the intended plan as seen in the above orders. Social history, medical history, and labs were reviewed. The patient has received an after-visit summary and questions were answered concerning future plans.   I have discussed medication side effects and warnings with the patient as well.     MD LESTER Sanchez & CANDELARIO AMBRIZ [facility]  12/08/22

## 2022-12-08 NOTE — PROGRESS NOTES
1. Have you been to the ER, urgent care clinic since your last visit? Hospitalized since your last visit? No    2. Have you seen or consulted any other health care providers outside of the [address] [place] [address] since your last visit? Including any pap smears or colon screening.  No      Health Maintenance Due   Topic Date Due    COVID-19 Vaccine (1) Never done    Pneumococcal 65+ years (1 - PCV) Never done    DTaP/Tdap/Td series (1 - Tdap) Never done    Shingrix Vaccine Age 50> (1 of 2) Never done

## 2023-01-31 ENCOUNTER — TELEPHONE (OUTPATIENT)
Dept: FAMILY MEDICINE CLINIC | Age: 78
End: 2023-01-31

## 2023-01-31 DIAGNOSIS — J45.20 MILD INTERMITTENT ASTHMA WITHOUT COMPLICATION: ICD-10-CM

## 2023-01-31 RX ORDER — MONTELUKAST SODIUM 10 MG/1
TABLET ORAL
Qty: 90 TABLET | Refills: 3 | Status: SHIPPED | OUTPATIENT
Start: 2023-01-31

## 2023-01-31 NOTE — TELEPHONE ENCOUNTER
Refill ordered.     MD LESTER Singh & CANDELARIO AMBRIZ [facility]  01/31/23

## 2023-02-16 DIAGNOSIS — I89.0 LYMPHEDEMA: ICD-10-CM

## 2023-02-16 RX ORDER — BUMETANIDE 1 MG/1
1 TABLET ORAL DAILY
Qty: 90 TABLET | Refills: 0 | Status: SHIPPED | OUTPATIENT
Start: 2023-02-16

## 2023-03-24 ENCOUNTER — OFFICE VISIT (OUTPATIENT)
Dept: FAMILY MEDICINE CLINIC | Age: 78
End: 2023-03-24

## 2023-03-24 VITALS
BODY MASS INDEX: 35.28 KG/M2 | HEIGHT: 73 IN | HEART RATE: 73 BPM | WEIGHT: 266.2 LBS | TEMPERATURE: 97.9 F | SYSTOLIC BLOOD PRESSURE: 120 MMHG | DIASTOLIC BLOOD PRESSURE: 73 MMHG | RESPIRATION RATE: 18 BRPM | OXYGEN SATURATION: 96 %

## 2023-03-24 DIAGNOSIS — R35.1 BENIGN PROSTATIC HYPERPLASIA WITH NOCTURIA: ICD-10-CM

## 2023-03-24 DIAGNOSIS — T84.018A FAILURE OF TOTAL KNEE REPLACEMENT, INITIAL ENCOUNTER (HCC): ICD-10-CM

## 2023-03-24 DIAGNOSIS — E66.01 SEVERE OBESITY (BMI 35.0-39.9) WITH COMORBIDITY (HCC): ICD-10-CM

## 2023-03-24 DIAGNOSIS — E78.2 MIXED HYPERLIPIDEMIA: ICD-10-CM

## 2023-03-24 DIAGNOSIS — I25.10 ARTERIOSCLEROSIS OF CORONARY ARTERY: Primary | ICD-10-CM

## 2023-03-24 DIAGNOSIS — G47.33 OBSTRUCTIVE SLEEP APNEA SYNDROME: ICD-10-CM

## 2023-03-24 DIAGNOSIS — I10 PRIMARY HYPERTENSION: ICD-10-CM

## 2023-03-24 DIAGNOSIS — E27.8 ADRENAL INCIDENTALOMA (HCC): ICD-10-CM

## 2023-03-24 DIAGNOSIS — I89.0 LYMPHEDEMA: ICD-10-CM

## 2023-03-24 DIAGNOSIS — M00.9 PYOGENIC ARTHRITIS OF RIGHT KNEE JOINT, DUE TO UNSPECIFIED ORGANISM (HCC): ICD-10-CM

## 2023-03-24 DIAGNOSIS — G47.33 OSA ON CPAP: ICD-10-CM

## 2023-03-24 DIAGNOSIS — N40.1 BENIGN PROSTATIC HYPERPLASIA WITH NOCTURIA: ICD-10-CM

## 2023-03-24 DIAGNOSIS — Z96.659 FAILURE OF TOTAL KNEE REPLACEMENT, INITIAL ENCOUNTER (HCC): ICD-10-CM

## 2023-03-24 DIAGNOSIS — Z99.89 OSA ON CPAP: ICD-10-CM

## 2023-03-24 NOTE — PROGRESS NOTES
[facility]    History of Present Illness: Negar Sutherland is a 68 y.o. male with history of HLD, CAD, Adrenal incidentaloma, GERD, SANTA, Asthma, Septic arthritis. CC: Follow up  History provided by patient and Records    HPI:  Enlarged Prostate: Patient would like to try an alternative as the Flomax and Avodart currently. Patient has issues with ED as well. Hypertension Follow up:  Currently Taking:   Key CAD CHF Meds               bumetanide (BUMEX) 1 mg tablet (Taking) Take 1 Tablet by mouth daily. metoprolol succinate (TOPROL-XL) 25 mg XL tablet (Taking) Take 0.5 Tablets by mouth daily. aspirin delayed-release 81 mg tablet (Taking) Take  by mouth daily. rosuvastatin (CRESTOR) 10 mg tablet (Taking) rosuvastatin 10 mg tablet   TAKE 1 TABLET BY MOUTH AT BEDTIME    nitroglycerin (NITROSTAT) 0.4 mg SL tablet (Taking) nitroglycerin 0.4 mg sublingual tablet           The patient reports:  taking medications as instructed, no medication side effects noted, no TIA's, no chest pain on exertion, no dyspnea on exertion, no swelling of ankles, no orthostatic dizziness or lightheadedness, no orthopnea or paroxysmal nocturnal dyspnea. BP Readings from Last 3 Encounters:   03/24/23 120/73   12/08/22 115/81   11/18/22 130/85      Leg swelling: Not taking Bmex, advised every other day    SANTA: Is wearing Cpap    Health Maintenance  Health Maintenance Due   Topic Date Due    COVID-19 Vaccine (1) Never done    Pneumococcal 65+ years (1 - PCV) Never done    DTaP/Tdap/Td series (1 - Tdap) Never done    Shingles Vaccine (1 of 2) Never done    Medicare Yearly Exam  04/06/2023       Past Medical, Family, and Social History:     Current Outpatient Medications on File Prior to Visit   Medication Sig Dispense Refill    bumetanide (BUMEX) 1 mg tablet Take 1 Tablet by mouth daily.  90 Tablet 0    montelukast (SINGULAIR) 10 mg tablet TAKE 1 TABLET BY MOUTH AT BEDTIME FOR BREATHING 90 Tablet 3 metoprolol succinate (TOPROL-XL) 25 mg XL tablet Take 0.5 Tablets by mouth daily. 45 Tablet 1    clotrimazole (LOTRIMIN) 1 % topical cream APPLY TOPICALLY TO THE AFFECTED AREA TWICE DAILY 15 g 1    aspirin delayed-release 81 mg tablet Take  by mouth daily. nitrofurantoin (MACRODANTIN) 100 mg capsule Take  by mouth nightly. Nitrofurantoin mono      cefadroxil (DURICEF) 500 mg capsule TAKE 2 CAPLETS BY MOUTH TWICE DAILY      Breo Ellipta 100-25 mcg/dose inhaler INHALE 1 PUFF BY MOUTH EVERY MORNING      famotidine (PEPCID PO) Take  by mouth as needed. Omeprazole delayed release (PRILOSEC D/R) 20 mg tablet Take 1 Tablet by mouth daily. 90 Tablet 2    cpap machine kit by Does Not Apply route. rosuvastatin (CRESTOR) 10 mg tablet rosuvastatin 10 mg tablet   TAKE 1 TABLET BY MOUTH AT BEDTIME      nitroglycerin (NITROSTAT) 0.4 mg SL tablet nitroglycerin 0.4 mg sublingual tablet      acetaminophen (TYLENOL) 500 mg tablet Take 1,000 mg by mouth every six (6) hours as needed for Pain. calcium-vitamin D (OYSTER SHELL) 500 mg(1,250mg) -200 unit per tablet Take 1 Tablet by mouth daily. mometasone-formoterol (DULERA) 100-5 mcg/actuation HFA inhaler Take 1 Puff by inhalation two (2) times a day. dutasteride (AVODART) 0.5 mg capsule Take 0.5 mg by mouth daily. mecobal/levomefolat Ca/B6 phos (FOLTANX PO) Take  by mouth daily. albuterol-ipratropium (DUO-NEB) 2.5 mg-0.5 mg/3 ml nebu 3 mL by Nebulization route every six (6) hours as needed. levocetirizine (XYZAL) 5 mg tablet Take 5 mg by mouth nightly. tamsulosin (FLOMAX) 0.4 mg capsule TAKE 1 CAPSULE BY MOUTH EVERY MORNING (Patient not taking: Reported on 3/24/2023) 90 Capsule 1    coenzyme Q-10 (CO Q-10) 200 mg capsule Take  by mouth daily. (Patient not taking: Reported on 3/24/2023)      magnesium oxide (MAG-OX) 400 mg tablet Take 400 mg by mouth in the morning.  (Patient not taking: Reported on 3/24/2023)      zinc 50 mg tab tablet Take 50 mg by mouth daily. (Patient not taking: Reported on 3/24/2023)      cranberry fruit extract (CRANBERRY CONCENTRATE PO) Take 360 mg by mouth nightly. (Patient not taking: Reported on 3/24/2023)       No current facility-administered medications on file prior to visit. Patient Active Problem List   Diagnosis Code    Primary osteoarthritis of right knee M17.11    Arthritis M19.90    Obstructive sleep apnea syndrome G47.33    Arteriosclerosis of coronary artery I25.10    GERD (gastroesophageal reflux disease) K21.9    Hypertension I10    Lymphedema I89.0    SANTA on CPAP G47.33, Z99.89    Septic arthritis (HCC) M00.9    Hyperlipidemia E78.5    Asthma J45.909    At moderate risk for fall Z91.81    Failed total knee arthroplasty ([facility] T84.018A, Z96.659    Adrenal incidentaloma ([facility] E27.8       Social History     Socioeconomic History    Marital status:    Tobacco Use    Smoking status: Former     Packs/day: 2.00     Years: 20.00     Pack years: 40.00     Types: Cigarettes     Quit date:      Years since quittin.2    Smokeless tobacco: Never   Vaping Use    Vaping Use: Never used   Substance and Sexual Activity    Alcohol use: No    Drug use: No        Review of Systems   Review of Systems   Constitutional:  Negative for chills and fever. Cardiovascular:  Negative for chest pain and palpitations. Gastrointestinal:  Negative for abdominal pain, nausea and vomiting. Neurological:  Negative for dizziness and headaches. Objective:   Visit Vitals  /73 (BP 1 Location: Right arm, BP Patient Position: Sitting, BP Cuff Size: Large adult)   Pulse 73   Temp 97.9 °F (36.6 °C) (Oral)   Resp 18   Ht 6' 1\" (1.854 m)   Wt 266 lb 3.2 oz (120.7 kg)   SpO2 96%   BMI 35.12 kg/m²        Physical Exam  Vitals and nursing note reviewed. Constitutional:       Appearance: Normal appearance. HENT:      Head: Normocephalic and atraumatic.    Cardiovascular:      Rate and Rhythm: Normal rate and regular rhythm. Pulses: Normal pulses. Heart sounds: Normal heart sounds. Pulmonary:      Effort: Pulmonary effort is normal.      Breath sounds: Normal breath sounds. Abdominal:      General: Abdomen is flat. Bowel sounds are normal.      Palpations: Abdomen is soft. Musculoskeletal:      Cervical back: Normal range of motion and neck supple. Neurological:      Mental Status: He is alert. Pertinent Labs/Studies:      Assessment and orders:       ICD-10-CM ICD-9-CM    1. Arteriosclerosis of coronary artery  I25.10 414.00 CBC W/O DIFF      METABOLIC PANEL, COMPREHENSIVE      METABOLIC PANEL, COMPREHENSIVE      CBC W/O DIFF      2. Obstructive sleep apnea syndrome  G47.33 327.23       3. SANTA on CPAP  G47.33 327.23     Z99.89 V46.8       4. Adrenal incidentaloma ([facility]  E27.8 255.8       5. Primary hypertension  I10 401.9       6. Pyogenic arthritis of right knee joint, due to unspecified organism ([facility]  M00.9 711.06       7. Failure of total knee replacement, initial encounter ([place] [address].)  T84.018A 996.47     Z96.659 V43.65       8. Mixed hyperlipidemia  E78.2 272.2 LIPID PANEL      LIPID PANEL      9. Lymphedema  I89.0 457.1       10. Benign prostatic hyperplasia with nocturia  N40.1 600.01 PSA W/ REFLX FREE PSA    R35.1 788.43 PSA W/ REFLX FREE PSA      11. Severe obesity (BMI 35.0-39. 9) with comorbidity ([place] [address].)  E66.01 278.01         Diagnoses and all orders for this visit:    1. Arteriosclerosis of coronary artery  -     CBC W/O DIFF; Future  -     METABOLIC PANEL, COMPREHENSIVE; Future    2. Obstructive sleep apnea syndrome    3. SANTA on CPAP    4. Adrenal incidentaloma ([facility]    5. Primary hypertension    6. Pyogenic arthritis of right knee joint, due to unspecified organism ([facility]    7. Failure of total knee replacement, initial encounter ([facility]    8. Mixed hyperlipidemia  -     LIPID PANEL; Future    9. Lymphedema    10. Benign prostatic hyperplasia with nocturia  -     PSA W/ REFLX FREE PSA; Future    11. Severe obesity (BMI 35.0-39. 9) with comorbidity ([address]    Follow-up and Dispositions    Return in about 3 months (around 6/24/2023). I have discussed the diagnosis with the patient and the intended plan as seen in the above orders. Social history, medical history, and labs were reviewed. The patient has received an after-visit summary and questions were answered concerning future plans. I have discussed medication side effects and warnings with the patient as well.     MD LESTER Vasquez & CANDELARIO AMBRIZ [facility]  03/24/23

## 2023-03-24 NOTE — PROGRESS NOTES
1. Have you been to the ER, urgent care clinic since your last visit? Hospitalized since your last visit? No    2. Have you seen or consulted any other health care providers outside of the [address] [place] [address] since your last visit? Including any pap smears or colon screening.  No      Health Maintenance Due   Topic Date Due    COVID-19 Vaccine (1) Never done    Pneumococcal 65+ years (1 - PCV) Never done    DTaP/Tdap/Td series (1 - Tdap) Never done    Shingles Vaccine (1 of 2) Never done    Medicare Yearly Exam  04/06/2023

## 2023-03-25 LAB
ALBUMIN SERPL-MCNC: 3.9 G/DL (ref 3.5–5)
ALBUMIN/GLOB SERPL: 1.3 (ref 1.1–2.2)
ALP SERPL-CCNC: 114 U/L (ref 45–117)
ALT SERPL-CCNC: 25 U/L (ref 12–78)
ANION GAP SERPL CALC-SCNC: 9 MMOL/L (ref 5–15)
AST SERPL-CCNC: 12 U/L (ref 15–37)
BILIRUB SERPL-MCNC: 0.5 MG/DL (ref 0.2–1)
BUN SERPL-MCNC: 20 MG/DL (ref 6–20)
BUN/CREAT SERPL: 22 (ref 12–20)
CALCIUM SERPL-MCNC: 9.2 MG/DL (ref 8.5–10.1)
CHLORIDE SERPL-SCNC: 111 MMOL/L (ref 97–108)
CHOLEST SERPL-MCNC: 104 MG/DL
CO2 SERPL-SCNC: 21 MMOL/L (ref 21–32)
CREAT SERPL-MCNC: 0.92 MG/DL (ref 0.7–1.3)
ERYTHROCYTE [DISTWIDTH] IN BLOOD BY AUTOMATED COUNT: 15.1 % (ref 11.5–14.5)
GLOBULIN SER CALC-MCNC: 2.9 G/DL (ref 2–4)
GLUCOSE SERPL-MCNC: 93 MG/DL (ref 65–100)
HCT VFR BLD AUTO: 49.6 % (ref 36.6–50.3)
HDLC SERPL-MCNC: 40 MG/DL
HDLC SERPL: 2.6 (ref 0–5)
HGB BLD-MCNC: 15.3 G/DL (ref 12.1–17)
LDLC SERPL CALC-MCNC: 31.2 MG/DL (ref 0–100)
MCH RBC QN AUTO: 28.1 PG (ref 26–34)
MCHC RBC AUTO-ENTMCNC: 30.8 G/DL (ref 30–36.5)
MCV RBC AUTO: 91 FL (ref 80–99)
NRBC # BLD: 0 K/UL (ref 0–0.01)
NRBC BLD-RTO: 0 PER 100 WBC
PLATELET # BLD AUTO: 186 K/UL (ref 150–400)
PMV BLD AUTO: 11.6 FL (ref 8.9–12.9)
POTASSIUM SERPL-SCNC: 4.3 MMOL/L (ref 3.5–5.1)
PROT SERPL-MCNC: 6.8 G/DL (ref 6.4–8.2)
RBC # BLD AUTO: 5.45 M/UL (ref 4.1–5.7)
SODIUM SERPL-SCNC: 141 MMOL/L (ref 136–145)
TRIGL SERPL-MCNC: 164 MG/DL (ref ?–150)
VLDLC SERPL CALC-MCNC: 32.8 MG/DL
WBC # BLD AUTO: 8.3 K/UL (ref 4.1–11.1)

## 2023-03-26 LAB
PSA SERPL-MCNC: 1.2 NG/ML (ref 0–4)
REFLEX CRITERIA: NORMAL

## 2023-05-30 RX ORDER — METOPROLOL SUCCINATE 25 MG/1
TABLET, EXTENDED RELEASE ORAL
Qty: 45 TABLET | Refills: 1 | Status: SHIPPED | OUTPATIENT
Start: 2023-05-30

## 2023-07-13 NOTE — PROGRESS NOTES
Chief Complaint   Patient presents with    Incontinence     Denies dysuria. Denies fever, denies frquency. History of Present Illness: Camryn Stuart is a 66 y.o. male w/ PMHx of CAD, HTN, HLD, GERD, OSMANI, asthma, BPH, lymphedema who presents to clinic for evaluation of frequent urination, urgency, and incontinence. - Symptoms ongoing for 1-2 months.  - No burning with urination.  - Weak stream.  - Incomplete emptying.  - Has follow-up with urology in November.  - Hx of BPH. Pt not currently taking Flomax 0.4 mg daily or dutasteride 0.5 mg daily.  - Pt takes Bumex 1 mg daily.  - PSA level 1.2 on 3/24/23. CAD: Pt takes metoprolol 12.5 mg ER daily, ASA 81 mg daily, Crestor 10 mg daily. HTN: Pt takes Bumex 1 mg daily, metoprolol 12.5 mg ER daily. HLD: Pt takes Crestor 10 mg daily. Last LDL level 31.2 (3/24/23). GERD: Pt takes Prilosec 20 mg daily. Asthma/COPD: Former smoker. Pt takes Breo Elliipta, Duo-neb q6H PRN, Singulair 10 mg daily. BPH: Pt takes Flomax 0.4 mg daily. OSMANI: On CPAP.             Past Medical History:   Diagnosis Date    Arthritis     Asthma     R/T ENVIRONMENTAL ALLERGIES; INHALER USE DAILY    CAD (coronary artery disease) 2007    MI    GERD (gastroesophageal reflux disease)     History of BPH     History of kidney stones     Hx: UTI (urinary tract infection)     Hypertension     Lymphedema     Nausea & vomiting     OSMANI on CPAP     Sepsis ([address]) 8/16/2018       Current Outpatient Medications   Medication Sig Dispense Refill    diclofenac (VOLTAREN) 75 MG EC tablet Take 1 tablet by mouth 2 times daily 60 tablet 3    metoprolol succinate (TOPROL XL) 25 MG extended release tablet TAKE 1/2 TABLET BY MOUTH DAILY 45 tablet 1    acetaminophen (TYLENOL) 500 MG tablet Take 2 tablets by mouth every 6 hours as needed      aspirin 81 MG EC tablet Take by mouth daily      bumetanide (BUMEX) 1 MG tablet Take 1 tablet by mouth daily      Calcium Carbonate-Vitamin D (OYSTER SHELL CALCIUM/D)

## 2023-07-14 ENCOUNTER — OFFICE VISIT (OUTPATIENT)
Facility: CLINIC | Age: 78
End: 2023-07-14

## 2023-07-14 VITALS
TEMPERATURE: 98.5 F | RESPIRATION RATE: 18 BRPM | OXYGEN SATURATION: 95 % | BODY MASS INDEX: 34.85 KG/M2 | SYSTOLIC BLOOD PRESSURE: 120 MMHG | HEART RATE: 76 BPM | WEIGHT: 263 LBS | HEIGHT: 73 IN | DIASTOLIC BLOOD PRESSURE: 79 MMHG

## 2023-07-14 DIAGNOSIS — R35.0 URINARY FREQUENCY: Primary | ICD-10-CM

## 2023-07-14 LAB
APPEARANCE UR: CLEAR
BACTERIA URNS QL MICRO: ABNORMAL /HPF
BILIRUB UR QL: NEGATIVE
BILIRUBIN, URINE, POC: NEGATIVE
BLOOD URINE, POC: NORMAL
COLOR UR: ABNORMAL
EPITH CASTS URNS QL MICRO: ABNORMAL /LPF
GLUCOSE UR STRIP.AUTO-MCNC: NEGATIVE MG/DL
GLUCOSE URINE, POC: NEGATIVE
GLUCOSE, POC: 88 MG/DL
HGB UR QL STRIP: NEGATIVE
HYALINE CASTS URNS QL MICRO: ABNORMAL /LPF (ref 0–5)
KETONES UR QL STRIP.AUTO: NEGATIVE MG/DL
KETONES, URINE, POC: NEGATIVE
LEUKOCYTE ESTERASE UR QL STRIP.AUTO: ABNORMAL
LEUKOCYTE ESTERASE, URINE, POC: NORMAL
NITRITE UR QL STRIP.AUTO: NEGATIVE
NITRITE, URINE, POC: NEGATIVE
PH UR STRIP: 5.5 (ref 5–8)
PH, URINE, POC: 5.5 (ref 4.6–8)
PROT UR STRIP-MCNC: NEGATIVE MG/DL
PROTEIN,URINE, POC: NEGATIVE
RBC #/AREA URNS HPF: ABNORMAL /HPF (ref 0–5)
SP GR UR REFRACTOMETRY: 1.01 (ref 1–1.03)
SPECIFIC GRAVITY, URINE, POC: 1.02 (ref 1–1.03)
URINALYSIS CLARITY, POC: CLEAR
URINALYSIS COLOR, POC: YELLOW
UROBILINOGEN UR QL STRIP.AUTO: 0.2 EU/DL (ref 0.2–1)
UROBILINOGEN, POC: NORMAL
WBC URNS QL MICRO: >100 /HPF (ref 0–4)

## 2023-07-14 SDOH — ECONOMIC STABILITY: FOOD INSECURITY: WITHIN THE PAST 12 MONTHS, YOU WORRIED THAT YOUR FOOD WOULD RUN OUT BEFORE YOU GOT MONEY TO BUY MORE.: NEVER TRUE

## 2023-07-14 SDOH — ECONOMIC STABILITY: INCOME INSECURITY: HOW HARD IS IT FOR YOU TO PAY FOR THE VERY BASICS LIKE FOOD, HOUSING, MEDICAL CARE, AND HEATING?: NOT HARD AT ALL

## 2023-07-14 SDOH — ECONOMIC STABILITY: FOOD INSECURITY: WITHIN THE PAST 12 MONTHS, THE FOOD YOU BOUGHT JUST DIDN'T LAST AND YOU DIDN'T HAVE MONEY TO GET MORE.: NEVER TRUE

## 2023-07-14 SDOH — ECONOMIC STABILITY: HOUSING INSECURITY
IN THE LAST 12 MONTHS, WAS THERE A TIME WHEN YOU DID NOT HAVE A STEADY PLACE TO SLEEP OR SLEPT IN A SHELTER (INCLUDING NOW)?: NO

## 2023-07-14 NOTE — PATIENT INSTRUCTIONS
The two medicines for your prostate and urinary symptoms are:    Flomax (tamsulosin)  Avodart (dutasteride)     I recommend taking these two medications in combination to help with your frequent urination. Avodart is likely to cause sexual side effects.   Please discuss this with your urologist.

## 2023-07-14 NOTE — RESULT ENCOUNTER NOTE
POC glucose WNL (88). POC UA with trace blood, 1+ leukocyte esterase. Results and recommendations noted in office note.

## 2023-07-14 NOTE — PROGRESS NOTES
Chief Complaint   Patient presents with    Incontinence     Denies dysuria. Denies fever, denies frquency. 1. \"Have you been to the ER, urgent care clinic since your last visit? Hospitalized since your last visit? \" No    2. \"Have you seen or consulted any other health care providers outside of the [address] [place] [address] since your last visit? \" Yes- Cardiology     3. For patients aged 43-73: Has the patient had a colonoscopy / FIT/ Cologuard? N/A      If the patient is female:    4. For patients aged 43-66: Has the patient had a mammogram within the past 2 years? N/A      5. For patients aged 21-65: Has the patient had a pap smear?  N/A    Health Maintenance Due   Topic Date Due    COVID-19 Vaccine (1) Never done    Pneumococcal 65+ years Vaccine (1 - PCV) Never done    DTaP/Tdap/Td vaccine (1 - Tdap) Never done    Shingles vaccine (1 of 2) Never done    Annual Wellness Visit (AWV)  04/06/2023

## 2023-07-16 LAB
BACTERIA SPEC CULT: NORMAL
CC UR VC: NORMAL
SERVICE CMNT-IMP: NORMAL

## 2023-07-17 ENCOUNTER — TELEPHONE (OUTPATIENT)
Facility: CLINIC | Age: 78
End: 2023-07-17

## 2023-07-17 DIAGNOSIS — N40.1 BENIGN PROSTATIC HYPERPLASIA WITH URINARY FREQUENCY: ICD-10-CM

## 2023-07-17 DIAGNOSIS — N39.0 URINARY TRACT INFECTION WITHOUT HEMATURIA, SITE UNSPECIFIED: Primary | ICD-10-CM

## 2023-07-17 DIAGNOSIS — R35.0 BENIGN PROSTATIC HYPERPLASIA WITH URINARY FREQUENCY: ICD-10-CM

## 2023-07-17 RX ORDER — TAMSULOSIN HYDROCHLORIDE 0.4 MG/1
0.4 CAPSULE ORAL EVERY MORNING
Qty: 90 CAPSULE | Refills: 0 | Status: SHIPPED | OUTPATIENT
Start: 2023-07-17

## 2023-07-17 RX ORDER — CEPHALEXIN 500 MG/1
500 CAPSULE ORAL 4 TIMES DAILY
Qty: 28 CAPSULE | Refills: 0 | Status: SHIPPED | OUTPATIENT
Start: 2023-07-17 | End: 2023-07-24

## 2023-07-17 NOTE — TELEPHONE ENCOUNTER
Reviewed lab results:  - UA with moderate LE, >100 WBC, negative nitrites, few epithelial cells, 1+ bacteria, negative for blood. - UCX with >100k CFU/mL mixed urogenital amrbin. Discussed results and recommendations with pt by phone.  - Recommended Keflex 500 mg QID for 7 days for UTI.  - Recommended Flomax 0.4 mg daily (provided refill) for symptoms of BPH including weak stream, frequency, and incomplete emptying. Hx of allergy to sulfa antibiotics. Given that pt reports that (1) reaction to sulfa was minor and (2) he has tolerated Flomax, provided refill of Flomax.  - Recommended discussing potential sexual side effects of dutasteride with urologist.  - PSA recently measured to be 1.2 on 3/24/23.  - Plan for follow-up with PCP on 7/28/23.

## 2023-07-28 ENCOUNTER — OFFICE VISIT (OUTPATIENT)
Facility: CLINIC | Age: 78
End: 2023-07-28

## 2023-07-28 VITALS
HEIGHT: 73 IN | OXYGEN SATURATION: 95 % | BODY MASS INDEX: 35.36 KG/M2 | TEMPERATURE: 97.3 F | WEIGHT: 266.8 LBS | DIASTOLIC BLOOD PRESSURE: 73 MMHG | SYSTOLIC BLOOD PRESSURE: 108 MMHG | RESPIRATION RATE: 18 BRPM | HEART RATE: 73 BPM

## 2023-07-28 DIAGNOSIS — E78.2 MIXED HYPERLIPIDEMIA: ICD-10-CM

## 2023-07-28 DIAGNOSIS — Z99.89 OSA ON CPAP: ICD-10-CM

## 2023-07-28 DIAGNOSIS — I10 PRIMARY HYPERTENSION: Primary | ICD-10-CM

## 2023-07-28 DIAGNOSIS — G47.33 OSA ON CPAP: ICD-10-CM

## 2023-07-28 DIAGNOSIS — J45.20 MILD INTERMITTENT ASTHMA WITHOUT COMPLICATION: ICD-10-CM

## 2023-07-28 DIAGNOSIS — R35.1 BENIGN PROSTATIC HYPERPLASIA WITH NOCTURIA: ICD-10-CM

## 2023-07-28 DIAGNOSIS — I25.10 ARTERIOSCLEROSIS OF CORONARY ARTERY: ICD-10-CM

## 2023-07-28 DIAGNOSIS — N40.1 BENIGN PROSTATIC HYPERPLASIA WITH NOCTURIA: ICD-10-CM

## 2023-07-28 PROBLEM — M00.9 SEPTIC ARTHRITIS (HCC): Status: RESOLVED | Noted: 2019-07-30 | Resolved: 2023-07-28

## 2023-07-28 RX ORDER — BUMETANIDE 1 MG/1
TABLET ORAL
Qty: 270 TABLET | Refills: 1 | Status: SHIPPED | OUTPATIENT
Start: 2023-07-28

## 2023-07-28 NOTE — PROGRESS NOTES
[facility]    History of Present Illness: Opal Arriaga is a 66 y.o. male with history of  HLD, CAD, Adrenal incidentaloma, GERD, OSMANI, Asthma, Septic arthritis. CC: Follow up, Leg swelling  History provided by patient and Records    HPI:  Recent UTI: Symptoms resolved with antibiotics. Enlarged Prostate: Recently restarted Flomax, and not taking Avdart. Hypertension Follow up: The patient reports:  taking medications as instructed, no medication side effects noted, no TIA's, no chest pain on exertion, no dyspnea on exertion, mno orthostatic dizziness or lightheadedness, no orthopnea or paroxysmal nocturnal dyspnea. Does have significant leg swelling though. Currently taking Bumex 1, 1 tablet 2 times daily, and flomax. Not taking Dustaeride. BP Readings from Last 3 Encounters:   07/28/23 108/73   07/14/23 120/79   03/24/23 120/73       OSMANI: Wearing C-pap nightly. Asthma: Currently well controlled    CAD: Seeing Cardiologist, and planing for upcoming testing.   Metoprolol currently at 12.5 mg    Health Maintenance  Health Maintenance Due   Topic Date Due    Annual Wellness Visit (AWV)  04/06/2023       Past Medical, Family, and Social History:     Current Outpatient Medications on File Prior to Visit   Medication Sig Dispense Refill    tamsulosin (FLOMAX) 0.4 MG capsule Take 1 capsule by mouth every morning 90 capsule 0    diclofenac (VOLTAREN) 75 MG EC tablet Take 1 tablet by mouth 2 times daily 60 tablet 3    metoprolol succinate (TOPROL XL) 25 MG extended release tablet TAKE 1/2 TABLET BY MOUTH DAILY 45 tablet 1    acetaminophen (TYLENOL) 500 MG tablet Take 2 tablets by mouth every 6 hours as needed      aspirin 81 MG EC tablet Take by mouth daily      Calcium Carbonate-Vitamin D (OYSTER SHELL CALCIUM/D) 500-5 MG-MCG TABS Take 1 tablet by mouth daily      cefadroxil (DURICEF) 500 MG capsule TAKE 2 CAPLETS BY MOUTH TWICE DAILY      clotrimazole (LOTRIMIN) 1 % cream APPLY

## 2023-07-28 NOTE — PROGRESS NOTES
1. \"Have you been to the ER, urgent care clinic since your last visit? Hospitalized since your last visit? \" no    2. \"Have you seen or consulted any other health care providers outside of the [address] [place] [address] since your last visit? \" no      Health Maintenance Due   Topic Date Due    COVID-19 Vaccine (1) Never done    Pneumococcal 65+ years Vaccine (1 - PCV) Never done    DTaP/Tdap/Td vaccine (1 - Tdap) Never done    Shingles vaccine (1 of 2) Never done    Annual Wellness Visit (AWV)  04/06/2023

## 2023-07-29 LAB
ALBUMIN SERPL-MCNC: 3.7 G/DL (ref 3.5–5)
ALBUMIN/GLOB SERPL: 1.3 (ref 1.1–2.2)
ALP SERPL-CCNC: 120 U/L (ref 45–117)
ALT SERPL-CCNC: 36 U/L (ref 12–78)
ANION GAP SERPL CALC-SCNC: 4 MMOL/L (ref 5–15)
AST SERPL-CCNC: 19 U/L (ref 15–37)
BILIRUB SERPL-MCNC: 0.4 MG/DL (ref 0.2–1)
BUN SERPL-MCNC: 24 MG/DL (ref 6–20)
BUN/CREAT SERPL: 20 (ref 12–20)
CALCIUM SERPL-MCNC: 8.8 MG/DL (ref 8.5–10.1)
CHLORIDE SERPL-SCNC: 111 MMOL/L (ref 97–108)
CHOLEST SERPL-MCNC: 113 MG/DL
CO2 SERPL-SCNC: 25 MMOL/L (ref 21–32)
CREAT SERPL-MCNC: 1.21 MG/DL (ref 0.7–1.3)
ERYTHROCYTE [DISTWIDTH] IN BLOOD BY AUTOMATED COUNT: 14.9 % (ref 11.5–14.5)
GLOBULIN SER CALC-MCNC: 2.9 G/DL (ref 2–4)
GLUCOSE SERPL-MCNC: 78 MG/DL (ref 65–100)
HCT VFR BLD AUTO: 48.9 % (ref 36.6–50.3)
HDLC SERPL-MCNC: 34 MG/DL
HDLC SERPL: 3.3 (ref 0–5)
HGB BLD-MCNC: 14.9 G/DL (ref 12.1–17)
LDLC SERPL CALC-MCNC: 39.6 MG/DL (ref 0–100)
MCH RBC QN AUTO: 28.7 PG (ref 26–34)
MCHC RBC AUTO-ENTMCNC: 30.5 G/DL (ref 30–36.5)
MCV RBC AUTO: 94 FL (ref 80–99)
NRBC # BLD: 0 K/UL (ref 0–0.01)
NRBC BLD-RTO: 0 PER 100 WBC
PLATELET # BLD AUTO: 154 K/UL (ref 150–400)
PMV BLD AUTO: 11.3 FL (ref 8.9–12.9)
POTASSIUM SERPL-SCNC: 4.4 MMOL/L (ref 3.5–5.1)
PROT SERPL-MCNC: 6.6 G/DL (ref 6.4–8.2)
RBC # BLD AUTO: 5.2 M/UL (ref 4.1–5.7)
SODIUM SERPL-SCNC: 140 MMOL/L (ref 136–145)
TRIGL SERPL-MCNC: 197 MG/DL
VLDLC SERPL CALC-MCNC: 39.4 MG/DL
WBC # BLD AUTO: 9.3 K/UL (ref 4.1–11.1)

## 2023-07-30 LAB
PROSTATE SPECIFIC ANTIGEN FREE: 0.52 NG/ML
PROSTATE SPECIFIC ANTIGEN PERCENT FREE: 12.7 %
PSA SERPL-MCNC: 4.1 NG/ML (ref 0–4)
REFLEX CRITERIA: ABNORMAL

## 2023-08-01 ENCOUNTER — TELEPHONE (OUTPATIENT)
Facility: CLINIC | Age: 78
End: 2023-08-01

## 2023-08-01 DIAGNOSIS — R97.20 ELEVATED PSA: Primary | ICD-10-CM

## 2023-08-01 NOTE — TELEPHONE ENCOUNTER
Called patient and discussed labs. Patient will get repeat PSA in one month and follow up.     MD MATTHEW Campbell & KEITH MENDOZA [facility]  08/01/23

## 2023-08-14 ENCOUNTER — TELEPHONE (OUTPATIENT)
Facility: CLINIC | Age: 78
End: 2023-08-14

## 2023-08-14 NOTE — TELEPHONE ENCOUNTER
Patient called, no answer. Message left for return call. Lab results reviewed via telephone with Dr. William Walker.     Most recent labs printed and mailed to patient per his request.

## 2023-09-01 ENCOUNTER — NURSE ONLY (OUTPATIENT)
Facility: CLINIC | Age: 78
End: 2023-09-01

## 2023-09-01 DIAGNOSIS — R97.20 ELEVATED PSA: ICD-10-CM

## 2023-09-03 LAB
PROSTATE SPECIFIC ANTIGEN FREE: 0.89 NG/ML
PROSTATE SPECIFIC ANTIGEN PERCENT FREE: 16.8 %
PSA SERPL-MCNC: 5.3 NG/ML (ref 0–4)
REFLEX CRITERIA: ABNORMAL

## 2023-11-01 ENCOUNTER — OFFICE VISIT (OUTPATIENT)
Facility: CLINIC | Age: 78
End: 2023-11-01
Payer: MEDICARE

## 2023-11-01 VITALS
HEART RATE: 73 BPM | TEMPERATURE: 98.1 F | OXYGEN SATURATION: 96 % | BODY MASS INDEX: 35.78 KG/M2 | SYSTOLIC BLOOD PRESSURE: 125 MMHG | WEIGHT: 270 LBS | HEIGHT: 73 IN | RESPIRATION RATE: 18 BRPM | DIASTOLIC BLOOD PRESSURE: 73 MMHG

## 2023-11-01 DIAGNOSIS — I25.10 ARTERIOSCLEROSIS OF CORONARY ARTERY: ICD-10-CM

## 2023-11-01 DIAGNOSIS — I10 PRIMARY HYPERTENSION: ICD-10-CM

## 2023-11-01 DIAGNOSIS — Z00.00 MEDICARE ANNUAL WELLNESS VISIT, SUBSEQUENT: Primary | ICD-10-CM

## 2023-11-01 DIAGNOSIS — Z91.81 AT MODERATE RISK FOR FALL: ICD-10-CM

## 2023-11-01 DIAGNOSIS — G47.33 OSA ON CPAP: ICD-10-CM

## 2023-11-01 DIAGNOSIS — E78.2 MIXED HYPERLIPIDEMIA: ICD-10-CM

## 2023-11-01 DIAGNOSIS — J45.20 MILD INTERMITTENT ASTHMA WITHOUT COMPLICATION: ICD-10-CM

## 2023-11-01 PROCEDURE — G8417 CALC BMI ABV UP PARAM F/U: HCPCS | Performed by: FAMILY MEDICINE

## 2023-11-01 PROCEDURE — 3078F DIAST BP <80 MM HG: CPT | Performed by: FAMILY MEDICINE

## 2023-11-01 PROCEDURE — 1036F TOBACCO NON-USER: CPT | Performed by: FAMILY MEDICINE

## 2023-11-01 PROCEDURE — 1123F ACP DISCUSS/DSCN MKR DOCD: CPT | Performed by: FAMILY MEDICINE

## 2023-11-01 PROCEDURE — 3074F SYST BP LT 130 MM HG: CPT | Performed by: FAMILY MEDICINE

## 2023-11-01 PROCEDURE — G8427 DOCREV CUR MEDS BY ELIG CLIN: HCPCS | Performed by: FAMILY MEDICINE

## 2023-11-01 PROCEDURE — G8484 FLU IMMUNIZE NO ADMIN: HCPCS | Performed by: FAMILY MEDICINE

## 2023-11-01 PROCEDURE — G0439 PPPS, SUBSEQ VISIT: HCPCS | Performed by: FAMILY MEDICINE

## 2023-11-01 PROCEDURE — 99214 OFFICE O/P EST MOD 30 MIN: CPT | Performed by: FAMILY MEDICINE

## 2023-11-01 RX ORDER — BUMETANIDE 1 MG/1
TABLET ORAL
Qty: 270 TABLET | Refills: 1 | Status: SHIPPED | OUTPATIENT
Start: 2023-11-01

## 2023-11-01 ASSESSMENT — PATIENT HEALTH QUESTIONNAIRE - PHQ9
SUM OF ALL RESPONSES TO PHQ QUESTIONS 1-9: 0
SUM OF ALL RESPONSES TO PHQ QUESTIONS 1-9: 0
2. FEELING DOWN, DEPRESSED OR HOPELESS: 0
SUM OF ALL RESPONSES TO PHQ QUESTIONS 1-9: 0
SUM OF ALL RESPONSES TO PHQ QUESTIONS 1-9: 0
1. LITTLE INTEREST OR PLEASURE IN DOING THINGS: 0
SUM OF ALL RESPONSES TO PHQ9 QUESTIONS 1 & 2: 0

## 2023-11-01 ASSESSMENT — LIFESTYLE VARIABLES
HOW MANY STANDARD DRINKS CONTAINING ALCOHOL DO YOU HAVE ON A TYPICAL DAY: PATIENT DOES NOT DRINK
HOW OFTEN DO YOU HAVE A DRINK CONTAINING ALCOHOL: NEVER

## 2023-11-01 ASSESSMENT — ENCOUNTER SYMPTOMS
ABDOMINAL PAIN: 0
CHEST TIGHTNESS: 0

## 2023-11-01 NOTE — PATIENT INSTRUCTIONS
life.  Balancing. This helps you stay coordinated and have good posture. Includes heel-to-toe walking, sarika chi, and certain types of yoga. Aim for at least 3 days a week. It can reduce your risk of falling. Even if you have a hard time meeting the recommendations, it's better to be more active than less active. All activity done in each category counts toward your weekly total. You'd be surprised how daily things like carrying groceries, keeping up with grandchildren, and taking the stairs can add up. What keeps you from being active? If you've had a hard time being more active, you're not alone. Maybe you remember being able to do more. Or maybe you've never thought of yourself as being active. It's frustrating when you can't do the things you want. Being more active can help. What's holding you back? Getting started. Have a goal, but break it into easy tasks. Small steps build into big accomplishments. Staying motivated. If you feel like skipping your activity, remember your goal. Maybe you want to move better and stay independent. Every activity gets you one step closer. Not feeling your best.  Start with 5 minutes of an activity you enjoy. Prove to yourself you can do it. As you get comfortable, increase your time. You may not be where you want to be. But you're in the process of getting there. Everyone starts somewhere. How can you find safe ways to stay active? Talk with your doctor about any physical challenges you're facing. Make a plan with your doctor if you have a health problem or aren't sure how to get started with activity. If you're already active, ask your doctor if there is anything you should change to stay safe as your body and health change. If you tend to feel dizzy after you take medicine, avoid activity at that time. Try being active before you take your medicine. This will reduce your risk of falls.   If you plan to be active at home, make sure to clear your space before you

## 2023-11-13 ENCOUNTER — NURSE TRIAGE (OUTPATIENT)
Dept: OTHER | Facility: CLINIC | Age: 78
End: 2023-11-13

## 2023-11-13 NOTE — TELEPHONE ENCOUNTER
Location of patient: [facility] call from [place] at [facility] with [employer] , Hernando Pool, is calling. Patient is not there. Limited triage. Subjective: Caller states \"balance problems\"     Current Symptoms: losing balance when he stands, or walks a little way, patient thinks it may be his inner ear. Has HTN per the chart. Onset: a few days ago; unchanged    Associated Symptoms: NA    Pain Severity: unable to assess    Temperature: unknown     What has been tried: unknown    LMP: NA Pregnant: NA    Recommended disposition: See in Office Today    Care advice provided, patient verbalizes understanding; denies any other questions or concerns; instructed to call back for any new or worsening symptoms. Patient/Caller agrees with recommended disposition; writer provided warm transfer to [facility] at [facility] for appointment scheduling    Attention Provider: Thank you for allowing me to participate in the care of your patient. The patient was connected to triage in response to information provided to the ECC/PSC. Please do not respond through this encounter as the response is not directed to a shared pool.       Reason for Disposition   Patient wants to be seen    Protocols used: Neurologic Deficit-ADULT-[place]

## 2023-11-15 ENCOUNTER — OFFICE VISIT (OUTPATIENT)
Facility: CLINIC | Age: 78
End: 2023-11-15
Payer: MEDICARE

## 2023-11-15 VITALS
BODY MASS INDEX: 34.99 KG/M2 | DIASTOLIC BLOOD PRESSURE: 75 MMHG | TEMPERATURE: 98.2 F | OXYGEN SATURATION: 96 % | SYSTOLIC BLOOD PRESSURE: 115 MMHG | HEART RATE: 74 BPM | HEIGHT: 73 IN | RESPIRATION RATE: 17 BRPM | WEIGHT: 264 LBS

## 2023-11-15 DIAGNOSIS — I10 PRIMARY HYPERTENSION: ICD-10-CM

## 2023-11-15 DIAGNOSIS — R30.0 DYSURIA: Primary | ICD-10-CM

## 2023-11-15 DIAGNOSIS — R35.0 BENIGN PROSTATIC HYPERPLASIA WITH URINARY FREQUENCY: ICD-10-CM

## 2023-11-15 DIAGNOSIS — R35.1 BENIGN PROSTATIC HYPERPLASIA WITH NOCTURIA: ICD-10-CM

## 2023-11-15 DIAGNOSIS — E78.2 MIXED HYPERLIPIDEMIA: ICD-10-CM

## 2023-11-15 DIAGNOSIS — N40.1 BENIGN PROSTATIC HYPERPLASIA WITH URINARY FREQUENCY: ICD-10-CM

## 2023-11-15 DIAGNOSIS — N40.1 BENIGN PROSTATIC HYPERPLASIA WITH NOCTURIA: ICD-10-CM

## 2023-11-15 LAB
BILIRUBIN, URINE, POC: NEGATIVE
BLOOD URINE, POC: NORMAL
GLUCOSE URINE, POC: NEGATIVE
KETONES, URINE, POC: NEGATIVE
LEUKOCYTE ESTERASE, URINE, POC: NORMAL
NITRITE, URINE, POC: NEGATIVE
PH, URINE, POC: 5.5 (ref 4.6–8)
PROTEIN,URINE, POC: NORMAL
SPECIFIC GRAVITY, URINE, POC: 1.02 (ref 1–1.03)
URINALYSIS CLARITY, POC: NORMAL
URINALYSIS COLOR, POC: YELLOW
UROBILINOGEN, POC: NORMAL

## 2023-11-15 PROCEDURE — G8417 CALC BMI ABV UP PARAM F/U: HCPCS | Performed by: FAMILY MEDICINE

## 2023-11-15 PROCEDURE — 99214 OFFICE O/P EST MOD 30 MIN: CPT | Performed by: FAMILY MEDICINE

## 2023-11-15 PROCEDURE — G8484 FLU IMMUNIZE NO ADMIN: HCPCS | Performed by: FAMILY MEDICINE

## 2023-11-15 PROCEDURE — G8427 DOCREV CUR MEDS BY ELIG CLIN: HCPCS | Performed by: FAMILY MEDICINE

## 2023-11-15 PROCEDURE — 1036F TOBACCO NON-USER: CPT | Performed by: FAMILY MEDICINE

## 2023-11-15 PROCEDURE — 1123F ACP DISCUSS/DSCN MKR DOCD: CPT | Performed by: FAMILY MEDICINE

## 2023-11-15 PROCEDURE — 81003 URINALYSIS AUTO W/O SCOPE: CPT | Performed by: FAMILY MEDICINE

## 2023-11-15 PROCEDURE — 3074F SYST BP LT 130 MM HG: CPT | Performed by: FAMILY MEDICINE

## 2023-11-15 PROCEDURE — 3078F DIAST BP <80 MM HG: CPT | Performed by: FAMILY MEDICINE

## 2023-11-15 RX ORDER — METOPROLOL SUCCINATE 25 MG/1
12.5 TABLET, EXTENDED RELEASE ORAL DAILY
Qty: 45 TABLET | Refills: 1 | Status: SHIPPED | OUTPATIENT
Start: 2023-11-15

## 2023-11-15 RX ORDER — ROSUVASTATIN CALCIUM 10 MG/1
TABLET, COATED ORAL
Qty: 90 TABLET | Refills: 1 | Status: SHIPPED | OUTPATIENT
Start: 2023-11-15

## 2023-11-15 RX ORDER — TAMSULOSIN HYDROCHLORIDE 0.4 MG/1
0.4 CAPSULE ORAL EVERY MORNING
Qty: 90 CAPSULE | Refills: 1 | Status: SHIPPED | OUTPATIENT
Start: 2023-11-15

## 2023-11-15 ASSESSMENT — ENCOUNTER SYMPTOMS: CHEST TIGHTNESS: 0

## 2023-11-15 NOTE — PROGRESS NOTES
[facility]    History of Present Illness: Stephanie Carey is a 66 y.o. male with history of HLD, CAD, Adrenal incidentaloma, GERD, OSMANI, Asthma, Septic arthritis. CC: Issues with balance. History provided by patient and Records    HPI:  Patient notes has had issues with balance getting worse over the last 2 weeks. Noting he is \"wobbly\" at this time and is now using walking stick as well. Patient did not get labs done  weeks ago. Noting also urine frequency as this time and some intermittent pain. Has history of Recurrent UTI. Health Maintenance  There are no preventive care reminders to display for this patient.     Past Medical, Family, and Social History:     Current Outpatient Medications on File Prior to Visit   Medication Sig Dispense Refill    bumetanide (BUMEX) 1 MG tablet Take 2 tablets in the morning and 1 tablet in the evening 270 tablet 1    diclofenac (VOLTAREN) 75 MG EC tablet Take 1 tablet by mouth 2 times daily 60 tablet 3    acetaminophen (TYLENOL) 500 MG tablet Take 2 tablets by mouth every 6 hours as needed      aspirin 81 MG EC tablet Take by mouth daily      Calcium Carbonate-Vitamin D (OYSTER SHELL CALCIUM/D) 500-5 MG-MCG TABS Take 1 tablet by mouth daily      cefadroxil (DURICEF) 500 MG capsule TAKE 2 CAPLETS BY MOUTH TWICE DAILY      clotrimazole (LOTRIMIN) 1 % cream APPLY TOPICALLY TO THE AFFECTED AREA TWICE DAILY      coenzyme Q10 200 MG CAPS capsule Take by mouth daily      dutasteride (AVODART) 0.5 MG capsule Take 1 capsule by mouth daily      ipratropium-albuterol (DUONEB) 0.5-2.5 (3) MG/3ML SOLN nebulizer solution Inhale 3 mLs into the lungs every 6 hours as needed      levocetirizine (XYZAL) 5 MG tablet Take 1 tablet by mouth nightly      mometasone-formoterol (DULERA) 100-5 MCG/ACT inhaler Inhale 1 puff into the lungs 2 times daily      montelukast (SINGULAIR) 10 MG tablet TAKE 1 TABLET BY MOUTH AT BEDTIME FOR BREATHING      nitroGLYCERIN (NITROSTAT)

## 2023-11-16 LAB
ALBUMIN SERPL-MCNC: 3.6 G/DL (ref 3.5–5)
ALBUMIN/GLOB SERPL: 1.1 (ref 1.1–2.2)
ALP SERPL-CCNC: 117 U/L (ref 45–117)
ALT SERPL-CCNC: 39 U/L (ref 12–78)
ANION GAP SERPL CALC-SCNC: 9 MMOL/L (ref 5–15)
AST SERPL-CCNC: 23 U/L (ref 15–37)
BILIRUB SERPL-MCNC: 0.5 MG/DL (ref 0.2–1)
BUN SERPL-MCNC: 20 MG/DL (ref 6–20)
BUN/CREAT SERPL: 22 (ref 12–20)
CALCIUM SERPL-MCNC: 8.4 MG/DL (ref 8.5–10.1)
CHLORIDE SERPL-SCNC: 109 MMOL/L (ref 97–108)
CHOLEST SERPL-MCNC: 116 MG/DL
CO2 SERPL-SCNC: 22 MMOL/L (ref 21–32)
CREAT SERPL-MCNC: 0.9 MG/DL (ref 0.7–1.3)
ERYTHROCYTE [DISTWIDTH] IN BLOOD BY AUTOMATED COUNT: 14.6 % (ref 11.5–14.5)
GLOBULIN SER CALC-MCNC: 3.2 G/DL (ref 2–4)
GLUCOSE SERPL-MCNC: 115 MG/DL (ref 65–100)
HCT VFR BLD AUTO: 48.5 % (ref 36.6–50.3)
HDLC SERPL-MCNC: 33 MG/DL
HDLC SERPL: 3.5 (ref 0–5)
HGB BLD-MCNC: 15.5 G/DL (ref 12.1–17)
LDLC SERPL CALC-MCNC: 16.4 MG/DL (ref 0–100)
MCH RBC QN AUTO: 28.8 PG (ref 26–34)
MCHC RBC AUTO-ENTMCNC: 32 G/DL (ref 30–36.5)
MCV RBC AUTO: 90 FL (ref 80–99)
NRBC # BLD: 0 K/UL (ref 0–0.01)
NRBC BLD-RTO: 0 PER 100 WBC
PLATELET # BLD AUTO: 190 K/UL (ref 150–400)
PMV BLD AUTO: 11.3 FL (ref 8.9–12.9)
POTASSIUM SERPL-SCNC: 4.2 MMOL/L (ref 3.5–5.1)
PROT SERPL-MCNC: 6.8 G/DL (ref 6.4–8.2)
RBC # BLD AUTO: 5.39 M/UL (ref 4.1–5.7)
SODIUM SERPL-SCNC: 140 MMOL/L (ref 136–145)
TRIGL SERPL-MCNC: 333 MG/DL
VLDLC SERPL CALC-MCNC: 66.6 MG/DL
WBC # BLD AUTO: 9.4 K/UL (ref 4.1–11.1)

## 2023-11-17 ENCOUNTER — TELEPHONE (OUTPATIENT)
Facility: CLINIC | Age: 78
End: 2023-11-17

## 2023-11-17 LAB
BACTERIA SPEC CULT: NORMAL
CC UR VC: NORMAL
SERVICE CMNT-IMP: NORMAL

## 2023-11-17 NOTE — TELEPHONE ENCOUNTER
Call placed to pt and informed him per  there are no signs of a UTI bacteria in the urine. Pt verbalized understanding

## 2023-11-23 DIAGNOSIS — I10 PRIMARY HYPERTENSION: ICD-10-CM

## 2023-11-24 RX ORDER — METOPROLOL SUCCINATE 25 MG/1
12.5 TABLET, EXTENDED RELEASE ORAL DAILY
Qty: 45 TABLET | Refills: 1 | Status: SHIPPED | OUTPATIENT
Start: 2023-11-24

## 2023-11-27 ENCOUNTER — OFFICE VISIT (OUTPATIENT)
Facility: CLINIC | Age: 78
End: 2023-11-27
Payer: MEDICARE

## 2023-11-27 VITALS
HEART RATE: 77 BPM | BODY MASS INDEX: 35.65 KG/M2 | TEMPERATURE: 98.8 F | HEIGHT: 73 IN | RESPIRATION RATE: 20 BRPM | OXYGEN SATURATION: 96 % | WEIGHT: 269 LBS | DIASTOLIC BLOOD PRESSURE: 77 MMHG | SYSTOLIC BLOOD PRESSURE: 128 MMHG

## 2023-11-27 DIAGNOSIS — I10 ESSENTIAL (PRIMARY) HYPERTENSION: ICD-10-CM

## 2023-11-27 DIAGNOSIS — M15.9 PRIMARY OSTEOARTHRITIS INVOLVING MULTIPLE JOINTS: ICD-10-CM

## 2023-11-27 DIAGNOSIS — M54.2 CERVICALGIA: Primary | ICD-10-CM

## 2023-11-27 PROCEDURE — 1036F TOBACCO NON-USER: CPT | Performed by: FAMILY MEDICINE

## 2023-11-27 PROCEDURE — 3078F DIAST BP <80 MM HG: CPT | Performed by: FAMILY MEDICINE

## 2023-11-27 PROCEDURE — G8484 FLU IMMUNIZE NO ADMIN: HCPCS | Performed by: FAMILY MEDICINE

## 2023-11-27 PROCEDURE — 1123F ACP DISCUSS/DSCN MKR DOCD: CPT | Performed by: FAMILY MEDICINE

## 2023-11-27 PROCEDURE — G8427 DOCREV CUR MEDS BY ELIG CLIN: HCPCS | Performed by: FAMILY MEDICINE

## 2023-11-27 PROCEDURE — 3074F SYST BP LT 130 MM HG: CPT | Performed by: FAMILY MEDICINE

## 2023-11-27 PROCEDURE — G8417 CALC BMI ABV UP PARAM F/U: HCPCS | Performed by: FAMILY MEDICINE

## 2023-11-27 PROCEDURE — 99214 OFFICE O/P EST MOD 30 MIN: CPT | Performed by: FAMILY MEDICINE

## 2023-11-27 RX ORDER — DICLOFENAC SODIUM 75 MG/1
75 TABLET, DELAYED RELEASE ORAL 2 TIMES DAILY
Qty: 60 TABLET | Refills: 3 | Status: SHIPPED | OUTPATIENT
Start: 2023-11-27

## 2023-11-27 RX ORDER — PREDNISONE 20 MG/1
TABLET ORAL
Qty: 15 TABLET | Refills: 0 | Status: SHIPPED | OUTPATIENT
Start: 2023-11-27

## 2023-11-27 NOTE — PROGRESS NOTES
1. Have you been to the ER, urgent care clinic since your last visit? Hospitalized since your last visit?no  2. Have you seen or consulted any other health care providers outside of the [address] [place] [address] since your last visit? Include any pap smears or colon screening. no  Reviewed record in preparation for visit and have necessary documentation  Pt did not bring medication to office visit for review  opportunity was given for questions  Goals that were addressed and/or need to be completed during or after this appointment include There are no preventive care reminders to display for this patient.

## 2023-12-12 ENCOUNTER — OFFICE VISIT (OUTPATIENT)
Facility: CLINIC | Age: 78
End: 2023-12-12

## 2023-12-12 VITALS
DIASTOLIC BLOOD PRESSURE: 72 MMHG | HEIGHT: 73 IN | OXYGEN SATURATION: 94 % | TEMPERATURE: 98.9 F | SYSTOLIC BLOOD PRESSURE: 121 MMHG | HEART RATE: 60 BPM | WEIGHT: 266.6 LBS | BODY MASS INDEX: 35.33 KG/M2 | RESPIRATION RATE: 14 BRPM

## 2023-12-12 DIAGNOSIS — R35.0 BENIGN PROSTATIC HYPERPLASIA WITH URINARY FREQUENCY: ICD-10-CM

## 2023-12-12 DIAGNOSIS — N40.1 BENIGN PROSTATIC HYPERPLASIA WITH URINARY FREQUENCY: ICD-10-CM

## 2023-12-12 DIAGNOSIS — R30.0 DYSURIA: Primary | ICD-10-CM

## 2023-12-12 LAB
BILIRUBIN, URINE, POC: NORMAL
BLOOD URINE, POC: NORMAL
GLUCOSE URINE, POC: NEGATIVE
KETONES, URINE, POC: NORMAL
LEUKOCYTE ESTERASE, URINE, POC: NORMAL
NITRITE, URINE, POC: POSITIVE
PH, URINE, POC: 5.5 (ref 4.6–8)
PROTEIN,URINE, POC: NORMAL
SPECIFIC GRAVITY, URINE, POC: 1.02 (ref 1–1.03)
URINALYSIS CLARITY, POC: NORMAL
URINALYSIS COLOR, POC: YELLOW
UROBILINOGEN, POC: NORMAL

## 2023-12-12 RX ORDER — CEPHALEXIN 500 MG/1
500 CAPSULE ORAL 2 TIMES DAILY
Qty: 14 CAPSULE | Refills: 0 | Status: SHIPPED | OUTPATIENT
Start: 2023-12-12 | End: 2023-12-19

## 2023-12-13 ENCOUNTER — TELEPHONE (OUTPATIENT)
Facility: CLINIC | Age: 78
End: 2023-12-13

## 2023-12-13 NOTE — TELEPHONE ENCOUNTER
Pt called stating [facility] is stating they are missing documentation needed in order for pt to receive new CPAP machine. (Pt is trying to get machine before the holiday)    I called John Fuentes @ [facility] said they need a order completed and sent to them  along documentation from pt's recent ov, that stated the reason pt need a new machine. Please advise. Jesenia Giles

## 2023-12-14 NOTE — TELEPHONE ENCOUNTER
Printed precription prepared for faxing.     MD MATTHEW Lemus & KEITH MENDOZA [facility]  12/14/23

## 2023-12-15 LAB
BACTERIA SPEC CULT: ABNORMAL
CC UR VC: ABNORMAL
SERVICE CMNT-IMP: ABNORMAL

## 2024-01-10 ENCOUNTER — OFFICE VISIT (OUTPATIENT)
Facility: CLINIC | Age: 79
End: 2024-01-10
Payer: MEDICARE

## 2024-01-10 VITALS
HEIGHT: 73 IN | DIASTOLIC BLOOD PRESSURE: 77 MMHG | OXYGEN SATURATION: 95 % | TEMPERATURE: 97 F | BODY MASS INDEX: 35.57 KG/M2 | SYSTOLIC BLOOD PRESSURE: 134 MMHG | RESPIRATION RATE: 18 BRPM | WEIGHT: 268.4 LBS | HEART RATE: 75 BPM

## 2024-01-10 DIAGNOSIS — N39.0 RECURRENT UTI: ICD-10-CM

## 2024-01-10 DIAGNOSIS — Z96.659 FAILURE OF TOTAL KNEE REPLACEMENT, SUBSEQUENT ENCOUNTER: ICD-10-CM

## 2024-01-10 DIAGNOSIS — T84.018D FAILURE OF TOTAL KNEE REPLACEMENT, SUBSEQUENT ENCOUNTER: ICD-10-CM

## 2024-01-10 DIAGNOSIS — Z87.442 HISTORY OF KIDNEY STONES: ICD-10-CM

## 2024-01-10 DIAGNOSIS — R31.9 HEMATURIA, UNSPECIFIED TYPE: ICD-10-CM

## 2024-01-10 DIAGNOSIS — R30.0 DYSURIA: Primary | ICD-10-CM

## 2024-01-10 DIAGNOSIS — E66.01 SEVERE OBESITY (BMI 35.0-39.9) WITH COMORBIDITY (HCC): ICD-10-CM

## 2024-01-10 DIAGNOSIS — R35.0 URINARY FREQUENCY: ICD-10-CM

## 2024-01-10 LAB
BILIRUBIN, URINE, POC: NEGATIVE
BLOOD URINE, POC: NORMAL
GLUCOSE URINE, POC: NEGATIVE
KETONES, URINE, POC: NEGATIVE
LEUKOCYTE ESTERASE, URINE, POC: NORMAL
NITRITE, URINE, POC: NEGATIVE
PH, URINE, POC: 5.5 (ref 4.6–8)
PROTEIN,URINE, POC: NORMAL
SPECIFIC GRAVITY, URINE, POC: 1.03 (ref 1–1.03)
URINALYSIS CLARITY, POC: NORMAL
URINALYSIS COLOR, POC: YELLOW
UROBILINOGEN, POC: NORMAL

## 2024-01-10 PROCEDURE — G8417 CALC BMI ABV UP PARAM F/U: HCPCS | Performed by: FAMILY MEDICINE

## 2024-01-10 PROCEDURE — 81003 URINALYSIS AUTO W/O SCOPE: CPT | Performed by: FAMILY MEDICINE

## 2024-01-10 PROCEDURE — 3078F DIAST BP <80 MM HG: CPT | Performed by: FAMILY MEDICINE

## 2024-01-10 PROCEDURE — 3075F SYST BP GE 130 - 139MM HG: CPT | Performed by: FAMILY MEDICINE

## 2024-01-10 PROCEDURE — G8427 DOCREV CUR MEDS BY ELIG CLIN: HCPCS | Performed by: FAMILY MEDICINE

## 2024-01-10 PROCEDURE — G8484 FLU IMMUNIZE NO ADMIN: HCPCS | Performed by: FAMILY MEDICINE

## 2024-01-10 PROCEDURE — 1123F ACP DISCUSS/DSCN MKR DOCD: CPT | Performed by: FAMILY MEDICINE

## 2024-01-10 PROCEDURE — 1036F TOBACCO NON-USER: CPT | Performed by: FAMILY MEDICINE

## 2024-01-10 PROCEDURE — 99214 OFFICE O/P EST MOD 30 MIN: CPT | Performed by: FAMILY MEDICINE

## 2024-01-10 RX ORDER — LEVOFLOXACIN 500 MG/1
500 TABLET, FILM COATED ORAL DAILY
Qty: 10 TABLET | Refills: 0 | Status: SHIPPED | OUTPATIENT
Start: 2024-01-10 | End: 2024-01-20

## 2024-01-10 RX ORDER — CEFADROXIL 500 MG/1
500 CAPSULE ORAL DAILY
Qty: 30 CAPSULE | Refills: 0 | Status: SHIPPED | OUTPATIENT
Start: 2024-01-10

## 2024-01-10 RX ORDER — CEFADROXIL 500 MG/1
CAPSULE ORAL
Status: CANCELLED | OUTPATIENT
Start: 2024-01-10

## 2024-01-10 ASSESSMENT — ENCOUNTER SYMPTOMS: CHEST TIGHTNESS: 0

## 2024-01-10 ASSESSMENT — PATIENT HEALTH QUESTIONNAIRE - PHQ9
SUM OF ALL RESPONSES TO PHQ QUESTIONS 1-9: 0
SUM OF ALL RESPONSES TO PHQ9 QUESTIONS 1 & 2: 0
1. LITTLE INTEREST OR PLEASURE IN DOING THINGS: 0
SUM OF ALL RESPONSES TO PHQ QUESTIONS 1-9: 0
SUM OF ALL RESPONSES TO PHQ QUESTIONS 1-9: 0
2. FEELING DOWN, DEPRESSED OR HOPELESS: 0
SUM OF ALL RESPONSES TO PHQ QUESTIONS 1-9: 0

## 2024-01-10 NOTE — PROGRESS NOTES
[facility]    History of Present Illness:   Carlos Martino is a 78 y.o. male with history of HLD, CAD, Adrenal incidentaloma, GERD, OSMANI, Asthma, Septic arthritis.   CC: Blood in urine  History provided by patient and Records    HPI:  Patient reports that he has had recurrent issues with dysuria and blood in urine, and noting intermittent pains in the back.  Has a history of kidney stones, has seen Urology, but not recently.  Recent urine cultures positive with Pseudomonas Aerug. Which has been pan-sensitive.    Health Maintenance  Health Maintenance Due   Topic Date Due    Respiratory Syncytial Virus (RSV) Pregnant or age 60 yrs+ (1 - 1-dose 60+ series) Never done       Past Medical, Family, and Social History:     Current Outpatient Medications on File Prior to Visit   Medication Sig Dispense Refill    Probiotic Product (ALIGN) 4 MG CAPS Take 1 capsule by mouth daily 30 capsule 0    diclofenac (VOLTAREN) 75 MG EC tablet Take 1 tablet by mouth 2 times daily 60 tablet 3    predniSONE (DELTASONE) 20 MG tablet Take 3 tabs for two days, then 2 tabs for two days then 1 tab daily. 15 tablet 0    metoprolol succinate (TOPROL XL) 25 MG extended release tablet TAKE 1/2 TABLET BY MOUTH DAILY 45 tablet 1    tamsulosin (FLOMAX) 0.4 MG capsule Take 1 capsule by mouth every morning 90 capsule 1    rosuvastatin (CRESTOR) 10 MG tablet rosuvastatin 10 mg tablet  TAKE 1 TABLET BY MOUTH AT BEDTIME 90 tablet 1    bumetanide (BUMEX) 1 MG tablet Take 2 tablets in the morning and 1 tablet in the evening 270 tablet 1    acetaminophen (TYLENOL) 500 MG tablet Take 2 tablets by mouth every 6 hours as needed      aspirin 81 MG EC tablet Take by mouth daily      Calcium Carbonate-Vitamin D (OYSTER SHELL CALCIUM/D) 500-5 MG-MCG TABS Take 1 tablet by mouth daily      clotrimazole (LOTRIMIN) 1 % cream APPLY TOPICALLY TO THE AFFECTED AREA TWICE DAILY      coenzyme Q10 200 MG CAPS capsule Take by mouth daily      dutasteride

## 2024-01-10 NOTE — PROGRESS NOTES
1. \"Have you been to the ER, urgent care clinic since your last visit?  Hospitalized since your last visit?\" no    2. \"Have you seen or consulted any other health care providers outside [facility] since your last visit?\" no     3    Health Maintenance Due   Topic Date Due    Respiratory Syncytial Virus (RSV) Pregnant or age 60 yrs+ (1 - 1-dose 60+ series) Never done

## 2024-01-29 DIAGNOSIS — J45.20 MILD INTERMITTENT ASTHMA WITHOUT COMPLICATION: Primary | ICD-10-CM

## 2024-01-29 RX ORDER — MONTELUKAST SODIUM 10 MG/1
TABLET ORAL
Qty: 90 TABLET | Refills: 1 | Status: SHIPPED | OUTPATIENT
Start: 2024-01-29

## 2024-03-01 ENCOUNTER — OFFICE VISIT (OUTPATIENT)
Facility: CLINIC | Age: 79
End: 2024-03-01

## 2024-03-01 VITALS
TEMPERATURE: 97.5 F | HEIGHT: 73 IN | SYSTOLIC BLOOD PRESSURE: 131 MMHG | BODY MASS INDEX: 35.25 KG/M2 | RESPIRATION RATE: 20 BRPM | HEART RATE: 82 BPM | WEIGHT: 266 LBS | DIASTOLIC BLOOD PRESSURE: 88 MMHG | OXYGEN SATURATION: 94 %

## 2024-03-01 DIAGNOSIS — I10 PRIMARY HYPERTENSION: ICD-10-CM

## 2024-03-01 DIAGNOSIS — K21.9 GASTROESOPHAGEAL REFLUX DISEASE WITHOUT ESOPHAGITIS: ICD-10-CM

## 2024-03-01 DIAGNOSIS — G47.33 OSA ON CPAP: ICD-10-CM

## 2024-03-01 DIAGNOSIS — E27.8 ADRENAL INCIDENTALOMA (HCC): ICD-10-CM

## 2024-03-01 DIAGNOSIS — N40.1 BENIGN PROSTATIC HYPERPLASIA WITH NOCTURIA: ICD-10-CM

## 2024-03-01 DIAGNOSIS — E78.2 MIXED HYPERLIPIDEMIA: ICD-10-CM

## 2024-03-01 DIAGNOSIS — I25.10 ARTERIOSCLEROSIS OF CORONARY ARTERY: Primary | ICD-10-CM

## 2024-03-01 DIAGNOSIS — R35.1 BENIGN PROSTATIC HYPERPLASIA WITH NOCTURIA: ICD-10-CM

## 2024-03-01 DIAGNOSIS — E27.8 OTHER SPECIFIED DISORDERS OF ADRENAL GLAND (HCC): ICD-10-CM

## 2024-03-01 RX ORDER — LEVOFLOXACIN 250 MG/1
250 TABLET, FILM COATED ORAL DAILY
COMMUNITY
Start: 2024-02-27

## 2024-03-01 ASSESSMENT — ENCOUNTER SYMPTOMS
ABDOMINAL PAIN: 0
CHEST TIGHTNESS: 0

## 2024-03-01 NOTE — PROGRESS NOTES
Chief Complaint   Patient presents with    Follow-up     4 mo follow up-Chronic conditions       \"Have you been to the ER, urgent care clinic since your last visit?  Hospitalized since your last visit?\"    NO    “Have you seen or consulted any other health care providers outside of [facility] since your last visit?”    NO              Health Maintenance Due   Topic Date Due    Respiratory Syncytial Virus (RSV) Pregnant or age 60 yrs+ (1 - 1-dose 60+ series) Never done        
status:      Spouse name: None    Number of children: None    Years of education: None    Highest education level: None   Tobacco Use    Smoking status: Former     Current packs/day: 0.00     Average packs/day: 2.0 packs/day for 27.0 years (54.0 ttl pk-yrs)     Types: Cigarettes     Start date:      Quit date: 1985     Years since quittin.1     Passive exposure: Past    Smokeless tobacco: Never   Vaping Use    Vaping Use: Never used   Substance and Sexual Activity    Alcohol use: No    Drug use: No    Sexual activity: Not Currently     Social Determinants of Health     Financial Resource Strain: Low Risk  (2023)    Overall Financial Resource Strain (CARDIA)     Difficulty of Paying Living Expenses: Not hard at all   Transportation Needs: Unknown (2023)    PRAPARE - Transportation     Lack of Transportation (Non-Medical): No   Physical Activity: Inactive (2023)    Exercise Vital Sign     Days of Exercise per Week: 0 days     Minutes of Exercise per Session: 0 min   Housing Stability: Unknown (2023)    Housing Stability Vital Sign     Unstable Housing in the Last Year: No        Review of Systems   Review of Systems   Constitutional:  Negative for activity change and appetite change.   Respiratory:  Negative for chest tightness.    Cardiovascular:  Negative for chest pain.   Gastrointestinal:  Negative for abdominal pain.   Genitourinary:  Negative for flank pain.     Objective:   /88 (Site: Left Lower Arm, Position: Sitting, Cuff Size: Small Adult)   Pulse 82   Temp 97.5 °F (36.4 °C) (Oral)   Resp 20   Ht 1.854 m (6' 1\")   Wt 120.7 kg (266 lb)   SpO2 94%   BMI 35.09 kg/m²      Physical Exam  Vitals and nursing note reviewed.   Constitutional:       Appearance: Normal appearance.   HENT:      Head: Normocephalic and atraumatic.   Cardiovascular:      Rate and Rhythm: Normal rate and regular rhythm.      Pulses: Normal pulses.      Heart sounds: Normal heart sounds.

## 2024-03-02 LAB
ALBUMIN SERPL-MCNC: 3.3 G/DL (ref 3.5–5)
ALBUMIN/GLOB SERPL: 1.2 (ref 1.1–2.2)
ALP SERPL-CCNC: 94 U/L (ref 45–117)
ALT SERPL-CCNC: 40 U/L (ref 12–78)
ANION GAP SERPL CALC-SCNC: 7 MMOL/L (ref 5–15)
AST SERPL-CCNC: 23 U/L (ref 15–37)
BILIRUB SERPL-MCNC: 0.5 MG/DL (ref 0.2–1)
BUN SERPL-MCNC: 18 MG/DL (ref 6–20)
BUN/CREAT SERPL: 19 (ref 12–20)
CALCIUM SERPL-MCNC: 8.7 MG/DL (ref 8.5–10.1)
CHLORIDE SERPL-SCNC: 114 MMOL/L (ref 97–108)
CHOLEST SERPL-MCNC: 100 MG/DL
CO2 SERPL-SCNC: 22 MMOL/L (ref 21–32)
CREAT SERPL-MCNC: 0.94 MG/DL (ref 0.7–1.3)
ERYTHROCYTE [DISTWIDTH] IN BLOOD BY AUTOMATED COUNT: 15.1 % (ref 11.5–14.5)
GLOBULIN SER CALC-MCNC: 2.8 G/DL (ref 2–4)
GLUCOSE SERPL-MCNC: 99 MG/DL (ref 65–100)
HCT VFR BLD AUTO: 46.2 % (ref 36.6–50.3)
HDLC SERPL-MCNC: 29 MG/DL
HDLC SERPL: 3.4 (ref 0–5)
HGB BLD-MCNC: 14.3 G/DL (ref 12.1–17)
LDLC SERPL CALC-MCNC: 40 MG/DL (ref 0–100)
MCH RBC QN AUTO: 28.9 PG (ref 26–34)
MCHC RBC AUTO-ENTMCNC: 31 G/DL (ref 30–36.5)
MCV RBC AUTO: 93.3 FL (ref 80–99)
NRBC # BLD: 0 K/UL (ref 0–0.01)
NRBC BLD-RTO: 0 PER 100 WBC
PLATELET # BLD AUTO: 182 K/UL (ref 150–400)
PMV BLD AUTO: 11.5 FL (ref 8.9–12.9)
POTASSIUM SERPL-SCNC: 4.4 MMOL/L (ref 3.5–5.1)
PROT SERPL-MCNC: 6.1 G/DL (ref 6.4–8.2)
RBC # BLD AUTO: 4.95 M/UL (ref 4.1–5.7)
SODIUM SERPL-SCNC: 143 MMOL/L (ref 136–145)
TRIGL SERPL-MCNC: 155 MG/DL
VLDLC SERPL CALC-MCNC: 31 MG/DL
WBC # BLD AUTO: 6.7 K/UL (ref 4.1–11.1)

## 2024-03-21 DIAGNOSIS — M17.11 PRIMARY OSTEOARTHRITIS OF RIGHT KNEE: Primary | ICD-10-CM

## 2024-03-21 RX ORDER — DICLOFENAC SODIUM 75 MG/1
75 TABLET, DELAYED RELEASE ORAL 2 TIMES DAILY
Qty: 180 TABLET | Refills: 1 | Status: SHIPPED | OUTPATIENT
Start: 2024-03-21

## 2024-05-07 DIAGNOSIS — I10 PRIMARY HYPERTENSION: ICD-10-CM

## 2024-05-07 RX ORDER — METOPROLOL SUCCINATE 25 MG/1
12.5 TABLET, EXTENDED RELEASE ORAL DAILY
Qty: 45 TABLET | Refills: 1 | Status: SHIPPED | OUTPATIENT
Start: 2024-05-07

## 2024-05-20 DIAGNOSIS — E78.2 MIXED HYPERLIPIDEMIA: ICD-10-CM

## 2024-05-20 RX ORDER — ROSUVASTATIN CALCIUM 10 MG/1
TABLET, COATED ORAL
Qty: 90 TABLET | Refills: 1 | Status: SHIPPED | OUTPATIENT
Start: 2024-05-20

## 2024-06-05 ENCOUNTER — OFFICE VISIT (OUTPATIENT)
Facility: CLINIC | Age: 79
End: 2024-06-05
Payer: MEDICARE

## 2024-06-05 VITALS
TEMPERATURE: 97.8 F | WEIGHT: 260 LBS | DIASTOLIC BLOOD PRESSURE: 80 MMHG | HEART RATE: 78 BPM | HEIGHT: 73 IN | SYSTOLIC BLOOD PRESSURE: 115 MMHG | RESPIRATION RATE: 20 BRPM | BODY MASS INDEX: 34.46 KG/M2 | OXYGEN SATURATION: 94 %

## 2024-06-05 DIAGNOSIS — I10 PRIMARY HYPERTENSION: ICD-10-CM

## 2024-06-05 DIAGNOSIS — J45.20 MILD INTERMITTENT ASTHMA WITHOUT COMPLICATION: ICD-10-CM

## 2024-06-05 DIAGNOSIS — G47.33 OSA ON CPAP: Primary | ICD-10-CM

## 2024-06-05 DIAGNOSIS — K21.9 GASTROESOPHAGEAL REFLUX DISEASE WITHOUT ESOPHAGITIS: ICD-10-CM

## 2024-06-05 DIAGNOSIS — M19.90 ARTHRITIS: ICD-10-CM

## 2024-06-05 DIAGNOSIS — R35.1 BENIGN PROSTATIC HYPERPLASIA WITH NOCTURIA: ICD-10-CM

## 2024-06-05 DIAGNOSIS — M79.10 MUSCLE PAIN: ICD-10-CM

## 2024-06-05 DIAGNOSIS — I25.10 ARTERIOSCLEROSIS OF CORONARY ARTERY: ICD-10-CM

## 2024-06-05 DIAGNOSIS — N40.1 BENIGN PROSTATIC HYPERPLASIA WITH NOCTURIA: ICD-10-CM

## 2024-06-05 DIAGNOSIS — E78.2 MIXED HYPERLIPIDEMIA: ICD-10-CM

## 2024-06-05 PROCEDURE — 99214 OFFICE O/P EST MOD 30 MIN: CPT | Performed by: FAMILY MEDICINE

## 2024-06-05 PROCEDURE — G8427 DOCREV CUR MEDS BY ELIG CLIN: HCPCS | Performed by: FAMILY MEDICINE

## 2024-06-05 PROCEDURE — 3079F DIAST BP 80-89 MM HG: CPT | Performed by: FAMILY MEDICINE

## 2024-06-05 PROCEDURE — 1123F ACP DISCUSS/DSCN MKR DOCD: CPT | Performed by: FAMILY MEDICINE

## 2024-06-05 PROCEDURE — G8417 CALC BMI ABV UP PARAM F/U: HCPCS | Performed by: FAMILY MEDICINE

## 2024-06-05 PROCEDURE — 3074F SYST BP LT 130 MM HG: CPT | Performed by: FAMILY MEDICINE

## 2024-06-05 PROCEDURE — 1036F TOBACCO NON-USER: CPT | Performed by: FAMILY MEDICINE

## 2024-06-05 RX ORDER — BUMETANIDE 1 MG/1
1 TABLET ORAL NIGHTLY
Qty: 90 TABLET | Refills: 1
Start: 2024-06-05

## 2024-06-05 RX ORDER — CYCLOBENZAPRINE HCL 5 MG
5 TABLET ORAL
Qty: 30 TABLET | Refills: 1 | Status: SHIPPED | OUTPATIENT
Start: 2024-06-05

## 2024-06-05 ASSESSMENT — ENCOUNTER SYMPTOMS: CHEST TIGHTNESS: 0

## 2024-06-05 NOTE — PROGRESS NOTES
\"Have you been to the ER, urgent care clinic since your last visit?  Hospitalized since your last visit?\"    NO    “Have you seen or consulted any other health care providers outside of [facility] since your last visit?”    NO            Click Here for Release of Records Request

## 2024-06-05 NOTE — PROGRESS NOTES
[facility]    History of Present Illness:   Carlos Martino is a 78 y.o. male with history of HLD, CAD, Adrenal incidentaloma, GERD, OSMANI, Asthma, Septic arthritis.   CC: Follow up  History provided by patient and Records    HPI:  OAB/BPH: Has been getting injections recently to help with bladder.    Muscle aches: Noting cari wake patient at night with muscles pains.    Coronary Artery Disease Follow up:  Today patient reports he is feeling well and overall his symptoms are controlled on his current medications.  he  has had a history of acute myocardial infarction in the past.  Prior management has included:   - Coronary stenting: Yes  - Coronary bypass: No     he has not had CHF      he is  on a statin ( Crestor ).  he is not on antiplatelet therapy.  he is  on a beta blocker.  he is not on a regular Nitrate therapy.  he is not use Nitroglycerin as needed for chest pain.     Residual symptoms of CAD include none.  Patient denies any current chest pain.  Risk factors are controlled or at goal.  Primary risk factors include elevated cholesterol and hypertension.    OSMANI: Wearing C-pap nightly.     Asthma: Currently well controlled     Health Maintenance  There are no preventive care reminders to display for this patient.    Past Medical, Family, and Social History:     Current Outpatient Medications on File Prior to Visit   Medication Sig Dispense Refill    rosuvastatin (CRESTOR) 10 MG tablet TAKE 1 TABLET BY MOUTH AT BEDTIME 90 tablet 1    metoprolol succinate (TOPROL XL) 25 MG extended release tablet TAKE 1/2 TABLET BY MOUTH DAILY 45 tablet 1    diclofenac (VOLTAREN) 75 MG EC tablet TAKE 1 TABLET BY MOUTH TWICE DAILY 180 tablet 1    montelukast (SINGULAIR) 10 MG tablet TAKE 1 TABLET BY MOUTH AT BEDTIME FOR BREATHING 90 tablet 1    tamsulosin (FLOMAX) 0.4 MG capsule Take 1 capsule by mouth every morning 90 capsule 1    acetaminophen (TYLENOL) 500 MG tablet Take 2 tablets by mouth every 6 hours as

## 2024-06-06 LAB
ALBUMIN SERPL-MCNC: 3.5 G/DL (ref 3.5–5)
ALBUMIN/GLOB SERPL: 1.1 (ref 1.1–2.2)
ALP SERPL-CCNC: 138 U/L (ref 45–117)
ALT SERPL-CCNC: 36 U/L (ref 12–78)
ANION GAP SERPL CALC-SCNC: 5 MMOL/L (ref 5–15)
AST SERPL-CCNC: 17 U/L (ref 15–37)
BILIRUB SERPL-MCNC: 0.4 MG/DL (ref 0.2–1)
BUN SERPL-MCNC: 27 MG/DL (ref 6–20)
BUN/CREAT SERPL: 26 (ref 12–20)
CALCIUM SERPL-MCNC: 9.3 MG/DL (ref 8.5–10.1)
CHLORIDE SERPL-SCNC: 111 MMOL/L (ref 97–108)
CHOLEST SERPL-MCNC: 118 MG/DL
CO2 SERPL-SCNC: 24 MMOL/L (ref 21–32)
CREAT SERPL-MCNC: 1.04 MG/DL (ref 0.7–1.3)
ERYTHROCYTE [DISTWIDTH] IN BLOOD BY AUTOMATED COUNT: 14.7 % (ref 11.5–14.5)
GLOBULIN SER CALC-MCNC: 3.3 G/DL (ref 2–4)
GLUCOSE SERPL-MCNC: 89 MG/DL (ref 65–100)
HCT VFR BLD AUTO: 48.1 % (ref 36.6–50.3)
HDLC SERPL-MCNC: 38 MG/DL
HDLC SERPL: 3.1 (ref 0–5)
HGB BLD-MCNC: 14.9 G/DL (ref 12.1–17)
LDLC SERPL CALC-MCNC: 38.6 MG/DL (ref 0–100)
MCH RBC QN AUTO: 28.4 PG (ref 26–34)
MCHC RBC AUTO-ENTMCNC: 31 G/DL (ref 30–36.5)
MCV RBC AUTO: 91.6 FL (ref 80–99)
NRBC # BLD: 0 K/UL (ref 0–0.01)
NRBC BLD-RTO: 0 PER 100 WBC
PLATELET # BLD AUTO: 250 K/UL (ref 150–400)
PMV BLD AUTO: 10.8 FL (ref 8.9–12.9)
POTASSIUM SERPL-SCNC: 4.5 MMOL/L (ref 3.5–5.1)
PROT SERPL-MCNC: 6.8 G/DL (ref 6.4–8.2)
RBC # BLD AUTO: 5.25 M/UL (ref 4.1–5.7)
SODIUM SERPL-SCNC: 140 MMOL/L (ref 136–145)
TRIGL SERPL-MCNC: 207 MG/DL
VLDLC SERPL CALC-MCNC: 41.4 MG/DL
WBC # BLD AUTO: 7.7 K/UL (ref 4.1–11.1)

## 2024-07-08 ENCOUNTER — TELEPHONE (OUTPATIENT)
Facility: CLINIC | Age: 79
End: 2024-07-08

## 2024-07-09 ENCOUNTER — TELEPHONE (OUTPATIENT)
Facility: CLINIC | Age: 79
End: 2024-07-09

## 2024-07-09 DIAGNOSIS — N30.00 ACUTE CYSTITIS WITHOUT HEMATURIA: Primary | ICD-10-CM

## 2024-07-09 RX ORDER — CEFADROXIL 500 MG/1
500 CAPSULE ORAL 2 TIMES DAILY
Qty: 14 CAPSULE | Refills: 0 | Status: SHIPPED | OUTPATIENT
Start: 2024-07-09 | End: 2024-07-16

## 2024-07-09 NOTE — TELEPHONE ENCOUNTER
Pt advised of medication sent to pharmacy and if no better to schedule an appt to be seen by PCP.

## 2024-07-09 NOTE — TELEPHONE ENCOUNTER
Pt request a refill on the below medication. If Dr Cortez can call and I will explain what's going on. Please advise and send to [facility] in [place].       Cefadroxil 500 mg.

## 2024-07-16 ENCOUNTER — OFFICE VISIT (OUTPATIENT)
Facility: CLINIC | Age: 79
End: 2024-07-16
Payer: MEDICARE

## 2024-07-16 VITALS
SYSTOLIC BLOOD PRESSURE: 111 MMHG | HEIGHT: 73 IN | RESPIRATION RATE: 18 BRPM | OXYGEN SATURATION: 94 % | WEIGHT: 267 LBS | TEMPERATURE: 98 F | HEART RATE: 89 BPM | BODY MASS INDEX: 35.39 KG/M2 | DIASTOLIC BLOOD PRESSURE: 72 MMHG

## 2024-07-16 DIAGNOSIS — J06.9 VIRAL URI: Primary | ICD-10-CM

## 2024-07-16 DIAGNOSIS — J45.901 ASTHMA EXACERBATION, MILD: ICD-10-CM

## 2024-07-16 DIAGNOSIS — J45.21 MILD INTERMITTENT ASTHMA WITH ACUTE EXACERBATION: ICD-10-CM

## 2024-07-16 PROCEDURE — 99214 OFFICE O/P EST MOD 30 MIN: CPT

## 2024-07-16 PROCEDURE — 3074F SYST BP LT 130 MM HG: CPT

## 2024-07-16 PROCEDURE — 3078F DIAST BP <80 MM HG: CPT

## 2024-07-16 PROCEDURE — 1036F TOBACCO NON-USER: CPT

## 2024-07-16 PROCEDURE — G8428 CUR MEDS NOT DOCUMENT: HCPCS

## 2024-07-16 PROCEDURE — G8417 CALC BMI ABV UP PARAM F/U: HCPCS

## 2024-07-16 PROCEDURE — 1123F ACP DISCUSS/DSCN MKR DOCD: CPT

## 2024-07-16 RX ORDER — ALBUTEROL SULFATE 2.5 MG/3ML
2.5 SOLUTION RESPIRATORY (INHALATION) 4 TIMES DAILY PRN
Qty: 120 EACH | Refills: 3 | Status: SHIPPED | OUTPATIENT
Start: 2024-07-16

## 2024-07-16 RX ORDER — ALBUTEROL SULFATE 90 UG/1
2 AEROSOL, METERED RESPIRATORY (INHALATION) EVERY 6 HOURS PRN
Qty: 18 G | Refills: 3 | Status: SHIPPED | OUTPATIENT
Start: 2024-07-16

## 2024-07-16 RX ORDER — DOXYCYCLINE HYCLATE 100 MG
100 TABLET ORAL 2 TIMES DAILY
Qty: 10 TABLET | Refills: 0 | Status: SHIPPED | OUTPATIENT
Start: 2024-07-16 | End: 2024-07-21

## 2024-07-16 RX ORDER — PREDNISONE 20 MG/1
20 TABLET ORAL DAILY
Qty: 5 TABLET | Refills: 0 | Status: SHIPPED | OUTPATIENT
Start: 2024-07-16 | End: 2024-07-21

## 2024-07-16 SDOH — ECONOMIC STABILITY: FOOD INSECURITY: WITHIN THE PAST 12 MONTHS, THE FOOD YOU BOUGHT JUST DIDN'T LAST AND YOU DIDN'T HAVE MONEY TO GET MORE.: NEVER TRUE

## 2024-07-16 SDOH — ECONOMIC STABILITY: FOOD INSECURITY: WITHIN THE PAST 12 MONTHS, YOU WORRIED THAT YOUR FOOD WOULD RUN OUT BEFORE YOU GOT MONEY TO BUY MORE.: NEVER TRUE

## 2024-07-16 SDOH — ECONOMIC STABILITY: INCOME INSECURITY: HOW HARD IS IT FOR YOU TO PAY FOR THE VERY BASICS LIKE FOOD, HOUSING, MEDICAL CARE, AND HEATING?: NOT HARD AT ALL

## 2024-07-16 NOTE — PROGRESS NOTES
[facility]    Subjective   Carlos Martino is a 79 y.o. male who presents for Cough (Nasal congestion. Reports cough is productive minimally- yellow/green sputum.  X1 week duration.)    Pertinent PMHx: asthma    #Asthma exacerbation 2/2 viral URI  Patient reports 1 wk congestion, rhinorrhea, cough productive of yellow-green sputum, no fever. He was taking cefadroxil for UTI since (7/9/2024) , completed yesterday. States his UTI symptoms are gone but continues to have persistent URI symptoms, worst at night.     Reports history of asthma, has not used medications in over 2 years for it. Used old nebulizer yesterday without relief.     Taking bumex and singulair nightly.  Hx hospitalization for PNA 10 years ago.    Review of Systems   Review of Systems   As above. Denies HA, blurry vision, dizziness, CP, SOB, abdominal pain, changes in BM or urination.    Medical History  Past Medical History:   Diagnosis Date    Arthritis     Asthma     R/T ENVIRONMENTAL ALLERGIES; INHALER USE DAILY    CAD (coronary artery disease) 2007    MI    GERD (gastroesophageal reflux disease)     History of BPH     History of kidney stones     Hx: UTI (urinary tract infection)     Hypertension     Lymphedema     Nausea & vomiting     OSMANI on CPAP     Sepsis (HCC) 8/16/2018       Medications  Current Outpatient Medications   Medication Sig    predniSONE (DELTASONE) 20 MG tablet Take 1 tablet by mouth daily for 5 days    albuterol sulfate HFA (PROVENTIL;VENTOLIN;PROAIR) 108 (90 Base) MCG/ACT inhaler Inhale 2 puffs into the lungs every 6 hours as needed for Wheezing    albuterol (PROVENTIL) (2.5 MG/3ML) 0.083% nebulizer solution Take 3 mLs by nebulization 4 times daily as needed for Wheezing    doxycycline hyclate (VIBRA-TABS) 100 MG tablet Take 1 tablet by mouth 2 times daily for 5 days    bumetanide (BUMEX) 1 MG tablet Take 1 tablet by mouth at bedtime    cyclobenzaprine (FLEXERIL)

## 2024-07-16 NOTE — PROGRESS NOTES
Chief Complaint   Patient presents with    Cough     Nasal congestion. Reports cough is productive minimally- yellow/green sputum.  X1 week duration.         \"Have you been to the ER, urgent care clinic since your last visit?  Hospitalized since your last visit?\"    NO    “Have you seen or consulted any other health care providers outside of [facility] since your last visit?”    NO            Click Here for Release of Records Request     There are no preventive care reminders to display for this patient.

## 2024-07-16 NOTE — PROGRESS NOTES
I saw and evaluated the patient, performing the key elements of the service.  I discussed the findings, assessment and plan with the resident and agree with the resident's findings and plan as documented in the resident's note.   The pt is well appearing, NAD, +intermittent coughing during the exam. VSS. LUNG CTA. Will treat with mild asthma exacerbation with 5 days course of Prednisone 20mg. Pocket script for Doxycyline, informed the patient to start in 2 days if the symptoms are not improving with PO steroids.

## 2024-07-16 NOTE — PATIENT INSTRUCTIONS
Please take the following medications as prescribed:  Prednisone 20 mg for 5 days, take with food  Albuterol inhaler , 2 puffs as needed for breathing  Albuterol nebulizer solution, daily as needed for breathing    Doxycycline if no improvement in symptoms after 2 days of the above medications    Please inform us if your symptoms persist. Please also let us know if you have any new stomach upset with these medications.  Please follow up with your PCP in 1 month.

## 2024-07-25 DIAGNOSIS — J45.20 MILD INTERMITTENT ASTHMA WITHOUT COMPLICATION: ICD-10-CM

## 2024-07-25 RX ORDER — MONTELUKAST SODIUM 10 MG/1
TABLET ORAL
Qty: 90 TABLET | Refills: 1 | Status: SHIPPED | OUTPATIENT
Start: 2024-07-25

## 2024-08-08 DIAGNOSIS — M79.10 MUSCLE PAIN: ICD-10-CM

## 2024-08-08 RX ORDER — CYCLOBENZAPRINE HCL 5 MG
5 TABLET ORAL NIGHTLY
Qty: 30 TABLET | Refills: 1 | Status: SHIPPED | OUTPATIENT
Start: 2024-08-08

## 2024-09-05 ENCOUNTER — OFFICE VISIT (OUTPATIENT)
Facility: CLINIC | Age: 79
End: 2024-09-05

## 2024-09-05 VITALS
TEMPERATURE: 97.8 F | HEIGHT: 73 IN | RESPIRATION RATE: 18 BRPM | HEART RATE: 90 BPM | DIASTOLIC BLOOD PRESSURE: 74 MMHG | BODY MASS INDEX: 35.52 KG/M2 | SYSTOLIC BLOOD PRESSURE: 114 MMHG | OXYGEN SATURATION: 95 % | WEIGHT: 268 LBS

## 2024-09-05 DIAGNOSIS — R35.1 BENIGN PROSTATIC HYPERPLASIA WITH NOCTURIA: ICD-10-CM

## 2024-09-05 DIAGNOSIS — J45.20 MILD INTERMITTENT ASTHMA WITHOUT COMPLICATION: ICD-10-CM

## 2024-09-05 DIAGNOSIS — E78.2 MIXED HYPERLIPIDEMIA: ICD-10-CM

## 2024-09-05 DIAGNOSIS — I10 PRIMARY HYPERTENSION: ICD-10-CM

## 2024-09-05 DIAGNOSIS — M79.10 MUSCLE PAIN: ICD-10-CM

## 2024-09-05 DIAGNOSIS — N40.1 BENIGN PROSTATIC HYPERPLASIA WITH NOCTURIA: ICD-10-CM

## 2024-09-05 DIAGNOSIS — G47.33 OSA ON CPAP: Primary | ICD-10-CM

## 2024-09-05 DIAGNOSIS — E55.9 VITAMIN D DEFICIENCY: ICD-10-CM

## 2024-09-05 RX ORDER — CYCLOBENZAPRINE HCL 5 MG
5 TABLET ORAL NIGHTLY
Qty: 90 TABLET | Refills: 1 | Status: SHIPPED | OUTPATIENT
Start: 2024-09-05

## 2024-09-05 RX ORDER — CHOLECALCIFEROL (VITAMIN D3) 125 MCG
2 CAPSULE ORAL DAILY
Qty: 180 CAPSULE | Refills: 1 | Status: SHIPPED | OUTPATIENT
Start: 2024-09-05

## 2024-09-05 ASSESSMENT — ENCOUNTER SYMPTOMS
ABDOMINAL PAIN: 0
CHEST TIGHTNESS: 0
APNEA: 0
ABDOMINAL DISTENTION: 0

## 2024-09-05 NOTE — PROGRESS NOTES
[facility]    History of Present Illness:   Carlos Martino is a 79 y.o. male with history of HLD, CAD, Adrenal incidentaloma, GERD, OSMANI, Asthma, Septic arthritis.   CC: Follow up   History provided by patient and Records    HPI:  Dry Mouth: Chronic issue, noting with talking will get dry yadi in particular.    OSMANI: Wearing C-pap nightly.      Asthma: Currently well controlled     OAB/BPH: Completed the injections and noting symptoms have improved significantly so far.  Taking Flomax and Dutasteride.     Muscle aches: Noting cari wake patient at night with muscles pains.    Coronary Artery Disease Follow up:  Today patient reports he is feeling well and overall his symptoms are controlled on his current medications.  Noting persistent Lower extremity anne-marie, but this is normal  he  has had a history of acute myocardial infarction in the past.  Prior management has included:   - Coronary stenting: Yes  - Coronary bypass: No     he has not had CHF      he is  on a statin ( Crestor ).  he is not on antiplatelet therapy.  he is  on a beta blocker.  he is not on a regular Nitrate therapy.  he is not use Nitroglycerin as needed for chest pain.     Residual symptoms of CAD include none.  Patient denies any current chest pain.  Risk factors are controlled or at goal.  Primary risk factors include elevated cholesterol and hypertension.    Health Maintenance  Health Maintenance Due   Topic Date Due    DTaP/Tdap/Td vaccine (1 - Tdap) Never done    Prostate Specific Antigen (PSA) Screening or Monitoring  09/01/2024       Past Medical, Family, and Social History:     Current Outpatient Medications on File Prior to Visit   Medication Sig Dispense Refill    montelukast (SINGULAIR) 10 MG tablet TAKE 1 TABLET BY MOUTH AT BEDTIME FOR BREATHING 90 tablet 1    albuterol sulfate HFA (PROVENTIL;VENTOLIN;PROAIR) 108 (90 Base) MCG/ACT inhaler Inhale 2 puffs into the lungs every 6 hours as needed for Wheezing 18 g 3

## 2024-09-05 NOTE — PROGRESS NOTES
\"Have you been to the ER, urgent care clinic since your last visit?  Hospitalized since your last visit?\"    no    “Have you seen or consulted any other health care providers outside of [facility] since your last visit?”    no            Click Here for Release of Records Request

## 2024-09-06 LAB
ALBUMIN SERPL-MCNC: 3.4 G/DL (ref 3.5–5)
ALBUMIN/GLOB SERPL: 1.1 (ref 1.1–2.2)
ALP SERPL-CCNC: 132 U/L (ref 45–117)
ALT SERPL-CCNC: 35 U/L (ref 12–78)
ANION GAP SERPL CALC-SCNC: 5 MMOL/L (ref 2–12)
AST SERPL-CCNC: 19 U/L (ref 15–37)
BILIRUB SERPL-MCNC: 0.4 MG/DL (ref 0.2–1)
BUN SERPL-MCNC: 18 MG/DL (ref 6–20)
BUN/CREAT SERPL: 19 (ref 12–20)
CALCIUM SERPL-MCNC: 9 MG/DL (ref 8.5–10.1)
CHLORIDE SERPL-SCNC: 111 MMOL/L (ref 97–108)
CHOLEST SERPL-MCNC: 118 MG/DL
CO2 SERPL-SCNC: 23 MMOL/L (ref 21–32)
CREAT SERPL-MCNC: 0.96 MG/DL (ref 0.7–1.3)
ERYTHROCYTE [DISTWIDTH] IN BLOOD BY AUTOMATED COUNT: 15.1 % (ref 11.5–14.5)
GLOBULIN SER CALC-MCNC: 3 G/DL (ref 2–4)
GLUCOSE SERPL-MCNC: 132 MG/DL (ref 65–100)
HCT VFR BLD AUTO: 47.3 % (ref 36.6–50.3)
HDLC SERPL-MCNC: 36 MG/DL
HDLC SERPL: 3.3 (ref 0–5)
HGB BLD-MCNC: 14.5 G/DL (ref 12.1–17)
LDLC SERPL CALC-MCNC: 39.4 MG/DL (ref 0–100)
MCH RBC QN AUTO: 28.5 PG (ref 26–34)
MCHC RBC AUTO-ENTMCNC: 30.7 G/DL (ref 30–36.5)
MCV RBC AUTO: 93.1 FL (ref 80–99)
NRBC # BLD: 0 K/UL (ref 0–0.01)
NRBC BLD-RTO: 0 PER 100 WBC
PLATELET # BLD AUTO: 199 K/UL (ref 150–400)
PMV BLD AUTO: 10.9 FL (ref 8.9–12.9)
POTASSIUM SERPL-SCNC: 4.2 MMOL/L (ref 3.5–5.1)
PROT SERPL-MCNC: 6.4 G/DL (ref 6.4–8.2)
RBC # BLD AUTO: 5.08 M/UL (ref 4.1–5.7)
SODIUM SERPL-SCNC: 139 MMOL/L (ref 136–145)
TRIGL SERPL-MCNC: 213 MG/DL
VLDLC SERPL CALC-MCNC: 42.6 MG/DL
WBC # BLD AUTO: 6 K/UL (ref 4.1–11.1)

## 2024-09-07 LAB
PSA SERPL-MCNC: 1.8 NG/ML (ref 0–4)
REFLEX CRITERIA: NORMAL

## 2024-10-03 DIAGNOSIS — M17.11 PRIMARY OSTEOARTHRITIS OF RIGHT KNEE: ICD-10-CM

## 2024-10-03 RX ORDER — DICLOFENAC SODIUM 75 MG/1
75 TABLET, DELAYED RELEASE ORAL 2 TIMES DAILY
Qty: 180 TABLET | Refills: 1 | Status: SHIPPED | OUTPATIENT
Start: 2024-10-03

## 2024-11-21 ENCOUNTER — TELEPHONE (OUTPATIENT)
Facility: CLINIC | Age: 79
End: 2024-11-21

## 2024-11-21 RX ORDER — DOXYCYCLINE HYCLATE 100 MG
100 TABLET ORAL 2 TIMES DAILY
Qty: 14 TABLET | Refills: 0 | Status: SHIPPED | OUTPATIENT
Start: 2024-11-21 | End: 2024-11-28

## 2024-11-21 NOTE — TELEPHONE ENCOUNTER
Pt was scratched by his dog this morning. Pt has a few areas on his leg that were bleeding, and so they bandaged them up. The wound is now draining clear-lorna liquid and it is soaking though his bandage and pants leg. They are worried about possible infection, and would like for the Dr to call in an antibiotic for the pt to  [facility] in [place]. Please advise.

## 2024-11-22 ENCOUNTER — OFFICE VISIT (OUTPATIENT)
Facility: CLINIC | Age: 79
End: 2024-11-22

## 2024-11-22 VITALS
HEIGHT: 73 IN | SYSTOLIC BLOOD PRESSURE: 138 MMHG | RESPIRATION RATE: 16 BRPM | TEMPERATURE: 98.4 F | OXYGEN SATURATION: 93 % | BODY MASS INDEX: 35.65 KG/M2 | HEART RATE: 77 BPM | DIASTOLIC BLOOD PRESSURE: 95 MMHG | WEIGHT: 269 LBS

## 2024-11-22 DIAGNOSIS — I89.0 LYMPHEDEMA: ICD-10-CM
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
## 2024-11-22 DIAGNOSIS — I10 PRIMARY HYPERTENSION: ICD-10-CM

## 2024-11-22 NOTE — PROGRESS NOTES
Chief Complaint   Patient presents with    Abrasion     Dog scratch left leg 2 nights ago. Patient has lower extremity edema. Left leg weeping fluid.         \"Have you been to the ER, urgent care clinic since your last visit?  Hospitalized since your last visit?\"    NO    “Have you seen or consulted any other health care providers outside of [facility] since your last visit?”    NO            Click Here for Release of Records Request     Health Maintenance Due   Topic Date Due    DTaP/Tdap/Td vaccine (1 - Tdap) Never done    Annual Wellness Visit (Medicare)  11/01/2024

## 2024-11-26 ENCOUNTER — OFFICE VISIT (OUTPATIENT)
Facility: CLINIC | Age: 79
End: 2024-11-26

## 2024-11-26 VITALS
RESPIRATION RATE: 16 BRPM | SYSTOLIC BLOOD PRESSURE: 128 MMHG | DIASTOLIC BLOOD PRESSURE: 85 MMHG | OXYGEN SATURATION: 93 % | TEMPERATURE: 98.8 F | HEIGHT: 73 IN | WEIGHT: 268 LBS | BODY MASS INDEX: 35.52 KG/M2 | HEART RATE: 69 BPM

## 2024-11-26 DIAGNOSIS — L98.9 SKIN LESION OF LEFT LEG: Primary | ICD-10-CM

## 2024-11-26 DIAGNOSIS — I89.0 LYMPHEDEMA: ICD-10-CM

## 2024-11-26 RX ORDER — MUPIROCIN 20 MG/G
OINTMENT TOPICAL
Qty: 22 G | Refills: 0 | Status: SHIPPED | OUTPATIENT
Start: 2024-11-26 | End: 2024-12-03

## 2024-11-29 NOTE — PROGRESS NOTES
Progress Note    Patient: Carlos Martino MRN: [id number]  SSN: [id number]    YOB: 1945  Age: 79 y.o.  Sex: male        Chief Complaint   Patient presents with    Abrasion     Dog scratch left leg x2 nights ago. Patient has lower extremity edema. Left leg weeping fluid.     Leg Swelling     he is a 79 y.o. year old male who presents for evaluation of dog scratch to posterior left lower leg. Patient LLE lymphadenopathy. Patient reports wound weeping with increased LLE swelling. His PCP has prescribed doxycycline. Patient with dx of BPH, HTN, HLD and OA. Patient denies HA, dizziness, SOB, CP, abdominal pain, dysuria, weakness or paresthesia.      Encounter Diagnoses   Name Primary?    Dog scratch Yes    Lymphedema     Primary hypertension          Patient Active Problem List   Diagnosis    Obstructive sleep apnea syndrome    Arteriosclerosis of coronary artery    Hypertension    Hyperlipidemia    Primary osteoarthritis of right knee    GERD (gastroesophageal reflux disease)    Arthritis    Lymphedema    OSMANI on CPAP    Asthma    At moderate risk for fall    Failed total knee arthroplasty (HCC)    Adrenal incidentaloma (HCC)    Benign prostatic hyperplasia with nocturia    Viral URI     Past Surgical History:   Procedure Laterality Date    BREAST SURGERY Right     EXCISION OF BREAST LUMP (BENIGN)    CARDIAC CATHETERIZATION  2007, 2016    STENTS X3  (DIRK--DR CASTRO)    LITHOTRIPSY      2-3X    ORTHOPEDIC SURGERY Right 1964    FEMUR ORIF  (DR RODRIGUEZ)    TONSILLECTOMY      TOTAL KNEE ARTHROPLASTY Right 2018    TOTAL KNEE ARTHROPLASTY Right 2022    UROLOGICAL SURGERY      CYSTO    VASCULAR SURGERY Right     LEG VEIN BYPASS (DIRK)     Social History     Socioeconomic History    Marital status:      Spouse name: Not on file    Number of children: Not on file    Years of education: Not on file    Highest education level: Not on file   Occupational History    Not on file   Tobacco Use

## 2024-11-30 NOTE — PROGRESS NOTES
Chief Complaint   Patient presents with    Follow-up      \"Have you been to the ER, urgent care clinic since your last visit?  Hospitalized since your last visit?\"    NO    “Have you seen or consulted any other health care providers outside of [facility] since your last visit?”    NO            Click Here for Release of Records Request     Health Maintenance Due   Topic Date Due    DTaP/Tdap/Td vaccine (1 - Tdap) Never done    Annual Wellness Visit (Medicare)  11/01/2024     
file   Tobacco Use    Smoking status: Former     Current packs/day: 0.00     Average packs/day: 2.0 packs/day for 27.0 years (54.0 ttl pk-yrs)     Types: Cigarettes     Start date:      Quit date: 1985     Years since quittin.9     Passive exposure: Past    Smokeless tobacco: Never   Vaping Use    Vaping status: Never Used   Substance and Sexual Activity    Alcohol use: No    Drug use: No    Sexual activity: Not Currently   Other Topics Concern    Not on file   Social History Narrative    Not on file     Social Determinants of Health     Financial Resource Strain: Low Risk  (2024)    Overall Financial Resource Strain (CARDIA)     Difficulty of Paying Living Expenses: Not hard at all   Food Insecurity: No Food Insecurity (2024)    Hunger Vital Sign     Worried About Running Out of Food in the Last Year: Never true     Ran Out of Food in the Last Year: Never true   Transportation Needs: Unknown (2024)    PRAPARE - Transportation     Lack of Transportation (Medical): Not on file     Lack of Transportation (Non-Medical): No   Physical Activity: Inactive (2023)    Exercise Vital Sign     Days of Exercise per Week: 0 days     Minutes of Exercise per Session: 0 min   Stress: Not on file   Social Connections: Not on file   Intimate Partner Violence: Not on file   Housing Stability: Unknown (2024)    Housing Stability Vital Sign     Unable to Pay for Housing in the Last Year: Not on file     Number of Places Lived in the Last Year: Not on file     Unstable Housing in the Last Year: No     Family History   Problem Relation Age of Onset    Lung Disease Mother     Asthma Mother     Cancer Father         LUNG    Cancer Brother         BRAIN    Anesth Problems Neg Hx     Heart Disease Brother     Heart Disease Brother     Asthma Daughter     Heart Disease Brother      Current Outpatient Medications   Medication Sig    mupirocin (BACTROBAN) 2 % ointment Apply topically 3 times daily.

## 2024-12-05 ENCOUNTER — OFFICE VISIT (OUTPATIENT)
Facility: CLINIC | Age: 79
End: 2024-12-05
Payer: MEDICARE

## 2024-12-05 VITALS
DIASTOLIC BLOOD PRESSURE: 69 MMHG | TEMPERATURE: 97.4 F | HEART RATE: 90 BPM | WEIGHT: 263 LBS | BODY MASS INDEX: 34.85 KG/M2 | OXYGEN SATURATION: 96 % | HEIGHT: 73 IN | SYSTOLIC BLOOD PRESSURE: 110 MMHG | RESPIRATION RATE: 16 BRPM

## 2024-12-05 DIAGNOSIS — J45.20 MILD INTERMITTENT ASTHMA WITHOUT COMPLICATION: ICD-10-CM

## 2024-12-05 DIAGNOSIS — M17.11 PRIMARY OSTEOARTHRITIS OF RIGHT KNEE: Primary | ICD-10-CM

## 2024-12-05 DIAGNOSIS — I25.10 ARTERIOSCLEROSIS OF CORONARY ARTERY: ICD-10-CM

## 2024-12-05 DIAGNOSIS — I10 PRIMARY HYPERTENSION: ICD-10-CM

## 2024-12-05 DIAGNOSIS — K21.9 GASTROESOPHAGEAL REFLUX DISEASE WITHOUT ESOPHAGITIS: ICD-10-CM

## 2024-12-05 DIAGNOSIS — E78.2 MIXED HYPERLIPIDEMIA: ICD-10-CM

## 2024-12-05 DIAGNOSIS — L98.9 SKIN LESION OF LEFT LEG: ICD-10-CM

## 2024-12-05 DIAGNOSIS — I89.0 LYMPHEDEMA: ICD-10-CM

## 2024-12-05 DIAGNOSIS — G47.33 OSA ON CPAP: ICD-10-CM

## 2024-12-05 DIAGNOSIS — N40.1 BENIGN PROSTATIC HYPERPLASIA WITH NOCTURIA: ICD-10-CM

## 2024-12-05 DIAGNOSIS — R35.1 BENIGN PROSTATIC HYPERPLASIA WITH NOCTURIA: ICD-10-CM

## 2024-12-05 PROCEDURE — G8427 DOCREV CUR MEDS BY ELIG CLIN: HCPCS | Performed by: FAMILY MEDICINE

## 2024-12-05 PROCEDURE — G8484 FLU IMMUNIZE NO ADMIN: HCPCS | Performed by: FAMILY MEDICINE

## 2024-12-05 PROCEDURE — 1159F MED LIST DOCD IN RCRD: CPT | Performed by: FAMILY MEDICINE

## 2024-12-05 PROCEDURE — 1036F TOBACCO NON-USER: CPT | Performed by: FAMILY MEDICINE

## 2024-12-05 PROCEDURE — 3074F SYST BP LT 130 MM HG: CPT | Performed by: FAMILY MEDICINE

## 2024-12-05 PROCEDURE — G8417 CALC BMI ABV UP PARAM F/U: HCPCS | Performed by: FAMILY MEDICINE

## 2024-12-05 PROCEDURE — 1123F ACP DISCUSS/DSCN MKR DOCD: CPT | Performed by: FAMILY MEDICINE

## 2024-12-05 PROCEDURE — 1160F RVW MEDS BY RX/DR IN RCRD: CPT | Performed by: FAMILY MEDICINE

## 2024-12-05 PROCEDURE — 99214 OFFICE O/P EST MOD 30 MIN: CPT | Performed by: FAMILY MEDICINE

## 2024-12-05 PROCEDURE — 3078F DIAST BP <80 MM HG: CPT | Performed by: FAMILY MEDICINE

## 2024-12-05 RX ORDER — ROSUVASTATIN CALCIUM 10 MG/1
TABLET, COATED ORAL
Qty: 90 TABLET | Refills: 1 | Status: SHIPPED | OUTPATIENT
Start: 2024-12-05

## 2024-12-05 RX ORDER — MUPIROCIN 20 MG/G
OINTMENT TOPICAL
Qty: 22 G | Refills: 0 | Status: SHIPPED | OUTPATIENT
Start: 2024-12-05 | End: 2024-12-12

## 2024-12-05 RX ORDER — METOPROLOL SUCCINATE 25 MG/1
12.5 TABLET, EXTENDED RELEASE ORAL DAILY
Qty: 45 TABLET | Refills: 1 | Status: SHIPPED | OUTPATIENT
Start: 2024-12-05

## 2024-12-05 RX ORDER — PREDNISONE 10 MG/1
10 TABLET ORAL DAILY
Qty: 10 TABLET | Refills: 0 | Status: SHIPPED | OUTPATIENT
Start: 2024-12-05 | End: 2024-12-15

## 2024-12-05 RX ORDER — BUMETANIDE 1 MG/1
1 TABLET ORAL 2 TIMES DAILY
Qty: 180 TABLET | Refills: 1 | Status: SHIPPED | OUTPATIENT
Start: 2024-12-05

## 2024-12-05 ASSESSMENT — ENCOUNTER SYMPTOMS
CHEST TIGHTNESS: 0
APNEA: 0

## 2024-12-05 NOTE — PROGRESS NOTES
\"Have you been to the ER, urgent care clinic since your last visit?  Hospitalized since your last visit?\"    NO    “Have you seen or consulted any other health care providers outside of [facility] since your last visit?”    NO            Click Here for Release of Records Request     Health Maintenance Due   Topic Date Due    DTaP/Tdap/Td vaccine (1 - Tdap) Never done    Annual Wellness Visit (Medicare)  11/01/2024

## 2024-12-05 NOTE — PROGRESS NOTES
[facility]    History of Present Illness:   Carlos Martino is a 79 y.o. male with history of HLD, CAD, Adrenal incidentaloma, GERD, OSMANI, Asthma, Septic arthritis.   CC: Follow up  History provided by patient and Records    HPI:  Skin Infection: Patient has improved from dog scratch on the back of the left leg.  Completed Doxycycline.    OSMANI: Wearing C-pap nightly.      Asthma: Currently well controlled     OAB/BPH: Completed the injections and noting symptoms have improved significantly so far.  Taking Flomax and Dutasteride.     Leg Swelling: Has been taking a double dose of Bumex recently to help with swelling, is down several pounds.    Right knee: Patient with persistent right knee pain s/p Right knee     Coronary Artery Disease Follow up:  Today patient reports he is feeling well and overall his symptoms are controlled on his current medications.  Noting persistent Lower extremity anne-marie, but this is normal  he  has had a history of acute myocardial infarction in the past.  Prior management has included:   - Coronary stenting: Yes  - Coronary bypass: No     he has not had CHF      he is  on a statin ( Crestor ).  he is not on antiplatelet therapy.  he is  on a beta blocker.  he is not on a regular Nitrate therapy.  he is not use Nitroglycerin as needed for chest pain.     Residual symptoms of CAD include none.  Patient denies any current chest pain.  Risk factors are controlled or at goal.  Primary risk factors include elevated cholesterol and hypertension.    Health Maintenance  Health Maintenance Due   Topic Date Due    DTaP/Tdap/Td vaccine (1 - Tdap) Never done    Annual Wellness Visit (Medicare)  11/01/2024       Past Medical, Family, and Social History:     Current Outpatient Medications on File Prior to Visit   Medication Sig Dispense Refill    cyclobenzaprine (FLEXERIL) 5 MG tablet Take 1 tablet by mouth nightly 90 tablet 1    Cholecalciferol (VITAMIN D) 125 MCG (5000 UT) CAPS Take 2

## 2024-12-27 ENCOUNTER — TELEPHONE (OUTPATIENT)
Facility: CLINIC | Age: 79
End: 2024-12-27

## 2024-12-27 DIAGNOSIS — L03.119 CELLULITIS OF LOWER EXTREMITY, UNSPECIFIED LATERALITY: Primary | ICD-10-CM

## 2024-12-27 RX ORDER — DOXYCYCLINE HYCLATE 100 MG
100 TABLET ORAL 2 TIMES DAILY
Qty: 14 TABLET | Refills: 0 | Status: SHIPPED | OUTPATIENT
Start: 2024-12-27 | End: 2025-01-03

## 2024-12-27 NOTE — TELEPHONE ENCOUNTER
Pt states he has weeping leg and need a refill of the antibiotic. Please advise and send to [facility] in [place].

## 2025-01-02 ENCOUNTER — OFFICE VISIT (OUTPATIENT)
Facility: CLINIC | Age: 80
End: 2025-01-02

## 2025-01-02 VITALS
SYSTOLIC BLOOD PRESSURE: 124 MMHG | HEART RATE: 83 BPM | HEIGHT: 73 IN | OXYGEN SATURATION: 92 % | BODY MASS INDEX: 34.99 KG/M2 | DIASTOLIC BLOOD PRESSURE: 85 MMHG | RESPIRATION RATE: 16 BRPM | WEIGHT: 264 LBS | TEMPERATURE: 97.7 F

## 2025-01-02 DIAGNOSIS — L03.90 ACUTE CELLULITIS: Primary | ICD-10-CM

## 2025-01-02 RX ORDER — CLINDAMYCIN HYDROCHLORIDE 300 MG/1
300 CAPSULE ORAL 3 TIMES DAILY
Qty: 30 CAPSULE | Refills: 0 | Status: SHIPPED | OUTPATIENT
Start: 2025-01-02 | End: 2025-01-12

## 2025-01-02 ASSESSMENT — ENCOUNTER SYMPTOMS
APNEA: 0
ABDOMINAL PAIN: 0
ABDOMINAL DISTENTION: 0
CHEST TIGHTNESS: 0

## 2025-01-02 ASSESSMENT — PATIENT HEALTH QUESTIONNAIRE - PHQ9
SUM OF ALL RESPONSES TO PHQ QUESTIONS 1-9: 0
SUM OF ALL RESPONSES TO PHQ9 QUESTIONS 1 & 2: 0
1. LITTLE INTEREST OR PLEASURE IN DOING THINGS: NOT AT ALL
SUM OF ALL RESPONSES TO PHQ QUESTIONS 1-9: 0
2. FEELING DOWN, DEPRESSED OR HOPELESS: NOT AT ALL

## 2025-01-02 NOTE — PROGRESS NOTES
[facility]    History of Present Illness:   Carlos Martino is a 79 y.o. male with history of HLD, CAD, Adrenal incidentaloma, GERD, OSMANI, Asthma, Septic arthritis.   CC: Cellulitis  History provided by patient and Records    HPI:  Abscess/cellulitis follow up:  Patient presents for follow up of Cellulitis of the  Left leg .  Cellulitis evaluated 1 month ago.  Infection started secondary to puncture wound. Patient Has been compliant with previous directions  - Current Therapy:  Doxycycline  prescribed. Reports Cellulitis, improving sloly on therapy.  Patient Scott completed antibiotic regimen.  - Continued Symptoms: Reports erythema and drainage; Denies tenderness, fever, chills, nausea, and vomiting.  - Pain: Denies pain  - Fevers greater than 102?: No  - Streaking across skin?: No  - Enlarged Lymph Nodes?: No  - Currently taking immunocompromising drugs?: no     Health Maintenance  Health Maintenance Due   Topic Date Due    DTaP/Tdap/Td vaccine (1 - Tdap) Never done    Annual Wellness Visit (Medicare)  11/01/2024    Depression Screen  01/10/2025       Past Medical, Family, and Social History:     Current Outpatient Medications on File Prior to Visit   Medication Sig Dispense Refill    rosuvastatin (CRESTOR) 10 MG tablet TAKE 1 TABLET BY MOUTH AT BEDTIME 90 tablet 1    metoprolol succinate (TOPROL XL) 25 MG extended release tablet Take 0.5 tablets by mouth daily 45 tablet 1    bumetanide (BUMEX) 1 MG tablet Take 1 tablet by mouth 2 times daily 180 tablet 1    diclofenac (VOLTAREN) 75 MG EC tablet TAKE 1 TABLET BY MOUTH TWICE DAILY 180 tablet 1    cyclobenzaprine (FLEXERIL) 5 MG tablet Take 1 tablet by mouth nightly 90 tablet 1    Cholecalciferol (VITAMIN D) 125 MCG (5000 UT) CAPS Take 2 capsules by mouth daily 180 capsule 1    montelukast (SINGULAIR) 10 MG tablet TAKE 1 TABLET BY MOUTH AT BEDTIME FOR BREATHING 90 tablet 1    albuterol sulfate HFA (PROVENTIL;VENTOLIN;PROAIR) 108 (90 Base) MCG/ACT

## 2025-02-03 DIAGNOSIS — J45.20 MILD INTERMITTENT ASTHMA WITHOUT COMPLICATION: ICD-10-CM

## 2025-02-03 RX ORDER — MONTELUKAST SODIUM 10 MG/1
TABLET ORAL
Qty: 90 TABLET | Refills: 1 | Status: SHIPPED | OUTPATIENT
Start: 2025-02-03

## 2025-03-04 DIAGNOSIS — E55.9 VITAMIN D DEFICIENCY: ICD-10-CM

## 2025-03-05 ENCOUNTER — OFFICE VISIT (OUTPATIENT)
Facility: CLINIC | Age: 80
End: 2025-03-05

## 2025-03-05 VITALS
HEART RATE: 84 BPM | HEIGHT: 73 IN | WEIGHT: 263 LBS | OXYGEN SATURATION: 94 % | DIASTOLIC BLOOD PRESSURE: 74 MMHG | TEMPERATURE: 97.3 F | RESPIRATION RATE: 18 BRPM | SYSTOLIC BLOOD PRESSURE: 112 MMHG | BODY MASS INDEX: 34.85 KG/M2

## 2025-03-05 DIAGNOSIS — Z00.00 MEDICARE ANNUAL WELLNESS VISIT, SUBSEQUENT: Primary | ICD-10-CM

## 2025-03-05 DIAGNOSIS — I89.0 LYMPHEDEMA: ICD-10-CM

## 2025-03-05 DIAGNOSIS — G47.33 OSA ON CPAP: ICD-10-CM

## 2025-03-05 DIAGNOSIS — M17.11 PRIMARY OSTEOARTHRITIS OF RIGHT KNEE: ICD-10-CM

## 2025-03-05 DIAGNOSIS — E78.2 MIXED HYPERLIPIDEMIA: ICD-10-CM

## 2025-03-05 DIAGNOSIS — R35.1 BENIGN PROSTATIC HYPERPLASIA WITH NOCTURIA: ICD-10-CM

## 2025-03-05 DIAGNOSIS — K21.9 GASTROESOPHAGEAL REFLUX DISEASE WITHOUT ESOPHAGITIS: ICD-10-CM

## 2025-03-05 DIAGNOSIS — N40.1 BENIGN PROSTATIC HYPERPLASIA WITH NOCTURIA: ICD-10-CM

## 2025-03-05 DIAGNOSIS — I10 PRIMARY HYPERTENSION: ICD-10-CM

## 2025-03-05 RX ORDER — HYDROCHLOROTHIAZIDE 12.5 MG/1
12.5 CAPSULE ORAL EVERY MORNING
Qty: 30 CAPSULE | Refills: 0 | Status: SHIPPED | OUTPATIENT
Start: 2025-03-05

## 2025-03-05 ASSESSMENT — ENCOUNTER SYMPTOMS
ABDOMINAL PAIN: 0
BACK PAIN: 0
CHEST TIGHTNESS: 0
ABDOMINAL DISTENTION: 0
APNEA: 0

## 2025-03-05 ASSESSMENT — PATIENT HEALTH QUESTIONNAIRE - PHQ9
SUM OF ALL RESPONSES TO PHQ QUESTIONS 1-9: 0
2. FEELING DOWN, DEPRESSED OR HOPELESS: NOT AT ALL
SUM OF ALL RESPONSES TO PHQ QUESTIONS 1-9: 0
1. LITTLE INTEREST OR PLEASURE IN DOING THINGS: NOT AT ALL

## 2025-03-05 NOTE — PROGRESS NOTES
Medicare Annual Wellness Visit    Carlos Martino is here for Medicare AWV and Follow-up    Assessment & Plan   Medicare annual wellness visit, subsequent  Primary hypertension  -     Comprehensive Metabolic Panel; Future  Gastroesophageal reflux disease without esophagitis  -     CBC; Future  Primary osteoarthritis of right knee  Mixed hyperlipidemia  -     Lipid Panel; Future  Lymphedema  -     hydroCHLOROthiazide 12.5 MG capsule; Take 1 capsule by mouth every morning, Disp-30 capsule, R-0Normal  OSMANI on CPAP  Benign prostatic hyperplasia with nocturia       Return in about 3 months (around 6/5/2025).     Subjective     Patient's complete Health Risk Assessment and screening values have been reviewed and are found in Flowsheets. The following problems were reviewed today and where indicated follow up appointments were made and/or referrals ordered.    Positive Risk Factor Screenings with Interventions:                Abnormal BMI (obese):  Body mass index is 34.7 kg/m². (!) Abnormal  Interventions:  low carbohydrate diet        Vision Screen:  Do you have difficulty driving, watching TV, or doing any of your daily activities because of your eyesight?: No  Have you had an eye exam within the past year?: (!) No  Interventions:   Patient encouraged to make appointment with their eye specialist    Safety:  Do you have non-slip mats or non-slip surfaces or shower bars or grab bars in your shower or bathtub?: (!) No  Interventions:  Discussed     Advanced Directives:  Do you have a Living Will?: (!) No    Intervention:  has NO advanced directive - information provided        Objective   Vitals:    03/05/25 0956   BP: 112/74   Site: Left Upper Arm   Position: Sitting   Cuff Size: Large Adult   Pulse: 84   Resp: 18   Temp: 97.3 °F (36.3 °C)   TempSrc: Oral   SpO2: 94%   Weight: 119.3 kg (263 lb)   Height: 1.854 m (6' 1\")      Body mass index is 34.7 kg/m².                 Allergies   Allergen Reactions    Sulfa Antibiotics

## 2025-03-05 NOTE — PROGRESS NOTES
[facility]    History of Present Illness:   Carlos Martino is a 79 y.o. male with history of HLD, CAD, Adrenal incidentaloma, GERD, OSMANI, Asthma, Septic arthritis.   CC: Medicare Wellness/Follow up  History provided by patient and Records    HPI:  OSMANI: Wearing C-pap nightly.      Asthma: Currently well controlled      OAB/BPH: Completed the injections and noting symptoms have improved significantly so far.  Had a cystoscopy that was normal.  Noting some deeper groin area pain though for several days prior to scope, then resolved. Taking Flomax and Dutasteride.     Leg swelling : Persistent issue, is using Bumex 1 mg BID now.    Coronary Artery Disease Follow up:  Today patient reports he is feeling well and overall his symptoms are controlled on his current medications.  Noting persistent Lower extremity anne-marie, but this is normal  he  has had a history of acute myocardial infarction in the past.  Prior management has included:   - Coronary stenting: Yes  - Coronary bypass: No     he has not had CHF      he is  on a statin ( Crestor ).  he is not on antiplatelet therapy.  he is  on a beta blocker.  he is not on a regular Nitrate therapy.  he is not use Nitroglycerin as needed for chest pain.     Residual symptoms of CAD include none.  Patient denies any current chest pain.  Risk factors are controlled or at goal.  Primary risk factors include elevated cholesterol and hypertension.    Health Maintenance  Health Maintenance Due   Topic Date Due    DTaP/Tdap/Td vaccine (1 - Tdap) Never done    Respiratory Syncytial Virus (RSV) Pregnant or age 60 yrs+ (1 - 1-dose 75+ series) Never done    Annual Wellness Visit (Medicare)  11/01/2024       Past Medical, Family, and Social History:     Current Outpatient Medications on File Prior to Visit   Medication Sig Dispense Refill    VITAMIN D3 125 MCG (5000 UT) CAPS capsule TAKE 2 CAPSULES BY MOUTH DAILY 180 capsule 1    montelukast (SINGULAIR) 10 MG tablet

## 2025-03-06 LAB
ALBUMIN SERPL-MCNC: 3.7 G/DL (ref 3.5–5)
ALBUMIN/GLOB SERPL: 1.3 (ref 1.1–2.2)
ALP SERPL-CCNC: 119 U/L (ref 45–117)
ALT SERPL-CCNC: 37 U/L (ref 12–78)
ANION GAP SERPL CALC-SCNC: 7 MMOL/L (ref 2–12)
AST SERPL-CCNC: 23 U/L (ref 15–37)
BILIRUB SERPL-MCNC: 0.6 MG/DL (ref 0.2–1)
BUN SERPL-MCNC: 20 MG/DL (ref 6–20)
BUN/CREAT SERPL: 22 (ref 12–20)
CALCIUM SERPL-MCNC: 9 MG/DL (ref 8.5–10.1)
CHLORIDE SERPL-SCNC: 108 MMOL/L (ref 97–108)
CHOLEST SERPL-MCNC: 126 MG/DL
CO2 SERPL-SCNC: 26 MMOL/L (ref 21–32)
CREAT SERPL-MCNC: 0.89 MG/DL (ref 0.7–1.3)
ERYTHROCYTE [DISTWIDTH] IN BLOOD BY AUTOMATED COUNT: 15 % (ref 11.5–14.5)
GLOBULIN SER CALC-MCNC: 2.8 G/DL (ref 2–4)
GLUCOSE SERPL-MCNC: 91 MG/DL (ref 65–100)
HCT VFR BLD AUTO: 49.9 % (ref 36.6–50.3)
HDLC SERPL-MCNC: 39 MG/DL
HDLC SERPL: 3.2 (ref 0–5)
HGB BLD-MCNC: 15.4 G/DL (ref 12.1–17)
LDLC SERPL CALC-MCNC: 47 MG/DL (ref 0–100)
MCH RBC QN AUTO: 28.2 PG (ref 26–34)
MCHC RBC AUTO-ENTMCNC: 30.9 G/DL (ref 30–36.5)
MCV RBC AUTO: 91.2 FL (ref 80–99)
NRBC # BLD: 0 K/UL (ref 0–0.01)
NRBC BLD-RTO: 0 PER 100 WBC
PLATELET # BLD AUTO: 190 K/UL (ref 150–400)
PMV BLD AUTO: 11.7 FL (ref 8.9–12.9)
POTASSIUM SERPL-SCNC: 4.3 MMOL/L (ref 3.5–5.1)
PROT SERPL-MCNC: 6.5 G/DL (ref 6.4–8.2)
RBC # BLD AUTO: 5.47 M/UL (ref 4.1–5.7)
SODIUM SERPL-SCNC: 141 MMOL/L (ref 136–145)
TRIGL SERPL-MCNC: 200 MG/DL
VLDLC SERPL CALC-MCNC: 40 MG/DL
WBC # BLD AUTO: 7.9 K/UL (ref 4.1–11.1)

## 2025-04-28 DIAGNOSIS — M79.10 MUSCLE PAIN: ICD-10-CM

## 2025-04-28 DIAGNOSIS — M17.11 PRIMARY OSTEOARTHRITIS OF RIGHT KNEE: ICD-10-CM

## 2025-04-28 RX ORDER — DICLOFENAC SODIUM 75 MG/1
75 TABLET, DELAYED RELEASE ORAL 2 TIMES DAILY
Qty: 180 TABLET | Refills: 1 | Status: SHIPPED | OUTPATIENT
Start: 2025-04-28

## 2025-04-28 RX ORDER — CYCLOBENZAPRINE HCL 5 MG
5 TABLET ORAL NIGHTLY
Qty: 90 TABLET | Refills: 1 | Status: SHIPPED | OUTPATIENT
Start: 2025-04-28

## 2025-06-05 ENCOUNTER — OFFICE VISIT (OUTPATIENT)
Facility: CLINIC | Age: 80
End: 2025-06-05

## 2025-06-05 VITALS
SYSTOLIC BLOOD PRESSURE: 110 MMHG | TEMPERATURE: 98.7 F | DIASTOLIC BLOOD PRESSURE: 75 MMHG | BODY MASS INDEX: 35.54 KG/M2 | OXYGEN SATURATION: 96 % | WEIGHT: 268.2 LBS | RESPIRATION RATE: 16 BRPM | HEART RATE: 77 BPM | HEIGHT: 73 IN

## 2025-06-05 DIAGNOSIS — M17.11 PRIMARY OSTEOARTHRITIS OF RIGHT KNEE: ICD-10-CM

## 2025-06-05 DIAGNOSIS — E66.01 MORBID (SEVERE) OBESITY DUE TO EXCESS CALORIES (HCC): ICD-10-CM

## 2025-06-05 DIAGNOSIS — I25.10 ARTERIOSCLEROSIS OF CORONARY ARTERY: ICD-10-CM

## 2025-06-05 DIAGNOSIS — E78.2 MIXED HYPERLIPIDEMIA: ICD-10-CM

## 2025-06-05 DIAGNOSIS — I10 PRIMARY HYPERTENSION: ICD-10-CM

## 2025-06-05 DIAGNOSIS — K21.9 GASTROESOPHAGEAL REFLUX DISEASE WITHOUT ESOPHAGITIS: ICD-10-CM

## 2025-06-05 DIAGNOSIS — R35.0 URINARY FREQUENCY: Primary | ICD-10-CM

## 2025-06-05 PROBLEM — J06.9 VIRAL URI: Status: RESOLVED | Noted: 2024-07-16 | Resolved: 2025-06-05

## 2025-06-05 RX ORDER — METOPROLOL SUCCINATE 25 MG/1
12.5 TABLET, EXTENDED RELEASE ORAL DAILY
Qty: 45 TABLET | Refills: 1 | Status: SHIPPED | OUTPATIENT
Start: 2025-06-05

## 2025-06-05 RX ORDER — BUMETANIDE 1 MG/1
1 TABLET ORAL 2 TIMES DAILY
Qty: 180 TABLET | Refills: 1 | Status: SHIPPED | OUTPATIENT
Start: 2025-06-05

## 2025-06-05 RX ORDER — ROSUVASTATIN CALCIUM 10 MG/1
TABLET, COATED ORAL
Qty: 90 TABLET | Refills: 1 | Status: SHIPPED | OUTPATIENT
Start: 2025-06-05

## 2025-06-05 SDOH — ECONOMIC STABILITY: FOOD INSECURITY: WITHIN THE PAST 12 MONTHS, YOU WORRIED THAT YOUR FOOD WOULD RUN OUT BEFORE YOU GOT MONEY TO BUY MORE.: NEVER TRUE

## 2025-06-05 SDOH — ECONOMIC STABILITY: INCOME INSECURITY: IN THE LAST 12 MONTHS, WAS THERE A TIME WHEN YOU WERE NOT ABLE TO PAY THE MORTGAGE OR RENT ON TIME?: NO

## 2025-06-05 SDOH — ECONOMIC STABILITY: FOOD INSECURITY: WITHIN THE PAST 12 MONTHS, THE FOOD YOU BOUGHT JUST DIDN'T LAST AND YOU DIDN'T HAVE MONEY TO GET MORE.: NEVER TRUE

## 2025-06-05 SDOH — ECONOMIC STABILITY: TRANSPORTATION INSECURITY
IN THE PAST 12 MONTHS, HAS THE LACK OF TRANSPORTATION KEPT YOU FROM MEDICAL APPOINTMENTS OR FROM GETTING MEDICATIONS?: NO

## 2025-06-05 SDOH — ECONOMIC STABILITY: TRANSPORTATION INSECURITY
IN THE PAST 12 MONTHS, HAS LACK OF TRANSPORTATION KEPT YOU FROM MEETINGS, WORK, OR FROM GETTING THINGS NEEDED FOR DAILY LIVING?: NO

## 2025-06-05 ASSESSMENT — ENCOUNTER SYMPTOMS
APNEA: 0
CHEST TIGHTNESS: 0

## 2025-06-05 NOTE — PROGRESS NOTES
Chief Complaint   Patient presents with    Follow-up Chronic Condition    Numbness     Right thumb, point and middle finger    Knee Pain     right      \"Have you been to the ER, urgent care clinic since your last visit?  Hospitalized since your last visit?\"    NO    “Have you seen or consulted any other health care providers outside of [facility] since your last visit?”    NO            Click Here for Release of Records Request     Health Maintenance Due   Topic Date Due    DTaP/Tdap/Td vaccine (1 - Tdap) Never done    Respiratory Syncytial Virus (RSV) Pregnant or age 60 yrs+ (1 - 1-dose 75+ series) Never done

## 2025-06-05 NOTE — PROGRESS NOTES
[facility]    History of Present Illness:   Carlos Martino is a 79 y.o. male with history of HLD, CAD, Adrenal incidentaloma, GERD, OSMANI, Asthma, Septic arthritis.   CC: Follow up  History provided by patient and Records    HPI:  Carpal Tunnel Symptoms:  Patient presents for evaluation of Carpal Tunnel like symptoms affecting the right hands.  Previous diagnoses include None.  Patient describes numbness occurring over the last 5 months.  Specific areas of the hand/wrist affected include the thumb, pointer, and middle ringer. Pain is described as None. Numbness is worsened by none.    - Symptoms worse at night/morning? Yes  - Pain improved with \"Flicking Wrist\": Yes   - Has patient noted Hand weakness? No  - Has Patient noted Muscle Atrophy? No  - Has patient noted permanaent changes in sensation? No  - Patient has not tried wrist braces.   - Current medications include: none which have been ineffective.    - Previous work up includes: Construction and other     OSMANI: Wearing C-pap nightly.      Asthma: Currently well controlled     OAB/BPH: Taking Flomax and Dutasteride. Injections did not help in the lpong run.    Leg swelling : Persistent issue, is using Bumex 1 mg BID now.     Coronary Artery Disease Follow up:  Today patient reports he is feeling well and overall his symptoms are controlled on his current medications.  Noting persistent Lower extremity anne-marie, but this is normal  he  has had a history of acute myocardial infarction in the past.  Prior management has included:   - Coronary stenting: Yes  - Coronary bypass: No     he has not had CHF      he is  on a statin ( Crestor ).  he is not on antiplatelet therapy.  he is  on a beta blocker.  he is not on a regular Nitrate therapy.  he is not use Nitroglycerin as needed for chest pain.     Residual symptoms of CAD include none.  Patient denies any current chest pain.  Risk factors are controlled or at goal.  Primary risk factors include

## 2025-06-06 LAB
APPEARANCE UR: CLEAR
BACTERIA URNS QL MICRO: NEGATIVE /HPF
BILIRUB UR QL: NEGATIVE
COLOR UR: ABNORMAL
EPITH CASTS URNS QL MICRO: ABNORMAL /LPF
GLUCOSE UR STRIP.AUTO-MCNC: NEGATIVE MG/DL
HGB UR QL STRIP: NEGATIVE
HYALINE CASTS URNS QL MICRO: ABNORMAL /LPF (ref 0–5)
KETONES UR QL STRIP.AUTO: NEGATIVE MG/DL
LEUKOCYTE ESTERASE UR QL STRIP.AUTO: ABNORMAL
NITRITE UR QL STRIP.AUTO: NEGATIVE
PH UR STRIP: 5 (ref 5–8)
PROT UR STRIP-MCNC: NEGATIVE MG/DL
RBC #/AREA URNS HPF: ABNORMAL /HPF (ref 0–5)
SP GR UR REFRACTOMETRY: 1.01 (ref 1–1.03)
UROBILINOGEN UR QL STRIP.AUTO: 0.2 EU/DL (ref 0.2–1)
WBC URNS QL MICRO: ABNORMAL /HPF (ref 0–4)

## 2025-06-07 LAB
BACTERIA SPEC CULT: ABNORMAL
CC UR VC: ABNORMAL
SERVICE CMNT-IMP: ABNORMAL

## 2025-06-10 ENCOUNTER — RESULTS FOLLOW-UP (OUTPATIENT)
Facility: CLINIC | Age: 80
End: 2025-06-10

## 2025-07-24 ENCOUNTER — OFFICE VISIT (OUTPATIENT)
Facility: CLINIC | Age: 80
End: 2025-07-24

## 2025-07-24 VITALS
WEIGHT: 268.2 LBS | HEIGHT: 73 IN | TEMPERATURE: 97.7 F | RESPIRATION RATE: 18 BRPM | BODY MASS INDEX: 35.54 KG/M2 | OXYGEN SATURATION: 93 % | HEART RATE: 75 BPM | DIASTOLIC BLOOD PRESSURE: 70 MMHG | SYSTOLIC BLOOD PRESSURE: 122 MMHG

## 2025-07-24 DIAGNOSIS — J06.9 UPPER RESPIRATORY TRACT INFECTION, UNSPECIFIED TYPE: Primary | ICD-10-CM

## 2025-07-24 RX ORDER — PREDNISONE 10 MG/1
10 TABLET ORAL DAILY
Qty: 7 TABLET | Refills: 0 | Status: SHIPPED | OUTPATIENT
Start: 2025-07-24 | End: 2025-07-31

## 2025-07-24 SDOH — ECONOMIC STABILITY: FOOD INSECURITY: WITHIN THE PAST 12 MONTHS, YOU WORRIED THAT YOUR FOOD WOULD RUN OUT BEFORE YOU GOT MONEY TO BUY MORE.: NEVER TRUE

## 2025-07-24 ASSESSMENT — ENCOUNTER SYMPTOMS
COUGH: 1
SINUS PRESSURE: 1
SORE THROAT: 1

## 2025-07-24 NOTE — PROGRESS NOTES
\"Have you been to the ER, urgent care clinic since your last visit?  Hospitalized since your last visit?\"    NO    “Have you seen or consulted any other health care providers outside of [facility] since your last visit?”    NO            Click Here for Release of Records Request

## 2025-07-24 NOTE — PROGRESS NOTES
[facility]    History of Present Illness:   Carlos Martino is a 80 y.o. male with history of HLD, CAD, Adrenal incidentaloma, GERD, OSMANI, Asthma, Septic arthritis.   CC: Head congestion  History provided by patient and Records    HPI:  Sinusitis: Patient presents with sinusitis symptoms over the last 3 days.  Patient reports symptoms including nasal congestion, cough, sore throats and denies fevers, headaches, spitting/vomiting mucous.  Patient describes pain of the bilateral sinus(es).  Patient does not report Viral URI prior to developing these symptoms.  Previous OTC treatments include Tylenol compete helped the dripping.  he reports possible recent sick contacts.  he does not have history of recurrent sinusitis.  - Last antibiotic use: Remote     Health Maintenance  Health Maintenance Due   Topic Date Due    DTaP/Tdap/Td vaccine (1 - Tdap) Never done    Respiratory Syncytial Virus (RSV) Pregnant or age 60 yrs+ (1 - 1-dose 75+ series) Never done       Past Medical, Family, and Social History:     Current Outpatient Medications on File Prior to Visit   Medication Sig Dispense Refill    bumetanide (BUMEX) 1 MG tablet Take 1 tablet by mouth 2 times daily 180 tablet 1    metoprolol succinate (TOPROL XL) 25 MG extended release tablet Take 0.5 tablets by mouth daily 45 tablet 1    rosuvastatin (CRESTOR) 10 MG tablet TAKE 1 TABLET BY MOUTH AT BEDTIME 90 tablet 1    diclofenac sodium (VOLTAREN) 1 % GEL Apply 1 g topically 4 times daily Apply to knees.  Can take with Voltaren pills 150 g 1    cyclobenzaprine (FLEXERIL) 5 MG tablet TAKE 1 TABLET BY MOUTH EVERY NIGHT 90 tablet 1    diclofenac (VOLTAREN) 75 MG EC tablet TAKE 1 TABLET BY MOUTH TWICE DAILY 180 tablet 1    hydroCHLOROthiazide 12.5 MG capsule Take 1 capsule by mouth every morning 30 capsule 0    VITAMIN D3 125 MCG (5000 UT) CAPS capsule TAKE 2 CAPSULES BY MOUTH DAILY 180 capsule 1    montelukast (SINGULAIR) 10 MG tablet TAKE 1 TABLET BY

## 2025-07-31 DIAGNOSIS — J06.9 UPPER RESPIRATORY TRACT INFECTION, UNSPECIFIED TYPE: ICD-10-CM

## 2025-07-31 RX ORDER — PREDNISONE 10 MG/1
10 TABLET ORAL DAILY
Qty: 7 TABLET | Refills: 0 | Status: SHIPPED | OUTPATIENT
Start: 2025-07-31 | End: 2025-08-07

## 2025-08-11 DIAGNOSIS — J06.9 UPPER RESPIRATORY TRACT INFECTION, UNSPECIFIED TYPE: ICD-10-CM

## 2025-08-11 RX ORDER — PREDNISONE 10 MG/1
10 TABLET ORAL DAILY
Qty: 7 TABLET | Refills: 0 | Status: SHIPPED | OUTPATIENT
Start: 2025-08-11 | End: 2025-08-18

## (undated) DEVICE — CONTAINER,SPECIMEN,3OZ,OR STRL: Brand: MEDLINE

## (undated) DEVICE — BANDAGE COMPR SELF ADH 5 YDX6 IN TAN STRL PREMIERPRO LF

## (undated) DEVICE — Device

## (undated) DEVICE — ROCKER SWITCH PENCIL BLADE ELECTRODE, HOLSTER: Brand: EDGE

## (undated) DEVICE — DRAPE,EXTREMITY,89X128,STERILE: Brand: MEDLINE

## (undated) DEVICE — SPONGE LAP 18X18IN STRL -- 5/PK

## (undated) DEVICE — INFECTION CONTROL KIT SYS

## (undated) DEVICE — HANDPIECE SET WITH BONE CLEANING TIP AND SUCTION TUBE: Brand: INTERPULSE

## (undated) DEVICE — SUTURE VCRL SZ 2-0 L36IN ABSRB UD L40MM CT 1/2 CIR J957H

## (undated) DEVICE — SCRUB DRY SURG EZ SCRUB BRUSH PREOPERATIVE GRN

## (undated) DEVICE — GARMENT,MEDLINE,DVT,INT,CALF,LG, GEN2: Brand: MEDLINE

## (undated) DEVICE — STERILE POLYISOPRENE POWDER-FREE SURGICAL GLOVES WITH EMOLLIENT COATING: Brand: PROTEXIS

## (undated) DEVICE — TRAY PREP DRY W/ PREM GLV 2 APPL 6 SPNG 2 UNDPD 1 OVERWRAP

## (undated) DEVICE — 4-PORT MANIFOLD: Brand: NEPTUNE 2

## (undated) DEVICE — TOTAL TRAY, 16FR 10ML SIL FOLEY, URN: Brand: MEDLINE

## (undated) DEVICE — DEVON™ KNEE AND BODY STRAP 60" X 3" (1.5 M X 7.6 CM): Brand: DEVON

## (undated) DEVICE — GLOVE SURG SZ 65 L12IN FNGR THK94MIL STD WHT LTX FREE

## (undated) DEVICE — STRAP,POSITIONING,KNEE/BODY,FOAM,4X60": Brand: MEDLINE

## (undated) DEVICE — SUTURE VCRL SZ 1 L36IN ABSRB UD L36MM CT-1 1/2 CIR J947H

## (undated) DEVICE — SLIM BODY SKIN STAPLER: Brand: APPOSE ULC

## (undated) DEVICE — SUTURE STRATAFIX SYMMETRIC PDS + SZ 1 L18IN ABSRB VLT L48MM SXPP1A400

## (undated) DEVICE — DRAPE,REIN 53X77,STERILE: Brand: MEDLINE

## (undated) DEVICE — Z DISCONTINUED USE 2744636  DRESSING AQUACEL 14 IN ALG W3.5XL14IN POLYUR FLM CVR W/ HYDRCOLL

## (undated) DEVICE — PADDING CAST SPEC 6INX4YD COT --

## (undated) DEVICE — BANDAGE COMPR M W6INXL10YD WHT BGE VELC E MTRX HK AND LOOP

## (undated) DEVICE — PAD,ABDOMINAL,5"X9",ST,LF,25/BX: Brand: MEDLINE INDUSTRIES, INC.

## (undated) DEVICE — SOLUTION IRRIG 1000ML H2O STRL BLT

## (undated) DEVICE — 2108 SERIES SAGITTAL BLADE, NO OFFSET  (12.4 X 1.19 X 82.1MM)

## (undated) DEVICE — SURGICAL PROCEDURE PACK BASIN MAJ SET CUST NO CAUT

## (undated) DEVICE — SOLUTION SURG PREP 26 CC PURPREP

## (undated) DEVICE — KENDALL SCD EXPRESS SLEEVES, KNEE LENGTH, MEDIUM: Brand: KENDALL SCD

## (undated) DEVICE — GLOVE ORTHO 8   MSG9480

## (undated) DEVICE — SUTURE VCRL SZ 0 L36IN ABSRB VLT L40MM CT 1/2 CIR J358H

## (undated) DEVICE — SOLUTION IRRIG 3000ML 0.9% SOD CHL USP UROMATIC PLAS CONT

## (undated) DEVICE — SOLUTION IRRIG 3000ML 0.9% SOD CHL FLX CONT 0797208] ICU MEDICAL INC]

## (undated) DEVICE — SOLUTION IRRIG 1000ML STRL H2O USP PLAS POUR BTL

## (undated) DEVICE — HOOK LOCK LATEX FREE ELASTIC BANDAGE D/L 6INX10YD

## (undated) DEVICE — SWAB CULT DBL W/O CHAR RAYON TIP AMIES GEL CLMN FOR COLL

## (undated) DEVICE — 3M™ IOBAN™ 2 ANTIMICROBIAL INCISE DRAPE 6651EZ: Brand: IOBAN™ 2

## (undated) DEVICE — REM POLYHESIVE ADULT PATIENT RETURN ELECTRODE: Brand: VALLEYLAB

## (undated) DEVICE — PADDING CST 6IN STERILE --

## (undated) DEVICE — TRAY CATH 16F URIN MTR LTX -- CONVERT TO ITEM 363111

## (undated) DEVICE — NEEDLE HYPO 22GA L1.5IN BLK S STL HUB POLYPR SHLD REG BVL

## (undated) DEVICE — Device: Brand: JELCO

## (undated) DEVICE — CARTRIDGE BNE CEM MIX UNIV TWR VAC ROTOR BRK OFF NOZ W/O

## (undated) DEVICE — T4 HOOD

## (undated) DEVICE — MIXING SYS CEM BNE VAC -- QUICKVAC 15/BX

## (undated) DEVICE — 2108 SERIES SAGITTAL BLADE (18.6 X 0.8 X 73.8MM)

## (undated) DEVICE — STERILE POLYISOPRENE POWDER-FREE SURGICAL GLOVES: Brand: PROTEXIS

## (undated) DEVICE — GLOVE SURG SZ 65 L12IN FNGR THK79MIL GRN LTX FREE

## (undated) DEVICE — (D)PREP SKN CHLRAPRP APPL 26ML -- CONVERT TO ITEM 371833

## (undated) DEVICE — SUTURE VCRL SZ 2 L54IN ABSRB UD L65MM TP-1 1/2 CIR J880T

## (undated) DEVICE — PADDING CST 4INX4YD --

## (undated) DEVICE — GLOVE SURG SZ 8 L12IN FNGR THK94MIL STD WHT LTX FREE

## (undated) DEVICE — PACK,ORTHOPEDIC III,AURORA: Brand: MEDLINE

## (undated) DEVICE — TOTAL JOINT - SMH: Brand: MEDLINE INDUSTRIES, INC.

## (undated) DEVICE — STRYKER PERFORMANCE SERIES SAGITTAL BLADE: Brand: STRYKER PERFORMANCE SERIES

## (undated) DEVICE — HYPODERMIC SAFETY NEEDLE: Brand: MAGELLAN

## (undated) DEVICE — SYR LR LCK 1ML GRAD NSAF 30ML --

## (undated) DEVICE — IMMOBILIZER PREMIER PRO KNEE CUTAWAY CNTOUR 22INCH COOL BLU

## (undated) DEVICE — SPONGE GZ W4XL4IN COT 12 PLY TYP VII WVN C FLD DSGN

## (undated) DEVICE — DRESSING PETRO W3XL8IN N ADH OIL EMUL GZ CURAD

## (undated) DEVICE — GOWN,SIRUS,FABRNF,XL,20/CS: Brand: MEDLINE

## (undated) DEVICE — CUSTOM CAST PD STR

## (undated) DEVICE — SYR 20ML LL STRL LF --

## (undated) DEVICE — SOLUTION IV 50ML 0.9% SOD CHL